# Patient Record
Sex: MALE | Race: WHITE | NOT HISPANIC OR LATINO | Employment: FULL TIME | URBAN - METROPOLITAN AREA
[De-identification: names, ages, dates, MRNs, and addresses within clinical notes are randomized per-mention and may not be internally consistent; named-entity substitution may affect disease eponyms.]

---

## 2017-05-27 DIAGNOSIS — N41.1 CHRONIC PROSTATITIS: ICD-10-CM

## 2017-05-28 RX ORDER — ATORVASTATIN CALCIUM 20 MG/1
TABLET, FILM COATED ORAL
Qty: 90 TABLET | Refills: 12 | Status: SHIPPED | OUTPATIENT
Start: 2017-05-28 | End: 2018-06-04 | Stop reason: SDUPTHER

## 2017-05-28 RX ORDER — DOXYCYCLINE HYCLATE 100 MG
TABLET ORAL
Qty: 30 TABLET | Refills: 12 | Status: SHIPPED | OUTPATIENT
Start: 2017-05-28 | End: 2018-06-18 | Stop reason: CLARIF

## 2017-08-23 DIAGNOSIS — Z00.00 ROUTINE GENERAL MEDICAL EXAMINATION AT A HEALTH CARE FACILITY: Primary | ICD-10-CM

## 2017-10-13 ENCOUNTER — OFFICE VISIT (OUTPATIENT)
Dept: PULMONOLOGY | Facility: CLINIC | Age: 67
End: 2017-10-13

## 2017-10-13 ENCOUNTER — HOSPITAL ENCOUNTER (OUTPATIENT)
Dept: CARDIOLOGY | Facility: CLINIC | Age: 67
Discharge: HOME | End: 2017-10-13

## 2017-10-13 ENCOUNTER — CLINICAL SUPPORT (OUTPATIENT)
Dept: INTERNAL MEDICINE | Facility: CLINIC | Age: 67
End: 2017-10-13

## 2017-10-13 VITALS
HEART RATE: 79 BPM | SYSTOLIC BLOOD PRESSURE: 156 MMHG | HEIGHT: 69 IN | DIASTOLIC BLOOD PRESSURE: 88 MMHG | BODY MASS INDEX: 30.96 KG/M2 | WEIGHT: 209 LBS

## 2017-10-13 DIAGNOSIS — Z00.00 ANNUAL PHYSICAL EXAM: Primary | ICD-10-CM

## 2017-10-13 DIAGNOSIS — Z00.00 ROUTINE GENERAL MEDICAL EXAMINATION AT A HEALTH CARE FACILITY: ICD-10-CM

## 2017-10-13 DIAGNOSIS — Z00.00 ROUTINE GENERAL MEDICAL EXAMINATION AT A HEALTH CARE FACILITY: Primary | ICD-10-CM

## 2017-10-13 LAB
ALBUMIN SERPL BCP-MCNC: 3.8 G/DL
ALP SERPL-CCNC: 94 U/L
ALT SERPL W/O P-5'-P-CCNC: 21 U/L
ANION GAP SERPL CALC-SCNC: 11 MMOL/L
AST SERPL-CCNC: 23 U/L
BILIRUB SERPL-MCNC: 0.6 MG/DL
BUN SERPL-MCNC: 17 MG/DL
CALCIUM SERPL-MCNC: 9.7 MG/DL
CHLORIDE SERPL-SCNC: 95 MMOL/L
CHOLEST SERPL-MCNC: 181 MG/DL
CHOLEST/HDLC SERPL: 2.7 {RATIO}
CO2 SERPL-SCNC: 28 MMOL/L
COMPLEXED PSA SERPL-MCNC: 1.3 NG/ML
CREAT SERPL-MCNC: 0.9 MG/DL
ERYTHROCYTE [DISTWIDTH] IN BLOOD BY AUTOMATED COUNT: 12.3 %
EST. GFR  (AFRICAN AMERICAN): >60 ML/MIN/1.73 M^2
EST. GFR  (NON AFRICAN AMERICAN): >60 ML/MIN/1.73 M^2
ESTIMATED AVG GLUCOSE: 105 MG/DL
GLUCOSE SERPL-MCNC: 102 MG/DL
HBA1C MFR BLD HPLC: 5.3 %
HCT VFR BLD AUTO: 44.2 %
HCV AB SERPL QL IA: NEGATIVE
HDLC SERPL-MCNC: 67 MG/DL
HDLC SERPL: 37 %
HGB BLD-MCNC: 15.1 G/DL
LDLC SERPL CALC-MCNC: 98 MG/DL
MCH RBC QN AUTO: 29.4 PG
MCHC RBC AUTO-ENTMCNC: 34.2 G/DL
MCV RBC AUTO: 86 FL
NONHDLC SERPL-MCNC: 114 MG/DL
PLATELET # BLD AUTO: 244 K/UL
PMV BLD AUTO: 8.8 FL
POTASSIUM SERPL-SCNC: 4.5 MMOL/L
PROT SERPL-MCNC: 6.9 G/DL
RBC # BLD AUTO: 5.14 M/UL
SODIUM SERPL-SCNC: 134 MMOL/L
TRIGL SERPL-MCNC: 80 MG/DL
TSH SERPL DL<=0.005 MIU/L-ACNC: 0.58 UIU/ML
WBC # BLD AUTO: 6.2 K/UL

## 2017-10-13 PROCEDURE — 93000 ELECTROCARDIOGRAM COMPLETE: CPT | Mod: ,,, | Performed by: INTERNAL MEDICINE

## 2017-10-13 PROCEDURE — 84443 ASSAY THYROID STIM HORMONE: CPT

## 2017-10-13 PROCEDURE — 80053 COMPREHEN METABOLIC PANEL: CPT

## 2017-10-13 PROCEDURE — 99387 INIT PM E/M NEW PAT 65+ YRS: CPT | Mod: ,,, | Performed by: INTERNAL MEDICINE

## 2017-10-13 PROCEDURE — 86803 HEPATITIS C AB TEST: CPT

## 2017-10-13 PROCEDURE — 80061 LIPID PANEL: CPT

## 2017-10-13 PROCEDURE — 85027 COMPLETE CBC AUTOMATED: CPT

## 2017-10-13 PROCEDURE — 99999 PR PBB SHADOW E&M-EST. PATIENT-LVL III: CPT | Mod: PBBFAC,,, | Performed by: INTERNAL MEDICINE

## 2017-10-13 PROCEDURE — 83036 HEMOGLOBIN GLYCOSYLATED A1C: CPT

## 2017-10-13 PROCEDURE — 84153 ASSAY OF PSA TOTAL: CPT

## 2017-10-13 RX ORDER — VALSARTAN AND HYDROCHLOROTHIAZIDE 320; 25 MG/1; MG/1
1 TABLET, FILM COATED ORAL DAILY
Refills: 3 | Status: ON HOLD | COMMUNITY
Start: 2017-08-28 | End: 2019-07-22 | Stop reason: HOSPADM

## 2017-10-13 NOTE — LETTER
"October 14, 2017    Manpreet Hamilton  [address]  [place] [address]             [facility]  [address]  [facility] [address]  Phone: [phone] Dear Pancho,      Thank you for allowing me to serve you and perform your Executive Health exam on 10/13/2017. This letter will serve as a brief summary of the physical findings and laboratory/studies performed and recommendations at this time. Today's assessment is essentially normal. With the weather cooling down, it is time to get back on the links. Thanks for the Bear. The other grandfather is named "Alberto".         If you have any questions or concerns, please don't hesitate to call.    Sincerely,        Matias Pradhan MD     "

## 2017-10-14 NOTE — PROGRESS NOTES
Subjective:       Patient ID: Manpreet Hamilton is a 67 y.o. male.    Chief Complaint: Annual Exam    HPI  68 yo  from [place] Comes for his periodic health exam. He is feeling well, had a ganglion taking on his toe last month as his only significant medical encounter since last year. Has no active medical compliant. He has no played much golf the weather has been too hot.   Review of Systems   Constitutional: Negative.    HENT: Negative.    Eyes: Negative.    Respiratory: Negative.    Cardiovascular: Negative.    Gastrointestinal: Negative.                 Repair of hiatal hernia with good results.   Genitourinary: Negative.         Hx of chronic epidymitis   Musculoskeletal: Negative.         Ganglion surgerically removed from toe recently   Skin: Negative.    Neurological: Negative.    Psychiatric/Behavioral: Negative.    All other systems reviewed and are negative.      Objective:      Physical Exam   Constitutional: He is oriented to person, place, and time. He appears well-developed and well-nourished.   HENT:   Head: Normocephalic and atraumatic.   Right Ear: External ear normal.   Left Ear: External ear normal.   Eyes: Conjunctivae and EOM are normal. Pupils are equal, round, and reactive to light.   Neck: Normal range of motion. Neck supple.   Cardiovascular: Normal rate, regular rhythm and normal heart sounds.    Pulmonary/Chest: Effort normal and breath sounds normal.   Peak flow 500 l/min   Abdominal: Soft. Bowel sounds are normal.   Musculoskeletal: Normal range of motion.   Neurological: He is alert and oriented to person, place, and time. He has normal reflexes.   Skin: Skin is warm and dry.   Psychiatric: He has a normal mood and affect. His behavior is normal. Judgment and thought content normal.       Assessment:       No diagnosis found.    Plan:           Labs; All parameters are normal EKG is normal. IMP: Healthy Male 15 pounds overweight

## 2018-02-08 ENCOUNTER — TELEPHONE (OUTPATIENT)
Dept: PULMONOLOGY | Facility: CLINIC | Age: 68
End: 2018-02-08

## 2018-02-08 NOTE — TELEPHONE ENCOUNTER
Has had a cough for a month. Chest x-ray in [place] suggests fibrosis. His Ct shows mild increase in interstitial  Line marking. No honeycombing and no appreciable ground glass. Will treat with steroids for alveolitis.

## 2018-03-01 ENCOUNTER — HOSPITAL ENCOUNTER (OUTPATIENT)
Dept: RADIOLOGY | Facility: HOSPITAL | Age: 68
Discharge: HOME | End: 2018-03-01
Attending: INTERNAL MEDICINE
Payer: COMMERCIAL

## 2018-03-01 ENCOUNTER — HOSPITAL ENCOUNTER (OUTPATIENT)
Dept: PULMONOLOGY | Facility: CLINIC | Age: 68
Discharge: HOME | End: 2018-03-01
Payer: COMMERCIAL

## 2018-03-01 ENCOUNTER — OFFICE VISIT (OUTPATIENT)
Dept: PULMONOLOGY | Facility: CLINIC | Age: 68
End: 2018-03-01
Payer: COMMERCIAL

## 2018-03-01 VITALS — BODY MASS INDEX: 31.31 KG/M2 | WEIGHT: 212 LBS

## 2018-03-01 DIAGNOSIS — R91.8 ABNORMAL FINDINGS ON DIAGNOSTIC IMAGING OF LUNG: ICD-10-CM

## 2018-03-01 DIAGNOSIS — J84.9 ILD (INTERSTITIAL LUNG DISEASE): Primary | ICD-10-CM

## 2018-03-01 PROCEDURE — 71250 CT THORAX DX C-: CPT | Mod: TC

## 2018-03-01 PROCEDURE — 94727 GAS DIL/WSHOT DETER LNG VOL: CPT | Mod: S$GLB,,, | Performed by: INTERNAL MEDICINE

## 2018-03-01 PROCEDURE — 99999 PR PBB SHADOW E&M-EST. PATIENT-LVL II: CPT | Mod: PBBFAC,,, | Performed by: INTERNAL MEDICINE

## 2018-03-01 PROCEDURE — 94729 DIFFUSING CAPACITY: CPT | Mod: S$GLB,,, | Performed by: INTERNAL MEDICINE

## 2018-03-01 PROCEDURE — 71250 CT THORAX DX C-: CPT | Mod: 26,,, | Performed by: RADIOLOGY

## 2018-03-01 PROCEDURE — 99214 OFFICE O/P EST MOD 30 MIN: CPT | Mod: S$GLB,,, | Performed by: INTERNAL MEDICINE

## 2018-03-01 NOTE — LETTER
March 5, 2018     No referring provider defined for this encounter.               [facility]  [address]  [facility] [address]  Phone: [phone] Dear {MR/MRS/MS/DR:48140} No ref. provider found     I saw your patient, Manpreet Hamilton, in the Pl[facility] Surgery [facility] on 3/1/2018.    Thank you for referring him and allowing me to participate in his care.    Sincerely,          Radames Pradhan MD

## 2018-03-02 NOTE — PROGRESS NOTES
Subjective:       Patient ID: Manpreet Hamilton is a 67 y.o. male.    Chief Complaint: Pneumonia and Shortness of Breath    HPI  66 yo male from [place] who I have cared for years comes for follow up on recent onset on dyspnea following a atypical pneumonia in early February and  A CT done in Mid February shows fibrotic changes in the lower lung fields and sub pleurral areas. I empirically put him on 40 mg of prednisone for two and he is currently completing a 2 week course of 30 mg. He admits to mild exertional dyspnea with exertion. He has not played golf because of weather not because of his breathing.  On exam today he has bi basilar velcro rales. His PFT's show mild reduction In FVC and TLC BUT a normal DLCO of 88% His resting Sa02: 97% and after walking the length of the carter and back it did drop to 93%  His CT today is compared to his CT done in [place] in Feb. And in my opinion it is slightly better but fibrotic changes remain. Could be early UIP vs NSIP.  I favor the latter because of a documented illness with subsequent respiratory symptoms..  His CT showed significant coronary artery calcifications, He denies chest pain. Will ask Dr. Scanlon, his PCP in [place] to order a stress EKG. Will taper his prednisone to 20 mg per two weeks then 10 mg for two weeks and then repeat the lung volumes and DLCO       No flowsheet data found.]  Review of Systems   Constitutional: Negative.    HENT: Negative.    Eyes: Negative.    Respiratory: Negative.  Positive for dyspnea on extertion.         Recent bout of atypical pneumonia,, now abnormal CT and PFT's   Cardiovascular: Negative.         Coronary artery calcifications  on CT   Genitourinary: Negative.    Musculoskeletal: Negative.    Skin: Negative.    Gastrointestinal: Negative.    Neurological: Negative.    Psychiatric/Behavioral: Negative.        Objective:      Physical Exam   Constitutional: He is oriented to person, place, and time. He appears  well-developed and well-nourished.   HENT:   Head: Normocephalic and atraumatic.   Right Ear: External ear normal.   Left Ear: External ear normal.   Eyes: Conjunctivae and EOM are normal. Pupils are equal, round, and reactive to light.   Neck: Normal range of motion. Neck supple.   Cardiovascular: Normal rate, regular rhythm and normal heart sounds.    Pulmonary/Chest: Effort normal and breath sounds normal.   Bi basilar velcro rales   Abdominal: Soft. Bowel sounds are normal.   Musculoskeletal: Normal range of motion.   Neurological: He is alert and oriented to person, place, and time. He has normal reflexes.   Skin: Skin is warm and dry.   Psychiatric: He has a normal mood and affect. His behavior is normal. Judgment and thought content normal.           Assessment:       No diagnosis found.    Outpatient Encounter Prescriptions as of 3/1/2018   Medication Sig Dispense Refill    ascorbic acid (VITAMIN C) 100 MG tablet Take 100 mg by mouth once daily.      aspirin (ECOTRIN) 81 MG EC tablet Take 81 mg by mouth once daily.      atorvastatin (LIPITOR) 20 MG tablet TAKE ONE TABLET BY MOUTH NIGHTLY 90 tablet 12    azelastine (ASTELIN) 137 mcg nasal spray 1 spray by Nasal route 2 (two) times daily. prn      doxycycline (VIBRA-TABS) 100 MG tablet TAKE ONE TABLET BY MOUTH ONCE DAILY FIRST 5 DAYS OF EACH MONTH 30 tablet 12    valsartan-hydrochlorothiazide (DIOVAN-HCT) 320-25 mg per tablet Take 1 tablet by mouth once daily.  3    VIAGRA 50 mg tablet Take 1 tablet (50 mg total) by mouth once daily. 30 tablet 10     No facility-administered encounter medications on file as of 3/1/2018.      No orders of the defined types were placed in this encounter.    Plan:        IMP NSIP following recent bout of atypical pneumonia  Continue on prednisone and get a stress EKG       PFT's done at [facility] in [place]: Feb 5: FVC;2.70 liters 63%, TLC:3.62 liters and DLCO:19.27  67%   The DLCO today is improved at 88% and  FVC 2.81  liters  and T.10 liters. Has improved on steroids. Certainly justifies the continued use but will taper and do CT and PFT's in six weeks.      DLCO cer

## 2018-03-06 DIAGNOSIS — J84.10 PULMONARY FIBROSIS: Primary | ICD-10-CM

## 2018-03-14 ENCOUNTER — PATIENT MESSAGE (OUTPATIENT)
Dept: PULMONOLOGY | Facility: CLINIC | Age: 68
End: 2018-03-14

## 2018-03-14 DIAGNOSIS — J84.9 INTERSTITIAL LUNG DISEASE: ICD-10-CM

## 2018-03-23 ENCOUNTER — TELEPHONE (OUTPATIENT)
Dept: PULMONOLOGY | Facility: CLINIC | Age: 68
End: 2018-03-23

## 2018-03-26 ENCOUNTER — PATIENT MESSAGE (OUTPATIENT)
Dept: PULMONOLOGY | Facility: CLINIC | Age: 68
End: 2018-03-26

## 2018-03-26 DIAGNOSIS — M54.50 LOW BACK PAIN, NON-SPECIFIC: ICD-10-CM

## 2018-03-26 DIAGNOSIS — R35.0 FREQUENCY OF URINATION: Primary | ICD-10-CM

## 2018-03-27 ENCOUNTER — HOSPITAL ENCOUNTER (OUTPATIENT)
Dept: PULMONOLOGY | Facility: CLINIC | Age: 68
Discharge: HOME | End: 2018-03-27
Payer: COMMERCIAL

## 2018-03-27 ENCOUNTER — HOSPITAL ENCOUNTER (OUTPATIENT)
Dept: RADIOLOGY | Facility: HOSPITAL | Age: 68
Discharge: HOME | End: 2018-03-27
Attending: INTERNAL MEDICINE
Payer: COMMERCIAL

## 2018-03-27 ENCOUNTER — OFFICE VISIT (OUTPATIENT)
Dept: PULMONOLOGY | Facility: CLINIC | Age: 68
End: 2018-03-27
Payer: COMMERCIAL

## 2018-03-27 ENCOUNTER — CLINICAL SUPPORT (OUTPATIENT)
Dept: INTERNAL MEDICINE | Facility: CLINIC | Age: 68
End: 2018-03-27
Attending: INTERNAL MEDICINE
Payer: COMMERCIAL

## 2018-03-27 ENCOUNTER — OFFICE VISIT (OUTPATIENT)
Dept: UROLOGY | Facility: CLINIC | Age: 68
End: 2018-03-27
Payer: COMMERCIAL

## 2018-03-27 VITALS
WEIGHT: 215.38 LBS | SYSTOLIC BLOOD PRESSURE: 131 MMHG | HEART RATE: 83 BPM | BODY MASS INDEX: 31.9 KG/M2 | HEIGHT: 69 IN | DIASTOLIC BLOOD PRESSURE: 69 MMHG

## 2018-03-27 VITALS
WEIGHT: 215.38 LBS | SYSTOLIC BLOOD PRESSURE: 128 MMHG | OXYGEN SATURATION: 96 % | DIASTOLIC BLOOD PRESSURE: 74 MMHG | HEIGHT: 69 IN | BODY MASS INDEX: 31.9 KG/M2 | HEART RATE: 78 BPM

## 2018-03-27 DIAGNOSIS — N13.8 BPH WITH OBSTRUCTION/LOWER URINARY TRACT SYMPTOMS: ICD-10-CM

## 2018-03-27 DIAGNOSIS — R39.15 URINARY URGENCY: ICD-10-CM

## 2018-03-27 DIAGNOSIS — G62.9 ACQUIRED POLYNEUROPATHY: Primary | ICD-10-CM

## 2018-03-27 DIAGNOSIS — J84.9 INTERSTITIAL LUNG DISEASE: ICD-10-CM

## 2018-03-27 DIAGNOSIS — R06.00 DYSPNEA AND RESPIRATORY ABNORMALITY: ICD-10-CM

## 2018-03-27 DIAGNOSIS — R39.14 FEELING OF INCOMPLETE BLADDER EMPTYING: Primary | ICD-10-CM

## 2018-03-27 DIAGNOSIS — N40.1 BPH WITH OBSTRUCTION/LOWER URINARY TRACT SYMPTOMS: ICD-10-CM

## 2018-03-27 DIAGNOSIS — J84.10 PULMONARY FIBROSIS: ICD-10-CM

## 2018-03-27 DIAGNOSIS — R06.89 DYSPNEA AND RESPIRATORY ABNORMALITY: ICD-10-CM

## 2018-03-27 DIAGNOSIS — J84.9 ILD (INTERSTITIAL LUNG DISEASE): Primary | ICD-10-CM

## 2018-03-27 DIAGNOSIS — M54.50 LOW BACK PAIN, NON-SPECIFIC: ICD-10-CM

## 2018-03-27 DIAGNOSIS — R35.0 URINE FREQUENCY: ICD-10-CM

## 2018-03-27 LAB
BILIRUB SERPL-MCNC: NORMAL MG/DL
BLOOD URINE, POC: NORMAL
COLOR, POC UA: NORMAL
FOLATE SERPL-MCNC: 12.1 NG/ML
GLUCOSE UR QL STRIP: NORMAL
KETONES UR QL STRIP: NORMAL
LEUKOCYTE ESTERASE URINE, POC: NORMAL
NITRITE, POC UA: NORMAL
PH, POC UA: 8
POC RESIDUAL URINE VOLUME: 53 ML (ref 0–100)
PROTEIN, POC: NORMAL
SPECIFIC GRAVITY, POC UA: 1
T4 FREE SERPL-MCNC: 0.92 NG/DL
TSH SERPL DL<=0.005 MIU/L-ACNC: 0.42 UIU/ML
UROBILINOGEN, POC UA: NORMAL
VIT B12 SERPL-MCNC: 422 PG/ML

## 2018-03-27 PROCEDURE — 51798 US URINE CAPACITY MEASURE: CPT | Mod: S$GLB,,, | Performed by: NURSE PRACTITIONER

## 2018-03-27 PROCEDURE — 94727 GAS DIL/WSHOT DETER LNG VOL: CPT | Mod: S$GLB,,, | Performed by: INTERNAL MEDICINE

## 2018-03-27 PROCEDURE — 84425 ASSAY OF VITAMIN B-1: CPT

## 2018-03-27 PROCEDURE — 71250 CT THORAX DX C-: CPT | Mod: TC

## 2018-03-27 PROCEDURE — 81001 URINALYSIS AUTO W/SCOPE: CPT | Mod: S$GLB,,, | Performed by: NURSE PRACTITIONER

## 2018-03-27 PROCEDURE — 94729 DIFFUSING CAPACITY: CPT | Mod: S$GLB,,, | Performed by: INTERNAL MEDICINE

## 2018-03-27 PROCEDURE — 72148 MRI LUMBAR SPINE W/O DYE: CPT | Mod: 26,,, | Performed by: RADIOLOGY

## 2018-03-27 PROCEDURE — 99214 OFFICE O/P EST MOD 30 MIN: CPT | Mod: S$GLB,,, | Performed by: INTERNAL MEDICINE

## 2018-03-27 PROCEDURE — 99999 PR PBB SHADOW E&M-EST. PATIENT-LVL III: CPT | Mod: PBBFAC,,, | Performed by: INTERNAL MEDICINE

## 2018-03-27 PROCEDURE — 84443 ASSAY THYROID STIM HORMONE: CPT

## 2018-03-27 PROCEDURE — 82746 ASSAY OF FOLIC ACID SERUM: CPT

## 2018-03-27 PROCEDURE — 72148 MRI LUMBAR SPINE W/O DYE: CPT | Mod: TC

## 2018-03-27 PROCEDURE — 99203 OFFICE O/P NEW LOW 30 MIN: CPT | Mod: 25,S$GLB,, | Performed by: NURSE PRACTITIONER

## 2018-03-27 PROCEDURE — 84439 ASSAY OF FREE THYROXINE: CPT

## 2018-03-27 PROCEDURE — 82607 VITAMIN B-12: CPT

## 2018-03-27 PROCEDURE — 71250 CT THORAX DX C-: CPT | Mod: 26,,, | Performed by: RADIOLOGY

## 2018-03-27 PROCEDURE — 99999 PR PBB SHADOW E&M-EST. PATIENT-LVL III: CPT | Mod: PBBFAC,,, | Performed by: NURSE PRACTITIONER

## 2018-03-27 RX ORDER — PREDNISONE 20 MG/1
10 TABLET ORAL DAILY
Refills: 0 | COMMUNITY
Start: 2018-03-08 | End: 2018-11-15

## 2018-03-27 RX ORDER — BACLOFEN 10 MG/1
2 TABLET ORAL 3 TIMES DAILY PRN
Refills: 1 | Status: ON HOLD | COMMUNITY
Start: 2018-03-22 | End: 2018-06-27 | Stop reason: CLARIF

## 2018-03-27 RX ORDER — TAMSULOSIN HYDROCHLORIDE 0.4 MG/1
0.4 CAPSULE ORAL NIGHTLY
Qty: 30 CAPSULE | Refills: 11 | Status: SHIPPED | OUTPATIENT
Start: 2018-03-27 | End: 2018-11-15

## 2018-03-27 RX ORDER — DOXYCYCLINE 100 MG/1
1 CAPSULE ORAL DAILY
Refills: 22 | COMMUNITY
Start: 2018-03-06 | End: 2018-06-04 | Stop reason: SDUPTHER

## 2018-03-27 NOTE — LETTER
March 29, 2018    Manpreet Hamilton  [address]  [place] [address]             [facility]  [address]  [facility] [address]  Phone: [phone] Dear Pancho,    Your studies of lung function declined very slightly since the original studies of March 1. I have added cellceft and reduced your prednisone to 10 mg a day. Your MRI shows moderate degenrative changes with some disc bulging and mild spinal stenosis. If agreeable, I will make an appointment with one of our neurosurgeons to review your options when you return to see me in May.      You have several issues that need close monitoring.    If you have any questions or concerns, please don't hesitate to call.    Sincerely,        Matias Pradhan MD

## 2018-03-27 NOTE — PROGRESS NOTES
Subjective:       Patient ID: Manpreet Hamilton is a 67 y.o. male.    Chief Complaint: incomplete bladder emptying; Urinary Frequency; and Urinary Urgency      HPI: Manpreet Hamilton is a 67 y.o. male who presents today for evaluation and management of feeling of incomplete bladder emptying, urinary frequency and urinary urgency. [facility] but a new patient to me. This is his initial visit to clinic.    Today he presents to clinic for feeling of incomplete bladder emptying, urinary frequency and urgency. His symptoms have been occurring for the past year and gradually worsening over the past few months. He reports daytime frequency about every 2 hours and nocturia x 1. He reports urgency but is able to hold urine for short time. He denies any incontinence. He reports he has to strain to start his urine stream and once stream is started, it is intermittent stream. He denies dysuria, hematuria, flank pain. He had a UTI over 15 years ago and denies any hx of kidney stones or personal or family hx of prostate cancer.   He has ED and takes viagra, which he receives from his PCP. He has hx of epididymitis, which has been treated with doxycycline in the past, but denies any recent episode of epididymitis.  He drinks coffee throughout the day and evening.    Lab Results   Component Value Date    PSA 1.3 10/13/2017    PSA 1.0 08/05/2016    PSA 1.1 09/03/2015       Review of patient's allergies indicates:   Allergen Reactions    Penicillins        Current Outpatient Prescriptions   Medication Sig Dispense Refill    ascorbic acid (VITAMIN C) 100 MG tablet Take 100 mg by mouth once daily.      aspirin (ECOTRIN) 81 MG EC tablet Take 81 mg by mouth once daily.      atorvastatin (LIPITOR) 20 MG tablet TAKE ONE TABLET BY MOUTH NIGHTLY 90 tablet 12    baclofen (LIORESAL) 10 MG tablet 2 tablets 3 (three) times daily as needed.  1    doxycycline (VIBRA-TABS) 100 MG tablet TAKE ONE TABLET  BY MOUTH ONCE DAILY FIRST 5 DAYS OF EACH MONTH 30 tablet 12    doxycycline (VIBRAMYCIN) 100 MG Cap Take 1 capsule by mouth once daily. 1 first 5 days of each month  22    predniSONE (DELTASONE) 20 MG tablet Take 20 mg by mouth once daily.  0    valsartan-hydrochlorothiazide (DIOVAN-HCT) 320-25 mg per tablet Take 1 tablet by mouth once daily.  3    VIAGRA 50 mg tablet Take 1 tablet (50 mg total) by mouth once daily. 30 tablet 10    azelastine (ASTELIN) 137 mcg nasal spray 1 spray by Nasal route 2 (two) times daily. prn      tamsulosin (FLOMAX) 0.4 mg Cp24 Take 1 capsule (0.4 mg total) by mouth every evening. 30 capsule 11     No current facility-administered medications for this visit.        Past Medical History:   Diagnosis Date    Hyperlipemia     Hypertension     Nasal congestion        Past Surgical History:   Procedure Laterality Date    COLONOSCOPY N/A 8/5/2016    Procedure: COLONOSCOPY;  Surgeon: Leon Padilla MD;  Location: [address]);  Service: Endoscopy;  Laterality: N/A;    HAND SURGERY      left    HIATAL HERNIA REPAIR      TONSILLECTOMY, ADENOIDECTOMY      VASECTOMY         Family History   Problem Relation Age of Onset    Coronary artery disease Mother     Parkinsonism Father     Dementia Father     Coronary artery disease Sister     Multiple sclerosis Sister        Review of Systems   Constitutional: Negative for chills, fatigue and fever.   HENT: Negative for congestion and trouble swallowing.    Eyes: Negative for visual disturbance.   Respiratory: Negative for chest tightness and shortness of breath.    Cardiovascular: Positive for leg swelling. Negative for chest pain and palpitations.   Gastrointestinal: Negative for abdominal pain, constipation, diarrhea, nausea and vomiting.   Genitourinary: Positive for frequency and urgency. Negative for dysuria, flank pain, hematuria, scrotal swelling and testicular pain.        See HPI   Musculoskeletal: Negative for back  pain and gait problem.   Skin: Negative for rash.   Allergic/Immunologic: Negative for immunocompromised state.   Neurological: Negative for dizziness, seizures, syncope, weakness, light-headedness and headaches.   Hematological: Negative for adenopathy.   Psychiatric/Behavioral: Negative for confusion. The patient is not nervous/anxious.          All other systems were reviewed and were negative.    Objective:     Vitals:    03/27/18 1021   BP: 131/69   Pulse: 83        Physical Exam   Nursing note and vitals reviewed.  Constitutional: He is oriented to person, place, and time. He appears well-developed and well-nourished.   HENT:   Head: Normocephalic and atraumatic.   Eyes: Conjunctivae and EOM are normal.   Neck: Normal range of motion.   Cardiovascular: Normal rate and regular rhythm.    Pulmonary/Chest: Effort normal. No respiratory distress.   Abdominal: Soft.   Genitourinary: Right testis shows no mass, no swelling and no tenderness. Left testis shows no mass, no swelling and no tenderness. Circumcised. No penile erythema or penile tenderness. No discharge found.       Musculoskeletal: Normal range of motion. He exhibits no edema.   Neurological: He is alert and oriented to person, place, and time.   Skin: Skin is warm and dry.     Psychiatric: He has a normal mood and affect. His behavior is normal. Judgment and thought content normal.         Lab Results   Component Value Date    CREATININE 0.9 10/13/2017     Lab Results   Component Value Date    EGFRNONAA >60.0 10/13/2017     Lab Results   Component Value Date    ESTGFRAFRICA >60.0 10/13/2017     Urine dipstick in clinic: sp grav 1.005, pH 8, negative blood or bacteria    PVR: done in clinic with bladder scanner by Nurse Tolbert was 53 ml.    Assessment:       1. Feeling of incomplete bladder emptying    2. Urine frequency    3. Urinary urgency    4. BPH with obstruction/lower urinary tract symptoms        Plan:     Manpreet Loza was seen today for  incomplete bladder emptying, urinary frequency and urinary urgency.    Diagnoses and all orders for this visit:    Feeling of incomplete bladder emptying  -     POCT urinalysis, dipstick or tablet reag  -     POCT Bladder Scan  -     tamsulosin (FLOMAX) 0.4 mg Cp24; Take 1 capsule (0.4 mg total) by mouth every evening.    Urine frequency  -     POCT urinalysis, dipstick or tablet reag  -     POCT Bladder Scan  -     tamsulosin (FLOMAX) 0.4 mg Cp24; Take 1 capsule (0.4 mg total) by mouth every evening.    Urinary urgency  -     POCT urinalysis, dipstick or tablet reag  -     POCT Bladder Scan  -     tamsulosin (FLOMAX) 0.4 mg Cp24; Take 1 capsule (0.4 mg total) by mouth every evening.    BPH with obstruction/lower urinary tract symptoms  -     tamsulosin (FLOMAX) 0.4 mg Cp24; Take 1 capsule (0.4 mg total) by mouth every evening.    -Discussed plan of care with patient  -Education and recommendations of today's plan of care including home remedies.  Discussed the in office findings; reassurance with no infection and emptying his bladder.  Discussed the contributing factors for his LUTS.  Diet modifications; reducing PM fluids (no eating or drinking 2-3 hours prior to bedtime) and avoid bladder irritants.  Avoid Bladder Irritants: Tea, coffee, caffeine, alcohol, artificial sweeteners, citrus, spicy foods, acidic foods,chocolate, tomato-based foods, smoking  Elevating his feet couple of hours prior to bedtime.  -Can start flomax; take at bedtime; Discussed side effects, indications, and MOA for flomax. Prescription sent to the pharmacy. Pt verbalized understanding. If feeling dizzy can stop.  -Will call patient in one month to see how medication is working since patient lives in [place].  -Follow up in one year with PSA or prn      I encouraged him to call or email me with questions and/or concerns.     I spent 35 minutes with the patient of which more than half was spent in coordinating the patient's care as well as  in direct consultation with the patient in regards to our treatment and plan.

## 2018-03-27 NOTE — LETTER
[facility]  [address] [facility] [address]  Phone: [phone] April 23, 2018     Dr. Yonis Sheets  [facility] [employer] [facility]  Dept of Neurosurgery [address]  [facility] 3[address]  [facility]. [id number]    Patient: Manpreet Hamilton   MR Number: [id number]   YOB: 1950   Date of Visit: 3/27/2018       Dear Dr. Sheets,    I am referring my patient, Manpreet Hamilton, to you for evaluation of lower back pain and accompanying peripheral neuropathy. At the time of my visit, I noted a slight right sided foot drop. He says that has improved. He is currently in [place] but he has sent you a disc of his MRI done here for your review and hopes to see you later this month or early May,     I appreciate your assistance in his care and look forward to your findings and recommendations.    Sincerely,                           Radames Pradhan MD            CC  No Recipients

## 2018-03-27 NOTE — LETTER
March 27, 2018      Matias Pradhan MD  [address]  [facility] [address]           [facility] 4th Floor  [address]  [facility] [address]  Phone: [phone]          Patient: Manpreet Hamilton   MR Number: [id number]   YOB: 1950   Date of Visit: 3/27/2018       Dear Dr. Matias Pradhan:    Thank you for referring Manpreet Hamilton to me for evaluation. Attached you will find relevant portions of my assessment and plan of care.    If you have questions, please do not hesitate to call me. I look forward to following Manpreet Hamilton along with you.    Sincerely,    Rose Delgadillo, NBA    Enclosure  CC:  No Recipients    If you would like to receive this communication electronically, please contact [email or web address] or [phone] to request more information on [employer] access.    For providers and/or their staff who would like to refer a patient to [facility], please contact us through our one-stop-shop provider referral line, [facility] , at [phone].    If you feel you have received this communication in error or would no longer like to receive these types of communications, please e-mail [email or web address]

## 2018-03-29 NOTE — PROGRESS NOTES
Subjective:       Patient ID: Manpreet Hamilton is a 67 y.o. male.    Chief Complaint: Interstitial Lung Disease    HPI 68 yo  comes for follow up on new onset pulmonary fibrosis following an acute illness in January, labeled an  Atypical pneumonia by his PCP in [place].  I have had him on prednisone, 40 mg for two weeks and he is down to 30 mg for the past two weeks. There has been a slight decline in his lung volumes and DLCO since his baseline studies of  March 1st.  There is no appreciable change on his CT study compared to one six weeks ago. I have suggested dropping prednisone to 10 mg and adding cellceft 10-00 mg daily.  While here he had a MRI of his lumbar spine for assessment of a peripheral neuropathy and weakness in his lower extremity. There are significant changes in the lower lumbar spine with some encroachment and findings of mild spinal stenosis. When he returns in six weeks for follow up on his lung function will get a consult in Neurosurgery. I will had the neurosurgeon review his MRI next week in case he feels that he needs to be seen sooner .  He was also seen in urology clinic and found to have urinary retention and Benign Prostatic Hypertrophy and started on Flomax 0.4 mg at bedtime.  A copy of  This letter and all report will be sent to Dr. Josiah Scanlon in [place].       No flowsheet data found.]  Review of Systems   Constitutional: Negative.    HENT: Negative.    Eyes: Negative.    Respiratory: Positive for shortness of breath.         New onset post viral pulmonary fibrosis    Exsmoker Quit 30 years ago   Cardiovascular: Negative.    Genitourinary: Positive for difficulty urinating.   Musculoskeletal: Positive for back pain.        Peripheral   neuropathy  All chemical tests are normal: B-12. Folate and TSH   Skin: Negative.    Gastrointestinal: Negative.    Neurological: Negative.    Psychiatric/Behavioral: Negative.        Objective:      Physical Exam    Constitutional: He is oriented to person, place, and time. He appears well-developed and well-nourished.   HENT:   Head: Normocephalic and atraumatic.   Right Ear: External ear normal.   Left Ear: External ear normal.   Eyes: Conjunctivae and EOM are normal. Pupils are equal, round, and reactive to light.   Neck: Normal range of motion. Neck supple.   Cardiovascular: Normal rate, regular rhythm and normal heart sounds.    Pulmonary/Chest: Effort normal and breath sounds normal.   Faint bi basilar velcro rales   Resting Sa02: 97% fell to 92$ after walking the length of the carter and back.   Abdominal: Soft. Bowel sounds are normal.   Musculoskeletal: Normal range of motion.   Neurological: He is alert and oriented to person, place, and time. He has normal reflexes.   Skin: Skin is warm and dry.   Psychiatric: He has a normal mood and affect. His behavior is normal. Judgment and thought content normal.           Assessment:       No diagnosis found.    Outpatient Encounter Prescriptions as of 3/27/2018   Medication Sig Dispense Refill    ascorbic acid (VITAMIN C) 100 MG tablet Take 100 mg by mouth once daily.      aspirin (ECOTRIN) 81 MG EC tablet Take 81 mg by mouth once daily.      atorvastatin (LIPITOR) 20 MG tablet TAKE ONE TABLET BY MOUTH NIGHTLY 90 tablet 12    azelastine (ASTELIN) 137 mcg nasal spray 1 spray by Nasal route 2 (two) times daily. prn      baclofen (LIORESAL) 10 MG tablet 2 tablets 3 (three) times daily as needed.  1    doxycycline (VIBRA-TABS) 100 MG tablet TAKE ONE TABLET BY MOUTH ONCE DAILY FIRST 5 DAYS OF EACH MONTH 30 tablet 12    doxycycline (VIBRAMYCIN) 100 MG Cap Take 1 capsule by mouth once daily. 1 first 5 days of each month  22    predniSONE (DELTASONE) 20 MG tablet Take 20 mg by mouth once daily.  0    tamsulosin (FLOMAX) 0.4 mg Cp24 Take 1 capsule (0.4 mg total) by mouth every evening. 30 capsule 11    valsartan-hydrochlorothiazide (DIOVAN-HCT) 320-25 mg per tablet Take 1  tablet by mouth once daily.  3    VIAGRA 50 mg tablet Take 1 tablet (50 mg total) by mouth once daily. 30 tablet 10     No facility-administered encounter medications on file as of 3/27/2018.      No orders of the defined types were placed in this encounter.    Plan:             IMP: Mild post infectious alveolitis with suggestion of fibrosis.  Lumbar back disease with some spinal stenosis and Benign Prostatic Hypertrophy

## 2018-03-30 LAB — VIT B1 SERPL-MCNC: 56 UG/L (ref 38–122)

## 2018-04-27 ENCOUNTER — TELEPHONE (OUTPATIENT)
Dept: UROLOGY | Facility: CLINIC | Age: 68
End: 2018-04-27

## 2018-04-27 NOTE — TELEPHONE ENCOUNTER
Spoke with patient regarding flomax. He was started on flomax about 1 month ago for BPH with LUTS. He reports slight improvement to symptoms. He is on vacation and will call clinic next week to discuss follow up appt.

## 2018-05-01 ENCOUNTER — TELEPHONE (OUTPATIENT)
Dept: UROLOGY | Facility: CLINIC | Age: 68
End: 2018-05-01

## 2018-05-01 DIAGNOSIS — J84.9 ILD (INTERSTITIAL LUNG DISEASE): Primary | ICD-10-CM

## 2018-05-01 NOTE — TELEPHONE ENCOUNTER
Returned patient's phone call. A follow up appt was made for 5/15/18. He reports flomax has slightly improved his symptoms.    ----- Message from Shaheed Cameron LPN sent at 5/1/2018 11:00 AM CDT -----  Contact: Self- [phone]      ----- Message -----  From: Kellie Gaines  Sent: 5/1/2018   8:08 AM  To: [facility] Staff    Patient Returning Call    Who Called: Pt  Who Left Message for Patient: Rose Delgadillo   Does the patient know what this is regarding?: No  Communication Preference Call back [phone]  Additional Information:

## 2018-05-02 ENCOUNTER — TELEPHONE (OUTPATIENT)
Dept: NEUROSURGERY | Facility: CLINIC | Age: 68
End: 2018-05-02

## 2018-05-02 NOTE — TELEPHONE ENCOUNTER
----- Message from Angel Pendleton MD sent at 5/2/2018 12:46 PM CDT -----  Logan,    Would you please double book Mr. Hamilton for the AM clinic on May 14?    Many thanks,  Angel

## 2018-05-02 NOTE — TELEPHONE ENCOUNTER
Advised patient that appt has been scheduled 5/14/18 at 1015. Patient agreed to appt. Date, time and location. Appt letter mailed to address on file.

## 2018-05-14 ENCOUNTER — OFFICE VISIT (OUTPATIENT)
Dept: SPINE | Facility: CLINIC | Age: 68
End: 2018-05-14
Payer: COMMERCIAL

## 2018-05-14 ENCOUNTER — HOSPITAL ENCOUNTER (OUTPATIENT)
Dept: PULMONOLOGY | Facility: CLINIC | Age: 68
Discharge: HOME | End: 2018-05-14
Payer: COMMERCIAL

## 2018-05-14 ENCOUNTER — OFFICE VISIT (OUTPATIENT)
Dept: PULMONOLOGY | Facility: CLINIC | Age: 68
End: 2018-05-14
Payer: COMMERCIAL

## 2018-05-14 ENCOUNTER — HOSPITAL ENCOUNTER (OUTPATIENT)
Dept: CARDIOLOGY | Facility: CLINIC | Age: 68
Discharge: HOME | End: 2018-05-14
Payer: COMMERCIAL

## 2018-05-14 VITALS
BODY MASS INDEX: 31.4 KG/M2 | HEART RATE: 76 BPM | WEIGHT: 212 LBS | DIASTOLIC BLOOD PRESSURE: 76 MMHG | SYSTOLIC BLOOD PRESSURE: 136 MMHG | HEIGHT: 69 IN

## 2018-05-14 VITALS
HEIGHT: 69 IN | OXYGEN SATURATION: 97 % | BODY MASS INDEX: 31.84 KG/M2 | WEIGHT: 215 LBS | HEART RATE: 107 BPM | SYSTOLIC BLOOD PRESSURE: 136 MMHG | DIASTOLIC BLOOD PRESSURE: 68 MMHG

## 2018-05-14 DIAGNOSIS — R00.0 TACHYCARDIA: ICD-10-CM

## 2018-05-14 DIAGNOSIS — M51.26 LUMBAR HERNIATED DISC: Primary | ICD-10-CM

## 2018-05-14 DIAGNOSIS — R06.89 DYSPNEA AND RESPIRATORY ABNORMALITY: ICD-10-CM

## 2018-05-14 DIAGNOSIS — J84.9 ILD (INTERSTITIAL LUNG DISEASE): Primary | ICD-10-CM

## 2018-05-14 DIAGNOSIS — J84.9 ILD (INTERSTITIAL LUNG DISEASE): ICD-10-CM

## 2018-05-14 DIAGNOSIS — R06.00 DYSPNEA AND RESPIRATORY ABNORMALITY: ICD-10-CM

## 2018-05-14 DIAGNOSIS — R00.0 TACHYCARDIA: Primary | ICD-10-CM

## 2018-05-14 PROCEDURE — 99999 PR PBB SHADOW E&M-EST. PATIENT-LVL III: CPT | Mod: PBBFAC,,, | Performed by: INTERNAL MEDICINE

## 2018-05-14 PROCEDURE — 99214 OFFICE O/P EST MOD 30 MIN: CPT | Mod: S$GLB,,, | Performed by: INTERNAL MEDICINE

## 2018-05-14 PROCEDURE — 93000 ELECTROCARDIOGRAM COMPLETE: CPT | Mod: S$GLB,,, | Performed by: INTERNAL MEDICINE

## 2018-05-14 PROCEDURE — 99999 PR PBB SHADOW E&M-EST. PATIENT-LVL III: CPT | Mod: PBBFAC,,, | Performed by: NEUROLOGICAL SURGERY

## 2018-05-14 PROCEDURE — 94729 DIFFUSING CAPACITY: CPT | Mod: S$GLB,,, | Performed by: INTERNAL MEDICINE

## 2018-05-14 PROCEDURE — 99203 OFFICE O/P NEW LOW 30 MIN: CPT | Mod: S$GLB,,, | Performed by: NEUROLOGICAL SURGERY

## 2018-05-14 PROCEDURE — 94727 GAS DIL/WSHOT DETER LNG VOL: CPT | Mod: S$GLB,,, | Performed by: INTERNAL MEDICINE

## 2018-05-14 RX ORDER — SILDENAFIL CITRATE 20 MG/1
1 TABLET ORAL
Refills: 0 | COMMUNITY
Start: 2018-03-23 | End: 2018-11-15

## 2018-05-14 RX ORDER — MYCOPHENOLATE MOFETIL 500 MG/1
1000 TABLET ORAL DAILY
Refills: 1 | COMMUNITY
Start: 2018-03-28 | End: 2018-06-13

## 2018-05-14 RX ORDER — TRAMADOL HYDROCHLORIDE 50 MG/1
50 TABLET ORAL EVERY 6 HOURS PRN
Status: ON HOLD | COMMUNITY
End: 2018-06-28 | Stop reason: HOSPADM

## 2018-05-14 RX ORDER — HYDROCODONE POLISTIREX AND CHLORPHENIRAMINE POLISTIREX 10; 8 MG/5ML; MG/5ML
1 SUSPENSION, EXTENDED RELEASE ORAL 2 TIMES DAILY PRN
Refills: 0 | COMMUNITY
Start: 2018-05-07 | End: 2018-06-13

## 2018-05-14 NOTE — PROGRESS NOTES
Dear Dr. Pradhan,      Thank you for referring this patient to my clinic. The full details of my evaluation will also be forthcoming to you by letter.    CHIEF COMPLAINT:  Consult for low back pain    I, Gary Ayoub, attest that this documentation has been prepared under the direction and in the presence of Angel Pendleton MD.    HPI:  Manpreet Hamilton is a 67 y.o.  male with history of pulmonary fibrosis on decadron taper, who is referred to me by Dr. Pradhan for evaluation of low back pain. Pt reports primarily low back pain and posterior RLE pain radiating from the buttocks to the foot. He notes some LLE pain in the same region as well. Pt's leg pain is worse than his low back. He reports numbness in the 2nd and 3rd toes of the right foot and middle 3 toes of the left foot. He does not feel as if he is dragging his feet but states that Dr. Pradhan observed mild foot dragging. Pt does not notice any pain relief from the steroids he has been taking. The pain began in January and has been worsening since then. He received any PRERNA in [place] by Dr. Familia Hutchison but is unsure of the specific level. Pt reports urinary retention but denies any incontinence.       Review of patient's allergies indicates:   Allergen Reactions    Penicillins        Past Medical History:   Diagnosis Date    Hyperlipemia     Hypertension     Nasal congestion      Past Surgical History:   Procedure Laterality Date    COLONOSCOPY N/A 8/5/2016    Procedure: COLONOSCOPY;  Surgeon: Leon Padilla MD;  Location: [address]);  Service: Endoscopy;  Laterality: N/A;    HAND SURGERY      left    HIATAL HERNIA REPAIR      TONSILLECTOMY, ADENOIDECTOMY      VASECTOMY       Family History   Problem Relation Age of Onset    Coronary artery disease Mother     Parkinsonism Father     Dementia Father     Coronary artery disease Sister     Multiple sclerosis Sister      Social History   Substance Use Topics    Smoking status:  Former Smoker     Packs/day: 1.50     Years: 20.00     Types: Cigarettes     Quit date: 3/17/1985    Smokeless tobacco: Former User     Quit date: 1/30/1986    Alcohol use 10.2 oz/week     17 Glasses of wine per week        Review of Systems   Constitutional: Negative.    HENT: Negative.    Eyes: Negative.    Respiratory: Negative.    Cardiovascular: Negative.    Gastrointestinal: Negative.    Endocrine: Negative.    Genitourinary: Negative.         Urinary retention   Musculoskeletal: Positive for back pain (low back ) and myalgias (BLE, R>L). Negative for gait problem and neck pain.   Skin: Negative.    Allergic/Immunologic: Negative.    Neurological: Positive for numbness (2nd two toes of right foot and middle three toes of left foot ). Negative for weakness, light-headedness and headaches.   Hematological: Negative.    Psychiatric/Behavioral: Negative.        OBJECTIVE:   Vital Signs:  Pulse: 76 (05/14/18 0953)  BP: 136/76 (05/14/18 0953)    Physical Exam:    Vital signs: All nursing notes and vital signs reviewed -- afebrile, vital signs stable.  Constitutional: Patient sitting comfortably in chair. Appears well developed and well nourished.  Skin: Exposed areas are intact without abnormal markings, rashes or other lesions.  HEENT: Normocephalic. Normal conjunctivae.  Cardiovascular: Normal rate and regular rhythm.  Respiratory: Chest wall rises and falls symmetrically, without signs of respiratory distress.  Abdomen: Soft and non-tender.  Extremities: Warm and without edema. Calves supple, non-tender.  Psych/Behavior: Normal affect.    Neurological:    Mental status: Alert and oriented. Conversational and appropriate.       Cranial Nerves: Grossly intact.     Motor:    Upper:  Deltoids Triceps Biceps WE WF     R 5/5 5/5 5/5 5/5 5/5 5/5    L 5/5 5/5 5/5 5/5 5/5 5/5      Lower:  HF KE KF DF PF EHL    R 5/5 5/5 5/5 5/5 5/5 5/5    L 5/5 5/5 5/5 5/5 5/5 5/5     Sensory: Intact sensation to light touch in all  extremities. Romberg positive.    Reflexes:          DTR: 1 patellar, bilaterally. 1 achilles, bilaterally.     Lemos's: Negative.     Babinski's: Negative.     Clonus: Negative.    Cerebellar: Finger-to-nose and rapid alternating movements normal. Gait stable, fluid.    Spine:    Posture: Head well aligned over pelvis in front and side views.  No focal or global spinal deformity visible on inspection. Shoulders and hips even. No obvious leg length discrepancy. No scapula winging.    Bending: Full ROM with forward, back and lateral bending. No rib prominence with forward bend.    Cervical:      ROM: Full with flexion, extension, lateral rotation and ear-to-shoulder bend.      Midline TTP: Negative.     Spurling's test: Negative.     Lhermitte's: Negative.    Thoracic:     Midline TTP: Negative    Lumbar:     Midline TTP: Negative     Straight Leg Test: Positive, right.      Crossed Straight Leg Test: Negative     Sciatic notch tenderness: Negative.    Other:     SI joint TTP: Negative.     Greater trochanter TTP: Negative.     Tenderness with external/internal hip rotation: Negative.    Diagnostic Results:  All imaging was independently reviewed by me.    MRI L-spine, dated 3/27/2018  1. Lumbar spondylosis from L3/4 to L5/S1   2. Small right paracentral disc herniation at L5/S1   3. Mild to moderate right neuroforaminal stenosis at L5/S1  4. Multilevel facet arthropathy    ASSESSMENT/PLAN:     Manpreet Hamilton has a subacute nonfunctional right L5 radiculopathy in the setting of a small right paracentral disc herniation at L5-S1 causing moderate lateral recess and neuroforaminal stenosis. He has tried PT but it hasn't helped. His decadron taper for pulmonary fibrosis has had no effect on his pain. He has tried one round of ESIs which didn't help, but level injected in unclear. We discussed surgical and nonsurgical options, but decided that another round of injections targeting the right L5 nerve root would be  appropriate. He will follow up with me in 3 months or sooner depending on how he is doing.    The patient understands and agrees with the plan of care. All questions were answered.     1. L5/S1 PRERNA and return with records   2. RTC 3 months       I, Dr. Angel Pendleton personally performed the services described in this documentation. All medical record entries made by the scribe, Gary Ayoub, were at my direction and in my presence.  I have reviewed the chart and agree that the record reflects my personal performance and is accurate and complete.      Angel Pendleotn M.D.  Department of Neurosurgery  [facility]      .

## 2018-05-14 NOTE — PROGRESS NOTES
Subjective:       Patient ID: Manpreet Hamilton is a 67 y.o. male.    Chief Complaint: Interstitial Lung Disease    HPI  68 yo male who had a viral pneumonia in January and has developed a mild but progressive interstitial lung disease. The clinical course is suggestive of  or NSIP. His chest x-ray done last week in [facility] has declined from prior films with bilateral mid zonal fibrosis. His CT done six weeks ago March 27t has sub pleural fibrotic changes.  Resting  Sa02: 97% but dropped to 90% after walking the length of the carter and back. He recently returned from a vacation in [place] and was very aware of walking and climbing stairs. He has been on Cell Cept 500 mg bid for the past six weeks. I think that it is time to move. OFEV. A EKG showed a resting tachycardia with a normal sinus rhythm.   Today FVC: 2.55 liters 65% vs Jan, 2013: 3.36 liters 82%  DLCO today: 58% vs 75% in 2013      No flowsheet data found.]  Review of Systems   Constitutional: Negative.    HENT: Negative.    Eyes: Negative.    Respiratory: Positive for cough, shortness of breath and dyspnea on extertion.         URI in Jan. 2018 followed by cough and dyspnea   Cardiovascular: Negative.    Genitourinary: Negative.    Musculoskeletal: Positive for back pain.        Saw Neurosurgery today.. Did not advise surgery   Skin: Negative.    Gastrointestinal: Negative.    Neurological: Negative.    Psychiatric/Behavioral: Negative.        Objective:      Physical Exam   Constitutional: He is oriented to person, place, and time. He appears well-developed and well-nourished.   HENT:   Head: Normocephalic and atraumatic.   Right Ear: External ear normal.   Left Ear: External ear normal.   Eyes: Conjunctivae and EOM are normal. Pupils are equal, round, and reactive to light.   Neck: Normal range of motion. Neck supple.   Cardiovascular: Normal rate, regular rhythm and normal heart sounds.    Pulmonary/Chest: Effort normal and breath  sounds normal.   Bi Basilar velcro rales   Abdominal: Soft. Bowel sounds are normal.   Musculoskeletal: Normal range of motion.   Neurological: He is alert and oriented to person, place, and time. He has normal reflexes.   Skin: Skin is warm and dry.   Psychiatric: He has a normal mood and affect. His behavior is normal. Judgment and thought content normal.           Assessment:       No diagnosis found.    Outpatient Encounter Prescriptions as of 5/14/2018   Medication Sig Dispense Refill    hydrocodone-chlorpheniramine (TUSSIONEX) 10-8 mg/5 mL suspension Take 1 mL by mouth 2 (two) times daily as needed.  0    aspirin (ECOTRIN) 81 MG EC tablet Take 81 mg by mouth once daily.      atorvastatin (LIPITOR) 20 MG tablet TAKE ONE TABLET BY MOUTH NIGHTLY 90 tablet 12    azelastine (ASTELIN) 137 mcg nasal spray 1 spray by Nasal route 2 (two) times daily. prn      baclofen (LIORESAL) 10 MG tablet 2 tablets 3 (three) times daily as needed.  1    doxycycline (VIBRA-TABS) 100 MG tablet TAKE ONE TABLET BY MOUTH ONCE DAILY FIRST 5 DAYS OF EACH MONTH 30 tablet 12    doxycycline (VIBRAMYCIN) 100 MG Cap Take 1 capsule by mouth once daily. 1 first 5 days of each month  22    mycophenolate (CELLCEPT) 500 mg Tab Take 1,000 mg by mouth once daily.  1    predniSONE (DELTASONE) 20 MG tablet Take 20 mg by mouth once daily.  0    sildenafil, antihypertensive, (REVATIO) 20 mg Tab Take 1 tablet by mouth as needed.  0    tamsulosin (FLOMAX) 0.4 mg Cp24 Take 1 capsule (0.4 mg total) by mouth every evening. 30 capsule 11    traMADol (ULTRAM) 50 mg tablet Take 50 mg by mouth every 6 (six) hours as needed for Pain.      valsartan-hydrochlorothiazide (DIOVAN-HCT) 320-25 mg per tablet Take 1 tablet by mouth once daily.  3    VIAGRA 50 mg tablet Take 1 tablet (50 mg total) by mouth once daily. 30 tablet 10    [DISCONTINUED] ascorbic acid (VITAMIN C) 100 MG tablet Take 100 mg by mouth once daily.       No facility-administered  encounter medications on file as of 5/14/2018.      No orders of the defined types were placed in this encounter.    Plan:             Patient 's clinical course is compatible with NSIP. Will start OFEV in the near future.

## 2018-05-14 NOTE — LETTER
"[facility] - Pulmonary Services  [address]  [place] [address]  Phone: [phone] May 23, 2018     Irwin Shoemaker MD  Pulmonary Division  [facility] [place]    Patient: Manpreet Hamilton   MR Number: [id number]   YOB: 1950   Date of Visit: 5/14/2018       Dear Ulises,     I hope that you enjoyed the ATS meeting. I am referring . Manpreet Hamilton for your opinion regarding pulmonary fibrosis and the wisdom of starting OFEV.   I would like you to reviewed the reports on my long term friend, Manpreet Hamilton of [facility]. He had an acute " atypical pneumonia" in mid January at home. He began to note exertional dyspnea and saw his local PCP, chest x-rays were  done. He finally saw me for the first time March 1st. I thought that the clinical history was strong for BOOP , I think the working term this month is .  He has continued to lose ground in spite of steroids and the addition of cell ceft. I am trying to enroll him into the OFEV program and they want a second opinion. 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   Give me a call at your pleasure.           I appreciate your assistance in his care and look forward to your findings and recommendations.    Sincerely,                           Radames Pradhan MD            CC  No Recipients       "

## 2018-05-14 NOTE — LETTER
May 18, 2018    Manpreet Hamilton  [address] [place] [address]             [facility] - Pulmonary Services  [address]  [facility] [address]  Phone: [phone] Dear Pancho,      I am sending you a copy of your recent visit notes. I do think that you need to start the OFEV. I am working on getting it her and Brain is coordinating it in [place]. Call me you get it, We would stop the Cell Cept at that point.     If you have any questions or concerns, please don't hesitate to call.    Sincerely,        Matias Pradhan MD

## 2018-05-14 NOTE — LETTER
May 14, 2018      Mayo Pradhan MD  [address]  [facility] [address]           [facility]  [address], [address]  [facility] [address]  Phone: [phone]  Fax: [phone]          Patient: Manpreet Hamilton   MR Number: [id number]   YOB: 1950   Date of Visit: 5/14/2018       Dear Dr. Mayo Pradhan:    Thank you for referring Manpreet Hamilton to me for evaluation. Attached you will find relevant portions of my assessment and plan of care.    If you have questions, please do not hesitate to call me. I look forward to following Manpreet Hamilton along with you.    Sincerely,    Angel Pendleton MD    Enclosure  CC:  No Recipients    If you would like to receive this communication electronically, please contact [email or web address] or [phone] to request more information on [employer] Link access.    For providers and/or their staff who would like to refer a patient to [facility], please contact us through our one-stop-shop provider referral line, [facility], at [phone].    If you feel you have received this communication in error or would no longer like to receive these types of communications, please e-mail [email or web address]

## 2018-05-14 NOTE — LETTER
May 14, 2018    Manpreet Hamilton  [address]  [place] [address]             [facility] - Pulmonary Services  [address]  [facility] [address]  Phone: [phone] Dear {MR/MRS/MS/DR:34590} Alfonso:    ***      If you have any questions or concerns, please don't hesitate to call.    Sincerely,        Matias Pradhan MD

## 2018-05-17 ENCOUNTER — TELEPHONE (OUTPATIENT)
Dept: PULMONOLOGY | Facility: CLINIC | Age: 68
End: 2018-05-17

## 2018-05-17 NOTE — TELEPHONE ENCOUNTER
I faxed 13 pages of medical records on pt Manpreet Hamilton this AM. Morelia called and requested records that specifically state Mr Hamilton has ILD. I told her that I will fax tomorrow after Dr Pradhan review it. Joana Guzman LPN.

## 2018-05-17 NOTE — TELEPHONE ENCOUNTER
----- Message from Tasha Arroyo sent at 5/17/2018  3:27 PM CDT -----  Contact: [facility]    Needs Medical Advice    Who Called:  Morelia   Symptoms (please be specific):    How long has patient had these symptoms:    Pharmacy name and phone # if needed:    Communication Preference (MyChart response to Pt. (or) Call Back # and timeframe):[phone]  Ext  [phone]  Additional Information: needs to speak with the nurse in regards to pt medication

## 2018-05-23 DIAGNOSIS — J84.9 ILD (INTERSTITIAL LUNG DISEASE): Primary | ICD-10-CM

## 2018-05-24 ENCOUNTER — TELEPHONE (OUTPATIENT)
Dept: PULMONOLOGY | Facility: CLINIC | Age: 68
End: 2018-05-24

## 2018-05-24 NOTE — TELEPHONE ENCOUNTER
----- Message from Tasha Arroyo sent at 5/24/2018 10:30 AM CDT -----  Pharmacy Calling    Name of Caller: Zoila   Pharmacy Name:  [facility]   Prescription Name: OFEV  What do they need to clarify?:  Communication Preference (Fabrizio response to Pt. (or) Call Back # and timeframe): [phone] ext [phone]  Additional Information:

## 2018-05-24 NOTE — TELEPHONE ENCOUNTER
Spoke to Elsie , she works for the insurance company of Mr Hamilton. I had previously faxed her some records of Mr Hamilton, ct scans, PFTS and Dr Pradhan's clinic notes. Elsie is looking for something that states Interstitial Lung Disease. I let her know no biopsy has been done, its Dr Pradhan opinion based on his symptoms, ct reports, Pulmonary function reports and his response to medicine, Prednisone and Cellcept. I faxed the clinic notes and CT reports to Elsie. Joana Guzman Lpn.

## 2018-05-29 ENCOUNTER — PATIENT MESSAGE (OUTPATIENT)
Dept: SPINE | Facility: CLINIC | Age: 68
End: 2018-05-29

## 2018-05-30 ENCOUNTER — PATIENT MESSAGE (OUTPATIENT)
Dept: PULMONOLOGY | Facility: CLINIC | Age: 68
End: 2018-05-30

## 2018-06-02 ENCOUNTER — PATIENT MESSAGE (OUTPATIENT)
Dept: SPINE | Facility: CLINIC | Age: 68
End: 2018-06-02

## 2018-06-04 ENCOUNTER — PATIENT MESSAGE (OUTPATIENT)
Dept: PULMONOLOGY | Facility: CLINIC | Age: 68
End: 2018-06-04

## 2018-06-04 RX ORDER — ATORVASTATIN CALCIUM 20 MG/1
TABLET, FILM COATED ORAL
Qty: 90 TABLET | Refills: 4 | Status: SHIPPED | OUTPATIENT
Start: 2018-06-04

## 2018-06-04 RX ORDER — DOXYCYCLINE 100 MG/1
CAPSULE ORAL
Qty: 15 CAPSULE | Refills: 6 | Status: ON HOLD | OUTPATIENT
Start: 2018-06-04 | End: 2019-08-28 | Stop reason: HOSPADM

## 2018-06-06 ENCOUNTER — PATIENT MESSAGE (OUTPATIENT)
Dept: SPINE | Facility: CLINIC | Age: 68
End: 2018-06-06

## 2018-06-07 ENCOUNTER — PATIENT MESSAGE (OUTPATIENT)
Dept: NEUROSURGERY | Facility: CLINIC | Age: 68
End: 2018-06-07

## 2018-06-08 ENCOUNTER — TELEPHONE (OUTPATIENT)
Dept: NEUROSURGERY | Facility: CLINIC | Age: 68
End: 2018-06-08

## 2018-06-08 DIAGNOSIS — M54.9 BACK PAIN, UNSPECIFIED BACK LOCATION, UNSPECIFIED BACK PAIN LATERALITY, UNSPECIFIED CHRONICITY: Primary | ICD-10-CM

## 2018-06-08 NOTE — TELEPHONE ENCOUNTER
BEHZAD stating and giving a reminder about the up coming appointment for Monday 6/11/18@3:15pm also  wanted a imaging that was scheduled for 7:30am

## 2018-06-11 ENCOUNTER — PATIENT MESSAGE (OUTPATIENT)
Dept: SPINE | Facility: CLINIC | Age: 68
End: 2018-06-11

## 2018-06-11 ENCOUNTER — OFFICE VISIT (OUTPATIENT)
Dept: SPINE | Facility: CLINIC | Age: 68
End: 2018-06-11
Payer: COMMERCIAL

## 2018-06-11 ENCOUNTER — HOSPITAL ENCOUNTER (OUTPATIENT)
Dept: RADIOLOGY | Facility: OTHER | Age: 68
Discharge: HOME | End: 2018-06-11
Attending: NEUROLOGICAL SURGERY
Payer: COMMERCIAL

## 2018-06-11 VITALS
WEIGHT: 215 LBS | SYSTOLIC BLOOD PRESSURE: 132 MMHG | DIASTOLIC BLOOD PRESSURE: 74 MMHG | HEART RATE: 110 BPM | BODY MASS INDEX: 31.84 KG/M2 | HEIGHT: 69 IN

## 2018-06-11 DIAGNOSIS — M54.17 RADICULOPATHY OF LUMBOSACRAL REGION: Primary | ICD-10-CM

## 2018-06-11 DIAGNOSIS — M54.9 BACK PAIN, UNSPECIFIED BACK LOCATION, UNSPECIFIED BACK PAIN LATERALITY, UNSPECIFIED CHRONICITY: ICD-10-CM

## 2018-06-11 PROCEDURE — 72148 MRI LUMBAR SPINE W/O DYE: CPT | Mod: 26,,, | Performed by: RADIOLOGY

## 2018-06-11 PROCEDURE — 99214 OFFICE O/P EST MOD 30 MIN: CPT | Mod: S$GLB,,, | Performed by: NEUROLOGICAL SURGERY

## 2018-06-11 PROCEDURE — 99999 PR PBB SHADOW E&M-EST. PATIENT-LVL III: CPT | Mod: PBBFAC,,, | Performed by: NEUROLOGICAL SURGERY

## 2018-06-11 PROCEDURE — 72148 MRI LUMBAR SPINE W/O DYE: CPT | Mod: TC

## 2018-06-11 NOTE — PROGRESS NOTES
CHIEF COMPLAINT:  Follow up evaluation of low back pain    I, Gary Ayoub, attest that this documentation has been prepared under the direction and in the presence of Angel Pendleton MD.    HPI:  Manpreet Hamilton is a 67 y.o.  male with history of pulmonary fibrosis on decadron taper, who presents today for follow up evaluation of low back pain after increased pain. Pt reports that after his last PRERNA 3 weeks ago his low back and RLE pain has worsened. His low back and RLE pain are equal in severity. Lying flat on his back exacerbates his pain. Pt notes some LLE pain as well, though not as severe as the RLE. Pt recently began Ofev for pulmonary fibrosis.       Review of patient's allergies indicates:   Allergen Reactions    Penicillins        Past Medical History:   Diagnosis Date    Hyperlipemia     Hypertension     Nasal congestion      Past Surgical History:   Procedure Laterality Date    COLONOSCOPY N/A 8/5/2016    Procedure: COLONOSCOPY;  Surgeon: Leon Padilla MD;  Location: [address]);  Service: Endoscopy;  Laterality: N/A;    HAND SURGERY      left    HIATAL HERNIA REPAIR      TONSILLECTOMY, ADENOIDECTOMY      VASECTOMY       Family History   Problem Relation Age of Onset    Coronary artery disease Mother     Parkinsonism Father     Dementia Father     Coronary artery disease Sister     Multiple sclerosis Sister      Social History   Substance Use Topics    Smoking status: Former Smoker     Packs/day: 1.50     Years: 20.00     Types: Cigarettes     Quit date: 3/17/1985    Smokeless tobacco: Former User     Quit date: 1/30/1986    Alcohol use 10.2 oz/week     17 Glasses of wine per week        Review of Systems   Constitutional: Negative.    HENT: Negative.    Eyes: Negative.    Respiratory: Negative.    Cardiovascular: Negative.    Gastrointestinal: Negative.    Endocrine: Negative.    Genitourinary: Negative.    Musculoskeletal: Positive for back pain (low back pain) and  myalgias (BLE, R>L). Negative for gait problem and neck pain.   Skin: Negative.    Allergic/Immunologic: Negative.    Neurological: Negative for weakness, light-headedness, numbness and headaches.   Hematological: Negative.    Psychiatric/Behavioral: Negative.        OBJECTIVE:   Vital Signs:  Pulse: 110 (06/11/18 1311)  BP: 132/74 (06/11/18 1311)    Physical Exam:    Vital signs: All nursing notes and vital signs reviewed -- afebrile, vital signs stable.  Constitutional: Patient sitting comfortably in chair. Appears well developed and well nourished.  Skin: Exposed areas are intact without abnormal markings, rashes or other lesions.  HEENT: Normocephalic. Normal conjunctivae.  Cardiovascular: Normal rate and regular rhythm.  Respiratory: Chest wall rises and falls symmetrically, without signs of respiratory distress.  Abdomen: Soft and non-tender.  Extremities: Warm and without edema. Calves supple, non-tender.  Psych/Behavior: Normal affect.    Neurological:    Mental status: Alert and oriented. Conversational and appropriate.       Cranial Nerves: Grossly intact.     Motor:    Upper:  Deltoids Triceps Biceps WE WF     R 5/5 5/5 5/5 5/5 5/5 5/5    L 5/5 5/5 5/5 5/5 5/5 5/5      Lower:  HF KE KF DF PF EHL    R 5/5 5/5 5/5 5/5 5/5 5/5    L 5/5 5/5 5/5 5/5 5/5 5/5     Sensory: Intact sensation to light touch in all extremities. Romberg positive.    Reflexes:          DTR: 1 patellar, bilaterally. 1 achilles, bilaterally.     Lemos's: Negative.     Babinski's: Negative.     Clonus: Negative.    Cerebellar: Finger-to-nose and rapid alternating movements normal. Gait stable, fluid.    Spine:    Posture: Head well aligned over pelvis in front and side views.  No focal or global spinal deformity visible on inspection. Shoulders and hips even. No obvious leg length discrepancy. No scapula winging.    Bending: Full ROM with forward, back and lateral bending. No rib prominence with forward bend.    Cervical:      ROM:  Full with flexion, extension, lateral rotation and ear-to-shoulder bend.      Midline TTP: Negative.     Spurling's test: Negative.     Lhermitte's: Negative.    Thoracic:     Midline TTP: Negative    Lumbar:     Midline TTP: Negative     Straight Leg Test: Positive, right.      Crossed Straight Leg Test: Negative     Sciatic notch tenderness: Negative.    Other:     SI joint TTP: Negative.     Greater trochanter TTP: Negative.     Tenderness with external/internal hip rotation: Negative.    Diagnostic Results:  All imaging was independently reviewed by me.    MRI L-spine, dated 6/11/2018:  1. Unchanged compared to MRI from 3/27/2018    ASSESSMENT/PLAN:     Manpreet Hamilton has severe right lateral recess stenosis from a disc herniation at L5-S1 causing a right L5/S1 radiculopathy. He also has axial back pain. His symptoms have failed conservative measures. I therefore recommend a right L5-S1 microdiscectomy. R/B/A/I/M were reviewed in detail. I estimate a 10% risk of complication, much of which relates to his pulmonary fibrosis and immunosuppressive therapy. He will need thorough preop medical clearance.    The patient understands and agrees with the plan of care. All questions were answered.     1. Right L5/S1 microdiscectomy      I, Dr. Angel Pendleton personally performed the services described in this documentation. All medical record entries made by the scribe, Gary Ayoub, were at my direction and in my presence.  I have reviewed the chart and agree that the record reflects my personal performance and is accurate and complete.      Angel Pendleton M.D.  Department of Neurosurgery  [facility]      .

## 2018-06-12 ENCOUNTER — PATIENT MESSAGE (OUTPATIENT)
Dept: PULMONOLOGY | Facility: CLINIC | Age: 68
End: 2018-06-12

## 2018-06-12 ENCOUNTER — PATIENT MESSAGE (OUTPATIENT)
Dept: SPINE | Facility: CLINIC | Age: 68
End: 2018-06-12

## 2018-06-13 ENCOUNTER — PATIENT MESSAGE (OUTPATIENT)
Dept: PULMONOLOGY | Facility: CLINIC | Age: 68
End: 2018-06-13

## 2018-06-13 ENCOUNTER — TELEPHONE (OUTPATIENT)
Dept: NEUROSURGERY | Facility: CLINIC | Age: 68
End: 2018-06-13

## 2018-06-13 DIAGNOSIS — M51.36 DDD (DEGENERATIVE DISC DISEASE), LUMBAR: ICD-10-CM

## 2018-06-13 DIAGNOSIS — M48.061 SPINAL STENOSIS OF LUMBAR REGION, UNSPECIFIED WHETHER NEUROGENIC CLAUDICATION PRESENT: Primary | ICD-10-CM

## 2018-06-17 ENCOUNTER — PATIENT MESSAGE (OUTPATIENT)
Dept: SURGERY | Facility: HOSPITAL | Age: 68
End: 2018-06-17

## 2018-06-18 ENCOUNTER — ANESTHESIA EVENT (OUTPATIENT)
Dept: SURGERY | Facility: HOSPITAL | Age: 68
End: 2018-06-18
Payer: COMMERCIAL

## 2018-06-18 DIAGNOSIS — Z01.818 PREOPERATIVE TESTING: Primary | ICD-10-CM

## 2018-06-18 NOTE — PRE-PROCEDURE INSTRUCTIONS
Preop instructions given to wife.  Hold asa, asa containing products, vitamins, supplements, nsaids one week prior to surgery. Verbalized understanding. Call for change inpatient status or questions. Will need medical clearance by Dr. Josiah Scanlon in [place], [place] and will get labs and ua there.Patient will be out of town in [place] from 6/22 until 6/26.  He will come to [place] on 6/26, the day before surgery.

## 2018-06-18 NOTE — ANESTHESIA PREPROCEDURE EVALUATION
Pre Admission Screening  Radha Sanchez RN      []Hide copied text  Anesthesia Assessment: Preoperative EQUATION     Planned Procedure: Procedure(s) (LRB):  MICRODISCECTOMY, SPINE MIS Right L5- S1 (Right)  Requested Anesthesia Type:General  Surgeon: Angel Pendleton MD  Service: Neurosurgery  Known or anticipated Date of Surgery:6/27/2018     Surgeon notes: reviewed     Electronic QUestionnaire Assessment completed via nurse interview with patient.      No aq     Triage considerations:      The patient has no apparent active cardiac condition (No unstable coronary Syndrome such as severe unstable angina or recent [<1 month] myocardial infarction, decompensated CHF, severe valvular   disease or significant arrhythmia)     Previous anesthesia records:MAC and No problems  8/5/2016 colonoscopy  Airway/Jaw/Neck:  Airway Findings: Mouth Opening: Normal Tongue: Normal  General Airway Assessment: Adult  TM Distance: Normal, at least 6 cm  Jaw/Neck Findings:  Neck ROM: Normal ROM        Last PCP note: within 3 months , within [facility]   Subspecialty notes: Pulmonary, urology,spine     Other important co-morbidities: interstitial pulmonary fibrosis, htn, hld, lumbar spinal stenosis     Tests already available:  Available tests,  3-6 months ago , within [facility] . 5/124/2018 ekg,3/27/2018 tsh, 10/13/2017 hgaic                             Instructions given. (See in Nurse's note)     Optimization:    Medical Opinion Indicated                              Plan:    Testing:  Hematology Profile, BMP, PT/INR, PTT, T&S and UA                              Consultation:Patient's PCP for a statement of optimization                            Patient  has previously scheduled Medical Appointment:none     Navigation: Tests Scheduled.tbd                         Consults scheduled.tbd                        Results will be tracked by Preop Clinic.                                 Electronically signed by Radha Sanchez RN at 6/18/2018  8:55 AM         Pre-admit on 6/27/2018            Detailed Report     6/21 6/19 Cleared for surgery by Dr. Josiah Mederos, per his note.  Labs resulted and noted. BMP: Cl 93  Scanned to media  ua still pending    6/21  UA resulted and scanned into media    Radha Sanchez RN BSN  Preop Center      [facility]  Anesthesia Pre-Operative Evaluation         Patient Name: Manpreet Hamilton  YOB: 1950  MRN: [id number]    SUBJECTIVE:     Pre-operative evaluation for Procedure(s) (LRB):  MICRODISCECTOMY, SPINE MIS Right L5- S1 (Right)     06/26/2018    Manpreet Hamilton is a 67 y.o. male w/ a significant PMHx of HTN, HLD, pulmonary fibrosis/ILD and Lumbar spondylosis from L3/4 to L5/S1. Patient now presents for the above procedure(s).      LDA: None documented.    Prev airway: None documented    Drips: None documented.    Patient Active Problem List   Diagnosis    Screening    Abnormal findings on diagnostic imaging of lung    Pulmonary fibrosis    ILD (interstitial lung disease)       Review of patient's allergies indicates:   Allergen Reactions    Penicillins        No current facility-administered medications for this encounter.     Current Outpatient Prescriptions:     aspirin (ECOTRIN) 81 MG EC tablet, Take 81 mg by mouth once daily., Disp: , Rfl:     atorvastatin (LIPITOR) 20 MG tablet, TAKE ONE TABLET BY MOUTH EVERY EVENING, Disp: 90 tablet, Rfl: 4    azelastine (ASTELIN) 137 mcg nasal spray, 1 spray by Nasal route 2 (two) times daily. prn, Disp: , Rfl:     baclofen (LIORESAL) 10 MG tablet, 2 tablets 3 (three) times daily as needed., Disp: , Rfl: 1    doxycycline (VIBRAMYCIN) 100 MG Cap, TAKE ONE CAPSULE BY MOUTH EVERY DAY FOR THE FIRST 5 DAYS OF EACH MONTH, Disp: 15 capsule, Rfl: 6    nintedanib (OFEV) 150 mg Cap, Take by mouth., Disp: , Rfl:     predniSONE (DELTASONE) 20 MG tablet, Take 10 mg by mouth once daily. , Disp: , Rfl: 0    sildenafil, antihypertensive, (REVATIO) 20 mg  Tab, Take 1 tablet by mouth as needed., Disp: , Rfl: 0    valsartan-hydrochlorothiazide (DIOVAN-HCT) 320-25 mg per tablet, Take 1 tablet by mouth once daily., Disp: , Rfl: 3    tamsulosin (FLOMAX) 0.4 mg Cp24, Take 1 capsule (0.4 mg total) by mouth every evening., Disp: 30 capsule, Rfl: 11    traMADol (ULTRAM) 50 mg tablet, Take 50 mg by mouth every 6 (six) hours as needed for Pain., Disp: , Rfl:     No current facility-administered medications on file prior to encounter.      Current Outpatient Prescriptions on File Prior to Encounter   Medication Sig Dispense Refill    aspirin (ECOTRIN) 81 MG EC tablet Take 81 mg by mouth once daily.      atorvastatin (LIPITOR) 20 MG tablet TAKE ONE TABLET BY MOUTH EVERY EVENING 90 tablet 4    azelastine (ASTELIN) 137 mcg nasal spray 1 spray by Nasal route 2 (two) times daily. prn      baclofen (LIORESAL) 10 MG tablet 2 tablets 3 (three) times daily as needed.  1    doxycycline (VIBRAMYCIN) 100 MG Cap TAKE ONE CAPSULE BY MOUTH EVERY DAY FOR THE FIRST 5 DAYS OF EACH MONTH 15 capsule 6    nintedanib (OFEV) 150 mg Cap Take by mouth.      predniSONE (DELTASONE) 20 MG tablet Take 10 mg by mouth once daily.   0    sildenafil, antihypertensive, (REVATIO) 20 mg Tab Take 1 tablet by mouth as needed.  0    valsartan-hydrochlorothiazide (DIOVAN-HCT) 320-25 mg per tablet Take 1 tablet by mouth once daily.  3    tamsulosin (FLOMAX) 0.4 mg Cp24 Take 1 capsule (0.4 mg total) by mouth every evening. 30 capsule 11    traMADol (ULTRAM) 50 mg tablet Take 50 mg by mouth every 6 (six) hours as needed for Pain.         Past Surgical History:   Procedure Laterality Date    COLONOSCOPY N/A 8/5/2016    Procedure: COLONOSCOPY;  Surgeon: Leon Padilla MD;  Location: [address];  Service: Endoscopy;  Laterality: N/A;    HAND SURGERY      left    HIATAL HERNIA REPAIR      TONSILLECTOMY, ADENOIDECTOMY      VASECTOMY         Social History     Social History    Marital status:       Spouse name: N/A    Number of children: N/A    Years of education: N/A     Occupational History    Not on file.     Social History Main Topics    Smoking status: Former Smoker     Packs/day: 1.50     Years: 20.00     Types: Cigarettes     Quit date: 3/17/1985    Smokeless tobacco: Former User     Quit date: 1/30/1986    Alcohol use 10.2 oz/week     17 Glasses of wine per week    Drug use: No    Sexual activity: Yes     Partners: Female     Other Topics Concern    Not on file     Social History Narrative    No narrative on file       OBJECTIVE:     Vital Signs Range (Last 24H):         Significant Labs:  Lab Results   Component Value Date    WBC 6.20 10/13/2017    HGB 15.1 10/13/2017    HCT 44.2 10/13/2017     10/13/2017    CHOL 181 10/13/2017    TRIG 80 10/13/2017    HDL 67 10/13/2017    ALT 21 10/13/2017    AST 23 10/13/2017     (L) 10/13/2017    K 4.5 10/13/2017    CL 95 10/13/2017    CREATININE 0.9 10/13/2017    BUN 17 10/13/2017    CO2 28 10/13/2017    TSH 0.422 03/27/2018    PSA 1.3 10/13/2017    HGBA1C 5.3 10/13/2017       Diagnostic Studies: No relevant studies.    EKG:  Vent. Rate : 100 BPM     Atrial Rate : 100 BPM     P-R Int : 140 ms          QRS Dur : 082 ms      QT Int : 326 ms       P-R-T Axes : 023 009 013 degrees     QTc Int : 420 ms    Normal sinus rhythm  Normal ECG  When compared with ECG of 13-OCT-2017 08:59,  No significant change was found  Confirmed by Lilian Eubanks MD ([id number]) on 5/14/2018 2:58:10 PM    2D ECHO:  No results found for this or any previous visit.       ASSESSMENT/PLAN:         Anesthesia Evaluation    I have reviewed the Patient Summary Reports.    I have reviewed the Nursing Notes.   I have reviewed the Medications.   Steroids Taken In Past Year: Prednisone    Review of Systems  Anesthesia Hx:  Denies Hx of Anesthetic complications  Denies Family Hx of Anesthesia complications.   Denies Personal Hx of Anesthesia complications.   Social:  Former  Smoker, Alcohol Use    Hematology/Oncology:  Hematology Normal   Oncology Normal     EENT/Dental:EENT/Dental Normal   Cardiovascular:   Hypertension hyperlipidemia  Functional Capacity 4 METS, ABLD TO CLIMB 2 FLIGHTS OF STAIRS  Hypertension , Well Controlled on Rx    Pulmonary:   Denies Shortness of breath. Interstitial lung disease Pulmonary Fibrosis/Interstitial Disease:    Renal/:  Renal Symptoms/Infections/Stones: (H/O FREQUENCY, URGENCY AND INCOMPLETE BLADDER EMPTYING)    Hepatic/GI:  Hepatic/GI Normal    Musculoskeletal:  Musculoskeletal General/Symptoms: (RLE PAIN) low back pain.  Spine Disorders: lumbar Degenerative disease  Lumbar Spine Disorders (LUMBAR SPINAL STENOSIS, LUMBAR DDD), Lumbar Disc Disease, Radiculopathy   Neurological:  Neurology Normal    Endocrine:  Endocrine Normal  Metabolic Disorders, Obesity / BMI > 30  Psych:  Psychiatric Normal           Physical Exam  General:  Well nourished    Airway/Jaw/Neck:  Airway Findings: Mouth Opening: Normal Mallampati: II  TM Distance: Normal, at least 6 cm  Jaw/Neck Findings:  Neck ROM: Normal ROM     Eyes/Ears/Nose:  EYES/EARS/NOSE FINDINGS: Normal   Dental:  Dental Findings: In tact   Chest/Lungs:  Chest/Lungs Clear    Heart/Vascular:  Heart Findings: Normal    Abdomen:  Abdomen Findings: Normal    Musculoskeletal:  Musculoskeletal Findings: Normal    Mental Status:  Mental Status Findings: Normal        Anesthesia Plan  Type of Anesthesia, risks & benefits discussed:  Anesthesia Type:  general  Patient's Preference:   Intra-op Monitoring Plan: standard ASA monitors  Intra-op Monitoring Plan Comments:   Post Op Pain Control Plan: multimodal analgesia, IV/PO Opioids PRN and per primary service following discharge from PACU  Post Op Pain Control Plan Comments:   Induction:   IV  Beta Blocker:  Patient is not currently on a Beta-Blocker (No further documentation required).       Informed Consent: Patient understands risks and agrees with Anesthesia plan.   Questions answered. Anesthesia consent signed with patient.  ASA Score: 3     Day of Surgery Review of History & Physical:    H&P update referred to the surgeon.         Ready For Surgery From Anesthesia Perspective.

## 2018-06-18 NOTE — PRE ADMISSION SCREENING
Anesthesia Assessment: Preoperative EQUATION    Planned Procedure: Procedure(s) (LRB):  MICRODISCECTOMY, SPINE MIS Right L5- S1 (Right)  Requested Anesthesia Type:General  Surgeon: Angel Pendleton MD  Service: Neurosurgery  Known or anticipated Date of Surgery:6/27/2018    Surgeon notes: reviewed    Electronic QUestionnaire Assessment completed via nurse interview with patient.      No aq    Triage considerations:     The patient has no apparent active cardiac condition (No unstable coronary Syndrome such as severe unstable angina or recent [<1 month] myocardial infarction, decompensated CHF, severe valvular   disease or significant arrhythmia)    Previous anesthesia records:MAC and No problems  8/5/2016 colonoscopy  Airway/Jaw/Neck:  Airway Findings: Mouth Opening: Normal Tongue: Normal  General Airway Assessment: Adult  TM Distance: Normal, at least 6 cm  Jaw/Neck Findings:  Neck ROM: Normal ROM       Last PCP note: within 3 months , within [facility]   Subspecialty notes: Pulmonary, urology,spine    Other important co-morbidities: interstitial pulmonary fibrosis, htn, hld, lumbar spinal stenosis     Tests already available:  Available tests,  3-6 months ago , within [facility] . 5/124/2018 ekg,3/27/2018 tsh, 10/13/2017 hgaic             Instructions given. (See in Nurse's note)    Optimization:    Medical Opinion Indicated         Plan:    Testing:  Hematology Profile, BMP, PT/INR, PTT, T&S and UA        Consultation:Patient's PCP for a statement of optimization      Patient  has previously scheduled Medical Appointment:none    Navigation: Tests Scheduled.tbd              Consults scheduled.tbd             Results will be tracked by Preop Clinic.

## 2018-06-22 ENCOUNTER — PATIENT MESSAGE (OUTPATIENT)
Dept: PULMONOLOGY | Facility: CLINIC | Age: 68
End: 2018-06-22

## 2018-06-22 DIAGNOSIS — R05.9 COUGH: Primary | ICD-10-CM

## 2018-06-23 ENCOUNTER — PATIENT MESSAGE (OUTPATIENT)
Dept: SURGERY | Facility: HOSPITAL | Age: 68
End: 2018-06-23

## 2018-06-25 ENCOUNTER — PATIENT MESSAGE (OUTPATIENT)
Dept: SURGERY | Facility: HOSPITAL | Age: 68
End: 2018-06-25

## 2018-06-26 ENCOUNTER — PATIENT MESSAGE (OUTPATIENT)
Dept: PULMONOLOGY | Facility: CLINIC | Age: 68
End: 2018-06-26

## 2018-06-26 ENCOUNTER — OFFICE VISIT (OUTPATIENT)
Dept: PULMONOLOGY | Facility: CLINIC | Age: 68
End: 2018-06-26
Payer: COMMERCIAL

## 2018-06-26 ENCOUNTER — HOSPITAL ENCOUNTER (OUTPATIENT)
Dept: PULMONOLOGY | Facility: CLINIC | Age: 68
Discharge: HOME | End: 2018-06-26
Payer: COMMERCIAL

## 2018-06-26 ENCOUNTER — TELEPHONE (OUTPATIENT)
Dept: NEUROSURGERY | Facility: CLINIC | Age: 68
End: 2018-06-26

## 2018-06-26 DIAGNOSIS — Z01.811 ENCOUNTER FOR PRE-OPERATIVE RESPIRATORY CLEARANCE: Primary | ICD-10-CM

## 2018-06-26 DIAGNOSIS — J84.9 ILD (INTERSTITIAL LUNG DISEASE): ICD-10-CM

## 2018-06-26 PROCEDURE — 94727 GAS DIL/WSHOT DETER LNG VOL: CPT | Mod: S$GLB,,, | Performed by: INTERNAL MEDICINE

## 2018-06-26 PROCEDURE — 94729 DIFFUSING CAPACITY: CPT | Mod: S$GLB,,, | Performed by: INTERNAL MEDICINE

## 2018-06-26 PROCEDURE — 99214 OFFICE O/P EST MOD 30 MIN: CPT | Mod: S$GLB,,, | Performed by: INTERNAL MEDICINE

## 2018-06-26 PROCEDURE — 99999 PR PBB SHADOW E&M-EST. PATIENT-LVL II: CPT | Mod: PBBFAC,,, | Performed by: INTERNAL MEDICINE

## 2018-06-26 NOTE — TELEPHONE ENCOUNTER
Called pt to give information R/T surgery tmr. No answer, left message to arrive at [facility] at 9AM, NPO after midnight.

## 2018-06-26 NOTE — TELEPHONE ENCOUNTER
Multiple attempts to reach patient both on cell phone and home number. Messages left on both lines with surgery information for tomorrow, stated to arrive to [facility] at 9AM, NPO after midnight tonight.

## 2018-06-26 NOTE — PROGRESS NOTES
Subjective:       Patient ID: Manpreet Hamilton is a 67 y.o. male.    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 Pendleton.  He is currently of 10 mg of prednisone and 150 mg of OFEV bid for recent episode of pulmonary fibrosis. His PFT's today are better than the studies done on June 7th when he began his OFEV. His resting Sa02: 98% and it fell to 93% after walking the length of the carter and back.  Review of Systems   Constitutional: Negative.    HENT: Negative.    Eyes: Negative.    Respiratory: Positive for shortness of breath.         Mild pulmonary fibrosis.    Cardiovascular: Negative.    Genitourinary: Negative.    Musculoskeletal: Negative.         Sciatic pain   Skin: Negative.    Gastrointestinal: Negative.    Neurological: Negative.    Psychiatric/Behavioral: Negative.        Objective:      Physical Exam   Constitutional: He is oriented to person, place, and time. He appears well-developed and well-nourished.   HENT:   Head: Normocephalic and atraumatic.   Right Ear: External ear normal.   Left Ear: External ear normal.   Eyes: Conjunctivae and EOM are normal. Pupils are equal, round, and reactive to light.   Neck: Normal range of motion. Neck supple.   Cardiovascular: Normal rate, regular rhythm and normal heart sounds.    Pulmonary/Chest: Effort normal and breath sounds normal.   Fine bibasilar velrco rales.    Abdominal: Soft. Bowel sounds are normal.   Musculoskeletal: Normal range of motion.   Neurological: He is alert and oriented to person, place, and time. He has normal reflexes.   Skin: Skin is warm and dry.   Psychiatric: He has a normal mood and affect. His behavior is normal. Judgment and thought content normal.         Assessment:       No diagnosis found.    Outpatient Encounter Prescriptions as of 6/26/2018   Medication Sig Dispense Refill     aspirin (ECOTRIN) 81 MG EC tablet Take 81 mg by mouth once daily.      atorvastatin (LIPITOR) 20 MG tablet TAKE ONE TABLET BY MOUTH EVERY EVENING 90 tablet 4    azelastine (ASTELIN) 137 mcg nasal spray 1 spray by Nasal route 2 (two) times daily. prn      baclofen (LIORESAL) 10 MG tablet 2 tablets 3 (three) times daily as needed.  1    doxycycline (VIBRAMYCIN) 100 MG Cap TAKE ONE CAPSULE BY MOUTH EVERY DAY FOR THE FIRST 5 DAYS OF EACH MONTH 15 capsule 6    nintedanib (OFEV) 150 mg Cap Take by mouth.      predniSONE (DELTASONE) 20 MG tablet Take 10 mg by mouth once daily.   0    sildenafil, antihypertensive, (REVATIO) 20 mg Tab Take 1 tablet by mouth as needed.  0    tamsulosin (FLOMAX) 0.4 mg Cp24 Take 1 capsule (0.4 mg total) by mouth every evening. 30 capsule 11    traMADol (ULTRAM) 50 mg tablet Take 50 mg by mouth every 6 (six) hours as needed for Pain.      valsartan-hydrochlorothiazide (DIOVAN-HCT) 320-25 mg per tablet Take 1 tablet by mouth once daily.  3     No facility-administered encounter medications on file as of 6/26/2018.      No orders of the defined types were placed in this encounter.    Plan:             Patient cleared for anesthesia and surgery.

## 2018-06-27 ENCOUNTER — HOSPITAL ENCOUNTER (OUTPATIENT)
Facility: HOSPITAL | Age: 68
Discharge: HOME | End: 2018-06-28
Attending: NEUROLOGICAL SURGERY | Admitting: NEUROLOGICAL SURGERY
Payer: COMMERCIAL

## 2018-06-27 ENCOUNTER — ANESTHESIA (OUTPATIENT)
Dept: SURGERY | Facility: HOSPITAL | Age: 68
End: 2018-06-27
Payer: COMMERCIAL

## 2018-06-27 DIAGNOSIS — Z01.818 PREOPERATIVE TESTING: ICD-10-CM

## 2018-06-27 DIAGNOSIS — J84.9 ILD (INTERSTITIAL LUNG DISEASE): Primary | ICD-10-CM

## 2018-06-27 DIAGNOSIS — M54.16 LUMBAR RADICULOPATHY: Primary | ICD-10-CM

## 2018-06-27 LAB
ABO + RH BLD: NORMAL
ANION GAP SERPL CALC-SCNC: 9 MMOL/L
APTT BLDCRRT: 23.3 SEC
BASOPHILS # BLD AUTO: 0.07 K/UL
BASOPHILS NFR BLD: 1 %
BLD GP AB SCN CELLS X3 SERPL QL: NORMAL
BUN SERPL-MCNC: 14 MG/DL
CALCIUM SERPL-MCNC: 9.6 MG/DL
CHLORIDE SERPL-SCNC: 100 MMOL/L
CO2 SERPL-SCNC: 27 MMOL/L
CREAT SERPL-MCNC: 0.8 MG/DL
DIFFERENTIAL METHOD: ABNORMAL
EOSINOPHIL # BLD AUTO: 0.2 K/UL
EOSINOPHIL NFR BLD: 2.3 %
ERYTHROCYTE [DISTWIDTH] IN BLOOD BY AUTOMATED COUNT: 12.6 %
EST. GFR  (AFRICAN AMERICAN): >60 ML/MIN/1.73 M^2
EST. GFR  (NON AFRICAN AMERICAN): >60 ML/MIN/1.73 M^2
GLUCOSE SERPL-MCNC: 94 MG/DL
HCT VFR BLD AUTO: 46.1 %
HGB BLD-MCNC: 15.7 G/DL
IMM GRANULOCYTES # BLD AUTO: 0.03 K/UL
IMM GRANULOCYTES NFR BLD AUTO: 0.4 %
INR PPP: 1
LYMPHOCYTES # BLD AUTO: 1.3 K/UL
LYMPHOCYTES NFR BLD: 18.1 %
MCH RBC QN AUTO: 31.3 PG
MCHC RBC AUTO-ENTMCNC: 34.1 G/DL
MCV RBC AUTO: 92 FL
MONOCYTES # BLD AUTO: 0.6 K/UL
MONOCYTES NFR BLD: 8.5 %
NEUTROPHILS # BLD AUTO: 5.1 K/UL
NEUTROPHILS NFR BLD: 69.7 %
NRBC BLD-RTO: 0 /100 WBC
PLATELET # BLD AUTO: 228 K/UL
PMV BLD AUTO: 8.7 FL
POTASSIUM SERPL-SCNC: 4.5 MMOL/L
PROTHROMBIN TIME: 10.3 SEC
RBC # BLD AUTO: 5.02 M/UL
SODIUM SERPL-SCNC: 136 MMOL/L
WBC # BLD AUTO: 7.31 K/UL

## 2018-06-27 PROCEDURE — 25000003 PHARM REV CODE 250: Performed by: STUDENT IN AN ORGANIZED HEALTH CARE EDUCATION/TRAINING PROGRAM

## 2018-06-27 PROCEDURE — P9045 ALBUMIN (HUMAN), 5%, 250 ML: HCPCS | Mod: JG | Performed by: NURSE ANESTHETIST, CERTIFIED REGISTERED

## 2018-06-27 PROCEDURE — 71000033 HC RECOVERY, INTIAL HOUR: Performed by: NEUROLOGICAL SURGERY

## 2018-06-27 PROCEDURE — 86850 RBC ANTIBODY SCREEN: CPT

## 2018-06-27 PROCEDURE — 63600175 PHARM REV CODE 636 W HCPCS: Performed by: NURSE ANESTHETIST, CERTIFIED REGISTERED

## 2018-06-27 PROCEDURE — 63600175 PHARM REV CODE 636 W HCPCS: Performed by: NEUROLOGICAL SURGERY

## 2018-06-27 PROCEDURE — 27000221 HC OXYGEN, UP TO 24 HOURS

## 2018-06-27 PROCEDURE — 63600175 PHARM REV CODE 636 W HCPCS: Performed by: STUDENT IN AN ORGANIZED HEALTH CARE EDUCATION/TRAINING PROGRAM

## 2018-06-27 PROCEDURE — 37000008 HC ANESTHESIA 1ST 15 MINUTES: Performed by: NEUROLOGICAL SURGERY

## 2018-06-27 PROCEDURE — 71000039 HC RECOVERY, EACH ADD'L HOUR: Performed by: NEUROLOGICAL SURGERY

## 2018-06-27 PROCEDURE — 80048 BASIC METABOLIC PNL TOTAL CA: CPT

## 2018-06-27 PROCEDURE — 36000710: Performed by: NEUROLOGICAL SURGERY

## 2018-06-27 PROCEDURE — 85610 PROTHROMBIN TIME: CPT

## 2018-06-27 PROCEDURE — 25000003 PHARM REV CODE 250: Performed by: NEUROLOGICAL SURGERY

## 2018-06-27 PROCEDURE — 27100025 HC TUBING, SET FLUID WARMER: Performed by: STUDENT IN AN ORGANIZED HEALTH CARE EDUCATION/TRAINING PROGRAM

## 2018-06-27 PROCEDURE — 27201423 OPTIME MED/SURG SUP & DEVICES STERILE SUPPLY: Performed by: NEUROLOGICAL SURGERY

## 2018-06-27 PROCEDURE — 63600175 PHARM REV CODE 636 W HCPCS: Performed by: ANESTHESIOLOGY

## 2018-06-27 PROCEDURE — 25000003 PHARM REV CODE 250: Performed by: NURSE ANESTHETIST, CERTIFIED REGISTERED

## 2018-06-27 PROCEDURE — 25000003 PHARM REV CODE 250: Performed by: ANESTHESIOLOGY

## 2018-06-27 PROCEDURE — D9220A PRA ANESTHESIA: Mod: ANES,,, | Performed by: ANESTHESIOLOGY

## 2018-06-27 PROCEDURE — 63030 LAMOT DCMPRN NRV RT 1 LMBR: CPT | Mod: RT,,, | Performed by: NEUROLOGICAL SURGERY

## 2018-06-27 PROCEDURE — 94761 N-INVAS EAR/PLS OXIMETRY MLT: CPT

## 2018-06-27 PROCEDURE — 85730 THROMBOPLASTIN TIME PARTIAL: CPT

## 2018-06-27 PROCEDURE — 37000009 HC ANESTHESIA EA ADD 15 MINS: Performed by: NEUROLOGICAL SURGERY

## 2018-06-27 PROCEDURE — 86920 COMPATIBILITY TEST SPIN: CPT

## 2018-06-27 PROCEDURE — 63600175 PHARM REV CODE 636 W HCPCS

## 2018-06-27 PROCEDURE — 36000711: Performed by: NEUROLOGICAL SURGERY

## 2018-06-27 PROCEDURE — 85025 COMPLETE CBC W/AUTO DIFF WBC: CPT

## 2018-06-27 PROCEDURE — 63600175 PHARM REV CODE 636 W HCPCS: Mod: JG | Performed by: NURSE ANESTHETIST, CERTIFIED REGISTERED

## 2018-06-27 PROCEDURE — D9220A PRA ANESTHESIA: Mod: CRNA,,, | Performed by: NURSE ANESTHETIST, CERTIFIED REGISTERED

## 2018-06-27 RX ORDER — OXYCODONE AND ACETAMINOPHEN 5; 325 MG/1; MG/1
1 TABLET ORAL EVERY 4 HOURS PRN
Status: DISCONTINUED | OUTPATIENT
Start: 2018-06-27 | End: 2018-06-28 | Stop reason: HOSPADM

## 2018-06-27 RX ORDER — HYDROMORPHONE HYDROCHLORIDE 1 MG/ML
0.2 INJECTION, SOLUTION INTRAMUSCULAR; INTRAVENOUS; SUBCUTANEOUS EVERY 5 MIN PRN
Status: DISCONTINUED | OUTPATIENT
Start: 2018-06-27 | End: 2018-06-27 | Stop reason: HOSPADM

## 2018-06-27 RX ORDER — ONDANSETRON 2 MG/ML
4 INJECTION INTRAMUSCULAR; INTRAVENOUS ONCE AS NEEDED
Status: DISCONTINUED | OUTPATIENT
Start: 2018-06-27 | End: 2018-06-27 | Stop reason: HOSPADM

## 2018-06-27 RX ORDER — PREDNISONE 10 MG/1
10 TABLET ORAL DAILY
Status: DISCONTINUED | OUTPATIENT
Start: 2018-06-28 | End: 2018-06-28 | Stop reason: HOSPADM

## 2018-06-27 RX ORDER — BISACODYL 10 MG
10 SUPPOSITORY, RECTAL RECTAL DAILY
Status: DISCONTINUED | OUTPATIENT
Start: 2018-06-28 | End: 2018-06-28 | Stop reason: HOSPADM

## 2018-06-27 RX ORDER — MIDAZOLAM HYDROCHLORIDE 1 MG/ML
INJECTION, SOLUTION INTRAMUSCULAR; INTRAVENOUS
Status: DISCONTINUED | OUTPATIENT
Start: 2018-06-27 | End: 2018-06-27

## 2018-06-27 RX ORDER — ACETAMINOPHEN 10 MG/ML
INJECTION, SOLUTION INTRAVENOUS
Status: DISCONTINUED | OUTPATIENT
Start: 2018-06-27 | End: 2018-06-27

## 2018-06-27 RX ORDER — AMOXICILLIN 250 MG
2 CAPSULE ORAL NIGHTLY PRN
Status: DISCONTINUED | OUTPATIENT
Start: 2018-06-27 | End: 2018-06-28 | Stop reason: HOSPADM

## 2018-06-27 RX ORDER — KETAMINE HYDROCHLORIDE 100 MG/ML
INJECTION, SOLUTION INTRAMUSCULAR; INTRAVENOUS
Status: DISCONTINUED | OUTPATIENT
Start: 2018-06-27 | End: 2018-06-27

## 2018-06-27 RX ORDER — MAG HYDROX/ALUMINUM HYD/SIMETH 200-200-20
30 SUSPENSION, ORAL (FINAL DOSE FORM) ORAL EVERY 4 HOURS PRN
Status: DISCONTINUED | OUTPATIENT
Start: 2018-06-27 | End: 2018-06-28 | Stop reason: HOSPADM

## 2018-06-27 RX ORDER — PHENYLEPHRINE HYDROCHLORIDE 10 MG/ML
INJECTION INTRAVENOUS
Status: DISCONTINUED | OUTPATIENT
Start: 2018-06-27 | End: 2018-06-27

## 2018-06-27 RX ORDER — ATORVASTATIN CALCIUM 20 MG/1
20 TABLET, FILM COATED ORAL NIGHTLY
Status: DISCONTINUED | OUTPATIENT
Start: 2018-06-27 | End: 2018-06-28 | Stop reason: HOSPADM

## 2018-06-27 RX ORDER — LORAZEPAM 2 MG/ML
1 INJECTION INTRAMUSCULAR ONCE
Status: COMPLETED | OUTPATIENT
Start: 2018-06-27 | End: 2018-06-27

## 2018-06-27 RX ORDER — AZELASTINE 1 MG/ML
1 SPRAY, METERED NASAL 2 TIMES DAILY
Status: DISCONTINUED | OUTPATIENT
Start: 2018-06-27 | End: 2018-06-28 | Stop reason: HOSPADM

## 2018-06-27 RX ORDER — SODIUM CHLORIDE 9 MG/ML
INJECTION, SOLUTION INTRAVENOUS CONTINUOUS PRN
Status: DISCONTINUED | OUTPATIENT
Start: 2018-06-27 | End: 2018-06-27

## 2018-06-27 RX ORDER — METHYLPREDNISOLONE ACETATE 40 MG/ML
INJECTION, SUSPENSION INTRA-ARTICULAR; INTRALESIONAL; INTRAMUSCULAR; SOFT TISSUE
Status: DISCONTINUED | OUTPATIENT
Start: 2018-06-27 | End: 2018-06-27 | Stop reason: HOSPADM

## 2018-06-27 RX ORDER — MUPIROCIN 20 MG/G
1 OINTMENT TOPICAL 2 TIMES DAILY
Status: DISCONTINUED | OUTPATIENT
Start: 2018-06-27 | End: 2018-06-28 | Stop reason: HOSPADM

## 2018-06-27 RX ORDER — ONDANSETRON 4 MG/1
4 TABLET, ORALLY DISINTEGRATING ORAL ONCE
Status: DISCONTINUED | OUTPATIENT
Start: 2018-06-27 | End: 2018-06-28 | Stop reason: HOSPADM

## 2018-06-27 RX ORDER — SODIUM CHLORIDE 0.9 % (FLUSH) 0.9 %
3 SYRINGE (ML) INJECTION
Status: DISCONTINUED | OUTPATIENT
Start: 2018-06-27 | End: 2018-06-27 | Stop reason: HOSPADM

## 2018-06-27 RX ORDER — ONDANSETRON 2 MG/ML
4 INJECTION INTRAMUSCULAR; INTRAVENOUS DAILY PRN
Status: DISCONTINUED | OUTPATIENT
Start: 2018-06-27 | End: 2018-06-27 | Stop reason: HOSPADM

## 2018-06-27 RX ORDER — FENTANYL CITRATE 50 UG/ML
INJECTION, SOLUTION INTRAMUSCULAR; INTRAVENOUS
Status: DISCONTINUED | OUTPATIENT
Start: 2018-06-27 | End: 2018-06-27

## 2018-06-27 RX ORDER — LIDOCAINE HYDROCHLORIDE 10 MG/ML
1 INJECTION, SOLUTION EPIDURAL; INFILTRATION; INTRACAUDAL; PERINEURAL ONCE
Status: COMPLETED | OUTPATIENT
Start: 2018-06-27 | End: 2018-06-27

## 2018-06-27 RX ORDER — LIDOCAINE HCL/PF 100 MG/5ML
SYRINGE (ML) INTRAVENOUS
Status: DISCONTINUED | OUTPATIENT
Start: 2018-06-27 | End: 2018-06-27

## 2018-06-27 RX ORDER — LORAZEPAM 2 MG/ML
INJECTION INTRAMUSCULAR
Status: COMPLETED
Start: 2018-06-27 | End: 2018-06-27

## 2018-06-27 RX ORDER — VANCOMYCIN HYDROCHLORIDE 1 G/20ML
INJECTION, POWDER, LYOPHILIZED, FOR SOLUTION INTRAVENOUS
Status: DISCONTINUED | OUTPATIENT
Start: 2018-06-27 | End: 2018-06-27 | Stop reason: HOSPADM

## 2018-06-27 RX ORDER — LIDOCAINE HYDROCHLORIDE AND EPINEPHRINE 10; 10 MG/ML; UG/ML
INJECTION, SOLUTION INFILTRATION; PERINEURAL
Status: DISCONTINUED | OUTPATIENT
Start: 2018-06-27 | End: 2018-06-27 | Stop reason: HOSPADM

## 2018-06-27 RX ORDER — OXYCODONE HYDROCHLORIDE 5 MG/1
5 TABLET ORAL
Status: DISCONTINUED | OUTPATIENT
Start: 2018-06-27 | End: 2018-06-27 | Stop reason: HOSPADM

## 2018-06-27 RX ORDER — ONDANSETRON 2 MG/ML
INJECTION INTRAMUSCULAR; INTRAVENOUS
Status: DISCONTINUED | OUTPATIENT
Start: 2018-06-27 | End: 2018-06-27

## 2018-06-27 RX ORDER — ONDANSETRON 8 MG/1
8 TABLET, ORALLY DISINTEGRATING ORAL EVERY 6 HOURS PRN
Status: DISCONTINUED | OUTPATIENT
Start: 2018-06-27 | End: 2018-06-28 | Stop reason: HOSPADM

## 2018-06-27 RX ORDER — MUPIROCIN 20 MG/G
1 OINTMENT TOPICAL
Status: COMPLETED | OUTPATIENT
Start: 2018-06-27 | End: 2018-06-27

## 2018-06-27 RX ORDER — ROCURONIUM BROMIDE 10 MG/ML
INJECTION, SOLUTION INTRAVENOUS
Status: DISCONTINUED | OUTPATIENT
Start: 2018-06-27 | End: 2018-06-27

## 2018-06-27 RX ORDER — LOSARTAN POTASSIUM AND HYDROCHLOROTHIAZIDE 12.5; 1 MG/1; MG/1
2 TABLET ORAL DAILY
Status: DISCONTINUED | OUTPATIENT
Start: 2018-06-28 | End: 2018-06-28 | Stop reason: HOSPADM

## 2018-06-27 RX ORDER — MORPHINE SULFATE 4 MG/ML
1 INJECTION, SOLUTION INTRAMUSCULAR; INTRAVENOUS EVERY 4 HOURS PRN
Status: DISCONTINUED | OUTPATIENT
Start: 2018-06-27 | End: 2018-06-28 | Stop reason: HOSPADM

## 2018-06-27 RX ORDER — BACITRACIN 50000 [IU]/1
INJECTION, POWDER, FOR SOLUTION INTRAMUSCULAR
Status: DISCONTINUED | OUTPATIENT
Start: 2018-06-27 | End: 2018-06-27 | Stop reason: HOSPADM

## 2018-06-27 RX ORDER — ACETAMINOPHEN 325 MG/1
650 TABLET ORAL EVERY 6 HOURS PRN
Status: DISCONTINUED | OUTPATIENT
Start: 2018-06-27 | End: 2018-06-28 | Stop reason: HOSPADM

## 2018-06-27 RX ORDER — MUPIROCIN 20 MG/G
OINTMENT TOPICAL
Status: DISCONTINUED | OUTPATIENT
Start: 2018-06-27 | End: 2018-06-27 | Stop reason: HOSPADM

## 2018-06-27 RX ORDER — SODIUM CHLORIDE 9 MG/ML
INJECTION, SOLUTION INTRAVENOUS CONTINUOUS
Status: DISCONTINUED | OUTPATIENT
Start: 2018-06-27 | End: 2018-06-28 | Stop reason: HOSPADM

## 2018-06-27 RX ORDER — TRAMADOL HYDROCHLORIDE 50 MG/1
50 TABLET ORAL EVERY 6 HOURS PRN
Status: DISCONTINUED | OUTPATIENT
Start: 2018-06-27 | End: 2018-06-28 | Stop reason: HOSPADM

## 2018-06-27 RX ORDER — SUCCINYLCHOLINE CHLORIDE 20 MG/ML
INJECTION INTRAMUSCULAR; INTRAVENOUS
Status: DISCONTINUED | OUTPATIENT
Start: 2018-06-27 | End: 2018-06-27

## 2018-06-27 RX ORDER — PROPOFOL 10 MG/ML
VIAL (ML) INTRAVENOUS
Status: DISCONTINUED | OUTPATIENT
Start: 2018-06-27 | End: 2018-06-27

## 2018-06-27 RX ORDER — FENTANYL CITRATE 50 UG/ML
25 INJECTION, SOLUTION INTRAMUSCULAR; INTRAVENOUS EVERY 5 MIN PRN
Status: DISCONTINUED | OUTPATIENT
Start: 2018-06-27 | End: 2018-06-27 | Stop reason: HOSPADM

## 2018-06-27 RX ORDER — CEFAZOLIN SODIUM 1 G/3ML
INJECTION, POWDER, FOR SOLUTION INTRAMUSCULAR; INTRAVENOUS
Status: DISCONTINUED | OUTPATIENT
Start: 2018-06-27 | End: 2018-06-27

## 2018-06-27 RX ORDER — TAMSULOSIN HYDROCHLORIDE 0.4 MG/1
0.4 CAPSULE ORAL NIGHTLY
Status: DISCONTINUED | OUTPATIENT
Start: 2018-06-27 | End: 2018-06-28 | Stop reason: HOSPADM

## 2018-06-27 RX ORDER — ALBUMIN HUMAN 50 G/1000ML
SOLUTION INTRAVENOUS CONTINUOUS PRN
Status: DISCONTINUED | OUTPATIENT
Start: 2018-06-27 | End: 2018-06-27

## 2018-06-27 RX ORDER — OXYCODONE HYDROCHLORIDE 5 MG/1
TABLET ORAL
Status: DISPENSED
Start: 2018-06-27 | End: 2018-06-28

## 2018-06-27 RX ORDER — HYDROMORPHONE HYDROCHLORIDE 1 MG/ML
INJECTION, SOLUTION INTRAMUSCULAR; INTRAVENOUS; SUBCUTANEOUS
Status: COMPLETED
Start: 2018-06-27 | End: 2018-06-27

## 2018-06-27 RX ADMIN — HYDROMORPHONE HYDROCHLORIDE 0.2 MG: 1 INJECTION, SOLUTION INTRAMUSCULAR; INTRAVENOUS; SUBCUTANEOUS at 06:06

## 2018-06-27 RX ADMIN — ONDANSETRON 4 MG: 2 INJECTION INTRAMUSCULAR; INTRAVENOUS at 05:06

## 2018-06-27 RX ADMIN — LORAZEPAM 1 MG: 2 INJECTION INTRAMUSCULAR; INTRAVENOUS at 12:06

## 2018-06-27 RX ADMIN — TRAMADOL HYDROCHLORIDE 50 MG: 50 TABLET, FILM COATED ORAL at 09:06

## 2018-06-27 RX ADMIN — ROCURONIUM BROMIDE 5 MG: 10 INJECTION, SOLUTION INTRAVENOUS at 03:06

## 2018-06-27 RX ADMIN — PROPOFOL 50 MG: 10 INJECTION, EMULSION INTRAVENOUS at 03:06

## 2018-06-27 RX ADMIN — PROPOFOL 150 MG: 10 INJECTION, EMULSION INTRAVENOUS at 03:06

## 2018-06-27 RX ADMIN — MIDAZOLAM HYDROCHLORIDE 2 MG: 1 INJECTION, SOLUTION INTRAMUSCULAR; INTRAVENOUS at 03:06

## 2018-06-27 RX ADMIN — ACETAMINOPHEN 1000 MG: 10 INJECTION, SOLUTION INTRAVENOUS at 04:06

## 2018-06-27 RX ADMIN — PHENYLEPHRINE HYDROCHLORIDE 150 MCG: 10 INJECTION INTRAVENOUS at 05:06

## 2018-06-27 RX ADMIN — PHENYLEPHRINE HYDROCHLORIDE 200 MCG: 10 INJECTION INTRAVENOUS at 03:06

## 2018-06-27 RX ADMIN — PHENYLEPHRINE HYDROCHLORIDE 200 MCG: 10 INJECTION INTRAVENOUS at 04:06

## 2018-06-27 RX ADMIN — EPHEDRINE SULFATE 5 MG: 50 INJECTION INTRAMUSCULAR; INTRAVENOUS; SUBCUTANEOUS at 04:06

## 2018-06-27 RX ADMIN — ALBUMIN (HUMAN): 12.5 SOLUTION INTRAVENOUS at 05:06

## 2018-06-27 RX ADMIN — SODIUM CHLORIDE: 0.9 INJECTION, SOLUTION INTRAVENOUS at 04:06

## 2018-06-27 RX ADMIN — PROPOFOL 50 MG: 10 INJECTION, EMULSION INTRAVENOUS at 05:06

## 2018-06-27 RX ADMIN — SODIUM CHLORIDE, SODIUM GLUCONATE, SODIUM ACETATE, POTASSIUM CHLORIDE, MAGNESIUM CHLORIDE, SODIUM PHOSPHATE, DIBASIC, AND POTASSIUM PHOSPHATE: .53; .5; .37; .037; .03; .012; .00082 INJECTION, SOLUTION INTRAVENOUS at 04:06

## 2018-06-27 RX ADMIN — LIDOCAINE HYDROCHLORIDE 100 MG: 20 INJECTION, SOLUTION INTRAVENOUS at 03:06

## 2018-06-27 RX ADMIN — SODIUM CHLORIDE: 0.9 INJECTION, SOLUTION INTRAVENOUS at 10:06

## 2018-06-27 RX ADMIN — FENTANYL CITRATE 75 MCG: 50 INJECTION, SOLUTION INTRAMUSCULAR; INTRAVENOUS at 06:06

## 2018-06-27 RX ADMIN — PHENYLEPHRINE HYDROCHLORIDE 100 MCG: 10 INJECTION INTRAVENOUS at 04:06

## 2018-06-27 RX ADMIN — FENTANYL CITRATE 50 MCG: 50 INJECTION, SOLUTION INTRAMUSCULAR; INTRAVENOUS at 03:06

## 2018-06-27 RX ADMIN — FENTANYL CITRATE 50 MCG: 50 INJECTION, SOLUTION INTRAMUSCULAR; INTRAVENOUS at 05:06

## 2018-06-27 RX ADMIN — LIDOCAINE HYDROCHLORIDE 0.2 MG: 10 INJECTION, SOLUTION EPIDURAL; INFILTRATION; INTRACAUDAL; PERINEURAL at 10:06

## 2018-06-27 RX ADMIN — LORAZEPAM 1 MG: 2 INJECTION INTRAMUSCULAR; INTRAVENOUS at 01:06

## 2018-06-27 RX ADMIN — EPHEDRINE SULFATE 5 MG: 50 INJECTION INTRAMUSCULAR; INTRAVENOUS; SUBCUTANEOUS at 05:06

## 2018-06-27 RX ADMIN — ATORVASTATIN CALCIUM 20 MG: 20 TABLET, FILM COATED ORAL at 09:06

## 2018-06-27 RX ADMIN — SUCCINYLCHOLINE CHLORIDE 140 MG: 20 INJECTION, SOLUTION INTRAMUSCULAR; INTRAVENOUS at 03:06

## 2018-06-27 RX ADMIN — LORAZEPAM 1 MG: 2 INJECTION INTRAMUSCULAR at 12:06

## 2018-06-27 RX ADMIN — PHENYLEPHRINE HYDROCHLORIDE 100 MCG: 10 INJECTION INTRAVENOUS at 03:06

## 2018-06-27 RX ADMIN — MUPIROCIN 1 G: 20 OINTMENT TOPICAL at 09:06

## 2018-06-27 RX ADMIN — FENTANYL CITRATE 100 MCG: 50 INJECTION, SOLUTION INTRAMUSCULAR; INTRAVENOUS at 03:06

## 2018-06-27 RX ADMIN — PHENYLEPHRINE HYDROCHLORIDE 150 MCG: 10 INJECTION INTRAVENOUS at 04:06

## 2018-06-27 RX ADMIN — FENTANYL CITRATE 25 MCG: 50 INJECTION, SOLUTION INTRAMUSCULAR; INTRAVENOUS at 05:06

## 2018-06-27 RX ADMIN — CEFAZOLIN 2 G: 330 INJECTION, POWDER, FOR SOLUTION INTRAMUSCULAR; INTRAVENOUS at 03:06

## 2018-06-27 RX ADMIN — OXYCODONE HYDROCHLORIDE 5 MG: 5 TABLET ORAL at 06:06

## 2018-06-27 RX ADMIN — VANCOMYCIN HYDROCHLORIDE 1000 MG: 1 INJECTION, POWDER, LYOPHILIZED, FOR SOLUTION INTRAVENOUS at 12:06

## 2018-06-27 RX ADMIN — KETAMINE HYDROCHLORIDE 30 MG: 100 INJECTION, SOLUTION, CONCENTRATE INTRAMUSCULAR; INTRAVENOUS at 03:06

## 2018-06-27 RX ADMIN — PHENYLEPHRINE HYDROCHLORIDE 150 MCG: 10 INJECTION INTRAVENOUS at 06:06

## 2018-06-27 RX ADMIN — MUPIROCIN 1 G: 20 OINTMENT TOPICAL at 10:06

## 2018-06-27 NOTE — TRANSFER OF CARE
"Anesthesia Transfer of Care Note    Patient: Manpreet Hamilton    Procedure(s) Performed: Procedure(s) (LRB):  MICRODISCECTOMY, SPINE MIS Right L5- S1 (Right)    Patient location: PACU    Anesthesia Type: general    Transport from OR: Transported from OR on 6-10 L/min O2 by face mask with adequate spontaneous ventilation    Post pain: adequate analgesia    Post assessment: no apparent anesthetic complications and tolerated procedure well    Post vital signs: stable    Level of consciousness: awake, alert and oriented    Nausea/Vomiting: no nausea/vomiting    Complications: none    Transfer of care protocol was followed      Last vitals:   Visit Vitals  /76 (BP Location: Left arm, Patient Position: Lying)   Pulse (!) 111   Temp 36.5 °C (97.7 °F) (Oral)   Resp 18   Ht 5' 9" (1.753 m)   Wt 95.7 kg (211 lb)   SpO2 99%   BMI 31.16 kg/m²     "

## 2018-06-27 NOTE — H&P (VIEW-ONLY)
CHIEF COMPLAINT:  Follow up evaluation of low back pain    I, Gary Ayoub, attest that this documentation has been prepared under the direction and in the presence of Angel Pendleton MD.    HPI:  Manpreet Hamilton is a 67 y.o.  male with history of pulmonary fibrosis on decadron taper, who presents today for follow up evaluation of low back pain after increased pain. Pt reports that after his last PRERNA 3 weeks ago his low back and RLE pain has worsened. His low back and RLE pain are equal in severity. Lying flat on his back exacerbates his pain. Pt notes some LLE pain as well, though not as severe as the RLE. Pt recently began Ofev for pulmonary fibrosis.       Review of patient's allergies indicates:   Allergen Reactions    Penicillins        Past Medical History:   Diagnosis Date    Hyperlipemia     Hypertension     Nasal congestion      Past Surgical History:   Procedure Laterality Date    COLONOSCOPY N/A 8/5/2016    Procedure: COLONOSCOPY;  Surgeon: Leon Padilla MD;  Location: [address]);  Service: Endoscopy;  Laterality: N/A;    HAND SURGERY      left    HIATAL HERNIA REPAIR      TONSILLECTOMY, ADENOIDECTOMY      VASECTOMY       Family History   Problem Relation Age of Onset    Coronary artery disease Mother     Parkinsonism Father     Dementia Father     Coronary artery disease Sister     Multiple sclerosis Sister      Social History   Substance Use Topics    Smoking status: Former Smoker     Packs/day: 1.50     Years: 20.00     Types: Cigarettes     Quit date: 3/17/1985    Smokeless tobacco: Former User     Quit date: 1/30/1986    Alcohol use 10.2 oz/week     17 Glasses of wine per week        Review of Systems   Constitutional: Negative.    HENT: Negative.    Eyes: Negative.    Respiratory: Negative.    Cardiovascular: Negative.    Gastrointestinal: Negative.    Endocrine: Negative.    Genitourinary: Negative.    Musculoskeletal: Positive for back pain (low back pain) and  myalgias (BLE, R>L). Negative for gait problem and neck pain.   Skin: Negative.    Allergic/Immunologic: Negative.    Neurological: Negative for weakness, light-headedness, numbness and headaches.   Hematological: Negative.    Psychiatric/Behavioral: Negative.        OBJECTIVE:   Vital Signs:  Pulse: 110 (06/11/18 1311)  BP: 132/74 (06/11/18 1311)    Physical Exam:    Vital signs: All nursing notes and vital signs reviewed -- afebrile, vital signs stable.  Constitutional: Patient sitting comfortably in chair. Appears well developed and well nourished.  Skin: Exposed areas are intact without abnormal markings, rashes or other lesions.  HEENT: Normocephalic. Normal conjunctivae.  Cardiovascular: Normal rate and regular rhythm.  Respiratory: Chest wall rises and falls symmetrically, without signs of respiratory distress.  Abdomen: Soft and non-tender.  Extremities: Warm and without edema. Calves supple, non-tender.  Psych/Behavior: Normal affect.    Neurological:    Mental status: Alert and oriented. Conversational and appropriate.       Cranial Nerves: Grossly intact.     Motor:    Upper:  Deltoids Triceps Biceps WE WF     R 5/5 5/5 5/5 5/5 5/5 5/5    L 5/5 5/5 5/5 5/5 5/5 5/5      Lower:  HF KE KF DF PF EHL    R 5/5 5/5 5/5 5/5 5/5 5/5    L 5/5 5/5 5/5 5/5 5/5 5/5     Sensory: Intact sensation to light touch in all extremities. Romberg positive.    Reflexes:          DTR: 1 patellar, bilaterally. 1 achilles, bilaterally.     Lemos's: Negative.     Babinski's: Negative.     Clonus: Negative.    Cerebellar: Finger-to-nose and rapid alternating movements normal. Gait stable, fluid.    Spine:    Posture: Head well aligned over pelvis in front and side views.  No focal or global spinal deformity visible on inspection. Shoulders and hips even. No obvious leg length discrepancy. No scapula winging.    Bending: Full ROM with forward, back and lateral bending. No rib prominence with forward bend.    Cervical:      ROM:  Full with flexion, extension, lateral rotation and ear-to-shoulder bend.      Midline TTP: Negative.     Spurling's test: Negative.     Lhermitte's: Negative.    Thoracic:     Midline TTP: Negative    Lumbar:     Midline TTP: Negative     Straight Leg Test: Positive, right.      Crossed Straight Leg Test: Negative     Sciatic notch tenderness: Negative.    Other:     SI joint TTP: Negative.     Greater trochanter TTP: Negative.     Tenderness with external/internal hip rotation: Negative.    Diagnostic Results:  All imaging was independently reviewed by me.    MRI L-spine, dated 6/11/2018:  1. Unchanged compared to MRI from 3/27/2018    ASSESSMENT/PLAN:     Manpreet Hamilton has severe right lateral recess stenosis from a disc herniation at L5-S1 causing a right L5/S1 radiculopathy. He also has axial back pain. His symptoms have failed conservative measures. I therefore recommend a right L5-S1 microdiscectomy. R/B/A/I/M were reviewed in detail. I estimate a 10% risk of complication, much of which relates to his pulmonary fibrosis and immunosuppressive therapy. He will need thorough preop medical clearance.    The patient understands and agrees with the plan of care. All questions were answered.     1. Right L5/S1 microdiscectomy      I, Dr. Angel Pendleton personally performed the services described in this documentation. All medical record entries made by the scribe, Gary Ayoub, were at my direction and in my presence.  I have reviewed the chart and agree that the record reflects my personal performance and is accurate and complete.      Angel Pendleton M.D.  Department of Neurosurgery  [facility]      .

## 2018-06-27 NOTE — BRIEF OP NOTE
[facility]  Brief Operative Note    SUMMARY     Surgery Date: 6/27/2018     Surgeon(s) and Role:     * Angel Pendleton MD - Primary     * Irwin Miranda MD - Resident - Assisting        Pre-op Diagnosis:  DDD (degenerative disc disease), lumbar [M51.36]  Spinal stenosis of lumbar region, unspecified whether neurogenic claudication present [M48.061]    Post-op Diagnosis:  Post-Op Diagnosis Codes:     * DDD (degenerative disc disease), lumbar [M51.36]     * Spinal stenosis of lumbar region, unspecified whether neurogenic claudication present [M48.061]    Procedure(s) (LRB):  MICRODISCECTOMY, SPINE MIS Right L5- S1 (Right)    Anesthesia: General    Description of Procedure: L5-S1 MIS microdiscectomy from right paramedian approach    Description of the findings of the procedure: herniated disc at L5-S1, adequate decompression    Estimated Blood Loss: 100 mL         Specimens:   Specimen (12h ago through future)    None

## 2018-06-28 VITALS
HEART RATE: 91 BPM | SYSTOLIC BLOOD PRESSURE: 141 MMHG | OXYGEN SATURATION: 97 % | HEIGHT: 69 IN | RESPIRATION RATE: 18 BRPM | BODY MASS INDEX: 31.25 KG/M2 | WEIGHT: 211 LBS | TEMPERATURE: 98 F | DIASTOLIC BLOOD PRESSURE: 80 MMHG

## 2018-06-28 PROCEDURE — 97161 PT EVAL LOW COMPLEX 20 MIN: CPT

## 2018-06-28 PROCEDURE — 97165 OT EVAL LOW COMPLEX 30 MIN: CPT

## 2018-06-28 PROCEDURE — 99024 POSTOP FOLLOW-UP VISIT: CPT | Mod: ,,, | Performed by: PHYSICIAN ASSISTANT

## 2018-06-28 PROCEDURE — 25000003 PHARM REV CODE 250: Performed by: STUDENT IN AN ORGANIZED HEALTH CARE EDUCATION/TRAINING PROGRAM

## 2018-06-28 RX ORDER — OXYCODONE AND ACETAMINOPHEN 5; 325 MG/1; MG/1
1 TABLET ORAL
Qty: 75 TABLET | Refills: 0 | Status: SHIPPED | OUTPATIENT
Start: 2018-06-28 | End: 2018-08-03

## 2018-06-28 RX ADMIN — TRAMADOL HYDROCHLORIDE 50 MG: 50 TABLET, FILM COATED ORAL at 05:06

## 2018-06-28 NOTE — PROGRESS NOTES
[facility]  Neurosurgery  Progress Note    Subjective:     History of Present Illness: Manpreet Hamilton is a 67 y.o.  male with history of pulmonary fibrosis on decadron taper, who presents today for follow up evaluation of low back pain after increased pain. Pt reports that after his last PRERNA 3 weeks ago his low back and RLE pain has worsened. His low back and RLE pain are equal in severity. Lying flat on his back exacerbates his pain. Pt notes some LLE pain as well, though not as severe as the RLE. Pt recently began Ofev for pulmonary fibrosis.     Post-Op Info:  Procedure(s) (LRB):  MICRODISCECTOMY, SPINE MIS Right L5- S1 (Right)   1 Day Post-Op     Interval History: Pain well controlled. Tolerating diet and voiding. Denies paresthesias, weakness, or leg pain.     Medications:  Continuous Infusions:   sodium chloride 0.9% 0 mL/hr at 06/27/18 1745     Scheduled Meds:   atorvastatin  20 mg Oral QHS    azelastine  1 spray Nasal BID    bisacodyl  10 mg Rectal Daily    losartan-hydrochlorothiazide 100-12.5 mg  2 tablet Oral Daily    mupirocin  1 g Nasal BID    ondansetron  4 mg Oral Once    predniSONE  10 mg Oral Daily    tamsulosin  0.4 mg Oral QHS     PRN Meds:acetaminophen, aluminum-magnesium hydroxide-simethicone, morphine, ondansetron, oxyCODONE-acetaminophen, promethazine (PHENERGAN) IVPB, senna-docusate 8.6-50 mg, traMADol     Review of Systems  Objective:     Weight: 95.7 kg (211 lb)  Body mass index is 31.16 kg/m².  Vital Signs (Most Recent):  Temp: 98.3 °F (36.8 °C) (06/28/18 0737)  Pulse: 91 (06/28/18 0737)  Resp: 18 (06/28/18 0737)  BP: (!) 141/80 (06/28/18 0737)  SpO2: 97 % (06/28/18 0737) Vital Signs (24h Range):  Temp:  [97.7 °F (36.5 °C)-98.5 °F (36.9 °C)] 98.3 °F (36.8 °C)  Pulse:  [] 91  Resp:  [12-20] 18  SpO2:  [94 %-99 %] 97 %  BP: (110-141)/(52-80) 141/80                           Neurosurgery Physical Exam  General: well developed, well nourished, no  distress.   Head: normocephalic, atraumatic  Neurologic: Alert and oriented. Thought content appropriate.  GCS: Motor: 6/Verbal: 5/Eyes: 4 GCS Total: 15  Mental Status: Awake, Alert, Oriented x 4  Language: No aphasia  Speech: No dysarthria  Cranial nerves: face symmetric, tongue midline, CN II-XII grossly intact.   Eyes: pupils equal, round, reactive to light with accomodation, EOMI.   Pulmonary: normal respirations, no signs of respiratory distress  Abdomen: soft, non-distended, not tender to palpation  Sensory: intact to light touch throughout    Motor Strength: Moves all extremities spontaneously with good tone.  Full strength upper and lower extremities. No abnormal movements seen.     Strength  Deltoids Triceps Biceps Wrist Extension Wrist Flexion Hand    Upper: R 5/5 5/5 5/5 5/5 5/5 5/5    L 5/5 5/5 5/5 5/5 5/5 5/5     Iliopsoas Quadriceps Knee  Flexion Tibialis  anterior Gastro- cnemius EHL   Lower: R 5/5 5/5 5/5 5/5 5/5 5/5    L 5/5 5/5 5/5 5/5 5/5 5/5     Lemos: absent  Clonus: absent  Pulses: 2+ and symmetric radial and dorsalis pedis.  Skin: Skin is warm, dry and intact.  Gait: normal   Incision c/d/i with no surrounding erythema, edema, or drainage.        Significant Labs:    Recent Labs  Lab 06/27/18  1008   GLU 94      K 4.5      CO2 27   BUN 14   CREATININE 0.8   CALCIUM 9.6       Recent Labs  Lab 06/27/18  1008   WBC 7.31   HGB 15.7   HCT 46.1          Recent Labs  Lab 06/27/18  1008   INR 1.0   APTT 23.3     Microbiology Results (last 7 days)     ** No results found for the last 168 hours. **        Recent Lab Results       06/27/18  1008      Immature Granulocytes 0.4     Immature Grans (Abs) 0.03  Comment:  Mild elevation in immature granulocytes is non specific and   can be seen in a variety of conditions including stress response,   acute inflammation, trauma and pregnancy. Correlation with other   laboratory and clinical findings is essential.       Unit Blood Type  Code 5100[P]     Unit Expiration [id number][P]     Unit Blood Type O POS[P]     Anion Gap 9     aPTT 23.3  Comment:  aPTT therapeutic range = 39-69 seconds     Baso # 0.07     Basophil% 1.0     BUN, Bld 14     Calcium 9.6     Chloride 100     CO2 27     CODING SYSTEM AXOV161[P]     Creatinine 0.8     Differential Method Automated     DISPENSE STATUS CROSSMATCHED[P]     eGFR if African American >60.0     eGFR if non  >60.0  Comment:  Calculation used to obtain the estimated glomerular filtration  rate (eGFR) is the CKD-EPI equation.        Eos # 0.2     Eosinophil% 2.3     Glucose 94     Gran # (ANC) 5.1     Gran% 69.7     Group & Rh O POS     Hematocrit 46.1     Hemoglobin 15.7     INDIRECT DARYN NEG     Coumadin Monitoring INR 1.0  Comment:  Coumadin Therapy:  2.0 - 3.0 for INR for all indicators except mechanical heart valves  and antiphospholipid syndromes which should use 2.5 - 3.5.       Lymph # 1.3     Lymph% 18.1     MCH 31.3(H)     MCHC 34.1     MCV 92     Mono # 0.6     Mono% 8.5     MPV 8.7(L)     nRBC 0     Platelets 228     Potassium 4.5     PRODUCT CODE [id number][P]     Protime 10.3     RBC 5.02     RDW 12.6     Sodium 136     UNIT NUMBER [id number][P]     WBC 7.31         All pertinent labs from the last 24 hours have been reviewed.    Significant Diagnostics:  No new imaging    Assessment/Plan:     * Lumbar radiculopathy    Pt is 66 y/o male who presents with lumbar radiculopathy now s/p right MIS L5-S1 microdiscectomy POD#1    -Pt neurologically stable  -Pain well controlled on current regimen  -Tolerating diet and voiding  -No brace required  -Incision c/d/i  -Pt stable for dc home with no needs  -Follow up appointments scheduled            Bee Du PA-C  Neurosurgery  [facility]

## 2018-06-28 NOTE — PLAN OF CARE
Problem: Patient Care Overview  Goal: Plan of Care Review  Outcome: Ongoing (interventions implemented as appropriate)  Plan of care reviewed with patient. No distress noted at this time. Peripheral IV saline locked x2. Fall precautions in place. TEDS/SCD's on. Incision intact. PRN pain given once with moderate relief noted. Will continue to monitor closely.

## 2018-06-28 NOTE — SUBJECTIVE & OBJECTIVE
Interval History: Pain well controlled. Tolerating diet and voiding. Denies paresthesias, weakness, or leg pain.     Medications:  Continuous Infusions:   sodium chloride 0.9% 0 mL/hr at 06/27/18 1745     Scheduled Meds:   atorvastatin  20 mg Oral QHS    azelastine  1 spray Nasal BID    bisacodyl  10 mg Rectal Daily    losartan-hydrochlorothiazide 100-12.5 mg  2 tablet Oral Daily    mupirocin  1 g Nasal BID    ondansetron  4 mg Oral Once    predniSONE  10 mg Oral Daily    tamsulosin  0.4 mg Oral QHS     PRN Meds:acetaminophen, aluminum-magnesium hydroxide-simethicone, morphine, ondansetron, oxyCODONE-acetaminophen, promethazine (PHENERGAN) IVPB, senna-docusate 8.6-50 mg, traMADol     Review of Systems  Objective:     Weight: 95.7 kg (211 lb)  Body mass index is 31.16 kg/m².  Vital Signs (Most Recent):  Temp: 98.3 °F (36.8 °C) (06/28/18 0737)  Pulse: 91 (06/28/18 0737)  Resp: 18 (06/28/18 0737)  BP: (!) 141/80 (06/28/18 0737)  SpO2: 97 % (06/28/18 0737) Vital Signs (24h Range):  Temp:  [97.7 °F (36.5 °C)-98.5 °F (36.9 °C)] 98.3 °F (36.8 °C)  Pulse:  [] 91  Resp:  [12-20] 18  SpO2:  [94 %-99 %] 97 %  BP: (110-141)/(52-80) 141/80                           Neurosurgery Physical Exam  General: well developed, well nourished, no distress.   Head: normocephalic, atraumatic  Neurologic: Alert and oriented. Thought content appropriate.  GCS: Motor: 6/Verbal: 5/Eyes: 4 GCS Total: 15  Mental Status: Awake, Alert, Oriented x 4  Language: No aphasia  Speech: No dysarthria  Cranial nerves: face symmetric, tongue midline, CN II-XII grossly intact.   Eyes: pupils equal, round, reactive to light with accomodation, EOMI.   Pulmonary: normal respirations, no signs of respiratory distress  Abdomen: soft, non-distended, not tender to palpation  Sensory: intact to light touch throughout    Motor Strength: Moves all extremities spontaneously with good tone.  Full strength upper and lower extremities. No abnormal movements  seen.     Strength  Deltoids Triceps Biceps Wrist Extension Wrist Flexion Hand    Upper: R 5/5 5/5 5/5 5/5 5/5 5/5    L 5/5 5/5 5/5 5/5 5/5 5/5     Iliopsoas Quadriceps Knee  Flexion Tibialis  anterior Gastro- cnemius EHL   Lower: R 5/5 5/5 5/5 5/5 5/5 5/5    L 5/5 5/5 5/5 5/5 5/5 5/5     Lemos: absent  Clonus: absent  Pulses: 2+ and symmetric radial and dorsalis pedis.  Skin: Skin is warm, dry and intact.  Gait: normal   Incision c/d/i with no surrounding erythema, edema, or drainage.        Significant Labs:    Recent Labs  Lab 06/27/18  1008   GLU 94      K 4.5      CO2 27   BUN 14   CREATININE 0.8   CALCIUM 9.6       Recent Labs  Lab 06/27/18  1008   WBC 7.31   HGB 15.7   HCT 46.1          Recent Labs  Lab 06/27/18  1008   INR 1.0   APTT 23.3     Microbiology Results (last 7 days)     ** No results found for the last 168 hours. **        Recent Lab Results       06/27/18  1008      Immature Granulocytes 0.4     Immature Grans (Abs) 0.03  Comment:  Mild elevation in immature granulocytes is non specific and   can be seen in a variety of conditions including stress response,   acute inflammation, trauma and pregnancy. Correlation with other   laboratory and clinical findings is essential.       Unit Blood Type Code 5100[P]     Unit Expiration [id number][P]     Unit Blood Type O POS[P]     Anion Gap 9     aPTT 23.3  Comment:  aPTT therapeutic range = 39-69 seconds     Baso # 0.07     Basophil% 1.0     BUN, Bld 14     Calcium 9.6     Chloride 100     CO2 27     CODING SYSTEM WJWG353[P]     Creatinine 0.8     Differential Method Automated     DISPENSE STATUS CROSSMATCHED[P]     eGFR if African American >60.0     eGFR if non  >60.0  Comment:  Calculation used to obtain the estimated glomerular filtration  rate (eGFR) is the CKD-EPI equation.        Eos # 0.2     Eosinophil% 2.3     Glucose 94     Gran # (ANC) 5.1     Gran% 69.7     Group & Rh O POS     Hematocrit 46.1      Hemoglobin 15.7     INDIRECT DARYN NEG     Coumadin Monitoring INR 1.0  Comment:  Coumadin Therapy:  2.0 - 3.0 for INR for all indicators except mechanical heart valves  and antiphospholipid syndromes which should use 2.5 - 3.5.       Lymph # 1.3     Lymph% 18.1     MCH 31.3(H)     MCHC 34.1     MCV 92     Mono # 0.6     Mono% 8.5     MPV 8.7(L)     nRBC 0     Platelets 228     Potassium 4.5     PRODUCT CODE W4184O46[P]     Protime 10.3     RBC 5.02     RDW 12.6     Sodium 136     UNIT NUMBER [id number][P]     WBC 7.31         All pertinent labs from the last 24 hours have been reviewed.    Significant Diagnostics:  No new imaging

## 2018-06-28 NOTE — PROGRESS NOTES
Pt and wife unhappy with semi private room assignment, bed board called and pt reassigned to OBS1, pt and wife to OBS1 via stretcher, report to Sandi GRAVES, pt remains stable.

## 2018-06-28 NOTE — PLAN OF CARE
Problem: Occupational Therapy Goal  Goal: Occupational Therapy Goal  Outcome: Outcome(s) achieved Date Met: 06/28/18    OT evaluation complete with no major deficits identified with ADLs or mobility.  Pt does not require OT services in the acute care setting.  Pt to d/c from OT with no further therapy needs recommended upon d/c from hospital.    NADEEM Meneses  6/28/2018

## 2018-06-28 NOTE — PROGRESS NOTES
Attempted to call report, awaiting return call from Ricardo GRAVES, pt made ready fro transport to 730 via stretcher per PCT, Pt resting quietly,VSS per monitor,resp unlabored,  bilat /dorsi/plantar flexion equal and strong, sensation intact, w/o evident defecits, wife updated on pt status.

## 2018-06-28 NOTE — DISCHARGE SUMMARY
[facility]  Neurosurgery  Discharge Summary      Patient Name: Manpreet Hamilton  MRN: [id number]  Admission Date: 6/27/2018  Hospital Length of Stay: 0 days  Discharge Date and Time:  06/28/2018 8:53 AM  Attending Physician: Angel Pendleton MD   Discharging Provider: Bee Du PA-C  Primary Care Provider: Radames Pradhan MD    HPI:   Manpreet Hamilton is a 67 y.o.  male with history of pulmonary fibrosis on decadron taper, who presents today for follow up evaluation of low back pain after increased pain. Pt reports that after his last PRERNA 3 weeks ago his low back and RLE pain has worsened. His low back and RLE pain are equal in severity. Lying flat on his back exacerbates his pain. Pt notes some LLE pain as well, though not as severe as the RLE. Pt recently began Ofev for pulmonary fibrosis.     Procedure(s) (LRB):  MICRODISCECTOMY, SPINE MIS Right L5- S1 (Right)     Hospital Course: 6/27: L5-S1 Microdiscectomy  6/28: Pain well controlled. Incision c/d/i. Stable for dc.     Consults:     Significant Diagnostic Studies: Labs:   BMP:   Recent Labs  Lab 06/27/18  1008   GLU 94      K 4.5      CO2 27   BUN 14   CREATININE 0.8   CALCIUM 9.6    and CBC   Recent Labs  Lab 06/27/18  1008   WBC 7.31   HGB 15.7   HCT 46.1          Pending Diagnostic Studies:     None        Final Active Diagnoses:    Diagnosis Date Noted POA    PRINCIPAL PROBLEM:  Lumbar radiculopathy [M54.16] 06/27/2018 Yes      Problems Resolved During this Admission:    Diagnosis Date Noted Date Resolved POA      Discharged Condition: good    Disposition: Home or Self Care    Follow Up:   -7/11: 2 wk wound check   -8/06: Post op follow up with Dr. Pendleton    Patient Instructions:     Diet Adult Regular     Notify your health care provider if you experience any of the following:  increased confusion or weakness     Notify your health care provider if you experience any of the following:  persistent dizziness,  light-headedness, or visual disturbances     Notify your health care provider if you experience any of the following:  worsening rash     Notify your health care provider if you experience any of the following:  severe persistent headache     Notify your health care provider if you experience any of the following:  difficulty breathing or increased cough     Notify your health care provider if you experience any of the following:  redness, tenderness, or signs of infection (pain, swelling, redness, odor or green/yellow discharge around incision site)     Notify your health care provider if you experience any of the following:  severe uncontrolled pain     Notify your health care provider if you experience any of the following:  persistent nausea and vomiting or diarrhea     Notify your health care provider if you experience any of the following:  temperature >100.4     Activity as tolerated       Medications:  Reconciled Home Medications:      Medication List      START taking these medications    oxyCODONE-acetaminophen 5-325 mg per tablet  Commonly known as:  PERCOCET  Take 1 tablet by mouth every 4 to 6 hours as needed for Pain.        CONTINUE taking these medications    atorvastatin 20 MG tablet  Commonly known as:  LIPITOR  TAKE ONE TABLET BY MOUTH EVERY EVENING     azelastine 137 mcg (0.1 %) nasal spray  Commonly known as:  ASTELIN  1 spray by Nasal route 2 (two) times daily. prn     doxycycline 100 MG Cap  Commonly known as:  VIBRAMYCIN  TAKE ONE CAPSULE BY MOUTH EVERY DAY FOR THE FIRST 5 DAYS OF EACH MONTH     OFEV 150 mg Cap  Generic drug:  nintedanib  Take by mouth.     predniSONE 20 MG tablet  Commonly known as:  DELTASONE  Take 10 mg by mouth once daily.     sildenafil 20 mg Tab  Commonly known as:  REVATIO  Take 1 tablet by mouth as needed.     tamsulosin 0.4 mg Cp24  Commonly known as:  FLOMAX  Take 1 capsule (0.4 mg total) by mouth every evening.     valsartan-hydrochlorothiazide 320-25 mg per  tablet  Commonly known as:  DIOVAN-HCT  Take 1 tablet by mouth once daily.     traMADol 50 mg tablet  Commonly known as:  ULTRAM     STOP taking these medications    aspirin 81 MG EC tablet  Commonly known as:  ECOTRIN  May resume on 7/4/18               Bee Du PA-C  Neurosurgery  [facility]

## 2018-06-28 NOTE — PLAN OF CARE
Problem: Fall Risk (Adult)  Goal: Identify Related Risk Factors and Signs and Symptoms  Related risk factors and signs and symptoms are identified upon initiation of Human Response Clinical Practice Guideline (CPG)   Outcome: Outcome(s) achieved Date Met: 06/28/18  POC reviewed with pt and spouse. Pt aao x 4, up ad vannessa. Safety precautions maintained. Bed in low position wheels locked. Pt free of falls.

## 2018-06-28 NOTE — HPI
Manpreet Hamilton is a 67 y.o.  male with history of pulmonary fibrosis on decadron taper, who presents today for follow up evaluation of low back pain after increased pain. Pt reports that after his last PRERNA 3 weeks ago his low back and RLE pain has worsened. His low back and RLE pain are equal in severity. Lying flat on his back exacerbates his pain. Pt notes some LLE pain as well, though not as severe as the RLE. Pt recently began Ofev for pulmonary fibrosis.

## 2018-06-28 NOTE — ANESTHESIA POSTPROCEDURE EVALUATION
"Anesthesia Post Evaluation    Patient: Manpreet Hamilton    Procedure(s) Performed: Procedure(s) (LRB):  MICRODISCECTOMY, SPINE MIS Right L5- S1 (Right)    Final Anesthesia Type: general  Patient location during evaluation: PACU  Patient participation: Yes- Able to Participate  Level of consciousness: awake and alert and oriented  Pain management: adequate  Airway patency: patent  PONV status at discharge: No PONV  Anesthetic complications: no      Cardiovascular status: blood pressure returned to baseline and hemodynamically stable  Respiratory status: unassisted  Hydration status: euvolemic  Follow-up not needed.        Visit Vitals  BP (!) 141/80 (BP Location: Left arm, Patient Position: Sitting)   Pulse 91   Temp 36.8 °C (98.3 °F) (Oral)   Resp 18   Ht 5' 9" (1.753 m)   Wt 95.7 kg (211 lb)   SpO2 97%   BMI 31.16 kg/m²       Pain/Dannielle Score: Pain Assessment Performed: Yes (6/28/2018  6:00 AM)  Presence of Pain: denies (6/28/2018  6:00 AM)  Pain Rating Prior to Med Admin: 4 (6/28/2018  5:46 AM)  Dannielle Score: 10 (6/27/2018  8:00 PM)      "

## 2018-06-28 NOTE — NURSING
Pt DC per MD orders. Dc and prescription instructions given. Pt verbalizes understanding. LANETTE Smith @ bedside. Pt and wife ambulated off unit.

## 2018-06-28 NOTE — PLAN OF CARE
Problem: Physical Therapy Goal  Goal: Physical Therapy Goal  Outcome: Outcome(s) achieved Date Met: 06/28/18  Pt evaluation complete. Pt safe to d/c home from a mobility standpoint without needs for skilled PT at this time.    CHE LI, PT  6/28/2018

## 2018-06-28 NOTE — OP NOTE
DATE OF SURGERY: 6/27/18    PREOPERATIVE DIAGNOSIS:  1. Herniated intervertebral disc, right L5-S1  2. Right S1 radiculopathy and axial low back pain    POSTOPERATIVE DIAGNOSIS:  Same.    PROCEDURE PERFORMED:  1. Right L5 hemilaminectomy and L4-5 microdiscectomy from MIS approach  2. Use of intraoperative microscope  3. Use of intraoperative flouroscopy  4. Use of neuromonitoring with free run EMG    SURGEON: Angel Pendleton M.D.    ASSISTANT: Ulises Miranda M.D.    ANESTHESIA: GETA    ESTIMATED BLOOD LOSS: Minimal    COMPLICATIONS: None    DRAINS: None    SPECIMENS SENT: None    FINDINGS: None    INDICATIONS:    67M with axial low back pain and radicular right leg pain which had failed conservative management. Imaging showed a right sided disc herniation at L5-S1 causing severe right lateral recess stenosis. I recommended a right L5-S1 microdiscectomy.    Risks, benefits, alternatives, indications and methods were reviewed in detail and the patient wished to proceed. All questions were answered. No guarantees about the results of the procedure were made.    PROCEDURE:    The patient was brought to the operating room where he was intubated and placed under general anesthesia without difficulty.  All lines were placed. He was placed prone onto a Jeremy frame with appropriate padding of all pressure points.  AP and lateral x-ray were used to localize to the L5-S1 disc space and to plan a right paramedian incision. The skin was marked and the area was prepped and draped in the usual sterile fashion. A timeout was performed prior to the procedure. Ten mL of Lidocaine with Epinephrine was injected into the skin.     A linear paramedian incision was made with a 10 blade.  Bovie electric cautery was used to open the fascia. Sequential dilators were docked onto the right L5 hemilamina.  A tubular retractor was placed and docked onto the operative table.  The microscope was brought into the field for microdissection.  Soft  tissues were cleared with Bovie electrocautery to expose the right L5 hemilamina and the medial right L5-S1 facet.  A hemilaminectomy was then performed with a combination of the high-speed drill and Kerrison punches.  The ligamentum flavum was removed with curettes and Kerrison punches to expose the thecal sac and traversing right S1 nerve root.  The thecal sac was retracted medially with a nerve root retractor and epidural veins were cauterized and divided.  The L5-S1 disc space then came into view and an annulotomy was performed with a 15 blade.  Disc material was removed with pituitary rongeurs.  The plane below the posterior longitudinal ligament was then explored with a down-going curette and a Arlington probe to find the sequestered disc fragment.  The disc fragment was identified and mobilized into the L5-S1 disc space and removed with a pituitary rongeur.  The shoulder of the right S1 nerve root was then explored with a Arlington probe and found to be adequately decompressed.      The wound was copiously irrigated with sterile normal saline and a dilute Betadyne solution.  Depo-Medrol was placed over the thecal sac.  The microscope was taken out of the field.  Hemostasis was achieved with bipolar electrocautery.  1 g of vancomycin powder was placed into the wound. The fascia was closed in a watertight fashion with a running UR6 needle.  The soft tissues were closed in layers.  A 4-0 Monocryl subcuticular stitch was placed.  Dermabond was placed at the skin.    The patient appeared to tolerate the procedure well from a hemodynamic and neuro monitoring standpoint.  I was present for all critical portions of the case.  At the end of the case all counts are correct.  The patient was repositioned supine onto the hospital bed where he was extubated and allowed to emerge from anesthesia without difficulty.

## 2018-06-28 NOTE — PT/OT/SLP EVAL
Physical Therapy Evaluation and Discharge Note    Patient Name:  Manpreet Hamilton   MRN:  [id number]    Recommendations:     Discharge Recommendations:  home   Discharge Equipment Recommendations: none   Barriers to discharge: None    Assessment:     Manpreet Hamilton is a 67 y.o. male admitted with a medical diagnosis of <principal problem not specified>.  At this time, patient is functioning at their prior level of function and does not require further acute PT services.     Recent Surgery: Procedure(s) (LRB):  MICRODISCECTOMY, SPINE MIS Right L5- S1 (Right) 1 Day Post-Op    Plan:     During this hospitalization, patient does not require further acute PT services.  Please re-consult if situation changes.     Plan of Care Reviewed with: patient, spouse    Subjective     Communicated with RN prior to session.  Patient found standing in room upon PT entry to room, agreeable to evaluation.      Chief Complaint: pain at incision site  Patient comments/goals: return home  Pain/Comfort:  · Pain Rating 1: 4/10  · Location - Side 1: Bilateral  · Location - Orientation 1: generalized  · Location 1:  (incision site)  · Pain Addressed 1: Reposition, Distraction  · Pain Rating Post-Intervention 1: 4/10    Living Environment:  Pt lives with wife in 2-story house with bed and bath on 1st floor and walk-in shower. Pt reports (I) with ADLs and amb. 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  Prior to admission, patients level of function was (I).  Patient has the following equipment: none.  DME owned (not currently used): none.  Upon discharge, patient will have assistance from wife.    Objective:     Patient found with:       General Precautions: Standard, fall   Orthopedic Precautions:spinal precautions   Braces: N/A     Exams:  · Cognitive Exam:  Patient is oriented to Person, Place, Time and Situation and follows 100% of multi-step commands   · Gross Motor Coordination:  WFL  · Postural Exam:  Patient presented with the  following abnormalities:    · -       No postural abnormalities identified  · Sensation:    · -       Intact  light/touch B LE  · Skin Integrity/Edema:      · -       Skin integrity: Visible skin intact  · RLE ROM: WFL  · RLE Strength: WFL  · LLE ROM: WFL  · LLE Strength: WFL    Functional Mobility:  Transfers:     · Sit to Stand:  independence with no AD    Gait: ~300ft (I); no LOB or SOB    AM-PAC 6 CLICK MOBILITY  Total Score:24       Therapeutic Activities and Exercises:  Pt and wife educated on role of PT/POC.  Pt and wife report no further questions or concerns from a mobility standpoint.     Patient left up in chair with call button in reach, RN notified and wife present.    GOALS:    Physical Therapy Goals     Not on file          Multidisciplinary Problems (Resolved)        Problem: Physical Therapy Goal    Goal Priority Disciplines Outcome Goal Variances Interventions   Physical Therapy Goal   (Resolved)     PT/OT, PT Outcome(s) achieved                     History:     Past Medical History:   Diagnosis Date    Hyperlipemia     Hypertension     Nasal congestion     Pulmonary fibrosis        Past Surgical History:   Procedure Laterality Date    COLONOSCOPY N/A 8/5/2016    Procedure: COLONOSCOPY;  Surgeon: Leon Padilla MD;  Location: [address];  Service: Endoscopy;  Laterality: N/A;    HAND SURGERY      left    HIATAL HERNIA REPAIR      TONSILLECTOMY, ADENOIDECTOMY      VASECTOMY         Clinical Decision Making:     Decision Making/ Complexity Score   On examination of body system using standardized tests and measures patient presents with 1-2 elements from any of the following: body structures and functions, activity limitations, and/or participation restrictions.  Leading to a clinical presentation that is considered stable and/or uncomplicated                              Clinical Decision Making  (Eval Complexity):  Low- 38547     Time Tracking:     PT Received On: 06/28/18  PT  Start Time: 0742     PT Stop Time: 0750  PT Total Time (min): 8 min     Billable Minutes: Evaluation 8      CHE LI, PT  06/28/2018

## 2018-06-28 NOTE — PROGRESS NOTES
Pt arrived to OBS 1 via stretcher with RN and wife at bedside. No distress noted at this time. Peripheral IV saline locked x2. Pt oriented to room with bed in lowest position, wheels locked, and call bell within reach. Will continue to monitor closely.

## 2018-06-28 NOTE — PT/OT/SLP EVAL
Occupational Therapy   Evaluation/Discharge Summary    Name: Manpreet Hamilton  MRN: [id number]  Admitting Diagnosis:  Lumbar radiculopathy s/p microdiscetomy; spine MIS Rt L5-S1 1 Day Post-Op    Recommendations:     Discharge Recommendations: home  Discharge Equipment Recommendations:  none  Barriers to discharge:   None    History:     Occupational Profile:  Living Environment: Pt lives with wife in 2SH, 0STE; everything is on 1st floor.  Bathroom contains walk-in shower.  Previous level of function: Pt reports being (I) with all ADLs and mobility.   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  , drives  Equipment Owned:  none  Assistance upon Discharge: Family able to provide assist    Past Medical History:   Diagnosis Date    Hyperlipemia     Hypertension     Nasal congestion     Pulmonary fibrosis        Past Surgical History:   Procedure Laterality Date    COLONOSCOPY N/A 8/5/2016    Procedure: COLONOSCOPY;  Surgeon: Leon Padilla MD;  Location: [address];  Service: Endoscopy;  Laterality: N/A;    HAND SURGERY      left    HIATAL HERNIA REPAIR      TONSILLECTOMY, ADENOIDECTOMY      VASECTOMY         Subjective     Chief Complaint: Wants to go home  Patient/Family stated goals: Return home; resume PLOF  Communicated with: RN prior to session.  Pain/Comfort:  · Pain Rating 1: 4/10  · Location - Side 1: Bilateral  · Location - Orientation 1: lower  · Location 1: back  · Pain Rating Post-Intervention 1: 4/10    Patients cultural, spiritual, Anabaptist conflicts given the current situation: None reported    Objective:     Patient found with:  (standing up in room fully dressed; wife present)    General Precautions: Standard,  (none)   Orthopedic Precautions:N/A   Braces: N/A     Occupational Performance:    Bed Mobility:    · Not assessed 2* pt standing in room upon arrival    Functional Mobility/Transfers:  · Patient completed Sit <> Stand Transfer with  independence  with  no assistive device x 1 trial from chair  · Functional Mobility: Pt walked 60 ft independently; no instances of postural sway or LOB.    Activities of Daily Living:  · UB Dressing: independence for donning shirt per pt report  · LB Dressing: independence for donning underwear and shorts per pt report    Cognitive/Visual Perceptual:  Cognitive/Psychosocial Skills:    -       Oriented to: Person, Place, Time and Situation   -       Follows Commands/attention:Follows multistep  commands  -       Communication: clear/fluent  -       Memory: No Deficits noted  -       Safety awareness/insight to disability: intact   -       Mood/Affect/Coping skills/emotional control: Appropriate to situation    Physical Exam:  Postural examination/scapula alignment:    -       No postural abnormalities identified  Skin integrity: Visible skin intact  Edema:  None noted  Sensation: -       Intact, but reports tingling/neuropathy in both feet  Motor Planning: -       WFL  Dominant hand: -       Right  Upper Extremity Range of Motion:    -       Right Upper Extremity: WNL  -       Left Upper Extremity: WNL  Upper Extremity Strength:   -       Right Upper Extremity: WNL  -       Left Upper Extremity: WNL   Strength:  5/5 both hands  Fine Motor Coordination: -       Intact  Gross motor coordination: WNL  Balance:  Independent for sitting and standing    Patient left sitting up in chair with call button in reach and wife present    AMPAC 6 Click:  AMPAC Total Score: 24    Treatment & Education:  *Pt educated on role of OT and POC discussed  Education:    Assessment:     Manpreet Hamilton is a 67 y.o. male with a medical diagnosis of Lumbar radiculopathy s/p microdiscetomy; spine MIS Rt L5-S1.  No major deficits identified with ADLs or mobility at this time.  Pt is performing at baseline and is (I) with all ADLs and mobility.  Pt to d/c from OT with no further therapy needs recommended upon d/c from hospital.    Rehab  "Prognosis:  Good; patient would benefit from acute skilled OT services to address these deficits and reach maximum level of function.         Clinical Decision Makin.  OT Low:  "Pt evaluation falls under low complexity for evaluation coding due to performance deficits noted in 1-3 areas as stated above and 0 co-morbities affecting current functional status. Data obtained from problem focused assessments. No modifications or assistance was required for completion of evaluation. Only brief occupational profile and history review completed."     Plan:     Patient to d/c from OT; no further therapy needs recommended upon d/c from hospital.    This Plan of care has been discussed with the patient who was involved in its development and understands and is in agreement with the identified goals and treatment plan    GOALS:    Occupational Therapy Goals     Not on file          Multidisciplinary Problems (Resolved)        Problem: Occupational Therapy Goal    Goal Priority Disciplines Outcome Interventions   Occupational Therapy Goal   (Resolved)     OT, PT/OT Outcome(s) achieved                    Time Tracking:     OT Date of Treatment: 18  OT Start Time: 0744  OT Stop Time: 0752  OT Total Time (min): 8 min    Billable Minutes:Evaluation 8   *Completed with PT    NADEEM Meneses  2018    "

## 2018-06-28 NOTE — DISCHARGE INSTRUCTIONS
Please follow ONLY the instructions that are checked below.    Activity Restrictions:  [x]  Return to work will be determined on an individual basis.  [x]  No lifting greater than 10 pounds.  [x]  Avoid bending and twisting the area of your surgery more than 45 degrees from neutral position in any direction.  [x]  No driving or operating machinery:  [x]  until cleared by your surgeon.  [x]  while taking narcotic pain medications or muscle relaxants.  [x]  No cervical collar, soft collar, or lumbar brace required.  []  Wear your brace at all times. You may be given an extra brace or soft collar to wear when showering.  []  Wear your brace at all times except when flat in bed.  []  Wear brace for comfort only.  [x]  Increase ambulation over the next 2 weeks so that you are walking 2 miles per day at 2 weeks post-operatively.  [x]  Walk on paved surfaces only. It is okay to walk up and down stairs while holding onto a side rail.  [x]  No sexual activity for 2-3 weeks.    Discharge Medication/Follow-up:  [x]  Please refer to discharge medication reconciliation form.  [x]  Do not take ANY non-steroidal anti-inflammatory drugs (NSAIDS), including the following: ibuprofen, naprosyn, Aleve, Advil, Indocin, Mobic, or Celebrex for:  [x]  4 weeks  []  8 weeks  []  6 months  [x]  Prescriptions for appropriate medication will be given upon discharge.  [x]  Take docusate (Colace 100 mg): take one capsule a day as needed for constipation. You can get this over the counter.  [x]  Follow-up appointment:  [x]  10-14 days post-op for wound check by physician assistant/nurse- 7/11with Eryn Macario PA-C  [x]  4-6 weeks with MD: 8/06 with Dr. Pendleton  []  An appointment will be mailed to you.    Wound Care:  []  Remove dressing or bandaid in    days.  [x]  No bandage required. Keep your incision open to the air.  [x]  You may shower on the 2nd day after your surgery. Have the force of water hit you opposite from the incision. Pat the  incision dry after your shower; do not scrub the incision.  [x]  You cannot take a bath until 8 weeks after surgery.  [x]  Apply bacitracin to incision twice a day     Call your doctor or go to the Emergency Room for any signs of infection, including: increased redness, drainage, pain, or fever (temperature ?101.5 for 24 hours). Call your doctor or go to the Emergency Room if there are any localized neurological changes; problems with speech, vision, numbness, tingling, weakness, or severe headache; or for other concerns.    Special Instructions:  [x]  No use of tobacco products.  [x]  Diet: Please eat a regular diet as tolerated.  []  Other diet:              Specific physician instructions:           Physicians need 3 days' notice for pain medicine to be refilled. Pain medicine will only be refilled between 8 AM and 5 PM, Monday through Friday, due to Food and Drug Administration regulation of documentation.    If you have any questions about this form, please call [phone].    Form No. 83327 (Revised 10/31/2013)

## 2018-06-29 ENCOUNTER — PATIENT MESSAGE (OUTPATIENT)
Dept: NEUROSURGERY | Facility: CLINIC | Age: 68
End: 2018-06-29

## 2018-07-01 LAB
BLD PROD TYP BPU: NORMAL
BLOOD UNIT EXPIRATION DATE: NORMAL
BLOOD UNIT TYPE CODE: 5100
BLOOD UNIT TYPE: NORMAL
CODING SYSTEM: NORMAL
DISPENSE STATUS: NORMAL
TRANS ERYTHROCYTES VOL PATIENT: NORMAL ML

## 2018-07-02 ENCOUNTER — PATIENT MESSAGE (OUTPATIENT)
Dept: SPINE | Facility: CLINIC | Age: 68
End: 2018-07-02

## 2018-07-05 ENCOUNTER — TELEPHONE (OUTPATIENT)
Dept: NEUROSURGERY | Facility: CLINIC | Age: 68
End: 2018-07-05

## 2018-07-05 ENCOUNTER — PATIENT MESSAGE (OUTPATIENT)
Dept: SPINE | Facility: CLINIC | Age: 68
End: 2018-07-05

## 2018-07-06 ENCOUNTER — TELEPHONE (OUTPATIENT)
Dept: NEUROSURGERY | Facility: CLINIC | Age: 68
End: 2018-07-06

## 2018-07-06 NOTE — TELEPHONE ENCOUNTER
Spoke with Pt about him canceling his up coming appt. w/Amirah he stated he has talked to Dr. Pendleton and he will be seeing his primary in [facility] on 7/12/18 and than F/U back with  on /8/6/18

## 2018-07-12 ENCOUNTER — PATIENT MESSAGE (OUTPATIENT)
Dept: SPINE | Facility: CLINIC | Age: 68
End: 2018-07-12

## 2018-07-13 ENCOUNTER — PATIENT MESSAGE (OUTPATIENT)
Dept: PULMONOLOGY | Facility: CLINIC | Age: 68
End: 2018-07-13

## 2018-07-30 ENCOUNTER — PATIENT MESSAGE (OUTPATIENT)
Dept: PULMONOLOGY | Facility: CLINIC | Age: 68
End: 2018-07-30

## 2018-08-06 ENCOUNTER — OFFICE VISIT (OUTPATIENT)
Dept: SPINE | Facility: CLINIC | Age: 68
End: 2018-08-06
Payer: COMMERCIAL

## 2018-08-06 ENCOUNTER — HOSPITAL ENCOUNTER (OUTPATIENT)
Dept: PULMONOLOGY | Facility: CLINIC | Age: 68
Discharge: HOME | End: 2018-08-06
Payer: COMMERCIAL

## 2018-08-06 ENCOUNTER — OFFICE VISIT (OUTPATIENT)
Dept: PULMONOLOGY | Facility: CLINIC | Age: 68
End: 2018-08-06
Payer: COMMERCIAL

## 2018-08-06 VITALS
BODY MASS INDEX: 30.96 KG/M2 | DIASTOLIC BLOOD PRESSURE: 83 MMHG | SYSTOLIC BLOOD PRESSURE: 124 MMHG | HEART RATE: 101 BPM | HEIGHT: 69 IN | WEIGHT: 209 LBS

## 2018-08-06 VITALS — WEIGHT: 212 LBS | HEIGHT: 68 IN | BODY MASS INDEX: 32.13 KG/M2

## 2018-08-06 DIAGNOSIS — R05.9 COUGH: ICD-10-CM

## 2018-08-06 DIAGNOSIS — J84.9 ILD (INTERSTITIAL LUNG DISEASE): ICD-10-CM

## 2018-08-06 DIAGNOSIS — J84.112 UIP (USUAL INTERSTITIAL PNEUMONITIS): Primary | ICD-10-CM

## 2018-08-06 DIAGNOSIS — Z98.890 H/O MICRODISCECTOMY: Primary | ICD-10-CM

## 2018-08-06 PROCEDURE — 94729 DIFFUSING CAPACITY: CPT | Mod: S$GLB,,, | Performed by: INTERNAL MEDICINE

## 2018-08-06 PROCEDURE — 99024 POSTOP FOLLOW-UP VISIT: CPT | Mod: S$GLB,,, | Performed by: NEUROLOGICAL SURGERY

## 2018-08-06 PROCEDURE — 94727 GAS DIL/WSHOT DETER LNG VOL: CPT | Mod: S$GLB,,, | Performed by: INTERNAL MEDICINE

## 2018-08-06 PROCEDURE — 99999 PR PBB SHADOW E&M-EST. PATIENT-LVL II: CPT | Mod: PBBFAC,,, | Performed by: INTERNAL MEDICINE

## 2018-08-06 PROCEDURE — 99999 PR PBB SHADOW E&M-EST. PATIENT-LVL III: CPT | Mod: PBBFAC,,, | Performed by: NEUROLOGICAL SURGERY

## 2018-08-06 PROCEDURE — 99214 OFFICE O/P EST MOD 30 MIN: CPT | Mod: S$GLB,,, | Performed by: INTERNAL MEDICINE

## 2018-08-06 NOTE — LETTER
August 10, 2018    Manpreet Hamilton  [address]  [place] [address]             [facility]  [address]  [facility] [address]  Phone: [phone] Dear Kiko, I am enclosing a copy monique the serial breathing studies for you to take with you to [facility]. Also enclosing a report of the CT studies. Hope that the float trip was a success.:          If you have any questions or concerns, please don't hesitate to call.    Sincerely,        Matias Pradhan MD

## 2018-08-06 NOTE — PROGRESS NOTES
Subjective:       Patient ID: Manpreet Hamilton is a 67 y.o. male.    Chief Complaint: Interstitial Lung Disease    HPI  66 yo male on OFEV 150 mg BID and 10 mg of Prednisone  for UIP comes for his periodic check. He saw Dr. Jara earlier for a post op visit after a successful microdiscectomy for relief of sciatica.  Breathing wise he is doing well, has occassional episode of diarrhea every 4 or 5 days.  Plans to go to the [facility] for a third opinion. Will be returning in December to see Dr. John at [facility] since he is following him as well.  Breathing limitation is not a complaint today. SVC:today  2.35 liters, 62% and DLCO: 66%   On June 26th  the values were  2.57 liters 65% and DLCO: 70%. Today,. Resting Sa02: 97% and dropped to 92% after walking the length of the carter and back. Liver enzymes remain normal on recent blood study done in [place].       No flowsheet data found.]  Review of Systems   Constitutional: Negative.    HENT: Negative.    Eyes: Negative.    Respiratory: Negative.         UIP   Cardiovascular: Negative.    Genitourinary: Negative.    Musculoskeletal: Negative.         Recent microdiscetomy for sciatica  Doing well   Skin: Negative.    Gastrointestinal: Negative.    Neurological: Negative.    Psychiatric/Behavioral: Negative.        Objective:      Physical Exam   Constitutional: He is oriented to person, place, and time. He appears well-developed and well-nourished.   HENT:   Head: Normocephalic and atraumatic.   Right Ear: External ear normal.   Left Ear: External ear normal.   Eyes: Conjunctivae and EOM are normal. Pupils are equal, round, and reactive to light.   Neck: Normal range of motion. Neck supple.   Cardiovascular: Normal rate, regular rhythm and normal heart sounds.    Pulmonary/Chest: Effort normal and breath sounds normal.   Velcro rales right lung base. Left lung is relatively normal.   Abdominal: Soft. Bowel sounds are normal.   Musculoskeletal: Normal range of motion.    Neurological: He is alert and oriented to person, place, and time. He has normal reflexes.   Skin: Skin is warm and dry.   Psychiatric: He has a normal mood and affect. His behavior is normal. Judgment and thought content normal.           Assessment:       No diagnosis found.    Outpatient Encounter Prescriptions as of 8/6/2018   Medication Sig Dispense Refill    atorvastatin (LIPITOR) 20 MG tablet TAKE ONE TABLET BY MOUTH EVERY EVENING 90 tablet 4    doxycycline (VIBRAMYCIN) 100 MG Cap TAKE ONE CAPSULE BY MOUTH EVERY DAY FOR THE FIRST 5 DAYS OF EACH MONTH 15 capsule 6    nintedanib (OFEV) 150 mg Cap Take by mouth.      predniSONE (DELTASONE) 20 MG tablet Take 10 mg by mouth once daily.   0    sildenafil, antihypertensive, (REVATIO) 20 mg Tab Take 1 tablet by mouth as needed.  0    tamsulosin (FLOMAX) 0.4 mg Cp24 Take 1 capsule (0.4 mg total) by mouth every evening. 30 capsule 11    valsartan-hydrochlorothiazide (DIOVAN-HCT) 320-25 mg per tablet Take 1 tablet by mouth once daily.  3     No facility-administered encounter medications on file as of 8/6/2018.      No orders of the defined types were placed in this encounter.    Plan:             On OFEV 150 mg BID and 10 mg of prednisone for Usual Interstital Pneumonia. His FVC has slipped even on the OFEV regimen.  March 1st: FVD:2.81 liters and today 2.35 liters.   To visit [facility] in mid August.

## 2018-08-06 NOTE — PROGRESS NOTES
CHIEF COMPLAINT:  Post op evaluation 6 weeks after right L5/S1 MIS microdiscectomy    I, Gary Ayoub, attest that this documentation has been prepared under the direction and in the presence of Angel Pendleton MD.    HPI:  Manpreet Hamilton is a 67 y.o.  male, who presents today for a 6 week post op evaluation. Pt is s/p right L5/S1 MIS microdiscectomy on 6/27/2018. Pt reports that his RLE sciatic pain has resolved. He notes some soreness in his low back but it is tolerable. He rates this pain as a 3/10 in severity.  Pt has been walking 2 miles per day.       Review of patient's allergies indicates:   Allergen Reactions    Penicillins        Past Medical History:   Diagnosis Date    Hyperlipemia     Hypertension     Nasal congestion     Pulmonary fibrosis      Past Surgical History:   Procedure Laterality Date    COLONOSCOPY N/A 8/5/2016    Procedure: COLONOSCOPY;  Surgeon: Leon Padilla MD;  Location: [facility] (4TH FLR);  Service: Endoscopy;  Laterality: N/A;    HAND SURGERY      left    HIATAL HERNIA REPAIR      MICRODISCECTOMY OF SPINE Right 6/27/2018    Procedure: MICRODISCECTOMY, SPINE MIS Right L5- S1;  Surgeon: Angel Pendleton MD;  [facility];  Service: Neurosurgery;  Laterality: Right;    TONSILLECTOMY, ADENOIDECTOMY      VASECTOMY       Family History   Problem Relation Age of Onset    Coronary artery disease Mother     Parkinsonism Father     Dementia Father     Coronary artery disease Sister     Multiple sclerosis Sister      Social History   Substance Use Topics    Smoking status: Former Smoker     Packs/day: 1.50     Years: 20.00     Types: Cigarettes     Quit date: 3/17/1985    Smokeless tobacco: Former User     Quit date: 1/30/1986    Alcohol use 10.2 oz/week     17 Glasses of wine per week        Review of Systems   Constitutional: Negative.    HENT: Negative.    Eyes: Negative.    Respiratory: Negative.    Cardiovascular: Negative.    Gastrointestinal:  Negative.    Endocrine: Negative.    Genitourinary: Negative.    Musculoskeletal: Positive for back pain. Negative for gait problem and neck pain.   Skin: Negative.    Allergic/Immunologic: Negative.    Neurological: Negative for weakness, light-headedness, numbness and headaches.   Hematological: Negative.    Psychiatric/Behavioral: Negative.        OBJECTIVE:   Vital Signs:  Pulse: 101 (08/06/18 1309)  BP: 124/83 (08/06/18 1309)    Physical Exam:    Vital signs: All nursing notes and vital signs reviewed -- afebrile, vital signs stable.  Constitutional: Patient sitting comfortably in chair. Appears well developed and well nourished.  Skin: Exposed areas are intact without abnormal markings, rashes or other lesions. Incision is healing well.   HEENT: Normocephalic. Normal conjunctivae.  Cardiovascular: Normal rate and regular rhythm.  Respiratory: Chest wall rises and falls symmetrically, without signs of respiratory distress.  Abdomen: Soft and non-tender.  Extremities: Warm and without edema. Calves supple, non-tender.  Psych/Behavior: Normal affect.    Neurological:    Mental status: Alert and oriented. Conversational and appropriate.       Cranial Nerves: Grossly intact.     Motor:    Upper:  Deltoids Triceps Biceps WE WF     R 5/5 5/5 5/5 5/5 5/5 5/5    L 5/5 5/5 5/5 5/5 5/5 5/5      Lower:  HF KE KF DF PF EHL    R 5/5 5/5 5/5 5/5 5/5 5/5    L 5/5 5/5 5/5 5/5 5/5 5/5     Sensory: Intact sensation to light touch in all extremities. Romberg negative.    Reflexes:          DTR: 2+ symmetrically throughout.     Lemos's: Negative.     Babinski's: Negative.     Clonus: Negative.    Cerebellar: Finger-to-nose and rapid alternating movements normal. Gait stable, fluid.    Spine:    Posture: Head well aligned over pelvis in front and side views.  No focal or global spinal deformity visible on inspection. Shoulders and hips even. No obvious leg length discrepancy. No scapula winging.    Bending: Full ROM with  forward, back and lateral bending. No rib prominence with forward bend.    Cervical:      ROM: Full with flexion, extension, lateral rotation and ear-to-shoulder bend.      Midline TTP: Negative.     Spurling's test: Negative.     Lhermitte's: Negative.    Thoracic:     Midline TTP: Negative    Lumbar:     Midline TTP: Negative     Straight Leg Test: Negative     Crossed Straight Leg Test: Negative     Sciatic notch tenderness: Negative.    Other:     SI joint TTP: Negative.     Greater trochanter TTP: Negative.     Tenderness with external/internal hip rotation: Negative.    Diagnostic Results:  All imaging was independently reviewed by me.    No new imaging for my review.     ASSESSMENT/PLAN:     Manpreet Hamilton is a 66 y/o male s/p right L5/S1 microdiscectomy. Pt's RLE radiculopathy is improved . He has some mild incisional back pain. Overall the pt is doing very well and walking 2 miles a day. I recommend he continue his physical therapy. Pt can follow up in 6 months or sooner if he notices any new or worsening symptoms.     The patient understands and agrees with the plan of care. All questions were answered.     1. RTC 6 months       I, Dr. Angel Pendleton personally performed the services described in this documentation. All medical record entries made by the scribe, Gary Ayoub, were at my direction and in my presence.  I have reviewed the chart and agree that the record reflects my personal performance and is accurate and complete.      Angel Pendleton M.D.  Department of Neurosurgery  [facility]      .

## 2018-08-07 ENCOUNTER — PATIENT MESSAGE (OUTPATIENT)
Dept: PULMONOLOGY | Facility: CLINIC | Age: 68
End: 2018-08-07

## 2018-08-10 DIAGNOSIS — J84.9 ILD (INTERSTITIAL LUNG DISEASE): Primary | ICD-10-CM

## 2018-08-16 ENCOUNTER — PATIENT MESSAGE (OUTPATIENT)
Dept: PULMONOLOGY | Facility: CLINIC | Age: 68
End: 2018-08-16

## 2018-08-23 ENCOUNTER — PATIENT MESSAGE (OUTPATIENT)
Dept: SPINE | Facility: CLINIC | Age: 68
End: 2018-08-23

## 2018-08-23 ENCOUNTER — PATIENT MESSAGE (OUTPATIENT)
Dept: PULMONOLOGY | Facility: CLINIC | Age: 68
End: 2018-08-23

## 2018-08-27 ENCOUNTER — PATIENT MESSAGE (OUTPATIENT)
Dept: PULMONOLOGY | Facility: CLINIC | Age: 68
End: 2018-08-27

## 2018-08-27 ENCOUNTER — PATIENT MESSAGE (OUTPATIENT)
Dept: SPINE | Facility: CLINIC | Age: 68
End: 2018-08-27

## 2018-08-27 DIAGNOSIS — Z00.00 ROUTINE GENERAL MEDICAL EXAMINATION AT A HEALTH CARE FACILITY: Primary | ICD-10-CM

## 2018-09-07 ENCOUNTER — PATIENT MESSAGE (OUTPATIENT)
Dept: PULMONOLOGY | Facility: CLINIC | Age: 68
End: 2018-09-07

## 2018-09-19 NOTE — ASSESSMENT & PLAN NOTE
Pt is 66 y/o male who presents with lumbar radiculopathy now s/p right MIS L5-S1 microdiscectomy POD#1    -Pt neurologically stable  -Pain well controlled on current regimen  -Tolerating diet and voiding  -No brace required  -Incision c/d/i  -Pt stable for dc home with no needs  -Follow up appointments scheduled  
good, to achieve stated therapy goals

## 2018-09-20 ENCOUNTER — PATIENT MESSAGE (OUTPATIENT)
Dept: PULMONOLOGY | Facility: CLINIC | Age: 68
End: 2018-09-20

## 2018-10-10 ENCOUNTER — TELEPHONE (OUTPATIENT)
Dept: TRANSPLANT | Facility: CLINIC | Age: 68
End: 2018-10-10

## 2018-10-10 DIAGNOSIS — J84.9 ILD (INTERSTITIAL LUNG DISEASE): Primary | ICD-10-CM

## 2018-10-10 NOTE — LETTER
10/16/2018    Matias Crum Lorne  [address] [place] [id number]  Phone: [phone]  Fax: [phone]         Dear Dr. Matias Crum Bridgeton    Patient: Manpreet Hamilton     MR Number: [id number]     YOB: 1950       Thank you for the referral of Manpreet Hamilton to our lung transplant program. An initial appointment with the transplant team has been scheduled for 11/15/18. You will receive an after-visit summary following the completion of your patients appointment in our clinic.    Thank you again for your trust in our program. If there is anything we can do for you or your staff, please feel free to contact us at [phone].    Sincerely,         Jarret Park MD   Director, Lung Transplantation   Pulmonary & Critical Care Medicine    [facility]  [address] [place] [id number]  [phone]

## 2018-10-12 NOTE — TELEPHONE ENCOUNTER
Pt returned my call regarding lung transplant referral.  I explained to him that our age cutoff for lung transplant is 65 years old, but that Dr. Park is willing to meet with him on the day he comes to see Dr. Pradhan to discuss options.  Told patient we can schedule him for 1:00 on 11/15 and that he would see the appointment information in the portal.  Pt verbalized understanding.

## 2018-10-19 ENCOUNTER — PATIENT MESSAGE (OUTPATIENT)
Dept: SPINE | Facility: CLINIC | Age: 68
End: 2018-10-19

## 2018-11-08 ENCOUNTER — PATIENT MESSAGE (OUTPATIENT)
Dept: PULMONOLOGY | Facility: CLINIC | Age: 68
End: 2018-11-08

## 2018-11-14 ENCOUNTER — PATIENT MESSAGE (OUTPATIENT)
Dept: PULMONOLOGY | Facility: CLINIC | Age: 68
End: 2018-11-14

## 2018-11-15 ENCOUNTER — CLINICAL SUPPORT (OUTPATIENT)
Dept: INTERNAL MEDICINE | Facility: CLINIC | Age: 68
End: 2018-11-15
Attending: INTERNAL MEDICINE
Payer: COMMERCIAL

## 2018-11-15 ENCOUNTER — HOSPITAL ENCOUNTER (OUTPATIENT)
Dept: PULMONOLOGY | Facility: CLINIC | Age: 68
Discharge: HOME | End: 2018-11-15
Payer: COMMERCIAL

## 2018-11-15 ENCOUNTER — OFFICE VISIT (OUTPATIENT)
Dept: PULMONOLOGY | Facility: CLINIC | Age: 68
End: 2018-11-15
Payer: COMMERCIAL

## 2018-11-15 ENCOUNTER — INITIAL CONSULT (OUTPATIENT)
Dept: TRANSPLANT | Facility: CLINIC | Age: 68
End: 2018-11-15
Payer: COMMERCIAL

## 2018-11-15 ENCOUNTER — HOSPITAL ENCOUNTER (OUTPATIENT)
Dept: CARDIOLOGY | Facility: CLINIC | Age: 68
Discharge: HOME | End: 2018-11-15
Payer: COMMERCIAL

## 2018-11-15 ENCOUNTER — HOSPITAL ENCOUNTER (OUTPATIENT)
Dept: RADIOLOGY | Facility: HOSPITAL | Age: 68
Discharge: HOME | End: 2018-11-15
Attending: INTERNAL MEDICINE
Payer: COMMERCIAL

## 2018-11-15 VITALS
HEIGHT: 69 IN | HEART RATE: 93 BPM | BODY MASS INDEX: 30.07 KG/M2 | WEIGHT: 203 LBS | SYSTOLIC BLOOD PRESSURE: 143 MMHG | DIASTOLIC BLOOD PRESSURE: 90 MMHG

## 2018-11-15 VITALS
RESPIRATION RATE: 20 BRPM | OXYGEN SATURATION: 96 % | TEMPERATURE: 97 F | DIASTOLIC BLOOD PRESSURE: 82 MMHG | HEART RATE: 95 BPM | SYSTOLIC BLOOD PRESSURE: 134 MMHG | HEIGHT: 69 IN | WEIGHT: 204 LBS | BODY MASS INDEX: 30.21 KG/M2

## 2018-11-15 DIAGNOSIS — J84.9 ILD (INTERSTITIAL LUNG DISEASE): ICD-10-CM

## 2018-11-15 DIAGNOSIS — J84.112 IDIOPATHIC PULMONARY FIBROSIS: Primary | ICD-10-CM

## 2018-11-15 DIAGNOSIS — Z00.00 ANNUAL PHYSICAL EXAM: Primary | ICD-10-CM

## 2018-11-15 DIAGNOSIS — Z00.00 ROUTINE GENERAL MEDICAL EXAMINATION AT A HEALTH CARE FACILITY: ICD-10-CM

## 2018-11-15 DIAGNOSIS — Z00.00 ROUTINE GENERAL MEDICAL EXAMINATION AT A HEALTH CARE FACILITY: Primary | ICD-10-CM

## 2018-11-15 LAB
ALBUMIN SERPL BCP-MCNC: 3.9 G/DL
ALP SERPL-CCNC: 97 U/L
ALT SERPL W/O P-5'-P-CCNC: 19 U/L
ANION GAP SERPL CALC-SCNC: 9 MMOL/L
AST SERPL-CCNC: 23 U/L
BILIRUB SERPL-MCNC: 0.6 MG/DL
BUN SERPL-MCNC: 13 MG/DL
CALCIUM SERPL-MCNC: 10 MG/DL
CHLORIDE SERPL-SCNC: 95 MMOL/L
CHOLEST SERPL-MCNC: 200 MG/DL
CHOLEST/HDLC SERPL: 2.9 {RATIO}
CO2 SERPL-SCNC: 30 MMOL/L
COMPLEXED PSA SERPL-MCNC: 1.4 NG/ML
CREAT SERPL-MCNC: 0.8 MG/DL
ERYTHROCYTE [DISTWIDTH] IN BLOOD BY AUTOMATED COUNT: 11.6 %
EST. GFR  (AFRICAN AMERICAN): >60 ML/MIN/1.73 M^2
EST. GFR  (NON AFRICAN AMERICAN): >60 ML/MIN/1.73 M^2
ESTIMATED AVG GLUCOSE: 105 MG/DL
GLUCOSE SERPL-MCNC: 100 MG/DL
HBA1C MFR BLD HPLC: 5.3 %
HCT VFR BLD AUTO: 46.4 %
HDLC SERPL-MCNC: 70 MG/DL
HDLC SERPL: 35 %
HGB BLD-MCNC: 15.5 G/DL
LDLC SERPL CALC-MCNC: 113.8 MG/DL
MCH RBC QN AUTO: 30.6 PG
MCHC RBC AUTO-ENTMCNC: 33.4 G/DL
MCV RBC AUTO: 92 FL
NONHDLC SERPL-MCNC: 130 MG/DL
PLATELET # BLD AUTO: 313 K/UL
PMV BLD AUTO: 8.7 FL
POTASSIUM SERPL-SCNC: 4.5 MMOL/L
PROT SERPL-MCNC: 6.9 G/DL
RBC # BLD AUTO: 5.07 M/UL
SODIUM SERPL-SCNC: 134 MMOL/L
TRIGL SERPL-MCNC: 81 MG/DL
TSH SERPL DL<=0.005 MIU/L-ACNC: 0.75 UIU/ML
WBC # BLD AUTO: 8.05 K/UL

## 2018-11-15 PROCEDURE — 71046 X-RAY EXAM CHEST 2 VIEWS: CPT | Mod: TC,FY,TXP

## 2018-11-15 PROCEDURE — 80061 LIPID PANEL: CPT

## 2018-11-15 PROCEDURE — 93005 ELECTROCARDIOGRAM TRACING: CPT | Mod: S$GLB,,, | Performed by: INTERNAL MEDICINE

## 2018-11-15 PROCEDURE — 84153 ASSAY OF PSA TOTAL: CPT

## 2018-11-15 PROCEDURE — 90471 IMMUNIZATION ADMIN: CPT | Mod: S$GLB,,, | Performed by: INTERNAL MEDICINE

## 2018-11-15 PROCEDURE — 99397 PER PM REEVAL EST PAT 65+ YR: CPT | Mod: 25,S$GLB,, | Performed by: INTERNAL MEDICINE

## 2018-11-15 PROCEDURE — 93010 ELECTROCARDIOGRAM REPORT: CPT | Mod: S$GLB,,, | Performed by: INTERNAL MEDICINE

## 2018-11-15 PROCEDURE — 71046 X-RAY EXAM CHEST 2 VIEWS: CPT | Mod: 26,NTX,, | Performed by: RADIOLOGY

## 2018-11-15 PROCEDURE — 94729 DIFFUSING CAPACITY: CPT | Mod: S$GLB,,, | Performed by: INTERNAL MEDICINE

## 2018-11-15 PROCEDURE — 84443 ASSAY THYROID STIM HORMONE: CPT

## 2018-11-15 PROCEDURE — 80053 COMPREHEN METABOLIC PANEL: CPT

## 2018-11-15 PROCEDURE — 83036 HEMOGLOBIN GLYCOSYLATED A1C: CPT | Mod: NTX

## 2018-11-15 PROCEDURE — 99999 PR PBB SHADOW E&M-EST. PATIENT-LVL III: CPT | Mod: PBBFAC,,, | Performed by: INTERNAL MEDICINE

## 2018-11-15 PROCEDURE — 85027 COMPLETE CBC AUTOMATED: CPT | Mod: NTX

## 2018-11-15 PROCEDURE — 90670 PCV13 VACCINE IM: CPT | Mod: S$GLB,,, | Performed by: INTERNAL MEDICINE

## 2018-11-15 PROCEDURE — 1101F PT FALLS ASSESS-DOCD LE1/YR: CPT | Mod: CPTII,S$GLB,, | Performed by: INTERNAL MEDICINE

## 2018-11-15 PROCEDURE — 99214 OFFICE O/P EST MOD 30 MIN: CPT | Mod: 25,S$GLB,, | Performed by: INTERNAL MEDICINE

## 2018-11-15 PROCEDURE — 94727 GAS DIL/WSHOT DETER LNG VOL: CPT | Mod: S$GLB,,, | Performed by: INTERNAL MEDICINE

## 2018-11-15 PROCEDURE — 99999 PR PBB SHADOW E&M-EST. PATIENT-LVL III: CPT | Mod: PBBFAC,TXP,, | Performed by: INTERNAL MEDICINE

## 2018-11-15 RX ORDER — BESIFLOXACIN 6 MG/ML
1 SUSPENSION OPHTHALMIC 3 TIMES DAILY
Refills: 1 | COMMUNITY
Start: 2018-10-16 | End: 2019-01-23

## 2018-11-15 RX ORDER — SILDENAFIL 100 MG/1
100 TABLET, FILM COATED ORAL
COMMUNITY
End: 2019-01-23 | Stop reason: SDUPTHER

## 2018-11-15 RX ORDER — BROMFENAC SODIUM 0.7 MG/ML
1 SOLUTION/ DROPS OPHTHALMIC DAILY
Refills: 0 | COMMUNITY
Start: 2018-10-16 | End: 2019-01-23

## 2018-11-15 RX ORDER — DUREZOL 0.5 MG/ML
1 EMULSION OPHTHALMIC 4 TIMES DAILY
Refills: 1 | COMMUNITY
Start: 2018-10-16 | End: 2019-01-23

## 2018-11-15 NOTE — LETTER
November 15, 2018        Matias Crum [place]  [address]  [facility] [address]  Phone: [phone]  Fax: [phone]             [facility] - Lung Transplant  [address]  [facility] [address]  Phone: [phone]   Patient: Manpreet Hamilton   MR Number: [id number]   YOB: 1950   Date of Visit: 11/15/2018       Dear Dr. Matias Crum [place]    Thank you for referring Manpreet Hamilton to me for evaluation. Attached you will find relevant portions of my assessment and plan of care.    If you have questions, please do not hesitate to call me. I look forward to following Manpreet Hamilton along with you.    Sincerely,    Shauna Booker, DO    Enclosure    If you would like to receive this communication electronically, please contact [email or web address] or [phone] to request [employer] access.    [employer] is a tool which provides read-only access to select patient information with whom you have a relationship. Its easy to use and provides real time access to review your patients record including encounter summaries, notes, results, and demographic information.    If you feel you have received this communication in error or would no longer like to receive these types of communications, please e-mail [email or web address]

## 2018-11-15 NOTE — PROGRESS NOTES
Per Dr Novak verbal and Epic order, Mr Hamilton was given Prevnar 13 IM 0.5ml left deltoid, lot #[id number], expires . Pt waited 15 minutes and left without complaint. Joana Guzman LPN

## 2018-11-15 NOTE — LETTER
November 15, 2018    Manpreet Hamilton  [address]  [place] [address]             [facility] - Pulmonary Services  [address]  [facility] [address]  Phone: [phone] Dear Pancho,      Thank you for allowing me to serve you and perform your Executive Health exam on 11/15/2018. This letter will serve as a brief summary of the physical findings and laboratory/studies performed and recommendations at this time.Except for you lung condition, this is a normal exam. I am excited that you have responded to the OFEV and you DLCO has improved and your oxygen levels are better.   Looking forward to the Birthday Bash.  Have a Happy Holidays;          If you have any questions or concerns, please don't hesitate to call.    Sincerely,        Matias Pradhan MD

## 2018-11-15 NOTE — PROGRESS NOTES
LUNG TRANSPLANT INITIAL EVALUATION                                                                                                                                           Reason for Visit:  Evaluation for lung transplant    Referring Physician: SimonMayo austin MD    History of Present Illness: Manpreet Hamilton is a 68 y.o. male who is on 0L of oxygen.  He is on no assisted ventilation.  His New York Heart Association Class is II and a Karnofsky score of 80% - Normal activity with effort: some symptoms of disease. He is not diabetic.  He presents for initial lung transplant evaluation for a diagnosis of IPF.    He states that in Jan 2018 he was diagnosed with PNA.  Prior to that, he denies having any dyspnea.  Since then, he has had continued worsening of his dyspnea and developed a dry cough.  In May, he was formerly diagnosed with IPF and started on OFEV.  He also started seeing Dr. John at [facility] and went to [facility] for a second opinion.  His cough has resolved on OFEV and he tolerates it with minimal GI side effects.  He does not require supplemental oxygen therapy or NIPPV.  He has not had any other respiratory complications or frequent admissions.  Has noticed continued dyspnea and can no longer do activities that he was able to do previously.  Spoke with his pulmonologist at [facility] who recommended lung transplant evaluation.    Has no other pulmonary history and prior to Jan 2018 did not have any respiratory symptoms.  Smoked until 1985.  No history of COPD/Asthma.  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  He denies any GERD symptoms.  Had a full rheumatologic/autoimmune work-up at [facility] which was negative.  Was told that testing for HP was negative.  Has not had a lung biopsy and his IPF was diagnosed radiographically.  Was previously on Prednisone and Revatio which have been discontinued.  Follows-up with Dr. John in December  2018.    Is  and his wife presents to clinic with him today.  He continues to work.  Quit smoking in 1985.  No alcohol or illicits.    Past Medical History:   Diagnosis Date    Hyperlipemia     Hypertension     Nasal congestion     Pulmonary fibrosis      Colonoscopy Results: No procedure found.    Past Surgical History:   Procedure Laterality Date    CATARACT EXTRACTION Left     COLONOSCOPY N/A 8/5/2016    Procedure: COLONOSCOPY;  Surgeon: Leon Padilla MD;  Location: [facility] (4TH FLR);  Service: Endoscopy;  Laterality: N/A;    COLONOSCOPY N/A 8/5/2016    Performed by Leon Padilla MD at [facility] ENDO (4TH FLR)    HAND SURGERY      left    HIATAL HERNIA REPAIR      MICRODISCECTOMY OF SPINE Right 6/27/2018    Procedure: MICRODISCECTOMY, SPINE MIS Right L5- S1;  Surgeon: Angel Pendleton MD;  Location: [facility] OR [address] [place] [address];  Service: Neurosurgery;  Laterality: Right;    MICRODISCECTOMY, SPINE MIS Right L5- S1 Right 6/27/2018    [facility]leton [facility]    TONSILLECTOMY, ADENOIDECTOMY      VASECTOMY       [place]: None    Blood Product Transfusions: no    Allergies: Penicillins    Current Outpatient Medications   Medication Sig    atorvastatin (LIPITOR) 20 MG tablet TAKE ONE TABLET BY MOUTH EVERY EVENING    BESIVANCE 0.6 % DrpS 1 drop 3 (three) times daily.    doxycycline (VIBRAMYCIN) 100 MG Cap TAKE ONE CAPSULE BY MOUTH EVERY DAY FOR THE FIRST 5 DAYS OF EACH MONTH    DUREZOL 0.05 % Drop ophthalmic solution Place 1 drop into the left eye 4 (four) times daily.    nintedanib (OFEV) 150 mg Cap Take 1 tablet by mouth 2 (two) times daily.     PROLENSA 0.07 % Drop 1 drop once daily.    sildenafil (VIAGRA) 100 MG tablet Take 100 mg by mouth as needed.    valsartan-hydrochlorothiazide (DIOVAN-HCT) 320-25 mg per tablet Take 1 tablet by mouth once daily.     No current facility-administered medications for this visit.        Immunization History   Administered Date(s)  Administered    Pneumococcal Conjugate - 13 Valent 11/15/2018     Family History:    Family History   Problem Relation Age of Onset    Coronary artery disease Mother     Parkinsonism Father     Dementia Father     Coronary artery disease Sister     Multiple sclerosis Sister      Social History     Substance and Sexual Activity   Alcohol Use Yes    Alcohol/week: 16.8 oz    Types: 28 Glasses of wine per week    Comment: 6-7 bottles per week      Social History     Substance and Sexual Activity   Drug Use No      Social History     Socioeconomic History    Marital status:      Spouse name: Not on file    Number of children: Not on file    Years of education: Not on file    Highest education level: Not on file   Social Needs    Financial resource strain: Not on file    Food insecurity - worry: Not on file    Food insecurity - inability: Not on file    Transportation needs - medical: Not on file    Transportation needs - non-medical: Not on file   Occupational History    Not on file   Tobacco Use    Smoking status: Former Smoker     Packs/day: 1.50     Years: 20.00     Pack years: 30.00     Types: Cigarettes     Last attempt to quit: 3/17/1985     Years since quittin.6    Smokeless tobacco: Former User     Quit date: 1986   Substance and Sexual Activity    Alcohol use: Yes     Alcohol/week: 16.8 oz     Types: 28 Glasses of wine per week     Comment: 6-7 bottles per week    Drug use: No    Sexual activity: Yes     Partners: Female   Other Topics Concern    Not on file   Social History Narrative    Not on file     Review of Systems   Constitutional: Negative for chills, diaphoresis, fever, malaise/fatigue and weight loss.   HENT: Negative for congestion, ear discharge, ear pain, hearing loss, nosebleeds, sinus pain, sore throat and tinnitus.    Eyes: Negative for blurred vision, double vision, photophobia, pain, discharge and redness.   Respiratory: Positive for shortness of  "breath. Negative for cough, hemoptysis, sputum production, wheezing and stridor.    Cardiovascular: Negative for chest pain, palpitations, orthopnea, claudication, leg swelling and PND.   Gastrointestinal: Negative for abdominal pain, blood in stool, constipation, diarrhea, heartburn, melena, nausea and vomiting.   Genitourinary: Negative for dysuria, flank pain, frequency, hematuria and urgency.   Musculoskeletal: Negative for back pain, falls, joint pain, myalgias and neck pain.   Skin: Negative for itching and rash.   Neurological: Positive for tingling. Negative for dizziness, tremors, sensory change, speech change, focal weakness, seizures, loss of consciousness, weakness and headaches.   Endo/Heme/Allergies: Negative for environmental allergies and polydipsia. Does not bruise/bleed easily.   Psychiatric/Behavioral: Negative for depression, hallucinations, memory loss, substance abuse and suicidal ideas. The patient is not nervous/anxious and does not have insomnia.      Vitals  /82   Pulse 95   Temp 97.1 °F (36.2 °C) (Oral)   Resp 20   Ht 5' 9" (1.753 m)   Wt 92.5 kg (204 lb)   SpO2 96% Comment: room air  BMI 30.13 kg/m²   Physical Exam   Constitutional: He is oriented to person, place, and time and well-developed, well-nourished, and in no distress. No distress.   HENT:   Head: Normocephalic and atraumatic.   Nose: Nose normal.   Mouth/Throat: Oropharynx is clear and moist. No oropharyngeal exudate.   Eyes: Conjunctivae are normal. Pupils are equal, round, and reactive to light. No scleral icterus.   Neck: Normal range of motion. Neck supple. No JVD present. No tracheal deviation present. No thyromegaly present.   Cardiovascular: Normal rate, regular rhythm, normal heart sounds and intact distal pulses. Exam reveals no gallop and no friction rub.   No murmur heard.  Pulmonary/Chest: Effort normal. No stridor. No respiratory distress. He has no decreased breath sounds. He has no wheezes. He has " rales in the right lower field and the left lower field. He exhibits no tenderness.   Abdominal: Soft. Bowel sounds are normal. He exhibits no distension. There is no rebound and no guarding.   Musculoskeletal: Normal range of motion. He exhibits no edema, tenderness or deformity.   Lymphadenopathy:     He has no cervical adenopathy.   Neurological: He is alert and oriented to person, place, and time. No cranial nerve deficit. Gait normal. Coordination normal. GCS score is 15.   Skin: Skin is warm and dry. No rash noted. He is not diaphoretic. No erythema. No pallor.   Psychiatric: Mood and affect normal.       Labs:  Clinical Support on 11/15/2018   Component Date Value    Sodium 11/15/2018 134*    Potassium 11/15/2018 4.5     Chloride 11/15/2018 95     CO2 11/15/2018 30*    Glucose 11/15/2018 100     BUN, Bld 11/15/2018 13     Creatinine 11/15/2018 0.8     Calcium 11/15/2018 10.0     Total Protein 11/15/2018 6.9     Albumin 11/15/2018 3.9     Total Bilirubin 11/15/2018 0.6     Alkaline Phosphatase 11/15/2018 97     AST 11/15/2018 23     ALT 11/15/2018 19     Anion Gap 11/15/2018 9     eGFR if African American 11/15/2018 >60.0     eGFR if non African Amer* 11/15/2018 >60.0     WBC 11/15/2018 8.05     RBC 11/15/2018 5.07     Hemoglobin 11/15/2018 15.5     Hematocrit 11/15/2018 46.4     MCV 11/15/2018 92     MCH 11/15/2018 30.6     MCHC 11/15/2018 33.4     RDW 11/15/2018 11.6     Platelets 11/15/2018 313     MPV 11/15/2018 8.7*    Cholesterol 11/15/2018 200*    Triglycerides 11/15/2018 81     HDL 11/15/2018 70     LDL Cholesterol 11/15/2018 113.8     HDL/Chol Ratio 11/15/2018 35.0     Total Cholesterol/HDL Ra* 11/15/2018 2.9     Non-HDL Cholesterol 11/15/2018 130     TSH 11/15/2018 0.751     PSA, SCREEN 11/15/2018 1.4     Hemoglobin A1C 11/15/2018 5.3     Estimated Avg Glucose 11/15/2018 105        Pulmonary Function Tests 11/15/2018   TLC (liters) 3.16   DLCO (ml/mmHg sec) 18.1    TLC% 50   DLCO% 73     6MW 1/30/2013   6MWT Status completed without stopping   Patient Reported No complaints   Was O2 used? No   6MW Distance walked (feet) 1600   Distance walked (meters) 487.68   Did patient stop? No   Oxygen Saturation 96   Supplemental Oxygen Room Air   Heart Rate 87   Blood Pressure 123/71   Kelsy Dyspnea Rating  light   Oxygen Saturation 96   Supplemental Oxygen Room Air   Heart Rate 96   Blood Pressure 126/81   Kelsy Dyspnea Rating  moderate   Recovery Time (seconds) 76   Oxygen Saturation 96   Supplemental Oxygen Room Air   Heart Rate 87       Imaging:  Results for orders placed during the hospital encounter of 11/15/18   X-Ray Chest PA And Lateral    Narrative EXAMINATION:  XR CHEST PA AND LATERAL    CLINICAL HISTORY:  Interstitial pulmonary disease, unspecified    FINDINGS:  Heart size is normal.  Lung volumes are diminished.  There is diffuse idiopathic pulmonary fibrosis with honeycomb formation.      Impression See above    Chronic interstitial lung disease.      Electronically signed by: Mikel Roberts MD  Date:    11/15/2018  Time:    08:03       Assessment:  1. Idiopathic pulmonary fibrosis      Plan:   1. Patient followed by Dr. Pradhan for a diagnosis of IPF.  Has been treated with OFEV since May and is tolerating it well.  TLC and DLCO have been relatively stable with a slow decline.  Spirometry not available to assess trends in FVC.  He does not have hypoxemia and follows with Dr. John regularly.  Is on optimal treatment for IPF.    2. Patient was sent for discussions about lung transplantation.  We discussed that he currently does not have a declining trend in his DLCO, does not have hypoxemia, and does not report any evidence of pulmonary HTN.  He does not meet disease specific indications for lung transplant at this time for a diagnosis of IPF.  Would recommend to continue to follow trends in his lung function including spirometry.  Discussed that his age will be a limiting  factor for any future plans for lung transplant.  Unfortunately, our center has a age cutoff of 65.  Should he decline further, will need to seek opinions from other centers regarding his candidacy given his age. Him and his wife verbalized understanding and all questions were answered.     I appreciate the consult and you allowing me to participate in this patients care.  Please feel free to call with any questions.       Shauna Booker D.O.  Pulmonary/Critical Care Medicine

## 2018-11-16 NOTE — PROGRESS NOTES
Subjective:       Patient ID: Manpreet Hamilton is a 68 y.o. male.    Chief Complaint: Annual Exam    HPI 67 yo  of [place] [employer] comes for his periodic health exam and follow up on his recently developed interstitial lung disease. He is on OFEV and is stable, He has lost 14 pounds due to gastric side effects but they are better now. His breathing seems to be better.   Review of Systems   Constitutional: Negative.    HENT: Negative.    Eyes: Negative.    Respiratory: Positive for shortness of breath.         Developed UIP of NSIP after a viral pneumonia earlier in the year  He had normal lung function on March 1 but by the end of the month he has dropped his lung function by 20% and had exertional dyspnea.     Now on OFEV and FVC is stable and DLCO had improved greatly   Cardiovascular: Negative.    Gastrointestinal: Negative.    Genitourinary: Negative.         Long hx of low grade epidiymitis   Musculoskeletal: Negative.         Hx of successful lumbar back surgery earlier this year.   Skin: Negative.    Neurological: Negative.    Psychiatric/Behavioral: Negative.    All other systems reviewed and are negative.      Objective:      Physical Exam   Constitutional: He is oriented to person, place, and time. He appears well-developed and well-nourished.   HENT:   Head: Normocephalic and atraumatic.   Right Ear: External ear normal.   Left Ear: External ear normal.   Eyes: Conjunctivae and EOM are normal. Pupils are equal, round, and reactive to light.   Neck: Normal range of motion. Neck supple.   Cardiovascular: Normal rate, regular rhythm and normal heart sounds.   Pulmonary/Chest: Effort normal and breath sounds normal.   Bi basilar velcro rales     Resting Sa02: 96% and after walking the length of the carter and back: 92%   Abdominal: Soft. Bowel sounds are normal.   Musculoskeletal: Normal range of motion.   Neurological: He is alert and oriented to person, place, and time. He has normal  reflexes.   Skin: Skin is warm and dry.   Psychiatric: He has a normal mood and affect. His behavior is normal. Judgment and thought content normal.       Assessment:       1. Annual physical exam    2. ILD (interstitial lung disease)        Plan:       Labs:All parameters are normal. EKG is essentially normal. Chest x-ray has chronic mild increased interstitial markings. IMP: Moderate interstitial lung disease stable on OFEV,

## 2018-11-17 ENCOUNTER — PATIENT MESSAGE (OUTPATIENT)
Dept: PULMONOLOGY | Facility: CLINIC | Age: 68
End: 2018-11-17

## 2018-11-19 ENCOUNTER — PATIENT MESSAGE (OUTPATIENT)
Dept: PULMONOLOGY | Facility: CLINIC | Age: 68
End: 2018-11-19

## 2018-11-26 ENCOUNTER — PATIENT MESSAGE (OUTPATIENT)
Dept: PULMONOLOGY | Facility: CLINIC | Age: 68
End: 2018-11-26

## 2018-11-27 ENCOUNTER — PATIENT MESSAGE (OUTPATIENT)
Dept: PULMONOLOGY | Facility: CLINIC | Age: 68
End: 2018-11-27

## 2018-11-28 ENCOUNTER — PATIENT MESSAGE (OUTPATIENT)
Dept: PULMONOLOGY | Facility: CLINIC | Age: 68
End: 2018-11-28

## 2018-11-30 ENCOUNTER — PATIENT MESSAGE (OUTPATIENT)
Dept: PULMONOLOGY | Facility: CLINIC | Age: 68
End: 2018-11-30

## 2018-12-20 ENCOUNTER — PATIENT MESSAGE (OUTPATIENT)
Dept: PULMONOLOGY | Facility: CLINIC | Age: 68
End: 2018-12-20

## 2019-01-11 ENCOUNTER — PATIENT MESSAGE (OUTPATIENT)
Dept: PULMONOLOGY | Facility: CLINIC | Age: 69
End: 2019-01-11

## 2019-01-11 DIAGNOSIS — J84.10 PULMONARY FIBROSIS: Primary | ICD-10-CM

## 2019-01-11 DIAGNOSIS — Z00.00 ROUTINE GENERAL MEDICAL EXAMINATION AT A HEALTH CARE FACILITY: Primary | ICD-10-CM

## 2019-01-17 ENCOUNTER — PATIENT MESSAGE (OUTPATIENT)
Dept: PULMONOLOGY | Facility: CLINIC | Age: 69
End: 2019-01-17

## 2019-01-23 ENCOUNTER — HOSPITAL ENCOUNTER (OUTPATIENT)
Dept: PULMONOLOGY | Facility: CLINIC | Age: 69
Discharge: HOME | End: 2019-01-23
Payer: COMMERCIAL

## 2019-01-23 ENCOUNTER — OFFICE VISIT (OUTPATIENT)
Dept: PULMONOLOGY | Facility: CLINIC | Age: 69
End: 2019-01-23
Payer: COMMERCIAL

## 2019-01-23 ENCOUNTER — CLINICAL SUPPORT (OUTPATIENT)
Dept: INTERNAL MEDICINE | Facility: CLINIC | Age: 69
End: 2019-01-23
Payer: COMMERCIAL

## 2019-01-23 VITALS
BODY MASS INDEX: 30.07 KG/M2 | HEART RATE: 83 BPM | DIASTOLIC BLOOD PRESSURE: 74 MMHG | OXYGEN SATURATION: 96 % | HEIGHT: 69 IN | SYSTOLIC BLOOD PRESSURE: 132 MMHG | WEIGHT: 203 LBS

## 2019-01-23 DIAGNOSIS — J84.10 PULMONARY FIBROSIS: ICD-10-CM

## 2019-01-23 DIAGNOSIS — J84.9 ILD (INTERSTITIAL LUNG DISEASE): Primary | ICD-10-CM

## 2019-01-23 DIAGNOSIS — Z00.00 ROUTINE GENERAL MEDICAL EXAMINATION AT A HEALTH CARE FACILITY: Primary | ICD-10-CM

## 2019-01-23 LAB
ALBUMIN SERPL BCP-MCNC: 3.9 G/DL
ALBUMIN SERPL BCP-MCNC: 3.9 G/DL
ALP SERPL-CCNC: 95 U/L
ALP SERPL-CCNC: 95 U/L
ALT SERPL W/O P-5'-P-CCNC: 20 U/L
ALT SERPL W/O P-5'-P-CCNC: 20 U/L
ANION GAP SERPL CALC-SCNC: 9 MMOL/L
AST SERPL-CCNC: 27 U/L
AST SERPL-CCNC: 27 U/L
BILIRUB DIRECT SERPL-MCNC: 0.3 MG/DL
BILIRUB SERPL-MCNC: 0.8 MG/DL
BILIRUB SERPL-MCNC: 0.8 MG/DL
BUN SERPL-MCNC: 16 MG/DL
CALCIUM SERPL-MCNC: 10 MG/DL
CHLORIDE SERPL-SCNC: 94 MMOL/L
CO2 SERPL-SCNC: 27 MMOL/L
CREAT SERPL-MCNC: 0.8 MG/DL
ERYTHROCYTE [DISTWIDTH] IN BLOOD BY AUTOMATED COUNT: 12.1 %
EST. GFR  (AFRICAN AMERICAN): >60 ML/MIN/1.73 M^2
EST. GFR  (NON AFRICAN AMERICAN): >60 ML/MIN/1.73 M^2
GLUCOSE SERPL-MCNC: 106 MG/DL
HCT VFR BLD AUTO: 45.2 %
HGB BLD-MCNC: 15.4 G/DL
MCH RBC QN AUTO: 30.2 PG
MCHC RBC AUTO-ENTMCNC: 34.1 G/DL
MCV RBC AUTO: 89 FL
PLATELET # BLD AUTO: 261 K/UL
PMV BLD AUTO: 8.8 FL
POTASSIUM SERPL-SCNC: 4.1 MMOL/L
PROT SERPL-MCNC: 7.1 G/DL
PROT SERPL-MCNC: 7.1 G/DL
RBC # BLD AUTO: 5.1 M/UL
SODIUM SERPL-SCNC: 130 MMOL/L
WBC # BLD AUTO: 7.02 K/UL

## 2019-01-23 PROCEDURE — 80053 COMPREHEN METABOLIC PANEL: CPT

## 2019-01-23 PROCEDURE — 99999 PR PBB SHADOW E&M-EST. PATIENT-LVL III: ICD-10-PCS | Mod: PBBFAC,,, | Performed by: INTERNAL MEDICINE

## 2019-01-23 PROCEDURE — 99214 PR OFFICE/OUTPT VISIT, EST, LEVL IV, 30-39 MIN: ICD-10-PCS | Mod: S$GLB,,, | Performed by: INTERNAL MEDICINE

## 2019-01-23 PROCEDURE — 94729 DIFFUSING CAPACITY: CPT | Mod: S$GLB,,, | Performed by: INTERNAL MEDICINE

## 2019-01-23 PROCEDURE — 94729 PR C02/MEMBANE DIFFUSE CAPACITY: ICD-10-PCS | Mod: S$GLB,,, | Performed by: INTERNAL MEDICINE

## 2019-01-23 PROCEDURE — 94727 GAS DIL/WSHOT DETER LNG VOL: CPT | Mod: S$GLB,,, | Performed by: INTERNAL MEDICINE

## 2019-01-23 PROCEDURE — 99999 PR PBB SHADOW E&M-EST. PATIENT-LVL III: CPT | Mod: PBBFAC,,, | Performed by: INTERNAL MEDICINE

## 2019-01-23 PROCEDURE — 94727 PR PULM FUNCTION TEST BY GAS: ICD-10-PCS | Mod: S$GLB,,, | Performed by: INTERNAL MEDICINE

## 2019-01-23 PROCEDURE — 99214 OFFICE O/P EST MOD 30 MIN: CPT | Mod: S$GLB,,, | Performed by: INTERNAL MEDICINE

## 2019-01-23 PROCEDURE — 1101F PT FALLS ASSESS-DOCD LE1/YR: CPT | Mod: CPTII,S$GLB,, | Performed by: INTERNAL MEDICINE

## 2019-01-23 PROCEDURE — 85027 COMPLETE CBC AUTOMATED: CPT

## 2019-01-23 PROCEDURE — 1101F PR PT FALLS ASSESS DOC 0-1 FALLS W/OUT INJ PAST YR: ICD-10-PCS | Mod: CPTII,S$GLB,, | Performed by: INTERNAL MEDICINE

## 2019-01-23 RX ORDER — DESONIDE 0.5 MG/ML
1 LOTION TOPICAL 2 TIMES DAILY
Refills: 3 | COMMUNITY
Start: 2019-01-15

## 2019-01-23 NOTE — PROGRESS NOTES
Subjective:      Patient ID: Manpreet Hamilton is a 68 y.o. male.    Chief Complaint: Interstitial Lung Disease    HPI 68 male with UIP, comes for his periodic follow up. He is doing well, only an infrequent bout of diarrhea. He is stable, his PFT's are essentially unchanged form his last visit November 15th. Today's  FVC: 2.34 l 60% and TLC: 3.36 liters 54% the DLCO is slighllty lower at 59%.       Pulmonary Function Tests 11/15/2018   TLC (liters) 3.16   DLCO (ml/mmHg sec) 18.1   TLC% 50   DLCO% 73     Review of Systems   Constitutional: Negative.    HENT: Negative.    Eyes: Negative.    Respiratory: Negative.  Positive for dyspnea on extertion.         ILD on OFEV       Cardiovascular: Negative.    Genitourinary: Negative.    Musculoskeletal: Negative.    Skin: Negative.    Gastrointestinal: Negative.         Occasional diarrhea from OFEV   Neurological: Negative.    Psychiatric/Behavioral: Negative.      Objective:     Physical Exam   Constitutional: He is oriented to person, place, and time. He appears well-developed and well-nourished.   HENT:   Head: Normocephalic and atraumatic.   Right Ear: External ear normal.   Left Ear: External ear normal.   Eyes: Conjunctivae and EOM are normal. Pupils are equal, round, and reactive to light.   Neck: Normal range of motion. Neck supple.   Cardiovascular: Normal rate, regular rhythm and normal heart sounds.   Pulmonary/Chest: Effort normal and breath sounds normal.   Bi basilar velcro rales.    Resting Sa02: 98% falls to 93% after walking the length of the carter and back. Rebounds to 98% in 90 seconds.    Abdominal: Soft. Bowel sounds are normal.   Musculoskeletal: Normal range of motion.   Neurological: He is alert and oriented to person, place, and time. He has normal reflexes.   Skin: Skin is warm and dry.   Psychiatric: He has a normal mood and affect. His behavior is normal. Judgment and thought content normal.       Assessment:     1. ILD (interstitial lung  disease)      Outpatient Encounter Medications as of 1/23/2019   Medication Sig Dispense Refill    desonide (DESOWEN) 0.05 % lotion 1 application 2 (two) times daily.  3    vitamin D3-vitamin K2 1000-90 unit-mcg TbDL 1 tablet once daily.      atorvastatin (LIPITOR) 20 MG tablet TAKE ONE TABLET BY MOUTH EVERY EVENING 90 tablet 4    doxycycline (VIBRAMYCIN) 100 MG Cap TAKE ONE CAPSULE BY MOUTH EVERY DAY FOR THE FIRST 5 DAYS OF EACH MONTH 15 capsule 6    nintedanib (OFEV) 150 mg Cap Take 1 tablet by mouth 2 (two) times daily.       valsartan-hydrochlorothiazide (DIOVAN-HCT) 320-25 mg per tablet Take 1 tablet by mouth once daily.  3    [DISCONTINUED] BESIVANCE 0.6 % DrpS 1 drop 3 (three) times daily.  1    [DISCONTINUED] DUREZOL 0.05 % Drop ophthalmic solution Place 1 drop into the left eye 4 (four) times daily.  1    [DISCONTINUED] PROLENSA 0.07 % Drop 1 drop once daily.  0    [DISCONTINUED] sildenafil (VIAGRA) 100 MG tablet Take 100 mg by mouth as needed.       No facility-administered encounter medications on file as of 1/23/2019.        Plan:   ILD Stable on OFEV Today's labs are normal.  Problem List Items Addressed This Visit     ILD (interstitial lung disease) - Primary

## 2019-03-25 ENCOUNTER — PATIENT MESSAGE (OUTPATIENT)
Dept: PULMONOLOGY | Facility: CLINIC | Age: 69
End: 2019-03-25

## 2019-04-09 ENCOUNTER — TELEPHONE (OUTPATIENT)
Dept: PULMONOLOGY | Facility: CLINIC | Age: 69
End: 2019-04-09

## 2019-04-17 DIAGNOSIS — J84.112 UIP (USUAL INTERSTITIAL PNEUMONITIS): Primary | ICD-10-CM

## 2019-04-18 ENCOUNTER — PATIENT MESSAGE (OUTPATIENT)
Dept: PULMONOLOGY | Facility: CLINIC | Age: 69
End: 2019-04-18

## 2019-04-23 ENCOUNTER — PATIENT MESSAGE (OUTPATIENT)
Dept: PULMONOLOGY | Facility: CLINIC | Age: 69
End: 2019-04-23

## 2019-05-09 ENCOUNTER — PATIENT MESSAGE (OUTPATIENT)
Dept: PULMONOLOGY | Facility: CLINIC | Age: 69
End: 2019-05-09

## 2019-05-10 ENCOUNTER — CLINICAL SUPPORT (OUTPATIENT)
Dept: INTERNAL MEDICINE | Facility: CLINIC | Age: 69
End: 2019-05-10
Payer: COMMERCIAL

## 2019-05-10 ENCOUNTER — OFFICE VISIT (OUTPATIENT)
Dept: PULMONOLOGY | Facility: CLINIC | Age: 69
End: 2019-05-10
Payer: COMMERCIAL

## 2019-05-10 ENCOUNTER — HOSPITAL ENCOUNTER (OUTPATIENT)
Dept: PULMONOLOGY | Facility: CLINIC | Age: 69
Discharge: HOME | End: 2019-05-10
Payer: COMMERCIAL

## 2019-05-10 VITALS
OXYGEN SATURATION: 98 % | BODY MASS INDEX: 29.47 KG/M2 | HEART RATE: 80 BPM | DIASTOLIC BLOOD PRESSURE: 72 MMHG | WEIGHT: 199 LBS | HEIGHT: 69 IN | SYSTOLIC BLOOD PRESSURE: 128 MMHG

## 2019-05-10 DIAGNOSIS — J84.9 ILD (INTERSTITIAL LUNG DISEASE): Primary | ICD-10-CM

## 2019-05-10 DIAGNOSIS — J84.112 UIP (USUAL INTERSTITIAL PNEUMONITIS): ICD-10-CM

## 2019-05-10 DIAGNOSIS — T88.7XXA MEDICATION SIDE EFFECT: Primary | ICD-10-CM

## 2019-05-10 LAB
ALBUMIN SERPL BCP-MCNC: 4.2 G/DL (ref 3.5–5.2)
ALP SERPL-CCNC: 93 U/L (ref 55–135)
ALT SERPL W/O P-5'-P-CCNC: 20 U/L (ref 10–44)
ANION GAP SERPL CALC-SCNC: 10 MMOL/L (ref 8–16)
AST SERPL-CCNC: 30 U/L (ref 10–40)
BASOPHILS # BLD AUTO: 0.07 K/UL (ref 0–0.2)
BASOPHILS NFR BLD: 1 % (ref 0–1.9)
BILIRUB SERPL-MCNC: 0.5 MG/DL (ref 0.1–1)
BUN SERPL-MCNC: 15 MG/DL (ref 8–23)
CALCIUM SERPL-MCNC: 10.3 MG/DL (ref 8.7–10.5)
CHLORIDE SERPL-SCNC: 89 MMOL/L (ref 95–110)
CO2 SERPL-SCNC: 31 MMOL/L (ref 23–29)
CREAT SERPL-MCNC: 0.8 MG/DL (ref 0.5–1.4)
DIFFERENTIAL METHOD: ABNORMAL
EOSINOPHIL # BLD AUTO: 0.6 K/UL (ref 0–0.5)
EOSINOPHIL NFR BLD: 8.9 % (ref 0–8)
ERYTHROCYTE [DISTWIDTH] IN BLOOD BY AUTOMATED COUNT: 11.9 % (ref 11.5–14.5)
EST. GFR  (AFRICAN AMERICAN): >60 ML/MIN/1.73 M^2
EST. GFR  (NON AFRICAN AMERICAN): >60 ML/MIN/1.73 M^2
GLUCOSE SERPL-MCNC: 101 MG/DL (ref 70–110)
HCT VFR BLD AUTO: 45.7 % (ref 40–54)
HGB BLD-MCNC: 15.8 G/DL (ref 14–18)
IMM GRANULOCYTES # BLD AUTO: 0.02 K/UL (ref 0–0.04)
IMM GRANULOCYTES NFR BLD AUTO: 0.3 % (ref 0–0.5)
LYMPHOCYTES # BLD AUTO: 1.5 K/UL (ref 1–4.8)
LYMPHOCYTES NFR BLD: 22.3 % (ref 18–48)
MCH RBC QN AUTO: 31.2 PG (ref 27–31)
MCHC RBC AUTO-ENTMCNC: 34.6 G/DL (ref 32–36)
MCV RBC AUTO: 90 FL (ref 82–98)
MONOCYTES # BLD AUTO: 0.7 K/UL (ref 0.3–1)
MONOCYTES NFR BLD: 10.9 % (ref 4–15)
NEUTROPHILS # BLD AUTO: 3.9 K/UL (ref 1.8–7.7)
NEUTROPHILS NFR BLD: 56.6 % (ref 38–73)
NRBC BLD-RTO: 0 /100 WBC
PLATELET # BLD AUTO: 281 K/UL (ref 150–350)
PMV BLD AUTO: 8.6 FL (ref 9.2–12.9)
POTASSIUM SERPL-SCNC: 4.7 MMOL/L (ref 3.5–5.1)
PROT SERPL-MCNC: 7.1 G/DL (ref 6–8.4)
RBC # BLD AUTO: 5.06 M/UL (ref 4.6–6.2)
SODIUM SERPL-SCNC: 130 MMOL/L (ref 136–145)
WBC # BLD AUTO: 6.82 K/UL (ref 3.9–12.7)

## 2019-05-10 PROCEDURE — 94729 DIFFUSING CAPACITY: CPT | Mod: S$GLB,,, | Performed by: INTERNAL MEDICINE

## 2019-05-10 PROCEDURE — 94727 PR PULM FUNCTION TEST BY GAS: ICD-10-PCS | Mod: S$GLB,,, | Performed by: INTERNAL MEDICINE

## 2019-05-10 PROCEDURE — 94729 PR C02/MEMBANE DIFFUSE CAPACITY: ICD-10-PCS | Mod: S$GLB,,, | Performed by: INTERNAL MEDICINE

## 2019-05-10 PROCEDURE — 85025 COMPLETE CBC W/AUTO DIFF WBC: CPT

## 2019-05-10 PROCEDURE — 1101F PT FALLS ASSESS-DOCD LE1/YR: CPT | Mod: CPTII,S$GLB,, | Performed by: INTERNAL MEDICINE

## 2019-05-10 PROCEDURE — 99999 PR PBB SHADOW E&M-EST. PATIENT-LVL III: CPT | Mod: PBBFAC,,, | Performed by: INTERNAL MEDICINE

## 2019-05-10 PROCEDURE — 99214 PR OFFICE/OUTPT VISIT, EST, LEVL IV, 30-39 MIN: ICD-10-PCS | Mod: S$GLB,,, | Performed by: INTERNAL MEDICINE

## 2019-05-10 PROCEDURE — 94727 GAS DIL/WSHOT DETER LNG VOL: CPT | Mod: S$GLB,,, | Performed by: INTERNAL MEDICINE

## 2019-05-10 PROCEDURE — 80053 COMPREHEN METABOLIC PANEL: CPT

## 2019-05-10 PROCEDURE — 86765 RUBEOLA ANTIBODY: CPT

## 2019-05-10 PROCEDURE — 99999 PR PBB SHADOW E&M-EST. PATIENT-LVL III: ICD-10-PCS | Mod: PBBFAC,,, | Performed by: INTERNAL MEDICINE

## 2019-05-10 PROCEDURE — 99214 OFFICE O/P EST MOD 30 MIN: CPT | Mod: S$GLB,,, | Performed by: INTERNAL MEDICINE

## 2019-05-10 PROCEDURE — 1101F PR PT FALLS ASSESS DOC 0-1 FALLS W/OUT INJ PAST YR: ICD-10-PCS | Mod: CPTII,S$GLB,, | Performed by: INTERNAL MEDICINE

## 2019-05-10 RX ORDER — LOPERAMIDE HYDROCHLORIDE 2 MG/1
1 CAPSULE ORAL 3 TIMES DAILY PRN
Refills: 0 | Status: ON HOLD | COMMUNITY
Start: 2019-03-04 | End: 2019-08-28 | Stop reason: HOSPADM

## 2019-05-10 NOTE — LETTER
May 17, 2019    Manpreet Hamilton  [address]  [place] [address]             [facility] - Pulmonary Services  [address]  [facility] [address]  Phone: [phone] Dear Pancho,      I hope that your week end wedding was a blast. The weather held off to Sunday so you should have had a nice night.  Your titer for measles is positive that means that you have immunity. I am forwarding your labs and PFT's to share with Dr. Scanlon. I think that are stable and that you are doing well.      If you have any questions or concerns, please don't hesitate to call.    Sincerely,        Matias Pradhan MD

## 2019-05-13 LAB
RUBEOLA IGG ANTIBODY: 2.2 ISR (ref 0–0.9)
RUBEOLA INTERPRETATION: POSITIVE

## 2019-05-13 NOTE — PROGRESS NOTES
Subjective:      Patient ID: Manpreet Hamilton is a 68 y.o. male.    Chief Complaint: No chief complaint on file.    HPI  69 yo long term patient who developed ILD after a URI last year. He initially responded to steroids and behaved like   But last June his process seemed to re activate . He has seen Dr. Shoemaker at [facility] and was evaluated at the [facility] and the consensus is that he has UIP He is on OFEV and tolerating it well. He denies cough, has had little to no GI distress. He notes that playing golf, he is good for 9 holes and that if he visit want of his millls that climbing stairs bother him  His labs today are normal. And his lung volume show moderate reduction in FVC: 61% and TLC at 50%but his DLCO has  Improved  from last visit and now is  67%/  Resting Sa02: 97% and it fell to 91% after walking the length of the carter and back.  At his request, checking his measles titer.r      Pulmonary Function Tests 11/15/2018   TLC (liters) 3.16   DLCO (ml/mmHg sec) 18.1   TLC% 50   DLCO% 73     Review of Systems   Constitutional: Negative.    HENT: Negative.    Eyes: Negative.    Respiratory: Negative.  Positive for dyspnea on extertion.         On OFEV for ILD and doing well.    Cardiovascular: Negative.    Genitourinary: Negative.    Musculoskeletal: Negative.    Skin: Negative.    Gastrointestinal: Negative.    Neurological: Negative.    Psychiatric/Behavioral: Negative.      Objective:     Physical Exam   Constitutional: He is oriented to person, place, and time. He appears well-developed and well-nourished.   In no distress at rest.   HENT:   Head: Normocephalic and atraumatic.   Right Ear: External ear normal.   Left Ear: External ear normal.   Eyes: Pupils are equal, round, and reactive to light. Conjunctivae and EOM are normal.   Neck: Normal range of motion. Neck supple.   Cardiovascular: Normal rate, regular rhythm and normal heart sounds.   Pulmonary/Chest: Effort normal and breath sounds normal.    No velcro rales appreciated.   Abdominal: Soft. Bowel sounds are normal.   Musculoskeletal: Normal range of motion.   Neurological: He is alert and oriented to person, place, and time. He has normal reflexes.   Skin: Skin is warm and dry.   Psychiatric: He has a normal mood and affect. His behavior is normal. Judgment and thought content normal.       Assessment:     1. ILD (interstitial lung disease)      Outpatient Encounter Medications as of 5/10/2019   Medication Sig Dispense Refill    atorvastatin (LIPITOR) 20 MG tablet TAKE ONE TABLET BY MOUTH EVERY EVENING 90 tablet 4    desonide (DESOWEN) 0.05 % lotion 1 application 2 (two) times daily.  3    doxycycline (VIBRAMYCIN) 100 MG Cap TAKE ONE CAPSULE BY MOUTH EVERY DAY FOR THE FIRST 5 DAYS OF EACH MONTH 15 capsule 6    loperamide (IMODIUM) 2 mg capsule 1 capsule 3 (three) times daily as needed.  0    nintedanib (OFEV) 150 mg Cap Take 1 tablet by mouth 2 (two) times daily.       valsartan-hydrochlorothiazide (DIOVAN-HCT) 320-25 mg per tablet Take 1 tablet by mouth once daily.  3    vitamin D3-vitamin K2 1000-90 unit-mcg TbDL 1 tablet once daily.       No facility-administered encounter medications on file as of 5/10/2019.        Plan:   IMP: Stable ILD on OFEV  Problem List Items Addressed This Visit     ILD (interstitial lung disease) - Primary

## 2019-05-17 DIAGNOSIS — J84.112 UIP (USUAL INTERSTITIAL PNEUMONITIS): Primary | ICD-10-CM

## 2019-06-10 ENCOUNTER — PATIENT MESSAGE (OUTPATIENT)
Dept: PULMONOLOGY | Facility: CLINIC | Age: 69
End: 2019-06-10

## 2019-07-09 ENCOUNTER — TELEPHONE (OUTPATIENT)
Dept: PULMONOLOGY | Facility: CLINIC | Age: 69
End: 2019-07-09

## 2019-07-10 ENCOUNTER — TELEPHONE (OUTPATIENT)
Dept: GASTROENTEROLOGY | Facility: CLINIC | Age: 69
End: 2019-07-10

## 2019-07-10 ENCOUNTER — ANESTHESIA EVENT (OUTPATIENT)
Dept: SURGERY | Facility: HOSPITAL | Age: 69
DRG: 377 | End: 2019-07-10
Payer: COMMERCIAL

## 2019-07-10 ENCOUNTER — HOSPITAL ENCOUNTER (INPATIENT)
Facility: HOSPITAL | Age: 69
LOS: 12 days | Discharge: HOME HEALTH | DRG: 377 | End: 2019-07-22
Attending: INTERNAL MEDICINE | Admitting: INTERNAL MEDICINE
Payer: COMMERCIAL

## 2019-07-10 ENCOUNTER — ANESTHESIA EVENT (OUTPATIENT)
Dept: ENDOSCOPY | Facility: HOSPITAL | Age: 69
DRG: 377 | End: 2019-07-10
Payer: COMMERCIAL

## 2019-07-10 ENCOUNTER — ANESTHESIA (OUTPATIENT)
Dept: ENDOSCOPY | Facility: HOSPITAL | Age: 69
DRG: 377 | End: 2019-07-10
Payer: COMMERCIAL

## 2019-07-10 DIAGNOSIS — K27.9 PUD (PEPTIC ULCER DISEASE): Primary | ICD-10-CM

## 2019-07-10 DIAGNOSIS — K27.9 PUD (PEPTIC ULCER DISEASE): ICD-10-CM

## 2019-07-10 DIAGNOSIS — K92.2 GASTROINTESTINAL HEMORRHAGE, UNSPECIFIED GASTROINTESTINAL HEMORRHAGE TYPE: ICD-10-CM

## 2019-07-10 DIAGNOSIS — J84.10 PULMONARY FIBROSIS: ICD-10-CM

## 2019-07-10 DIAGNOSIS — J84.10 PULMONARY FIBROSIS: Primary | ICD-10-CM

## 2019-07-10 DIAGNOSIS — K92.2 GIB (GASTROINTESTINAL BLEEDING): ICD-10-CM

## 2019-07-10 DIAGNOSIS — K92.2 GI BLEED: ICD-10-CM

## 2019-07-10 DIAGNOSIS — D62 ACUTE BLOOD LOSS ANEMIA: ICD-10-CM

## 2019-07-10 LAB
ABO + RH BLD: NORMAL
ALBUMIN SERPL BCP-MCNC: 1.7 G/DL (ref 3.5–5.2)
ALBUMIN SERPL BCP-MCNC: 1.8 G/DL (ref 3.5–5.2)
ALBUMIN SERPL BCP-MCNC: 2.1 G/DL (ref 3.5–5.2)
ALP SERPL-CCNC: 33 U/L (ref 55–135)
ALP SERPL-CCNC: 40 U/L (ref 55–135)
ALP SERPL-CCNC: 44 U/L (ref 55–135)
ALT SERPL W/O P-5'-P-CCNC: 10 U/L (ref 10–44)
ALT SERPL W/O P-5'-P-CCNC: 8 U/L (ref 10–44)
ALT SERPL W/O P-5'-P-CCNC: 9 U/L (ref 10–44)
ANION GAP SERPL CALC-SCNC: 4 MMOL/L (ref 8–16)
ANION GAP SERPL CALC-SCNC: 4 MMOL/L (ref 8–16)
ANION GAP SERPL CALC-SCNC: 6 MMOL/L (ref 8–16)
ANISOCYTOSIS BLD QL SMEAR: SLIGHT
APTT BLDCRRT: 21.4 SEC (ref 21–32)
AST SERPL-CCNC: 11 U/L (ref 10–40)
AST SERPL-CCNC: 14 U/L (ref 10–40)
AST SERPL-CCNC: 18 U/L (ref 10–40)
BASO STIPL BLD QL SMEAR: ABNORMAL
BASOPHILS # BLD AUTO: 0.03 K/UL (ref 0–0.2)
BASOPHILS # BLD AUTO: 0.03 K/UL (ref 0–0.2)
BASOPHILS # BLD AUTO: 0.04 K/UL (ref 0–0.2)
BASOPHILS NFR BLD: 0.2 % (ref 0–1.9)
BASOPHILS NFR BLD: 0.2 % (ref 0–1.9)
BASOPHILS NFR BLD: 0.4 % (ref 0–1.9)
BASOPHILS NFR BLD: 0.4 % (ref 0–1.9)
BASOPHILS NFR BLD: 0.5 % (ref 0–1.9)
BASOPHILS NFR BLD: 0.5 % (ref 0–1.9)
BILIRUB SERPL-MCNC: 0.3 MG/DL (ref 0.1–1)
BILIRUB SERPL-MCNC: 0.3 MG/DL (ref 0.1–1)
BILIRUB SERPL-MCNC: 0.4 MG/DL (ref 0.1–1)
BILIRUB UR QL STRIP: NEGATIVE
BLD GP AB SCN CELLS X3 SERPL QL: NORMAL
BLD PROD TYP BPU: NORMAL
BLOOD UNIT EXPIRATION DATE: NORMAL
BLOOD UNIT TYPE CODE: 5100
BLOOD UNIT TYPE CODE: 7300
BLOOD UNIT TYPE: NORMAL
BUN SERPL-MCNC: 24 MG/DL (ref 8–23)
BUN SERPL-MCNC: 34 MG/DL (ref 8–23)
BUN SERPL-MCNC: 37 MG/DL (ref 8–23)
CALCIUM SERPL-MCNC: 6.3 MG/DL (ref 8.7–10.5)
CALCIUM SERPL-MCNC: 6.6 MG/DL (ref 8.7–10.5)
CALCIUM SERPL-MCNC: 7.5 MG/DL (ref 8.7–10.5)
CHLORIDE SERPL-SCNC: 104 MMOL/L (ref 95–110)
CHLORIDE SERPL-SCNC: 108 MMOL/L (ref 95–110)
CHLORIDE SERPL-SCNC: 110 MMOL/L (ref 95–110)
CLARITY UR REFRACT.AUTO: CLEAR
CO2 SERPL-SCNC: 19 MMOL/L (ref 23–29)
CO2 SERPL-SCNC: 21 MMOL/L (ref 23–29)
CO2 SERPL-SCNC: 22 MMOL/L (ref 23–29)
CODING SYSTEM: NORMAL
COLOR UR AUTO: NORMAL
CREAT SERPL-MCNC: 0.5 MG/DL (ref 0.5–1.4)
CREAT SERPL-MCNC: 0.7 MG/DL (ref 0.5–1.4)
CREAT SERPL-MCNC: 0.7 MG/DL (ref 0.5–1.4)
DIFFERENTIAL METHOD: ABNORMAL
DISPENSE STATUS: NORMAL
EOSINOPHIL # BLD AUTO: 0.1 K/UL (ref 0–0.5)
EOSINOPHIL # BLD AUTO: 0.1 K/UL (ref 0–0.5)
EOSINOPHIL # BLD AUTO: 0.3 K/UL (ref 0–0.5)
EOSINOPHIL # BLD AUTO: 0.3 K/UL (ref 0–0.5)
EOSINOPHIL # BLD AUTO: 0.4 K/UL (ref 0–0.5)
EOSINOPHIL # BLD AUTO: 0.4 K/UL (ref 0–0.5)
EOSINOPHIL NFR BLD: 0.4 % (ref 0–8)
EOSINOPHIL NFR BLD: 0.4 % (ref 0–8)
EOSINOPHIL NFR BLD: 3.8 % (ref 0–8)
EOSINOPHIL NFR BLD: 3.8 % (ref 0–8)
EOSINOPHIL NFR BLD: 4.5 % (ref 0–8)
EOSINOPHIL NFR BLD: 4.5 % (ref 0–8)
ERYTHROCYTE [DISTWIDTH] IN BLOOD BY AUTOMATED COUNT: 14.6 % (ref 11.5–14.5)
ERYTHROCYTE [DISTWIDTH] IN BLOOD BY AUTOMATED COUNT: 15 % (ref 11.5–14.5)
ERYTHROCYTE [DISTWIDTH] IN BLOOD BY AUTOMATED COUNT: 15 % (ref 11.5–14.5)
ERYTHROCYTE [DISTWIDTH] IN BLOOD BY AUTOMATED COUNT: 15.1 % (ref 11.5–14.5)
ERYTHROCYTE [DISTWIDTH] IN BLOOD BY AUTOMATED COUNT: 15.1 % (ref 11.5–14.5)
ERYTHROCYTE [DISTWIDTH] IN BLOOD BY AUTOMATED COUNT: 15.6 % (ref 11.5–14.5)
EST. GFR  (AFRICAN AMERICAN): >60 ML/MIN/1.73 M^2
EST. GFR  (NON AFRICAN AMERICAN): >60 ML/MIN/1.73 M^2
GIANT PLATELETS BLD QL SMEAR: PRESENT
GLUCOSE SERPL-MCNC: 104 MG/DL (ref 70–110)
GLUCOSE SERPL-MCNC: 154 MG/DL (ref 70–110)
GLUCOSE SERPL-MCNC: 87 MG/DL (ref 70–110)
GLUCOSE UR QL STRIP: NEGATIVE
HAPTOGLOB SERPL-MCNC: 130 MG/DL (ref 30–250)
HCT VFR BLD AUTO: 19.2 % (ref 40–54)
HCT VFR BLD AUTO: 20.8 % (ref 40–54)
HCT VFR BLD AUTO: 23 % (ref 40–54)
HCT VFR BLD AUTO: 23 % (ref 40–54)
HCT VFR BLD AUTO: 28.8 % (ref 40–54)
HCT VFR BLD AUTO: 29.4 % (ref 40–54)
HGB BLD-MCNC: 6.8 G/DL (ref 14–18)
HGB BLD-MCNC: 7 G/DL (ref 14–18)
HGB BLD-MCNC: 7.6 G/DL (ref 14–18)
HGB BLD-MCNC: 7.6 G/DL (ref 14–18)
HGB BLD-MCNC: 9.6 G/DL (ref 14–18)
HGB BLD-MCNC: 9.7 G/DL (ref 14–18)
HGB UR QL STRIP: NEGATIVE
HYPOCHROMIA BLD QL SMEAR: ABNORMAL
IMM GRANULOCYTES # BLD AUTO: 0.02 K/UL (ref 0–0.04)
IMM GRANULOCYTES # BLD AUTO: 0.04 K/UL (ref 0–0.04)
IMM GRANULOCYTES # BLD AUTO: 0.12 K/UL (ref 0–0.04)
IMM GRANULOCYTES # BLD AUTO: 0.19 K/UL (ref 0–0.04)
IMM GRANULOCYTES NFR BLD AUTO: 0.2 % (ref 0–0.5)
IMM GRANULOCYTES NFR BLD AUTO: 0.2 % (ref 0–0.5)
IMM GRANULOCYTES NFR BLD AUTO: 0.3 % (ref 0–0.5)
IMM GRANULOCYTES NFR BLD AUTO: 0.4 % (ref 0–0.5)
IMM GRANULOCYTES NFR BLD AUTO: 0.7 % (ref 0–0.5)
IMM GRANULOCYTES NFR BLD AUTO: 0.8 % (ref 0–0.5)
INR PPP: 1 (ref 0.8–1.2)
KETONES UR QL STRIP: NEGATIVE
LACTATE SERPL-SCNC: 1 MMOL/L (ref 0.5–2.2)
LDH SERPL L TO P-CCNC: 157 U/L (ref 110–260)
LEUKOCYTE ESTERASE UR QL STRIP: NEGATIVE
LIPASE SERPL-CCNC: 19 U/L (ref 4–60)
LYMPHOCYTES # BLD AUTO: 0.6 K/UL (ref 1–4.8)
LYMPHOCYTES # BLD AUTO: 0.8 K/UL (ref 1–4.8)
LYMPHOCYTES # BLD AUTO: 1.1 K/UL (ref 1–4.8)
LYMPHOCYTES # BLD AUTO: 1.2 K/UL (ref 1–4.8)
LYMPHOCYTES NFR BLD: 11.9 % (ref 18–48)
LYMPHOCYTES NFR BLD: 16.3 % (ref 18–48)
LYMPHOCYTES NFR BLD: 2.6 % (ref 18–48)
LYMPHOCYTES NFR BLD: 4.7 % (ref 18–48)
MAGNESIUM SERPL-MCNC: 1.7 MG/DL (ref 1.6–2.6)
MCH RBC QN AUTO: 29.8 PG (ref 27–31)
MCH RBC QN AUTO: 29.8 PG (ref 27–31)
MCH RBC QN AUTO: 30.2 PG (ref 27–31)
MCH RBC QN AUTO: 30.5 PG (ref 27–31)
MCH RBC QN AUTO: 30.5 PG (ref 27–31)
MCH RBC QN AUTO: 30.6 PG (ref 27–31)
MCHC RBC AUTO-ENTMCNC: 33 G/DL (ref 32–36)
MCHC RBC AUTO-ENTMCNC: 33.3 G/DL (ref 32–36)
MCHC RBC AUTO-ENTMCNC: 33.7 G/DL (ref 32–36)
MCHC RBC AUTO-ENTMCNC: 35.4 G/DL (ref 32–36)
MCV RBC AUTO: 86 FL (ref 82–98)
MCV RBC AUTO: 90 FL (ref 82–98)
MCV RBC AUTO: 90 FL (ref 82–98)
MCV RBC AUTO: 91 FL (ref 82–98)
MCV RBC AUTO: 91 FL (ref 82–98)
MCV RBC AUTO: 93 FL (ref 82–98)
MONOCYTES # BLD AUTO: 0.6 K/UL (ref 0.3–1)
MONOCYTES # BLD AUTO: 0.8 K/UL (ref 0.3–1)
MONOCYTES # BLD AUTO: 1.2 K/UL (ref 0.3–1)
MONOCYTES # BLD AUTO: 1.2 K/UL (ref 0.3–1)
MONOCYTES NFR BLD: 5.3 % (ref 4–15)
MONOCYTES NFR BLD: 6.7 % (ref 4–15)
MONOCYTES NFR BLD: 7.2 % (ref 4–15)
MONOCYTES NFR BLD: 7.2 % (ref 4–15)
MONOCYTES NFR BLD: 7.7 % (ref 4–15)
MONOCYTES NFR BLD: 8.5 % (ref 4–15)
NEUTROPHILS # BLD AUTO: 15.3 K/UL (ref 1.8–7.7)
NEUTROPHILS # BLD AUTO: 20.5 K/UL (ref 1.8–7.7)
NEUTROPHILS # BLD AUTO: 5.2 K/UL (ref 1.8–7.7)
NEUTROPHILS # BLD AUTO: 6.7 K/UL (ref 1.8–7.7)
NEUTROPHILS # BLD AUTO: 6.7 K/UL (ref 1.8–7.7)
NEUTROPHILS # BLD AUTO: 6.8 K/UL (ref 1.8–7.7)
NEUTROPHILS NFR BLD: 71.5 % (ref 38–73)
NEUTROPHILS NFR BLD: 75 % (ref 38–73)
NEUTROPHILS NFR BLD: 75.7 % (ref 38–73)
NEUTROPHILS NFR BLD: 75.7 % (ref 38–73)
NEUTROPHILS NFR BLD: 87.3 % (ref 38–73)
NEUTROPHILS NFR BLD: 90.7 % (ref 38–73)
NITRITE UR QL STRIP: NEGATIVE
NRBC BLD-RTO: 0 /100 WBC
OVALOCYTES BLD QL SMEAR: ABNORMAL
PH UR STRIP: 6 [PH] (ref 5–8)
PHOSPHATE SERPL-MCNC: 2.9 MG/DL (ref 2.7–4.5)
PLATELET # BLD AUTO: 119 K/UL (ref 150–350)
PLATELET # BLD AUTO: 132 K/UL (ref 150–350)
PLATELET # BLD AUTO: 133 K/UL (ref 150–350)
PLATELET # BLD AUTO: 141 K/UL (ref 150–350)
PLATELET # BLD AUTO: 141 K/UL (ref 150–350)
PLATELET # BLD AUTO: 159 K/UL (ref 150–350)
PLATELET BLD QL SMEAR: ABNORMAL
PMV BLD AUTO: 10.1 FL (ref 9.2–12.9)
PMV BLD AUTO: 9.8 FL (ref 9.2–12.9)
PMV BLD AUTO: 9.9 FL (ref 9.2–12.9)
PMV BLD AUTO: 9.9 FL (ref 9.2–12.9)
POIKILOCYTOSIS BLD QL SMEAR: SLIGHT
POLYCHROMASIA BLD QL SMEAR: ABNORMAL
POTASSIUM SERPL-SCNC: 3.3 MMOL/L (ref 3.5–5.1)
POTASSIUM SERPL-SCNC: 4.5 MMOL/L (ref 3.5–5.1)
POTASSIUM SERPL-SCNC: 4.7 MMOL/L (ref 3.5–5.1)
PROT SERPL-MCNC: 3.1 G/DL (ref 6–8.4)
PROT SERPL-MCNC: 3.2 G/DL (ref 6–8.4)
PROT SERPL-MCNC: 3.9 G/DL (ref 6–8.4)
PROT UR QL STRIP: NEGATIVE
PROTHROMBIN TIME: 10.1 SEC (ref 9–12.5)
RBC # BLD AUTO: 2.23 M/UL (ref 4.6–6.2)
RBC # BLD AUTO: 2.29 M/UL (ref 4.6–6.2)
RBC # BLD AUTO: 2.55 M/UL (ref 4.6–6.2)
RBC # BLD AUTO: 2.55 M/UL (ref 4.6–6.2)
RBC # BLD AUTO: 3.18 M/UL (ref 4.6–6.2)
RBC # BLD AUTO: 3.18 M/UL (ref 4.6–6.2)
SODIUM SERPL-SCNC: 130 MMOL/L (ref 136–145)
SODIUM SERPL-SCNC: 133 MMOL/L (ref 136–145)
SODIUM SERPL-SCNC: 135 MMOL/L (ref 136–145)
SP GR UR STRIP: 1.02 (ref 1–1.03)
TRANS ERYTHROCYTES VOL PATIENT: NORMAL ML
TRANS PLATPHERESIS VOL PATIENT: NORMAL ML
TROPONIN I SERPL DL<=0.01 NG/ML-MCNC: 0.01 NG/ML (ref 0–0.03)
URN SPEC COLLECT METH UR: NORMAL
WBC # BLD AUTO: 17.51 K/UL (ref 3.9–12.7)
WBC # BLD AUTO: 22.62 K/UL (ref 3.9–12.7)
WBC # BLD AUTO: 7.28 K/UL (ref 3.9–12.7)
WBC # BLD AUTO: 8.83 K/UL (ref 3.9–12.7)
WBC # BLD AUTO: 8.83 K/UL (ref 3.9–12.7)
WBC # BLD AUTO: 9.01 K/UL (ref 3.9–12.7)

## 2019-07-10 PROCEDURE — 31500 INSERT EMERGENCY AIRWAY: CPT | Mod: 59,,, | Performed by: INTERNAL MEDICINE

## 2019-07-10 PROCEDURE — P9021 RED BLOOD CELLS UNIT: HCPCS

## 2019-07-10 PROCEDURE — 20000000 HC ICU ROOM

## 2019-07-10 PROCEDURE — P9035 PLATELET PHERES LEUKOREDUCED: HCPCS

## 2019-07-10 PROCEDURE — 99291 CRITICAL CARE FIRST HOUR: CPT | Mod: 25,,, | Performed by: INTERNAL MEDICINE

## 2019-07-10 PROCEDURE — 25000003 PHARM REV CODE 250: Performed by: NURSE ANESTHETIST, CERTIFIED REGISTERED

## 2019-07-10 PROCEDURE — 25000003 PHARM REV CODE 250

## 2019-07-10 PROCEDURE — 37000008 HC ANESTHESIA 1ST 15 MINUTES: Performed by: INTERNAL MEDICINE

## 2019-07-10 PROCEDURE — 83010 ASSAY OF HAPTOGLOBIN QUANT: CPT

## 2019-07-10 PROCEDURE — 37000009 HC ANESTHESIA EA ADD 15 MINS: Performed by: INTERNAL MEDICINE

## 2019-07-10 PROCEDURE — 25000003 PHARM REV CODE 250: Performed by: STUDENT IN AN ORGANIZED HEALTH CARE EDUCATION/TRAINING PROGRAM

## 2019-07-10 PROCEDURE — 93799 UNLISTED CV SVC/PROCEDURE: CPT | Mod: 26,,, | Performed by: NURSE PRACTITIONER

## 2019-07-10 PROCEDURE — 63600175 PHARM REV CODE 636 W HCPCS: Performed by: HOSPITALIST

## 2019-07-10 PROCEDURE — 83735 ASSAY OF MAGNESIUM: CPT

## 2019-07-10 PROCEDURE — 31500 PR INSERT, EMERGENCY ENDOTRACH AIRWAY: ICD-10-PCS | Mod: 59,,, | Performed by: INTERNAL MEDICINE

## 2019-07-10 PROCEDURE — D9220A PRA ANESTHESIA: ICD-10-PCS | Mod: ANES,,, | Performed by: ANESTHESIOLOGY

## 2019-07-10 PROCEDURE — 25000003 PHARM REV CODE 250: Performed by: HOSPITALIST

## 2019-07-10 PROCEDURE — 84484 ASSAY OF TROPONIN QUANT: CPT

## 2019-07-10 PROCEDURE — 63600175 PHARM REV CODE 636 W HCPCS: Performed by: NURSE ANESTHETIST, CERTIFIED REGISTERED

## 2019-07-10 PROCEDURE — D9220A PRA ANESTHESIA: ICD-10-PCS | Mod: 59,ANES,, | Performed by: ANESTHESIOLOGY

## 2019-07-10 PROCEDURE — 27000221 HC OXYGEN, UP TO 24 HOURS

## 2019-07-10 PROCEDURE — 63600175 PHARM REV CODE 636 W HCPCS: Performed by: STUDENT IN AN ORGANIZED HEALTH CARE EDUCATION/TRAINING PROGRAM

## 2019-07-10 PROCEDURE — 99900035 HC TECH TIME PER 15 MIN (STAT)

## 2019-07-10 PROCEDURE — 88305 TISSUE EXAM BY PATHOLOGIST: CPT | Mod: 26,,, | Performed by: PATHOLOGY

## 2019-07-10 PROCEDURE — D9220A PRA ANESTHESIA: ICD-10-PCS | Mod: CRNA,,, | Performed by: NURSE ANESTHETIST, CERTIFIED REGISTERED

## 2019-07-10 PROCEDURE — 99253 IP/OBS CNSLTJ NEW/EST LOW 45: CPT | Mod: 25,,, | Performed by: INTERNAL MEDICINE

## 2019-07-10 PROCEDURE — 36415 COLL VENOUS BLD VENIPUNCTURE: CPT

## 2019-07-10 PROCEDURE — 83605 ASSAY OF LACTIC ACID: CPT

## 2019-07-10 PROCEDURE — 43239 EGD BIOPSY SINGLE/MULTIPLE: CPT | Performed by: INTERNAL MEDICINE

## 2019-07-10 PROCEDURE — 36800 INSERTION OF CANNULA: CPT | Mod: 59,,, | Performed by: INTERNAL MEDICINE

## 2019-07-10 PROCEDURE — 85610 PROTHROMBIN TIME: CPT

## 2019-07-10 PROCEDURE — P9017 PLASMA 1 DONOR FRZ W/IN 8 HR: HCPCS

## 2019-07-10 PROCEDURE — 83615 LACTATE (LD) (LDH) ENZYME: CPT

## 2019-07-10 PROCEDURE — 43239 EGD BIOPSY SINGLE/MULTIPLE: CPT | Mod: ,,, | Performed by: INTERNAL MEDICINE

## 2019-07-10 PROCEDURE — 27201012 HC FORCEPS, HOT/COLD, DISP: Performed by: INTERNAL MEDICINE

## 2019-07-10 PROCEDURE — 63600175 PHARM REV CODE 636 W HCPCS

## 2019-07-10 PROCEDURE — 88305 TISSUE EXAM BY PATHOLOGIST: CPT | Performed by: PATHOLOGY

## 2019-07-10 PROCEDURE — 80053 COMPREHEN METABOLIC PANEL: CPT | Mod: 91

## 2019-07-10 PROCEDURE — 43239 PR EGD, FLEX, W/BIOPSY, SGL/MULTI: ICD-10-PCS | Mod: ,,, | Performed by: INTERNAL MEDICINE

## 2019-07-10 PROCEDURE — 86920 COMPATIBILITY TEST SPIN: CPT

## 2019-07-10 PROCEDURE — D9220A PRA ANESTHESIA: Mod: ANES,,, | Performed by: ANESTHESIOLOGY

## 2019-07-10 PROCEDURE — 93799 PR PERIPHERAL IVUS ONLY: ICD-10-PCS | Mod: 26,,, | Performed by: NURSE PRACTITIONER

## 2019-07-10 PROCEDURE — C9113 INJ PANTOPRAZOLE SODIUM, VIA: HCPCS | Performed by: STUDENT IN AN ORGANIZED HEALTH CARE EDUCATION/TRAINING PROGRAM

## 2019-07-10 PROCEDURE — D9220A PRA ANESTHESIA: Mod: 59,ANES,, | Performed by: ANESTHESIOLOGY

## 2019-07-10 PROCEDURE — 84100 ASSAY OF PHOSPHORUS: CPT

## 2019-07-10 PROCEDURE — 99253 PR INITIAL INPATIENT CONSULT,LEVL III: ICD-10-PCS | Mod: 25,,, | Performed by: INTERNAL MEDICINE

## 2019-07-10 PROCEDURE — 99291 PR CRITICAL CARE, E/M 30-74 MINUTES: ICD-10-PCS | Mod: 25,,, | Performed by: INTERNAL MEDICINE

## 2019-07-10 PROCEDURE — D9220A PRA ANESTHESIA: Mod: 59,CRNA,, | Performed by: NURSE ANESTHETIST, CERTIFIED REGISTERED

## 2019-07-10 PROCEDURE — 86901 BLOOD TYPING SEROLOGIC RH(D): CPT

## 2019-07-10 PROCEDURE — 36430 TRANSFUSION BLD/BLD COMPNT: CPT

## 2019-07-10 PROCEDURE — 25500020 PHARM REV CODE 255: Performed by: INTERNAL MEDICINE

## 2019-07-10 PROCEDURE — D9220A PRA ANESTHESIA: Mod: CRNA,,, | Performed by: NURSE ANESTHETIST, CERTIFIED REGISTERED

## 2019-07-10 PROCEDURE — 81003 URINALYSIS AUTO W/O SCOPE: CPT

## 2019-07-10 PROCEDURE — 88305 TISSUE SPECIMEN TO PATHOLOGY - SURGERY: ICD-10-PCS | Mod: 26,,, | Performed by: PATHOLOGY

## 2019-07-10 PROCEDURE — 94002 VENT MGMT INPAT INIT DAY: CPT

## 2019-07-10 PROCEDURE — 83690 ASSAY OF LIPASE: CPT

## 2019-07-10 PROCEDURE — D9220A PRA ANESTHESIA: ICD-10-PCS | Mod: 59,CRNA,, | Performed by: NURSE ANESTHETIST, CERTIFIED REGISTERED

## 2019-07-10 PROCEDURE — 85025 COMPLETE CBC W/AUTO DIFF WBC: CPT

## 2019-07-10 PROCEDURE — 94761 N-INVAS EAR/PLS OXIMETRY MLT: CPT

## 2019-07-10 PROCEDURE — 63600175 PHARM REV CODE 636 W HCPCS: Performed by: ANESTHESIOLOGY

## 2019-07-10 PROCEDURE — 36800 PR INSERT CANNULA,VEIN-VEIN: ICD-10-PCS | Mod: 59,,, | Performed by: INTERNAL MEDICINE

## 2019-07-10 PROCEDURE — 85730 THROMBOPLASTIN TIME PARTIAL: CPT

## 2019-07-10 RX ORDER — FENTANYL CITRATE 50 UG/ML
INJECTION, SOLUTION INTRAMUSCULAR; INTRAVENOUS
Status: COMPLETED
Start: 2019-07-10 | End: 2019-07-10

## 2019-07-10 RX ORDER — SUCCINYLCHOLINE CHLORIDE 20 MG/ML
150 INJECTION INTRAMUSCULAR; INTRAVENOUS ONCE
Status: COMPLETED | OUTPATIENT
Start: 2019-07-10 | End: 2019-07-10

## 2019-07-10 RX ORDER — PANTOPRAZOLE SODIUM 40 MG/1
40 TABLET, DELAYED RELEASE ORAL EVERY 12 HOURS
Qty: 60 TABLET | Refills: 5 | Status: SHIPPED | OUTPATIENT
Start: 2019-07-10 | End: 2019-07-10 | Stop reason: SDUPTHER

## 2019-07-10 RX ORDER — FENTANYL CITRATE 50 UG/ML
INJECTION, SOLUTION INTRAMUSCULAR; INTRAVENOUS
Status: DISCONTINUED | OUTPATIENT
Start: 2019-07-10 | End: 2019-07-10

## 2019-07-10 RX ORDER — PHENYLEPHRINE HCL IN 0.9% NACL 1 MG/10 ML
SYRINGE (ML) INTRAVENOUS
Status: COMPLETED
Start: 2019-07-10 | End: 2019-07-10

## 2019-07-10 RX ORDER — HYDROCODONE BITARTRATE AND ACETAMINOPHEN 500; 5 MG/1; MG/1
TABLET ORAL
Status: DISCONTINUED | OUTPATIENT
Start: 2019-07-10 | End: 2019-07-10

## 2019-07-10 RX ORDER — PANTOPRAZOLE SODIUM 40 MG/10ML
40 INJECTION, POWDER, LYOPHILIZED, FOR SOLUTION INTRAVENOUS 2 TIMES DAILY
Status: DISCONTINUED | OUTPATIENT
Start: 2019-07-10 | End: 2019-07-10

## 2019-07-10 RX ORDER — LORAZEPAM 2 MG/ML
4 INJECTION INTRAMUSCULAR ONCE
Status: DISCONTINUED | OUTPATIENT
Start: 2019-07-10 | End: 2019-07-10

## 2019-07-10 RX ORDER — POTASSIUM CHLORIDE 7.45 MG/ML
10 INJECTION INTRAVENOUS
Status: COMPLETED | OUTPATIENT
Start: 2019-07-10 | End: 2019-07-10

## 2019-07-10 RX ORDER — PROPOFOL 10 MG/ML
VIAL (ML) INTRAVENOUS
Status: DISCONTINUED | OUTPATIENT
Start: 2019-07-10 | End: 2019-07-10

## 2019-07-10 RX ORDER — NOREPINEPHRINE BITARTRATE/D5W 4MG/250ML
0.02 PLASTIC BAG, INJECTION (ML) INTRAVENOUS CONTINUOUS
Status: DISCONTINUED | OUTPATIENT
Start: 2019-07-10 | End: 2019-07-10

## 2019-07-10 RX ORDER — LORAZEPAM 2 MG/ML
INJECTION INTRAMUSCULAR
Status: DISPENSED
Start: 2019-07-10 | End: 2019-07-11

## 2019-07-10 RX ORDER — SUCCINYLCHOLINE CHLORIDE 20 MG/ML
INJECTION INTRAMUSCULAR; INTRAVENOUS
Status: COMPLETED
Start: 2019-07-10 | End: 2019-07-10

## 2019-07-10 RX ORDER — FERROUS SULFATE 325(65) MG
325 TABLET ORAL EVERY 12 HOURS
Qty: 60 TABLET | Refills: 4 | Status: SHIPPED | OUTPATIENT
Start: 2019-07-10 | End: 2019-07-10 | Stop reason: SDUPTHER

## 2019-07-10 RX ORDER — FENTANYL CITRATE 50 UG/ML
50 INJECTION, SOLUTION INTRAMUSCULAR; INTRAVENOUS
Status: DISCONTINUED | OUTPATIENT
Start: 2019-07-10 | End: 2019-07-15

## 2019-07-10 RX ORDER — MIDAZOLAM HYDROCHLORIDE 1 MG/ML
INJECTION, SOLUTION INTRAMUSCULAR; INTRAVENOUS
Status: DISCONTINUED | OUTPATIENT
Start: 2019-07-10 | End: 2019-07-10

## 2019-07-10 RX ORDER — SODIUM CHLORIDE 0.9 % (FLUSH) 0.9 %
10 SYRINGE (ML) INJECTION
Status: DISCONTINUED | OUTPATIENT
Start: 2019-07-10 | End: 2019-07-22 | Stop reason: HOSPADM

## 2019-07-10 RX ORDER — HYDROCODONE BITARTRATE AND ACETAMINOPHEN 500; 5 MG/1; MG/1
TABLET ORAL
Status: DISCONTINUED | OUTPATIENT
Start: 2019-07-10 | End: 2019-07-12

## 2019-07-10 RX ORDER — FENTANYL CITRATE-0.9 % NACL/PF 10 MCG/ML
PLASTIC BAG, INJECTION (ML) INTRAVENOUS CONTINUOUS
Status: DISCONTINUED | OUTPATIENT
Start: 2019-07-10 | End: 2019-07-12

## 2019-07-10 RX ORDER — PROPOFOL 10 MG/ML
5 INJECTION, EMULSION INTRAVENOUS CONTINUOUS
Status: DISCONTINUED | OUTPATIENT
Start: 2019-07-10 | End: 2019-07-12

## 2019-07-10 RX ORDER — FERROUS SULFATE 325(65) MG
325 TABLET ORAL EVERY 12 HOURS
Qty: 60 TABLET | Refills: 4 | Status: SHIPPED | OUTPATIENT
Start: 2019-07-10

## 2019-07-10 RX ORDER — PANTOPRAZOLE SODIUM 40 MG/1
40 TABLET, DELAYED RELEASE ORAL EVERY 12 HOURS
Qty: 60 TABLET | Refills: 5 | Status: SHIPPED | OUTPATIENT
Start: 2019-07-10 | End: 2020-01-06

## 2019-07-10 RX ORDER — ETOMIDATE 2 MG/ML
12 INJECTION INTRAVENOUS ONCE
Status: COMPLETED | OUTPATIENT
Start: 2019-07-10 | End: 2019-07-10

## 2019-07-10 RX ORDER — NOREPINEPHRINE BITARTRATE/D5W 4MG/250ML
PLASTIC BAG, INJECTION (ML) INTRAVENOUS
Status: COMPLETED
Start: 2019-07-10 | End: 2019-07-10

## 2019-07-10 RX ORDER — PROPOFOL 10 MG/ML
INJECTION, EMULSION INTRAVENOUS
Status: COMPLETED
Start: 2019-07-10 | End: 2019-07-10

## 2019-07-10 RX ORDER — NOREPINEPHRINE BITARTRATE/D5W 4MG/250ML
0.02 PLASTIC BAG, INJECTION (ML) INTRAVENOUS CONTINUOUS
Status: DISCONTINUED | OUTPATIENT
Start: 2019-07-10 | End: 2019-07-14

## 2019-07-10 RX ORDER — PHENYLEPHRINE HYDROCHLORIDE 10 MG/ML
INJECTION INTRAVENOUS
Status: DISCONTINUED | OUTPATIENT
Start: 2019-07-10 | End: 2019-07-10

## 2019-07-10 RX ORDER — ROCURONIUM BROMIDE 10 MG/ML
INJECTION, SOLUTION INTRAVENOUS
Status: DISCONTINUED | OUTPATIENT
Start: 2019-07-10 | End: 2019-07-10

## 2019-07-10 RX ORDER — ETOMIDATE 2 MG/ML
INJECTION INTRAVENOUS
Status: COMPLETED
Start: 2019-07-10 | End: 2019-07-10

## 2019-07-10 RX ORDER — FENTANYL CITRATE 50 UG/ML
50 INJECTION, SOLUTION INTRAMUSCULAR; INTRAVENOUS ONCE
Status: COMPLETED | OUTPATIENT
Start: 2019-07-10 | End: 2019-07-10

## 2019-07-10 RX ADMIN — ETOMIDATE 12 MG: 40 INJECTION, SOLUTION INTRAVENOUS at 04:07

## 2019-07-10 RX ADMIN — POTASSIUM CHLORIDE 10 MEQ: 10 INJECTION, SOLUTION INTRAVENOUS at 03:07

## 2019-07-10 RX ADMIN — PROPOFOL 30 MCG/KG/MIN: 10 INJECTION, EMULSION INTRAVENOUS at 04:07

## 2019-07-10 RX ADMIN — PROPOFOL 20 MG: 10 INJECTION, EMULSION INTRAVENOUS at 04:07

## 2019-07-10 RX ADMIN — ERYTHROMYCIN LACTOBIONATE 500 MG: 500 INJECTION, POWDER, LYOPHILIZED, FOR SOLUTION INTRAVENOUS at 07:07

## 2019-07-10 RX ADMIN — PHENYLEPHRINE HYDROCHLORIDE 100 MCG: 10 INJECTION INTRAVENOUS at 04:07

## 2019-07-10 RX ADMIN — IOHEXOL 75 ML: 350 INJECTION, SOLUTION INTRAVENOUS at 05:07

## 2019-07-10 RX ADMIN — SUCCINYLCHOLINE CHLORIDE 150 MG: 20 INJECTION, SOLUTION INTRAMUSCULAR; INTRAVENOUS at 04:07

## 2019-07-10 RX ADMIN — POTASSIUM CHLORIDE 10 MEQ: 10 INJECTION, SOLUTION INTRAVENOUS at 04:07

## 2019-07-10 RX ADMIN — ETOMIDATE 12 MG: 2 INJECTION INTRAVENOUS at 04:07

## 2019-07-10 RX ADMIN — ROCURONIUM BROMIDE 20 MG: 10 INJECTION, SOLUTION INTRAVENOUS at 10:07

## 2019-07-10 RX ADMIN — PANTOPRAZOLE SODIUM 8 MG/HR: 40 INJECTION, POWDER, FOR SOLUTION INTRAVENOUS at 10:07

## 2019-07-10 RX ADMIN — Medication 1 MG: at 06:07

## 2019-07-10 RX ADMIN — FENTANYL CITRATE 50 MCG: 50 INJECTION INTRAMUSCULAR; INTRAVENOUS at 05:07

## 2019-07-10 RX ADMIN — FENTANYL CITRATE 50 MCG: 50 INJECTION INTRAMUSCULAR; INTRAVENOUS at 06:07

## 2019-07-10 RX ADMIN — PROPOFOL 20 MG: 10 INJECTION, EMULSION INTRAVENOUS at 03:07

## 2019-07-10 RX ADMIN — PHENYLEPHRINE HYDROCHLORIDE 200 MCG: 10 INJECTION INTRAVENOUS at 03:07

## 2019-07-10 RX ADMIN — Medication 0.04 MCG/KG/MIN: at 05:07

## 2019-07-10 RX ADMIN — NOREPINEPHRINE BITARTRATE 0.06 MCG/KG/MIN: 1 INJECTION, SOLUTION, CONCENTRATE INTRAVENOUS at 10:07

## 2019-07-10 RX ADMIN — SODIUM CHLORIDE 1000 ML: 0.9 INJECTION, SOLUTION INTRAVENOUS at 02:07

## 2019-07-10 RX ADMIN — FENTANYL CITRATE 50 MCG: 50 INJECTION, SOLUTION INTRAMUSCULAR; INTRAVENOUS at 02:07

## 2019-07-10 RX ADMIN — FENTANYL CITRATE 100 MCG: 50 INJECTION, SOLUTION INTRAMUSCULAR; INTRAVENOUS at 10:07

## 2019-07-10 RX ADMIN — Medication 0.1 MCG/KG/MIN: at 06:07

## 2019-07-10 RX ADMIN — SUCCINYLCHOLINE CHLORIDE 150 MG: 20 INJECTION INTRAMUSCULAR; INTRAVENOUS at 04:07

## 2019-07-10 RX ADMIN — MIDAZOLAM HYDROCHLORIDE 2 MG: 1 INJECTION, SOLUTION INTRAMUSCULAR; INTRAVENOUS at 10:07

## 2019-07-10 RX ADMIN — Medication 50 MCG/HR: at 07:07

## 2019-07-10 RX ADMIN — PROPOFOL 50 MG: 10 INJECTION, EMULSION INTRAVENOUS at 03:07

## 2019-07-10 RX ADMIN — FENTANYL CITRATE 50 MCG: 50 INJECTION INTRAMUSCULAR; INTRAVENOUS at 02:07

## 2019-07-10 RX ADMIN — PHENYLEPHRINE HYDROCHLORIDE 300 MCG: 10 INJECTION INTRAVENOUS at 03:07

## 2019-07-10 RX ADMIN — PANTOPRAZOLE SODIUM 8 MG/HR: 40 INJECTION, POWDER, FOR SOLUTION INTRAVENOUS at 06:07

## 2019-07-10 RX ADMIN — PANTOPRAZOLE SODIUM 40 MG: 40 INJECTION, POWDER, LYOPHILIZED, FOR SOLUTION INTRAVENOUS at 09:07

## 2019-07-10 RX ADMIN — PROPOFOL 30 MG: 10 INJECTION, EMULSION INTRAVENOUS at 03:07

## 2019-07-10 NOTE — PROCEDURES
"Manpreet Hamilton is a 68 y.o. male patient.    Temp: 97.6 °F (36.4 °C) (07/10/19 1400)  Pulse: 108 (07/10/19 1500)  Resp: 18 (07/10/19 1500)  BP: 113/61 (07/10/19 1500)  SpO2: 97 % (07/10/19 1500)  Weight: 92.8 kg (204 lb 9.4 oz) (07/10/19 0025)  Height: 5' 8" (172.7 cm) (07/10/19 0025)       Central Line  Date/Time: 7/10/2019 3:20 PM  Location procedure was performed: [facility] CARDIAC MEDICAL ICU ([facility])  Performed by: Tyler Mendez MD  Pre-operative Diagnosis: GI Bleed  Post-operative diagnosis: GI Bleed  Consent Done: Yes  Time out: Immediately prior to procedure a "time out" was called to verify the correct patient, procedure, equipment, support staff and site/side marked as required.  Indications: vascular access  Anesthesia: local infiltration    Anesthesia:  Local Anesthetic: lidocaine 1% without epinephrine  Preparation: skin prepped with ChloraPrep  Skin prep agent dried: skin prep agent completely dried prior to procedure  Sterile barriers: all five maximum sterile barriers used - cap, mask, sterile gown, sterile gloves, and large sterile sheet  Hand hygiene: hand hygiene performed prior to central venous catheter insertion  Location details: right internal jugular  Catheter type: triple lumen  Ultrasound guidance: yes  Vessel Caliber: small, patent, compressibility normal  Vascular Doppler: not done  Needle advanced into vessel with real time Ultrasound guidance.  Guidewire confirmed in vessel.  Sterile sheath used.  Esophageal manometry: No, no adequate blood flow from angiocath without using syringe for suction.  Number of attempts: 2  Complications: none  Estimated blood loss (mL): 2  Specimens: No  Post-procedure: line sutured,  chlorhexidine patch,  sterile dressing applied and blood return through all ports  Complications: No          Tyler Mendez  7/10/2019    "

## 2019-07-10 NOTE — SUBJECTIVE & OBJECTIVE
Past Medical History:   Diagnosis Date    Hyperlipemia     Hypertension     Nasal congestion     Pulmonary fibrosis        Past Surgical History:   Procedure Laterality Date    CATARACT EXTRACTION Left     COLONOSCOPY N/A 2016    Performed by Leno Padilla MD at [facility] ENDO (4TH FLR)    HAND SURGERY      left    HIATAL HERNIA REPAIR      MICRODISCECTOMY, SPINE MIS Right L5- S1 Right 2018    Performed by Angel Pendleton MD at [facility] OR 2ND FLR    TONSILLECTOMY, ADENOIDECTOMY      VASECTOMY         Review of patient's allergies indicates:   Allergen Reactions    Penicillins        Family History     Problem Relation (Age of Onset)    Coronary artery disease Mother, Sister    Dementia Father    Multiple sclerosis Sister    Parkinsonism Father        Tobacco Use    Smoking status: Former Smoker     Packs/day: 1.50     Years: 20.00     Pack years: 30.00     Types: Cigarettes     Last attempt to quit: 3/17/1985     Years since quittin.3    Smokeless tobacco: Former User     Quit date: 1986   Substance and Sexual Activity    Alcohol use: Yes     Alcohol/week: 16.8 oz     Types: 28 Glasses of wine per week     Comment: 6-7 bottles per week    Drug use: No    Sexual activity: Yes     Partners: Female      Review of Systems   Constitutional: Negative for fever.   HENT: Negative for drooling and facial swelling.    Eyes: Negative for discharge and redness.   Respiratory: Negative for shortness of breath.    Cardiovascular: Negative for chest pain.   Gastrointestinal: Positive for blood in stool. Negative for vomiting.   Musculoskeletal: Negative for joint swelling.   Skin: Negative for rash.   Neurological: Negative for facial asymmetry and speech difficulty.   Psychiatric/Behavioral: Negative for behavioral problems.     Objective:     Vital Signs (Most Recent):  Temp: 97 °F (36.1 °C) (07/10/19 0330)  Pulse: 105 (07/10/19 0330)  Resp: (!) 29 (07/10/19 0330)  BP: 112/61 (07/10/19  0330)  SpO2: 98 % (07/10/19 0330) Vital Signs (24h Range):  Temp:  [97 °F (36.1 °C)-99.3 °F (37.4 °C)] 97 °F (36.1 °C)  Pulse:  [] 105  Resp:  [10-31] 29  SpO2:  [97 %-99 %] 98 %  BP: ()/(53-74) 112/61   Weight: 92.8 kg (204 lb 9.4 oz)  Body mass index is 31.11 kg/m².      Intake/Output Summary (Last 24 hours) at 7/10/2019 0439  Last data filed at 7/10/2019 0304  Gross per 24 hour   Intake 120 ml   Output 1025 ml   Net -905 ml       Physical Exam   Constitutional: He appears well-developed and well-nourished.   HENT:   Head: Normocephalic and atraumatic.   Eyes: EOM are normal. Right eye exhibits no discharge. Left eye exhibits no discharge.   Neck: No tracheal deviation present.   Cardiovascular: Normal rate, regular rhythm and normal heart sounds. Exam reveals no gallop and no friction rub.   No murmur heard.  Pulmonary/Chest: Effort normal and breath sounds normal. No stridor. No respiratory distress. He has no wheezes.   Abdominal: Soft. He exhibits no distension. There is no tenderness. There is no guarding.   Musculoskeletal: He exhibits no edema or deformity.   Neurological: He is alert.   Skin: Skin is warm and dry.       Vents:     Lines/Drains/Airways     Drain                 Urethral Catheter 07/09/19 Straight-tip 16 Fr. 1 day          Peripheral Intravenous Line                 Peripheral IV - Double Lumen 20 G Right;Anterior Upper Arm -- days         Peripheral IV - Single Lumen 07/10/19 0300 18 G Anterior;Left Antecubital less than 1 day              Significant Labs:    CBC/Anemia Profile:  Recent Labs   Lab 07/10/19  0034   WBC 7.28   HGB 7.0*   HCT 20.8*   *   MCV 91   RDW 14.6*        Chemistries:  Recent Labs   Lab 07/10/19  0034   *   K 3.3*      CO2 19*   BUN 24*   CREATININE 0.5   CALCIUM 6.3*   ALBUMIN 1.8*   PROT 3.2*   BILITOT 0.3   ALKPHOS 33*   ALT 8*   AST 11     Lactic: 1  Lipase: 19    All pertinent labs within the past 24 hours have been  reviewed.    Significant Imaging: I have reviewed and interpreted all pertinent imaging results/findings within the past 24 hours.

## 2019-07-10 NOTE — [employer] PATIENT INSTRUCTIONS
Discharge Summary/Instructions after an Endoscopic Procedure  Patient Name: Manpreet Hamilton  Patient MRN: [id number]  Patient YOB: 1950  Wednesday, July 10, 2019  Jimmy Saldana MD  RESTRICTIONS:  During your procedure today, you received medications for sedation.  These   medications may affect your judgment, balance and coordination.  Therefore,   for 24 hours, you have the following restrictions:   - DO NOT drive a car, operate machinery, make legal/financial decisions,   sign important papers or drink alcohol.    ACTIVITY:  Today: no heavy lifting, straining or running due to procedural   sedation/anesthesia.  The following day: return to full activity including work.  DIET:  Eat and drink normally unless instructed otherwise.     TREATMENT FOR COMMON SIDE EFFECTS:  - Mild abdominal pain, nausea, belching, bloating or excessive gas:  rest,   eat lightly and use a heating pad.  - Sore Throat: treat with throat lozenges and/or gargle with warm salt   water.  - Because air was used during the procedure, expelling large amounts of air   from your rectum or belching is normal.  - If a bowel prep was taken, you may not have a bowel movement for 1-3 days.    This is normal.  SYMPTOMS TO WATCH FOR AND REPORT TO YOUR PHYSICIAN:  1. Abdominal pain or bloating, other than gas cramps.  2. Chest pain.  3. Back pain.  4. Signs of infection such as: chills or fever occurring within 24 hours   after the procedure.  5. Rectal bleeding, which would show as bright red, maroon, or black stools.   (A tablespoon of blood from the rectum is not serious, especially if   hemorrhoids are present.)  6. Vomiting.  7. Weakness or dizziness.  GO DIRECTLY TO THE NEAREST EMERGENCY ROOM IF YOU HAVE ANY OF THE FOLLOWING:      Difficulty breathing              Chills and/or fever over 101 F   Persistent vomiting and/or vomiting blood   Severe abdominal pain   Severe chest pain   Black, tarry stools   Bleeding- more than one  tablespoon   Any other symptom or condition that you feel may need urgent attention  Your doctor recommends these additional instructions:  If any biopsies were taken, your doctors clinic will contact you in 1 to 2   weeks with any results.  - Return patient to ICU for ongoing care.   - Intubate patient to protect airway.  - NPO. Consider prokinetic to help clear blood clots.  - Continue protonix gtt.  - Perform stat CTA AP bleeding protocol. If positive, consult IR for   embolization.  - If negative, will repeat EGD ASAP to evaluate mucosa after blood products   have cleared from stomach.  - The findings and recommendations were discussed with the patient's primary   physician.   - The findings and recommendations were discussed with the designated   responsible adult.   - Consider avoiding all non-steroidal anti-inflammatory drugs (aspirin,   ibuprofen, naproxen, etc.), Recommend you discuss with your prescribing   doctor (of your aspirin) to see if cardiovascular benefits of your aspirin   outweigh the risks of GI bleeding.  - Give Protonix (pantoprazole): initiate therapy with 80 mg IV bolus, then 8   mg/hr IV by continuous infusion.  For questions, problems or results please call your physician - Jimmy Saldana MD at Work:  [phone].  [facility], EMERGENCY ROOM PHONE NUMBER: [phone]  IF A COMPLICATION OR EMERGENCY SITUATION ARISES AND YOU ARE UNABLE TO REACH   YOUR PHYSICIAN - GO DIRECTLY TO THE EMERGENCY ROOM.  Jimmy Saldana MD  7/10/2019 4:41:52 PM  This report has been verified and signed electronically.  [employer]

## 2019-07-10 NOTE — ANESTHESIA PREPROCEDURE EVALUATION
07/10/2019  Manpreet Hamilton is a 68 y.o., male.  Pre-operative evaluation for EGD (ESOPHAGOGASTRODUODENOSCOPY) (N/A)    Chief Complaint: GI bleed, gastri/duodenal ulcers, recent massive blood transfusion    PMH:  Interstitial lung disease on nintedanab and tapered off prednisone (?)  sciatica  HTN-valsartan/HCTZ  Transfusion ordered - 3 units? HCT 19.2, plt 132,000  Past Surgical History:   Procedure Laterality Date    CATARACT EXTRACTION Left     COLONOSCOPY N/A 8/5/2016    Performed by Leon Padilla MD at [facility] ENDO (4TH FLR)    HAND SURGERY      left    HIATAL HERNIA REPAIR      MICRODISCECTOMY, SPINE MIS Right L5- S1 Right 6/27/2018    Performed by Angel Pendleton MD at [facility] OR 2ND FLR    TONSILLECTOMY, ADENOIDECTOMY      VASECTOMY           Vital Signs Range (Last 24H):  Temp:  [36.1 °C (97 °F)-36.9 °C (98.4 °F)]   Pulse:  []   Resp:  [5-35]   BP: ()/(48-74)   SpO2:  [97 %-100 %]       CBC:     Recent Labs   Lab 07/10/19  0034 07/10/19  0626 07/10/19  1103   WBC 7.28 8.83  8.83 9.01   RBC 2.29* 2.55*  2.55* 2.23*   HGB 7.0* 7.6*  7.6* 6.8*   HCT 20.8* 23.0*  23.0* 19.2*   * 141*  141* 132*   MCV 91 90  90 86   MCH 30.6 29.8  29.8 30.5   MCHC 33.7 33.0  33.0 35.4       CMP:   Recent Labs   Lab 07/10/19  0034 07/10/19  0625   * 130*   K 3.3* 4.7    104   CO2 19* 22*   BUN 24* 34*   CREATININE 0.5 0.7   GLU 87 104   MG  --  1.7   PHOS  --  2.9   CALCIUM 6.3* 7.5*   ALBUMIN 1.8* 2.1*   PROT 3.2* 3.9*   ALKPHOS 33* 40*   ALT 8* 9*   AST 11 14   BILITOT 0.3 0.3       INR:  Recent Labs   Lab 07/10/19  0625   INR 1.0   APTT 21.4         Diagnostic Studies:  1. The EKG portion of this study is negative for ischemia at a high workload, and peak heart rate of 155 bpm (99% of predicted).   2. Exercise capacity is excellent.   3. Blood pressure response to exercise  was normal (Presenting BP: 119/68 Peak BP: 183/67).   4. The following arrhythmias were present: rare PACs & PVCs.   5. There were no symptoms of chest discomfort or significant dyspnea throughout the protocol.   6. The Duke treadmill score was 8 suggesting a low probability for future cardiovascular events.    This document has been electronically    SIGNED BY: Ne Grossman M.D. On: 01/30/2013 09:37      EKG:  Vent. Rate : 090 BPM     Atrial Rate : 090 BPM     P-R Int : 152 ms          QRS Dur : 086 ms      QT Int : 348 ms       P-R-T Axes : 027 007 010 degrees     QTc Int : 425 ms    Normal sinus rhythm  Possible Left atrial enlargement  Borderline Abnormal ECG  When compared with ECG of 14-MAY-2018 13:12,  No significant change was found  Confirmed by LONNIE GILLILAND MD ([id number]) on 11/15/2018 3:15:35 PM    Referred By: ALFRED REBOLLEDO           Confirmed By:LONNIE GILLILAND MD      2D Echo:  Anesthesia Evaluation    I have reviewed the Patient Summary Reports.     I have reviewed the Nursing Notes.   I have reviewed the Medications.     Review of Systems  Anesthesia Hx:  No problems with previous Anesthesia    Social:  Former Smoker    Pulmonary:  Pulmonary Normal        Physical Exam  General:  Well nourished    Airway/Jaw/Neck:  Airway Findings: Mouth Opening: Normal Tongue: Normal  General Airway Assessment: Average  Mallampati: I  TM Distance: Normal, at least 6 cm  Jaw/Neck Findings:  Neck ROM: Normal ROM      Dental:  Dental Findings: In tact   Chest/Lungs:  Chest/Lungs Findings: Tachypnea     Heart/Vascular:  Heart Findings: Rate: Tachycardia  Rhythm: Regular Rhythm        Mental Status:  Mental Status Findings:  Cooperative, Alert and Oriented         Anesthesia Plan  Type of Anesthesia, risks & benefits discussed:  Anesthesia Type:  general  Patient's Preference:   Intra-op Monitoring Plan: standard ASA monitors  Intra-op Monitoring Plan Comments:   Post Op Pain Control Plan: per primary service following  discharge from PACU  Post Op Pain Control Plan Comments:   Induction:   IV  Beta Blocker:  Patient is not currently on a Beta-Blocker (No further documentation required).       Informed Consent: Patient understands risks and agrees with Anesthesia plan.  Questions answered. Anesthesia consent signed with patient.  ASA Score: 3  emergent   Day of Surgery Review of History & Physical:    H&P update referred to the surgeon.         Ready For Surgery From Anesthesia Perspective.

## 2019-07-10 NOTE — SIGNIFICANT EVENT
12/18/2018    [facility] [address]  [place] [address]    RE: Yolanda Watson       Dear Colleague,    I had the pleasure of seeing Yolanda Watson in the [facility].    HPI and Plan:   See dictation    Orders Placed This Encounter   Procedures     Basic metabolic panel     Follow-Up with Cardiac Advanced Practice Provider       Orders Placed This Encounter   Medications     carvedilol (COREG) 25 MG tablet     Sig: Take 0.5 tablets (12.5 mg) by mouth 2 times daily (with meals)     Dispense:  60 tablet     Refill:  11     spironolactone (ALDACTONE) 25 MG tablet     Sig: Take 1 tablet (25 mg) by mouth daily     Dispense:  30 tablet     Refill:  11       Medications Discontinued During This Encounter   Medication Reason     CIPROFLOXACIN PO Therapy completed     ONDANSETRON PO Not filled/taken by Patient     diphenoxylate-atropine (LOMOTIL) 2.5-0.025 MG per tablet Therapy completed     carvedilol (COREG) 12.5 MG tablet Reorder         Encounter Diagnoses   Name Primary?     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)      Malignant neoplasm of nipple of right breast in female, unspecified estrogen receptor status (H) Yes     Benign essential hypertension        CURRENT MEDICATIONS:  Current Outpatient Medications   Medication Sig Dispense Refill     ANASTROZOLE PO Take 1 mg by mouth At Bedtime        ASPIRIN PO Take 81 mg by mouth At Bedtime        carvedilol (COREG) 25 MG tablet Take 0.5 tablets (12.5 mg) by mouth 2 times daily (with meals) 60 tablet 11     lisinopril (PRINIVIL/ZESTRIL) 20 MG tablet Take 40 mg by mouth At Bedtime        multivitamin, therapeutic (THERA-VIT) TABS Take 1 tablet by mouth At Bedtime No iron       PARoxetine HCl (PAXIL PO) Take 20 mg by mouth At Bedtime       spironolactone (ALDACTONE) 25 MG tablet Take 1 tablet (25 mg) by mouth daily 30 tablet 11     atorvastatin (LIPITOR) 20 MG tablet Take 20 mg by mouth At Bedtime    CCS Attending  Called to bedside by Critical Care fellow for intubation.  Per the GI staff, patient had a stomach full of blood and needed a CT of his abdomen to rule out active bleed.      Arrived and found Dr. Montalvo attempting to place a NGT in patient.  Patient was obtunded, not following commands, and hypotensive.   CRNA was providing reuben bumps.     I was informed by them that the patient had received propofol for EGD anesthesia.  I was also informed that the anesthetic case was over, however the patient needed to be intubated due to his stomach being full blood (thus the placement of the NG tube) and resuscitation.  In addition the patient was noted to be coughing profusely, and I was concerned patient may have had aspiration event.      Critical care team proceeded with active resuscitation with blood products and intubation.  Please see intubation note.      Cole Valencia MD  [phone]  Critical Care Medicine  [facility]  5:12 PM 7/10/2019        cholestyramine (QUESTRAN) 4 g Packet Take 1 packet (4 g) by mouth 2 times daily (Patient not taking: Reported on 12/18/2018) 90 each 0     nystatin (MYCOSTATIN) 208883 UNIT/ML suspension Take 5 mLs (500,000 Units) by mouth 4 times daily (Patient not taking: Reported on 12/18/2018) 120 mL 0     PROCHLORPERAZINE MALEATE PO Take 10 mg by mouth every 4 hours as needed for nausea or vomiting         ALLERGIES     Allergies   Allergen Reactions     Demerol [Meperidine] Nausea and Vomiting     Sulfa Drugs Other (See Comments)     Extreme chills     Penicillins Rash     Swelling In mouth and tongue       PAST MEDICAL HISTORY:  Past Medical History:   Diagnosis Date     Acoustic neuroma (H)      Acoustic neuroma (H)      Acute arthropathy     lower leg     Acute depression      Anemia     iron deficeincy     Anxiety      Arthritis     osteoarthrosis of knee     Breast cancer (H)     left breast     Breast cancer, left breast (H) 02/2018     Breast cancer, right breast (H) 1/2016     Cervical cancer (H)      Colon cancer (H)      Colon cancer (H)      Depression      Diabetes mellitus (H)     pre-diabetic no longer taking Metformin     Dyspnea      Dyspnea      Gastro-oesophageal reflux disease      H/O: lung cancer 2013     Herpes      Herpes zoster      Herpes zoster      HTN (hypertension)      Hyperlipidaemia      Hyperlipidemia      Hyperlipidemia      Iron deficiency anemia      Lung cancer (H)      Lung nodule 2013    Lung cancer     Lung nodule      Nausea & vomiting      Osteoarthritis of knee      Pain in female pelvis      Pain in female pelvis      Pre-diabetes      Preop general physical exam      Rib lesion      Rib lesion      Shoulder pain, acute     right     Tachycardia      Torn meniscus      Torn meniscus        PAST SURGICAL HISTORY:  Past Surgical History:   Procedure Laterality Date     APPENDECTOMY  1966    done with [facility]     ARTHROSCOPY KNEE  2012    right knee scoped     BIOPSY NODE SENTINEL  Right 2/3/2016    Procedure: BIOPSY NODE SENTINEL;  Surgeon: Flory Pinto MD;  Location: [facility]     BIOPSY NODE SENTINEL Left 3/5/2018    Procedure: BIOPSY NODE SENTINEL;;  Surgeon: Denae Perez MD;  Location: [facility]     BREAST BIOPSY, RT/LT       cataracts       cataracts       CERVICAL CANCER SCREENING RESULTS DOCUMENTED AND REVIEWED       CHOLECYSTECTOMY  1991     CL AFF SURGICAL PATHOLOGY       CL AFF SURGICAL PATHOLOGY      acoustic neuroma left side excised [facility] Dr. Longoria 6/17/14     COLONOSCOPY       EXCISE NODE MEDIASTINAL  3/12/2013    Procedure: EXCISE NODE MEDIASTINAL;;  Surgeon: Lorne Arenas MD;  Location:  OR     EYE SURGERY      fiona cataracts     GENITOURINARY SURGERY      bladder sling     GYN SURGERY  1966    hysterectomy for cervical cancer *ovaries remain     HYSTERECTOMY  1966    Cervical CA, paps done every other year, done with appendectomy     INSERT PORT VASCULAR ACCESS Left 3/5/2018    Procedure: INSERT PORT VASCULAR ACCESS;;  Surgeon: Denae Perez MD;  Location: [facility]     INSERT PORT VASCULAR ACCESS N/A 3/5/2018    Procedure: INSERT PORT VASCULAR ACCESS;;  Surgeon: Denae Perez MD;  Location: [facility]     LAPAROSCOPIC ASSISTED COLECTOMY Right 10/27/2016    Procedure: LAPAROSCOPIC ASSISTED COLECTOMY;  Surgeon: Umang Arteaga MD;  Location:  OR     LOBECTOMY LUNG  3/12/2013    Procedure: LOBECTOMY LUNG;;  Surgeon: Lorne Arenas MD;  Location:  OR     LUMPECTOMY BREAST WITH SEED LOCALIZATION Right 2/3/2016    Procedure: LUMPECTOMY BREAST WITH SEED LOCALIZATION;  Surgeon: Flory Pinto MD;  Location: [facility]     LUMPECTOMY BREAST WITH SEED LOCALIZATION Left 3/5/2018    Procedure: LUMPECTOMY BREAST WITH SEED LOCALIZATION;  SEED LOCALIZED LEFT BREAST LUMPECTOMY, WITH LEFT SENTINEL NODE BIOPSY, PORT PLACEMENT;  Surgeon: Denae Perez MD;  Location: [facility]     MASTECTOMY SIMPLE Left 3/28/2018    Procedure: MASTECTOMY SIMPLE;  LEFT BREAST  MASTECTOMY ;  Surgeon: Denae Perez MD;  Location:  SD     MINI ARC SLING OPERATION FOR STRESS INCONTINENCE  2009     RESECT RIB  2/15/2013    Procedure: RESECT RIB;  RESECTION PORTION RIGHT TWELVETH RIB;  Surgeon: Lorne Arenas MD;  Location:  OR     THORACOTOMY  3/12/2013    Procedure: THORACOTOMY;  RIGHT THORACOTOMY, RIGHT MIDDLE AND LOWER LUNG LOBECTOMY, MEDIASTINAL LYMPH NODE DISSECTION ;  Surgeon: Lorne Arenas MD;  Location:  OR     TONSILLECTOMY         FAMILY HISTORY:  Family History   Problem Relation Age of Onset     Breast Cancer Paternal Grandmother      Colon Cancer Other         not specified     Fractures Other         Hip fracture, Aunt     Hyperlipidemia Mother      Hypertension Mother      Heart Disease Mother      Osteoporosis Mother      Hyperlipidemia Sister      Hyperlipidemia Brother      Prostate Cancer Brother      Hypertension Sister      Heart Disease Maternal Grandfather      Prostate Cancer Father      Colon Cancer Cousin         son of paternal aunt with breast cancer.     Breast Cancer Paternal Aunt      Colon Cancer Paternal Aunt      Other Cancer Paternal Uncle         Esophageal     Other Cancer Maternal Aunt        SOCIAL HISTORY:  Social History     Socioeconomic History     Marital status:      Spouse name: None     Number of children: 3     Years of education: None     Highest education level: None   Social Needs     Financial resource strain: None     Food insecurity - worry: None     Food insecurity - inability: None     Transportation needs - medical: None     Transportation needs - non-medical: None   Occupational History     Employer: RETIRED   Tobacco Use     Smoking status: Former Smoker     Packs/day: 1.00     Years: 45.00     Pack years: 45.00     Types: Cigarettes     Last attempt to quit: 1998     Years since quittin.6     Smokeless tobacco: Never Used     Tobacco comment: quit    Substance and Sexual Activity      "Alcohol use: Yes     Alcohol/week: 0.0 oz     Comment: glass of wine a day     Drug use: No     Sexual activity: Yes     Partners: Male     Birth control/protection: Female Surgical   Other Topics Concern     Parent/sibling w/ CABG, MI or angioplasty before 65F 55M? Not Asked   Social History Narrative    12/2015    -    3 kids    Retired        Pap-11/8/2010 WNL    Mammo-12/2/13     Colonoscopy-3/3/2010 WNL, no further recommended per pt     Dexa-1/1/2008       Review of Systems:  Skin:  Negative       Eyes:  Positive for glasses;cataracts    ENT:  Positive for deafness;hearing loss    Respiratory:  Positive for dyspnea on exertion;shortness of breath     Cardiovascular:  Negative      Gastroenterology: Negative      Genitourinary:         Musculoskeletal:  Positive for joint pain both knees and right shoulder   Neurologic:  Positive for headaches rare headache  Psychiatric:  Positive for sleep disturbances    Heme/Lymph/Imm:  Negative      Endocrine:  Negative        Physical Exam:  Vitals: /74 (BP Location: Right arm, Patient Position: Sitting, Cuff Size: Adult Regular)   Pulse 84   Ht 1.753 m (5' 9\")   Wt 85.3 kg (188 lb 1.6 oz)   Breastfeeding? No   BMI 27.78 kg/m       Constitutional:  cooperative;in no acute distress        Skin:  warm and dry to the touch          Head:  normocephalic, no masses or lesions        Eyes:  conjunctivae and lids unremarkable        Lymph:No Cervical lymphadenopathy present     ENT:  no pallor or cyanosis, dentition good        Neck:  carotid pulses are full and equal bilaterally;JVP normal        Respiratory:  clear to auscultation    diminished in R base    Cardiac: regular rhythm;normal S1 and S2;no murmurs, gallops or rubs detected                pulses full and equal, no bruits auscultated                                        GI:  abdomen soft;no masses;non-tender        Extremities and Muscular Skeletal:  no deformities, clubbing, cyanosis, erythema " observed;no edema              Neurological:  no gross motor deficits        Psych:  Alert and Oriented x 3          CC  Bartolome Clay MD  [address]  [place] [id number]                    Thank you for allowing me to participate in the care of your patient.      Sincerely,     Dinorah Miller,      [facility]    cc:   Bartolome Clay MD  [address]  [place] [id number]

## 2019-07-10 NOTE — PROCEDURES
"Manpreet Hamilton is a 68 y.o. male patient.    Temp: 98.4 °F (36.9 °C) (07/10/19 0025)  Pulse: 100 (07/10/19 0200)  Resp: 17 (07/10/19 0200)  BP: 130/61 (07/10/19 0200)  SpO2: 97 % (07/10/19 0200)  Weight: 92.8 kg (204 lb 9.4 oz) (07/10/19 0025)  Height: 5' 8" (172.7 cm) (07/10/19 0025)       Insert peripheral IV  Date/Time: 7/10/2019 2:52 AM  Performed by: Claritza Luna NP  Authorized by: Claritza Luna NP   Consent: Verbal consent obtained.  Risks and benefits: risks, benefits and alternatives were discussed  Consent given by: patient  Patient understanding: patient states understanding of the procedure being performed  Patient consent: the patient's understanding of the procedure matches consent given  Procedure consent: procedure consent matches procedure scheduled  Imaging studies: imaging studies available  Required items: required blood products, implants, devices, and special equipment available  Patient identity confirmed: verbally with patient, provided demographic data, arm band and hospital-assigned identification number  Local anesthesia used: no    Anesthesia:  Local anesthesia used: no    Sedation:  Patient sedated: no    Patient tolerance: Patient tolerated the procedure well with no immediate complications  Comments: 20g PIV placed under ultrasound guidance using seldinger technique          Claritza Luna  7/10/2019    "

## 2019-07-10 NOTE — PROCEDURES
"Manpreet Hamilton is a 68 y.o. male patient.    Temp: 97.6 °F (36.4 °C) (07/10/19 1654)  Pulse: (!) 123 (07/10/19 1654)  Resp: (!) 35 (07/10/19 1654)  BP: (!) 154/94 (07/10/19 1654)  SpO2: 100 % (07/10/19 1654)  Weight: 92.8 kg (204 lb 9.4 oz) (07/10/19 0025)  Height: 5' 8" (172.7 cm) (07/10/19 0025)       Intubation  Date/Time: 7/10/2019 6:08 PM  Location procedure was performed: [facility] ([facility])  Performed by: Tyler Mendez MD  Authorized by: Tyler Mendez MD   Pre-operative diagnosis: GI Hemorrhage  Post-operative diagnosis: GI Hemorrhage  Consent Done: Emergent Situation  Indications: airway protection  Intubation method: video-assisted  Patient status: paralyzed (RSI)  Preoxygenation: nonrebreather mask  Sedatives: etomidate  Paralytic: succinylcholine  Laryngoscope size: Mac 3  Tube size: 8.0 mm  Tube type: cuffed  Number of attempts: 1  Cricoid pressure: no  Cords visualized: yes  Post-procedure assessment: chest rise and CO2 detector  Breath sounds: clear and equal  Cuff inflated: yes  ETT to lip: 24 cm  Tube secured with: ETT mckeon  Chest x-ray interpreted by me.  Chest x-ray findings: endotracheal tube in appropriate position  Patient tolerance: Patient tolerated the procedure well with no immediate complications  Complications: No  Specimens: No          Tyler Mendez  7/10/2019  "

## 2019-07-10 NOTE — ASSESSMENT & PLAN NOTE
- Hemoglobin 7 on admission to [facility], will transfuse 1 unit  - Has received 9 units PRBC, 2 units FFP  - Protonix 40 mg BID  - Endoscopy in [place] with non-bleeding gastric ulcer and duodenal bulbs  - GI consulted, appreciate recs. Patient NPO since midnight.

## 2019-07-10 NOTE — PLAN OF CARE
Problem: Adult Inpatient Plan of Care  Goal: Plan of Care Review  Received pt, as a transfer from outside hospital. Pt. Is AAO x4, VSS. Pt. Admitted for a GIB. H/H 7/20, transfusing 1 Unit PRBCs, Pt. Is NPO for a possible EGD. Frequent vitals and assessment per flowsheet, plan of care reviewed with  Mr. Hamilton, questions/concerns addressed, will continue to monitor pt.

## 2019-07-10 NOTE — CONSULTS
[facility]  Gastroenterology  Consult Note    Patient Name: Manpreet Hamilton  MRN: [id number]  Admission Date: 7/10/2019  Hospital Length of Stay: 0 days  Code Status: Full Code   Attending Provider: Cole Valencia*   Consulting Provider: Loyd Maria MD  Primary Care Physician: Radames Pradhan MD  Principal Problem:<principal problem not specified>    Inpatient consult to Gastroenterology  Consult performed by: Loyd Marai MD  Consult ordered by: Anat Herman MD        Subjective:     HPI: Manpreet Hamilton is a 68 y.o. male w/ PMH of HTN, HLD, UIP, transferred from [place] for acute GI bleed. Patient was on a cruise near [place], when suddenly on the cruise had BRBPR and eventually turned to black tarry stools on 7/8/19. His BP on ship was 88/52, , hb was 9.7, baseline 15, transffered to [place], was hypotensive and tachycardic there, hb dropped to 7, he received 8 units of PRBC and 3 units of FFP, received PPI ggt, and EGD there showed small superficial gastric ulcer and duodenal bulb ulcer, no active bleed. He takes asa 81 daily, and 4 alcoholic beverages nightly, denies GI bleed in the past, denies having EGD, had colonoscopy in 2016 for routine screening. In the ER here HR is 100s, bp 107/53 not on pressors, hb is 7, receiving 1 unit PRBC currently in the ICU. No more active bleeds this morning, last BM was last night was black tarry, he denies further hematochezia. He does feel dizzy when standing from seated position. He denies dysphagia, odynophagia, heart burn, abdominal pain, he doesn't take PPI daily.     Past Medical History:   Diagnosis Date    Hyperlipemia     Hypertension     Nasal congestion     Pulmonary fibrosis        Past Surgical History:   Procedure Laterality Date    CATARACT EXTRACTION Left     COLONOSCOPY N/A 8/5/2016    Performed by [facility] at [facility] ([facility] FLR)    HAND SURGERY      left    HIATAL HERNIA REPAIR       MICRODISCECTOMY, SPINE MIS Right L5- S1 Right 2018    Performed by Angel Pendleton [facility] at [facility]    TONSILLECTOMY, ADENOIDECTOMY      VASECTOMY         Review of patient's allergies indicates:   Allergen Reactions    Penicillins      Family History     Problem Relation (Age of Onset)    Coronary artery disease Mother, Sister    Dementia Father    Multiple sclerosis Sister    Parkinsonism Father        Tobacco Use    Smoking status: Former Smoker     Packs/day: 1.50     Years: 20.00     Pack years: 30.00     Types: Cigarettes     Last attempt to quit: 3/17/1985     Years since quittin.3    Smokeless tobacco: Former User     Quit date: 1986   Substance and Sexual Activity    Alcohol use: Yes     Alcohol/week: 16.8 oz     Types: 28 Glasses of wine per week     Comment: 6-7 bottles per week    Drug use: No    Sexual activity: Yes     Partners: Female     Review of Systems   Constitutional: Positive for appetite change and fatigue. Negative for activity change and diaphoresis.   HENT: Negative for congestion.    Eyes: Negative for visual disturbance.   Respiratory: Negative for chest tightness.    Cardiovascular: Negative for chest pain.   Gastrointestinal: Negative for abdominal distention, abdominal pain, anal bleeding and blood in stool.   Genitourinary: Negative for difficulty urinating.   Musculoskeletal: Negative for arthralgias.   Skin: Negative for color change and rash.   Neurological: Positive for light-headedness. Negative for syncope.     Objective:     Vital Signs (Most Recent):  Temp: 97.9 °F (36.6 °C) (07/10/19 0715)  Pulse: 103 (07/10/19 0700)  Resp: (!) 27 (07/10/19 0700)  BP: (!) 107/53 (07/10/19 0700)  SpO2: 99 % (07/10/19 0700) Vital Signs (24h Range):  Temp:  [97 °F (36.1 °C)-99.3 °F (37.4 °C)] 97.9 °F (36.6 °C)  Pulse:  [] 103  Resp:  [5-31] 27  SpO2:  [97 %-100 %] 99 %  BP: ()/(53-74) 107/53     Weight: 92.8 kg (204 lb 9.4 oz) (07/10/19 0025)  Body  mass index is 31.11 kg/m².      Intake/Output Summary (Last 24 hours) at 7/10/2019 0813  Last data filed at 7/10/2019 0800  Gross per 24 hour   Intake 701.67 ml   Output 1560 ml   Net -858.33 ml       Lines/Drains/Airways     Drain                 Urethral Catheter 07/09/19 Straight-tip 16 Fr. 1 day          Peripheral Intravenous Line                 Peripheral IV - Double Lumen 20 G Right;Anterior Upper Arm -- days         Peripheral IV - Single Lumen 07/10/19 0300 18 G Anterior;Left Antecubital less than 1 day                Physical Exam   Constitutional: No distress.   HENT:   Head: Normocephalic and atraumatic.   Eyes: Conjunctivae are normal.   Neck: Neck supple.   Cardiovascular: Normal rate and regular rhythm.   Pulmonary/Chest: Effort normal and breath sounds normal.   Abdominal: Soft. Normal appearance and bowel sounds are normal. He exhibits no distension. There is no tenderness. There is no rigidity, no rebound and no guarding.   Neurological: He is alert.   Skin: Skin is warm and dry. He is not diaphoretic.       Significant Labs:  All pertinent lab results from the last 24 hours have been reviewed.    Significant Imaging:  Imaging results within the past 24 hours have been reviewed.    Assessment/Plan:     Active Diagnoses:    Diagnosis Date Noted POA    GIB (gastrointestinal bleeding) [K92.2]  Yes      Problems Resolved During this Admission:       Hematochezia  Melena  Acute blood loss anemia requiring numerous transfusions of PRBC    hb 7.6 this AM, tachycardic, orthostatic, INR 1  -NPO  -volume resuscitation per primary  -transfuse to keep at least above 7, per primary  -PPI 80 once, then PPI ggt, or PPI 40 BID  -Goal hb >7, plt >50, INR <1.5  -avoid NSAIDS, ASA, anticoagulants  -plan for EGD today      Thank you for your consult. I will follow-up with patient. Please contact us if you have any additional questions.    Loyd Maria MD  Gastroenterology  [facility]

## 2019-07-10 NOTE — TRANSFER OF CARE
"Anesthesia Transfer of Care Note    Patient: Manpreet Hamilton    Procedure(s) Performed: Procedure(s) (LRB):  EGD (ESOPHAGOGASTRODUODENOSCOPY) (N/A)    Patient location: ICU    Anesthesia Type: general    Transport from OR: Transported from OR on 2-3 L/min O2 by NC with adequate spontaneous ventilation    Post pain: adequate analgesia    Post assessment: no apparent anesthetic complications and tolerated procedure well    Post vital signs: stable    Level of consciousness: awake, alert and oriented    Nausea/Vomiting: no nausea/vomiting    Complications: none    Transfer of care protocol was followed      Last vitals:   Visit Vitals  BP (!) 106/58   Pulse 109   Temp 36.4 °C (97.6 °F) (Oral)   Resp 10   Ht 5' 8" (1.727 m)   Wt 92.8 kg (204 lb 9.4 oz)   SpO2 100%   BMI 31.11 kg/m²     "

## 2019-07-10 NOTE — SIGNIFICANT EVENT
CCS Attending  Called to bedside by Critical Care fellow for intubation.  Per the GI staff, patient had a stomach full of blood and needed a CT of his abdomen to rule out active bleed.     Arrived and found Dr. Montalvo attempting to place a NGT in patient.  Patient was obtunded, not following commands, and hypotensive.   CRNA was providing reuben bumps.    I was informed by them that the patient had received propofol for EGD anesthesia.  I was also informed that the anesthetic case was over, however the patient needed to be intubated due to his stomach being full blood (thus the placement of the NG tube) and resuscitation.  In addition the patient was noted to be coughing profusely, and I was concerned patient may have had aspiration event.     Critical care team proceeded with active resuscitation with blood products and intubation.  Please see intubation note.     Cole Valencia MD  [phone]  Critical Care Medicine  [facility]  5:12 PM 7/10/2019

## 2019-07-10 NOTE — CARE UPDATE
Pt intubated at bedside by md. Placed on mechanical ventilation with documented settings. Tolerating well. Will continue to monitor.

## 2019-07-10 NOTE — HPI
The patient is a 68M with a PMH of HTN, HLD, UIP (dx 1 year ago, followed by Dr. Pradhan, on ofev) presenting as transfer from [place] for GI bleed. The patient was on a cruise with his family in [place] when he suddenly began having bright red bowel movements that progressed to melena. This was on Saturday the 8th. On board the ship his BP was 88/52, pulse 119. Labs with hemoglobin of 9.7 (baseline 15). He was transferred to [place] where he became progressively more hypotensive (to systolic of 70's) and tachycardic to 120's. His Hg dropped to 7. He was admitted to [place] for 1 night prior to transfer to [facility]. While at [place], he received 8 units total of PRBC, 3 units FFP. He was placed on PPI drip and had an endoscopy which showed a small superficial gastric ulcer and duodenal bulb ulcers with no active bleeding.     The patient takes a baby aspirin daily and drinks approximately 4 alcoholic beverages nightly. Has never before had GI bleed.

## 2019-07-10 NOTE — ASSESSMENT & PLAN NOTE
- Dx 1 year ago, managed by Dr. Pradhan  - Hold zhaoev per Dr. Pradhan  - PFT's stable  - No oxygenation issue this admission

## 2019-07-10 NOTE — PLAN OF CARE
Radames Pradhan MD  [address] / [place] [address]    [facility] [place] [address]  [address]  [place] [address]  Phone: [phone] Fax: [phone]    Payor: BLUE CROSS BLUE SHIELD / Plan: BCBS ALL OUT OF STATE / Product Type: PPO /     Extended Emergency Contact Information  Primary Emergency Contact: Traci Hamilton  Address: [address]           [place] [address] [facility]  Home Phone: [phone]  Mobile Phone: [phone]  Relation: Spouse    Future Appointments   Date Time Provider Department Center   8/6/2019  8:30 AM Matias Pradhan MD [facility] [place]        07/10/19 1535   Discharge Assessment   Assessment Type Discharge Planning Assessment   Confirmed/corrected address and phone number on facesheet? Yes   Assessment information obtained from? Patient;Caregiver;Medical Record   Expected Length of Stay (days) 3   Communicated expected length of stay with patient/caregiver yes   Prior to hospitilization cognitive status: Alert/Oriented   Prior to hospitalization functional status: Independent   Current cognitive status: Alert/Oriented   Current Functional Status: Independent   Facility Arrived From: Transfer from [place], [place]    Lives With spouse   Able to Return to Prior Arrangements yes   Is patient able to care for self after discharge? Yes   Who are your caregiver(s) and their phone number(s)? Traci Hamilton (spouse) [phone]   Patient's perception of discharge disposition home or selfcare   Readmission Within the Last 30 Days no previous admission in last 30 days   Patient currently being followed by outpatient case management? No   Patient currently receives any other outside agency services? No   Equipment Currently Used at Home none   Do you have any problems affording any of your prescribed medications? No   Is the patient taking medications as prescribed?  yes   Does the patient have transportation home? Yes   Transportation Anticipated family or friend will provide   Discharge Plan A Home with family   Discharge Plan B Home Health;Home with family   DME Needed Upon Discharge  none   Patient/Family in Agreement with Plan yes   Does the patient have transportation to healthcare appointments? Yes       Kiana Aviles RN, [facility]  Case Management-Critical Care     [phone]

## 2019-07-10 NOTE — H&P
[facility]  Critical Care Medicine  History & Physical    Patient Name: Manpreet Hamilton  MRN: [id number]  Admission Date: 7/10/2019  Hospital Length of Stay: 0 days  Code Status: Full Code  Attending Physician: Cole Valencia*   Primary Care Provider: Radames Pradhan MD   Principal Problem: <principal problem not specified>    Subjective:     HPI:  The patient is a 68M with a PMH of HTN, HLD, UIP (dx 1 year ago, followed by Dr. Pradhan, on ofev) presenting as transfer from [place] for GI bleed. The patient was on a cruise with his family in [place] when he suddenly began having bright red bowel movements that progressed to melena. This was on Saturday the 8th. On board the ship his BP was 88/52, pulse 119. Labs with hemoglobin of 9.7 (baseline 15). He was transferred to [place] where he became progressively more hypotensive (to systolic of 70's) and tachycardic to 120's. His Hg dropped to 7. He was admitted to [place] for 1 night prior to transfer to [facility]. While at [place], he received 8 units total of PRBC, 3 units FFP. He was placed on PPI drip and had an endoscopy which showed a small superficial gastric ulcer and duodenal bulb ulcers with no active bleeding.     The patient takes a baby aspirin daily and drinks approximately 4 alcoholic beverages nightly. Has never before had GI bleed.    Hospital/ICU Course:  No notes on file     Past Medical History:   Diagnosis Date    Hyperlipemia     Hypertension     Nasal congestion     Pulmonary fibrosis        Past Surgical History:   Procedure Laterality Date    CATARACT EXTRACTION Left     COLONOSCOPY N/A 8/5/2016    Performed by Leon Padilla MD at [facility] ENDO (4TH FLR)    HAND SURGERY      left    HIATAL HERNIA REPAIR      MICRODISCECTOMY, SPINE MIS Right L5- S1 Right 6/27/2018    Performed by Angel Pendleton MD at [facility] OR 2ND FLR    TONSILLECTOMY, ADENOIDECTOMY      VASECTOMY         Review of patient's allergies indicates:    Allergen Reactions    Penicillins        Family History     Problem Relation (Age of Onset)    Coronary artery disease Mother, Sister    Dementia Father    Multiple sclerosis Sister    Parkinsonism Father        Tobacco Use    Smoking status: Former Smoker     Packs/day: 1.50     Years: 20.00     Pack years: 30.00     Types: Cigarettes     Last attempt to quit: 3/17/1985     Years since quittin.3    Smokeless tobacco: Former User     Quit date: 1986   Substance and Sexual Activity    Alcohol use: Yes     Alcohol/week: 16.8 oz     Types: 28 Glasses of wine per week     Comment: 6-7 bottles per week    Drug use: No    Sexual activity: Yes     Partners: Female      Review of Systems   Constitutional: Negative for fever.   HENT: Negative for drooling and facial swelling.    Eyes: Negative for discharge and redness.   Respiratory: Negative for shortness of breath.    Cardiovascular: Negative for chest pain.   Gastrointestinal: Positive for blood in stool. Negative for vomiting.   Musculoskeletal: Negative for joint swelling.   Skin: Negative for rash.   Neurological: Negative for facial asymmetry and speech difficulty.   Psychiatric/Behavioral: Negative for behavioral problems.     Objective:     Vital Signs (Most Recent):  Temp: 97 °F (36.1 °C) (07/10/19 0330)  Pulse: 105 (07/10/19 0330)  Resp: (!) 29 (07/10/19 0330)  BP: 112/61 (07/10/19 0330)  SpO2: 98 % (07/10/19 0330) Vital Signs (24h Range):  Temp:  [97 °F (36.1 °C)-99.3 °F (37.4 °C)] 97 °F (36.1 °C)  Pulse:  [] 105  Resp:  [10-] 29  SpO2:  [97 %-99 %] 98 %  BP: ()/(53-74) 112/61   Weight: 92.8 kg (204 lb 9.4 oz)  Body mass index is 31.11 kg/m².      Intake/Output Summary (Last 24 hours) at 7/10/2019 0430  Last data filed at 7/10/2019 0304  Gross per 24 hour   Intake 120 ml   Output 1025 ml   Net -905 ml       Physical Exam   Constitutional: He appears well-developed and well-nourished.   HENT:   Head: Normocephalic and atraumatic.    Eyes: EOM are normal. Right eye exhibits no discharge. Left eye exhibits no discharge.   Neck: No tracheal deviation present.   Cardiovascular: Normal rate, regular rhythm and normal heart sounds. Exam reveals no gallop and no friction rub.   No murmur heard.  Pulmonary/Chest: Effort normal and breath sounds normal. No stridor. No respiratory distress. He has no wheezes.   Abdominal: Soft. He exhibits no distension. There is no tenderness. There is no guarding.   Musculoskeletal: He exhibits no edema or deformity.   Neurological: He is alert.   Skin: Skin is warm and dry.       Vents:     Lines/Drains/Airways     Drain                 Urethral Catheter 07/09/19 Straight-tip 16 Fr. 1 day          Peripheral Intravenous Line                 Peripheral IV - Double Lumen 20 G Right;Anterior Upper Arm -- days         Peripheral IV - Single Lumen 07/10/19 0300 18 G Anterior;Left Antecubital less than 1 day              Significant Labs:    CBC/Anemia Profile:  Recent Labs   Lab 07/10/19  0034   WBC 7.28   HGB 7.0*   HCT 20.8*   *   MCV 91   RDW 14.6*        Chemistries:  Recent Labs   Lab 07/10/19  0034   *   K 3.3*      CO2 19*   BUN 24*   CREATININE 0.5   CALCIUM 6.3*   ALBUMIN 1.8*   PROT 3.2*   BILITOT 0.3   ALKPHOS 33*   ALT 8*   AST 11     Lactic: 1  Lipase: 19    All pertinent labs within the past 24 hours have been reviewed.    Significant Imaging: I have reviewed and interpreted all pertinent imaging results/findings within the past 24 hours.    Assessment/Plan:     Pulmonary  Pulmonary fibrosis  - Dx 1 year ago, managed by Dr. Pradhan  - Hold ofev per Dr. Pradhan  - PFT's stable  - No oxygenation issue this admission    GI  GIB (gastrointestinal bleeding)  - Hemoglobin 7 on admission to [facility], will transfuse 1 unit  - Has received 9 units PRBC, 2 units FFP  - Protonix 40 mg BID  - Endoscopy in [place] with non-bleeding gastric ulcer and duodenal bulbs  - GI consulted, appreciate recs. Patient  NPO since midnight.         Critical Care Daily Checklist:    A: Awake: RASS Goal/Actual Goal:    Actual: Shane Agitation Sedation Scale (RASS): Alert and calm   B: Spontaneous Breathing Trial Performed?     C: SAT & SBT Coordinated?  No                      D: Delirium: CAM-ICU Overall CAM-ICU: Negative   E: Early Mobility Performed? Yes   F: Feeding Goal:    Status:     Current Diet Order   Procedures    Diet NPO      AS: Analgesia/Sedation None   T: Thromboembolic Prophylaxis Held in light of GI bleed   H: HOB > 300 Yes   U: Stress Ulcer Prophylaxis (if needed) Protonix   G: Glucose Control NA   B: Bowel Function     I: Indwelling Catheter (Lines & Ribeiro) Necessity Ribeiro   D: De-escalation of Antimicrobials/Pharmacotherapies No    Plan for the day/ETD Pending GI recs    Code Status:  Family/Goals of Care: Full Code  -       Critical secondary to Patient has a condition that poses threat to life and bodily function: Gi bleed     Critical care was time spent personally by me on the following activities: development of treatment plan with patient or surrogate and bedside caregivers, discussions with consultants, evaluation of patient's response to treatment, examination of patient, ordering and performing treatments and interventions, ordering and review of laboratory studies, ordering and review of radiographic studies, pulse oximetry, re-evaluation of patient's condition. This critical care time did not overlap with that of any other provider or involve time for any procedures.     Anat Herman MD  Critical Care Medicine  [facility]

## 2019-07-11 ENCOUNTER — ANESTHESIA (OUTPATIENT)
Dept: SURGERY | Facility: HOSPITAL | Age: 69
DRG: 377 | End: 2019-07-11
Payer: COMMERCIAL

## 2019-07-11 LAB
ALBUMIN SERPL BCP-MCNC: 2.1 G/DL (ref 3.5–5.2)
ALLENS TEST: ABNORMAL
ALP SERPL-CCNC: 50 U/L (ref 55–135)
ALT SERPL W/O P-5'-P-CCNC: 11 U/L (ref 10–44)
ANION GAP SERPL CALC-SCNC: 6 MMOL/L (ref 8–16)
AST SERPL-CCNC: 17 U/L (ref 10–40)
BASOPHILS # BLD AUTO: 0.01 K/UL (ref 0–0.2)
BASOPHILS # BLD AUTO: 0.02 K/UL (ref 0–0.2)
BASOPHILS # BLD AUTO: 0.04 K/UL (ref 0–0.2)
BASOPHILS # BLD AUTO: 0.04 K/UL (ref 0–0.2)
BASOPHILS NFR BLD: 0.1 % (ref 0–1.9)
BASOPHILS NFR BLD: 0.2 % (ref 0–1.9)
BASOPHILS NFR BLD: 0.3 % (ref 0–1.9)
BASOPHILS NFR BLD: 0.4 % (ref 0–1.9)
BILIRUB SERPL-MCNC: 0.3 MG/DL (ref 0.1–1)
BLD PROD TYP BPU: NORMAL
BLD PROD TYP BPU: NORMAL
BLOOD UNIT EXPIRATION DATE: NORMAL
BLOOD UNIT EXPIRATION DATE: NORMAL
BLOOD UNIT TYPE CODE: 5100
BLOOD UNIT TYPE CODE: 5100
BLOOD UNIT TYPE: NORMAL
BLOOD UNIT TYPE: NORMAL
BUN SERPL-MCNC: 37 MG/DL (ref 8–23)
CALCIUM SERPL-MCNC: 7 MG/DL (ref 8.7–10.5)
CHLORIDE SERPL-SCNC: 104 MMOL/L (ref 95–110)
CO2 SERPL-SCNC: 21 MMOL/L (ref 23–29)
CODING SYSTEM: NORMAL
CODING SYSTEM: NORMAL
CREAT SERPL-MCNC: 0.8 MG/DL (ref 0.5–1.4)
DELSYS: ABNORMAL
DIFFERENTIAL METHOD: ABNORMAL
DISPENSE STATUS: NORMAL
DISPENSE STATUS: NORMAL
EOSINOPHIL # BLD AUTO: 0.4 K/UL (ref 0–0.5)
EOSINOPHIL # BLD AUTO: 0.5 K/UL (ref 0–0.5)
EOSINOPHIL # BLD AUTO: 0.6 K/UL (ref 0–0.5)
EOSINOPHIL # BLD AUTO: 0.7 K/UL (ref 0–0.5)
EOSINOPHIL NFR BLD: 3.3 % (ref 0–8)
EOSINOPHIL NFR BLD: 4.9 % (ref 0–8)
EOSINOPHIL NFR BLD: 6 % (ref 0–8)
EOSINOPHIL NFR BLD: 6.9 % (ref 0–8)
EOSINOPHIL NFR BLD: 7 % (ref 0–8)
EOSINOPHIL NFR BLD: 7.5 % (ref 0–8)
ERYTHROCYTE [DISTWIDTH] IN BLOOD BY AUTOMATED COUNT: 15.3 % (ref 11.5–14.5)
ERYTHROCYTE [DISTWIDTH] IN BLOOD BY AUTOMATED COUNT: 15.4 % (ref 11.5–14.5)
ERYTHROCYTE [DISTWIDTH] IN BLOOD BY AUTOMATED COUNT: 15.4 % (ref 11.5–14.5)
ERYTHROCYTE [DISTWIDTH] IN BLOOD BY AUTOMATED COUNT: 15.6 % (ref 11.5–14.5)
ERYTHROCYTE [DISTWIDTH] IN BLOOD BY AUTOMATED COUNT: 15.6 % (ref 11.5–14.5)
ERYTHROCYTE [DISTWIDTH] IN BLOOD BY AUTOMATED COUNT: 15.8 % (ref 11.5–14.5)
ERYTHROCYTE [SEDIMENTATION RATE] IN BLOOD BY WESTERGREN METHOD: 16 MM/H
EST. GFR  (AFRICAN AMERICAN): >60 ML/MIN/1.73 M^2
EST. GFR  (NON AFRICAN AMERICAN): >60 ML/MIN/1.73 M^2
FIO2: 30
GLUCOSE SERPL-MCNC: 152 MG/DL (ref 70–110)
HCO3 UR-SCNC: 20.5 MMOL/L (ref 24–28)
HCT VFR BLD AUTO: 23 % (ref 40–54)
HCT VFR BLD AUTO: 24 % (ref 40–54)
HCT VFR BLD AUTO: 24.7 % (ref 40–54)
HCT VFR BLD AUTO: 26.4 % (ref 40–54)
HCT VFR BLD AUTO: 26.8 % (ref 40–54)
HCT VFR BLD AUTO: 27.5 % (ref 40–54)
HGB BLD-MCNC: 7.8 G/DL (ref 14–18)
HGB BLD-MCNC: 8.1 G/DL (ref 14–18)
HGB BLD-MCNC: 8.3 G/DL (ref 14–18)
HGB BLD-MCNC: 8.7 G/DL (ref 14–18)
HGB BLD-MCNC: 9 G/DL (ref 14–18)
HGB BLD-MCNC: 9.1 G/DL (ref 14–18)
IMM GRANULOCYTES # BLD AUTO: 0.03 K/UL (ref 0–0.04)
IMM GRANULOCYTES # BLD AUTO: 0.04 K/UL (ref 0–0.04)
IMM GRANULOCYTES # BLD AUTO: 0.05 K/UL (ref 0–0.04)
IMM GRANULOCYTES # BLD AUTO: 0.09 K/UL (ref 0–0.04)
IMM GRANULOCYTES NFR BLD AUTO: 0.3 % (ref 0–0.5)
IMM GRANULOCYTES NFR BLD AUTO: 0.4 % (ref 0–0.5)
IMM GRANULOCYTES NFR BLD AUTO: 0.4 % (ref 0–0.5)
IMM GRANULOCYTES NFR BLD AUTO: 0.5 % (ref 0–0.5)
IMM GRANULOCYTES NFR BLD AUTO: 0.5 % (ref 0–0.5)
IMM GRANULOCYTES NFR BLD AUTO: 0.6 % (ref 0–0.5)
LYMPHOCYTES # BLD AUTO: 0.4 K/UL (ref 1–4.8)
LYMPHOCYTES # BLD AUTO: 0.6 K/UL (ref 1–4.8)
LYMPHOCYTES # BLD AUTO: 0.9 K/UL (ref 1–4.8)
LYMPHOCYTES # BLD AUTO: 0.9 K/UL (ref 1–4.8)
LYMPHOCYTES # BLD AUTO: 1.2 K/UL (ref 1–4.8)
LYMPHOCYTES # BLD AUTO: 1.2 K/UL (ref 1–4.8)
LYMPHOCYTES NFR BLD: 10.7 % (ref 18–48)
LYMPHOCYTES NFR BLD: 11.8 % (ref 18–48)
LYMPHOCYTES NFR BLD: 4.7 % (ref 18–48)
LYMPHOCYTES NFR BLD: 7.5 % (ref 18–48)
LYMPHOCYTES NFR BLD: 8.3 % (ref 18–48)
LYMPHOCYTES NFR BLD: 9.5 % (ref 18–48)
MAGNESIUM SERPL-MCNC: 1.7 MG/DL (ref 1.6–2.6)
MCH RBC QN AUTO: 29.7 PG (ref 27–31)
MCH RBC QN AUTO: 30.2 PG (ref 27–31)
MCH RBC QN AUTO: 30.4 PG (ref 27–31)
MCH RBC QN AUTO: 30.5 PG (ref 27–31)
MCH RBC QN AUTO: 30.7 PG (ref 27–31)
MCH RBC QN AUTO: 31 PG (ref 27–31)
MCHC RBC AUTO-ENTMCNC: 32.5 G/DL (ref 32–36)
MCHC RBC AUTO-ENTMCNC: 33.1 G/DL (ref 32–36)
MCHC RBC AUTO-ENTMCNC: 33.6 G/DL (ref 32–36)
MCHC RBC AUTO-ENTMCNC: 33.8 G/DL (ref 32–36)
MCHC RBC AUTO-ENTMCNC: 33.9 G/DL (ref 32–36)
MCHC RBC AUTO-ENTMCNC: 34.1 G/DL (ref 32–36)
MCV RBC AUTO: 89 FL (ref 82–98)
MCV RBC AUTO: 90 FL (ref 82–98)
MCV RBC AUTO: 91 FL (ref 82–98)
MCV RBC AUTO: 92 FL (ref 82–98)
MIN VOL: 8.53
MODE: ABNORMAL
MONOCYTES # BLD AUTO: 0.8 K/UL (ref 0.3–1)
MONOCYTES # BLD AUTO: 0.9 K/UL (ref 0.3–1)
MONOCYTES # BLD AUTO: 0.9 K/UL (ref 0.3–1)
MONOCYTES # BLD AUTO: 1 K/UL (ref 0.3–1)
MONOCYTES # BLD AUTO: 1.1 K/UL (ref 0.3–1)
MONOCYTES # BLD AUTO: 1.1 K/UL (ref 0.3–1)
MONOCYTES NFR BLD: 10.4 % (ref 4–15)
MONOCYTES NFR BLD: 10.8 % (ref 4–15)
MONOCYTES NFR BLD: 11.1 % (ref 4–15)
MONOCYTES NFR BLD: 12 % (ref 4–15)
MONOCYTES NFR BLD: 7.8 % (ref 4–15)
MONOCYTES NFR BLD: 9 % (ref 4–15)
NEUTROPHILS # BLD AUTO: 11.5 K/UL (ref 1.8–7.7)
NEUTROPHILS # BLD AUTO: 5.1 K/UL (ref 1.8–7.7)
NEUTROPHILS # BLD AUTO: 5.8 K/UL (ref 1.8–7.7)
NEUTROPHILS # BLD AUTO: 6.7 K/UL (ref 1.8–7.7)
NEUTROPHILS # BLD AUTO: 7 K/UL (ref 1.8–7.7)
NEUTROPHILS # BLD AUTO: 7.6 K/UL (ref 1.8–7.7)
NEUTROPHILS NFR BLD: 68 % (ref 38–73)
NEUTROPHILS NFR BLD: 71.3 % (ref 38–73)
NEUTROPHILS NFR BLD: 72.7 % (ref 38–73)
NEUTROPHILS NFR BLD: 74 % (ref 38–73)
NEUTROPHILS NFR BLD: 79.7 % (ref 38–73)
NEUTROPHILS NFR BLD: 80.8 % (ref 38–73)
NRBC BLD-RTO: 0 /100 WBC
NUM UNITS TRANS FFP: NORMAL
NUM UNITS TRANS FFP: NORMAL
PCO2 BLDA: 43.8 MMHG (ref 35–45)
PEEP: 5
PH SMN: 7.28 [PH] (ref 7.35–7.45)
PHOSPHATE SERPL-MCNC: 3.4 MG/DL (ref 2.7–4.5)
PIP: 27
PLATELET # BLD AUTO: 131 K/UL (ref 150–350)
PLATELET # BLD AUTO: 136 K/UL (ref 150–350)
PLATELET # BLD AUTO: 138 K/UL (ref 150–350)
PLATELET # BLD AUTO: 139 K/UL (ref 150–350)
PLATELET # BLD AUTO: 165 K/UL (ref 150–350)
PLATELET # BLD AUTO: 166 K/UL (ref 150–350)
PMV BLD AUTO: 10 FL (ref 9.2–12.9)
PMV BLD AUTO: 10 FL (ref 9.2–12.9)
PMV BLD AUTO: 10.1 FL (ref 9.2–12.9)
PMV BLD AUTO: 10.3 FL (ref 9.2–12.9)
PMV BLD AUTO: 9.6 FL (ref 9.2–12.9)
PMV BLD AUTO: 9.6 FL (ref 9.2–12.9)
PO2 BLDA: 122 MMHG (ref 80–100)
POC BE: -6 MMOL/L
POC SATURATED O2: 98 % (ref 95–100)
POC TCO2: 22 MMOL/L (ref 23–27)
POTASSIUM SERPL-SCNC: 4 MMOL/L (ref 3.5–5.1)
PROT SERPL-MCNC: 4 G/DL (ref 6–8.4)
RBC # BLD AUTO: 2.54 M/UL (ref 4.6–6.2)
RBC # BLD AUTO: 2.68 M/UL (ref 4.6–6.2)
RBC # BLD AUTO: 2.68 M/UL (ref 4.6–6.2)
RBC # BLD AUTO: 2.93 M/UL (ref 4.6–6.2)
RBC # BLD AUTO: 2.96 M/UL (ref 4.6–6.2)
RBC # BLD AUTO: 2.98 M/UL (ref 4.6–6.2)
SAMPLE: ABNORMAL
SITE: ABNORMAL
SODIUM SERPL-SCNC: 131 MMOL/L (ref 136–145)
SP02: 100
VT: 450
WBC # BLD AUTO: 10.72 K/UL (ref 3.9–12.7)
WBC # BLD AUTO: 14.43 K/UL (ref 3.9–12.7)
WBC # BLD AUTO: 7.56 K/UL (ref 3.9–12.7)
WBC # BLD AUTO: 7.85 K/UL (ref 3.9–12.7)
WBC # BLD AUTO: 8.59 K/UL (ref 3.9–12.7)
WBC # BLD AUTO: 9.22 K/UL (ref 3.9–12.7)

## 2019-07-11 PROCEDURE — 94150 VITAL CAPACITY TEST: CPT

## 2019-07-11 PROCEDURE — 99900035 HC TECH TIME PER 15 MIN (STAT)

## 2019-07-11 PROCEDURE — 99291 CRITICAL CARE FIRST HOUR: CPT | Mod: ,,, | Performed by: INTERNAL MEDICINE

## 2019-07-11 PROCEDURE — 82803 BLOOD GASES ANY COMBINATION: CPT

## 2019-07-11 PROCEDURE — 63600175 PHARM REV CODE 636 W HCPCS: Performed by: STUDENT IN AN ORGANIZED HEALTH CARE EDUCATION/TRAINING PROGRAM

## 2019-07-11 PROCEDURE — C9113 INJ PANTOPRAZOLE SODIUM, VIA: HCPCS | Performed by: STUDENT IN AN ORGANIZED HEALTH CARE EDUCATION/TRAINING PROGRAM

## 2019-07-11 PROCEDURE — 37000009 HC ANESTHESIA EA ADD 15 MINS: Performed by: INTERNAL MEDICINE

## 2019-07-11 PROCEDURE — 63600175 PHARM REV CODE 636 W HCPCS

## 2019-07-11 PROCEDURE — 36600 WITHDRAWAL OF ARTERIAL BLOOD: CPT

## 2019-07-11 PROCEDURE — 84100 ASSAY OF PHOSPHORUS: CPT

## 2019-07-11 PROCEDURE — 99223 1ST HOSP IP/OBS HIGH 75: CPT | Mod: ,,, | Performed by: SURGERY

## 2019-07-11 PROCEDURE — 83735 ASSAY OF MAGNESIUM: CPT

## 2019-07-11 PROCEDURE — 63600175 PHARM REV CODE 636 W HCPCS: Performed by: NURSE PRACTITIONER

## 2019-07-11 PROCEDURE — 85025 COMPLETE CBC W/AUTO DIFF WBC: CPT | Mod: 91

## 2019-07-11 PROCEDURE — 27000221 HC OXYGEN, UP TO 24 HOURS

## 2019-07-11 PROCEDURE — 20000000 HC ICU ROOM

## 2019-07-11 PROCEDURE — 27100171 HC OXYGEN HIGH FLOW UP TO 24 HOURS

## 2019-07-11 PROCEDURE — 25000003 PHARM REV CODE 250: Performed by: STUDENT IN AN ORGANIZED HEALTH CARE EDUCATION/TRAINING PROGRAM

## 2019-07-11 PROCEDURE — 99223 PR INITIAL HOSPITAL CARE,LEVL III: ICD-10-PCS | Mod: ,,, | Performed by: SURGERY

## 2019-07-11 PROCEDURE — 99291 PR CRITICAL CARE, E/M 30-74 MINUTES: ICD-10-PCS | Mod: ,,, | Performed by: INTERNAL MEDICINE

## 2019-07-11 PROCEDURE — 37000008 HC ANESTHESIA 1ST 15 MINUTES: Performed by: INTERNAL MEDICINE

## 2019-07-11 PROCEDURE — 80053 COMPREHEN METABOLIC PANEL: CPT

## 2019-07-11 PROCEDURE — 25000003 PHARM REV CODE 250

## 2019-07-11 RX ORDER — DIPHENHYDRAMINE HYDROCHLORIDE 50 MG/ML
25 INJECTION INTRAMUSCULAR; INTRAVENOUS EVERY 6 HOURS PRN
Status: DISCONTINUED | OUTPATIENT
Start: 2019-07-11 | End: 2019-07-22 | Stop reason: HOSPADM

## 2019-07-11 RX ORDER — LORAZEPAM/0.9% SODIUM CHLORIDE 100MG/0.1L
2 PLASTIC BAG, INJECTION (ML) INTRAVENOUS ONCE
Status: COMPLETED | OUTPATIENT
Start: 2019-07-11 | End: 2019-07-11

## 2019-07-11 RX ORDER — CHLORHEXIDINE GLUCONATE ORAL RINSE 1.2 MG/ML
15 SOLUTION DENTAL 2 TIMES DAILY
Status: DISCONTINUED | OUTPATIENT
Start: 2019-07-11 | End: 2019-07-11

## 2019-07-11 RX ORDER — DIPHENHYDRAMINE HYDROCHLORIDE 50 MG/ML
25 INJECTION INTRAMUSCULAR; INTRAVENOUS ONCE
Status: COMPLETED | OUTPATIENT
Start: 2019-07-11 | End: 2019-07-11

## 2019-07-11 RX ADMIN — PANTOPRAZOLE SODIUM 8 MG/HR: 40 INJECTION, POWDER, FOR SOLUTION INTRAVENOUS at 03:07

## 2019-07-11 RX ADMIN — Medication 0.1 MCG/KG/MIN: at 12:07

## 2019-07-11 RX ADMIN — PANTOPRAZOLE SODIUM 8 MG/HR: 40 INJECTION, POWDER, FOR SOLUTION INTRAVENOUS at 09:07

## 2019-07-11 RX ADMIN — DIPHENHYDRAMINE HYDROCHLORIDE 25 MG: 50 INJECTION, SOLUTION INTRAMUSCULAR; INTRAVENOUS at 05:07

## 2019-07-11 RX ADMIN — PANTOPRAZOLE SODIUM 8 MG/HR: 40 INJECTION, POWDER, FOR SOLUTION INTRAVENOUS at 07:07

## 2019-07-11 RX ADMIN — CHLORHEXIDINE GLUCONATE 0.12% ORAL RINSE 15 ML: 1.2 LIQUID ORAL at 09:07

## 2019-07-11 RX ADMIN — PROPOFOL 25 MCG/KG/MIN: 10 INJECTION, EMULSION INTRAVENOUS at 04:07

## 2019-07-11 RX ADMIN — DIPHENHYDRAMINE HYDROCHLORIDE 25 MG: 50 INJECTION, SOLUTION INTRAMUSCULAR; INTRAVENOUS at 08:07

## 2019-07-11 RX ADMIN — MAGNESIUM SULFATE IN WATER 2 G: 40 INJECTION, SOLUTION INTRAVENOUS at 08:07

## 2019-07-11 NOTE — PROGRESS NOTES
[facility]  General Surgery  Progress Note    Subjective:     History of Present Illness:  No notes on file    Post-Op Info:  Procedure(s) (LRB):  EGD (ESOPHAGOGASTRODUODENOSCOPY) (N/A)   Day of Surgery     Interval History: GI scoped last night and saw numerous non-bleeding ulcers in stomach and duodenal bulb. H/H appears stable this morning with no evidence of further bleeding.     Medications:  Continuous Infusions:   fentanyl 5 mL/hr at 07/11/19 0900    norepinephrine bitartrate-D5W Stopped (07/11/19 0800)    pantoprozole (PROTONIX) IV infusion 8 mg/hr (07/11/19 0945)    propofol Stopped (07/11/19 0645)     Scheduled Meds:   chlorhexidine  15 mL Mouth/Throat BID    magnesium sulfate  2 g Intravenous Once     PRN Meds:sodium chloride, diphenhydrAMINE, diphenhydrAMINE-zinc acetate 2-0.1%, fentaNYL, sodium chloride 0.9%     Review of patient's allergies indicates:   Allergen Reactions    Penicillins     Erythromycin Rash     Objective:     Vital Signs (Most Recent):  Temp: 97.7 °F (36.5 °C) (07/11/19 0700)  Pulse: 98 (07/11/19 0900)  Resp: 20 (07/11/19 0900)  BP: 106/62 (07/11/19 0900)  SpO2: 100 % (07/11/19 0900) Vital Signs (24h Range):  Temp:  [97.6 °F (36.4 °C)-98.4 °F (36.9 °C)] 97.7 °F (36.5 °C)  Pulse:  [] 98  Resp:  [0-56] 20  SpO2:  [92 %-100 %] 100 %  BP: ()/(41-94) 106/62     Weight: 92.8 kg (204 lb 9.4 oz)  Body mass index is 31.11 kg/m².    Intake/Output - Last 3 Shifts       07/09 0700 - 07/10 0659 07/10 0700 - 07/11 0659 07/11 0700 - 07/12 0659    P.O. 120      I.V. (mL/kg)  676 (7.3) 106.3 (1.1)    Blood 281.7 1353.9     IV Piggyback 300 1110     Total Intake(mL/kg) 701.7 (7.6) 3139.8 (33.8) 106.3 (1.1)    Urine (mL/kg/hr) 1375 1820 (0.8) 160 (0.6)    Other  50     Stool  0     Total Output 1375 1870 160    Net -673.3 +1269.8 -53.8           Stool Occurrence  3 x           Physical Exam   Constitutional: He appears well-developed and well-nourished.   HENT:    Head: Normocephalic and atraumatic.   Eyes: EOM are normal. Right eye exhibits no discharge. Left eye exhibits no discharge.   Neck: No tracheal deviation present.   Cardiovascular: Normal rate, regular rhythm and normal heart sounds. Exam reveals no gallop and no friction rub.   No murmur heard.  Pulmonary/Chest: Effort normal and breath sounds normal. No stridor. No respiratory distress. He has no wheezes.   Abdominal: Soft. He exhibits no distension. There is no tenderness. There is no guarding.   Musculoskeletal: He exhibits no edema or deformity.   Neurological: He is alert.   Skin: Skin is warm and dry.   Nursing note and vitals reviewed.      Significant Labs:  CBC:   Recent Labs   Lab 07/11/19  0807   WBC 7.56   RBC 2.93*   HGB 8.7*   HCT 26.8*   *   MCV 92   MCH 29.7   MCHC 32.5     CMP:   Recent Labs   Lab 07/11/19  0146   *   CALCIUM 7.0*   ALBUMIN 2.1*   PROT 4.0*   *   K 4.0   CO2 21*      BUN 37*   CREATININE 0.8   ALKPHOS 50*   ALT 11   AST 17   BILITOT 0.3       Significant Diagnostics:  I have reviewed all pertinent imaging results/findings within the past 24 hours.    Assessment/Plan:     * GIB (gastrointestinal bleeding)  68-year-old male with BRBPR initially transfused 5 units of blood and 1 unit of platelets and FFP. Re-scope with GI overnight showed multiple non-bleeding ulcers in stomach and duodenum. H/H now stable. Continues on protonix drip.     -no indication for acute surgical intervention at this time. Patient seems stable clinically.   -general surgery will sign off.   -if there is concern for continued bleeding would defer to GI and then IR for embolization and finally surgery for life-saving gastrectomy or other surgical operation as indicated.         William Parrish MD  General Surgery  [facility]

## 2019-07-11 NOTE — PLAN OF CARE
Problem: Adult Inpatient Plan of Care  Goal: Plan of Care Review  Outcome: Ongoing (interventions implemented as appropriate)  Pt AAOx4 at beginning of shift. Pt without complaints of pain. Pt increasely weaker throughout the morning, unable to stand at bedside to urinate. BP began decreasing. CCS consented to place central line this afternoon, pt tolerated procedure well. Immediately after line placement GI at bedside for EGD. Once procedure was finished, CC called to bedside to intubate pt to protect airway. No interventions occurred on the scope, stomach full of blood per MD. Pt intubated immediately per Critical care team. Pt tolerated procedure well. OG tube placed and on suction, with bright red blood coming out. Pt BP unstable, levo gtt initiated. 4 units RBC given emergently over~4 hours. 1 unit FFP and platelets given. Pt sedated on propofol as well. CT chest abdomen pelvis done, awaiting results. Surgery consulted and GI plan to re scope in the morning. POC reviewed with patient, all questions and concerns addressed. Will continue to monitor.

## 2019-07-11 NOTE — PLAN OF CARE
Problem: Adult Inpatient Plan of Care  Goal: Plan of Care Review  Outcome: Ongoing (interventions implemented as appropriate)  Pt. Remains intubated/sedated, VSS. Levo, fentanyl, propofol and pantoprazole infusing. EEG done overnight, pt. Tolerated well. H/H stable ( 9.9/27.1), New rash noted on pt.abdomen and groin area ( MD aware). Frequent vitals and assessment per flowsheet, plan of care reviewed with Mr. Hamilton, concerns addressed, will continue to monitor pt.

## 2019-07-11 NOTE — CONSULTS
[facility]  General Surgery  Consult Note    Consults  Subjective:     Chief Complaint/Reason for Admission: GI bleed    History of Present Illness:   Manpreet Hamilton is a 68 y.o. male with h/o pulmonary fibrosis, HTN, HLD who was transferred from [place], [place] while on a cruise for acute GI bleed. Reportedly initially presented as BRBPR on 7/8/2019. EGD in [place] showed a small superficial gastric ulcer and superficial duodenal bulb ulcer without any active bleeding. Last colonoscopy was in 2016 and was wnl. Patient with surgical history of laparoscopic hiatal hernia repair 10-15 years prior, reportedly has not had issues with GERD since this surgery. Patient takes 81mg of aspirin daily but reportedly does not take NSAIDs frequently and is a nonsmoker. Since admission to [facility], patient has received 5PRBC, 1FFP, and 1 Plt with most recent H/H of 9.7/29.4. Attempt was made at endoscopy, but this was aborted due to blood in the stomach. He was intubated for airway protection and an NGT was placed. CTA of chest and abdomen did not show active bleeding. Patient was started on erythromycin with plans to rescope patient. General surgery was consulted for surgical management if necessary.     No current facility-administered medications on file prior to encounter.      Current Outpatient Medications on File Prior to Encounter   Medication Sig    atorvastatin (LIPITOR) 20 MG tablet TAKE ONE TABLET BY MOUTH EVERY EVENING    desonide (DESOWEN) 0.05 % lotion 1 application 2 (two) times daily.    doxycycline (VIBRAMYCIN) 100 MG Cap TAKE ONE CAPSULE BY MOUTH EVERY DAY FOR THE FIRST 5 DAYS OF EACH MONTH    loperamide (IMODIUM) 2 mg capsule 1 capsule 3 (three) times daily as needed.    nintedanib (OFEV) 150 mg Cap Take 1 tablet by mouth 2 (two) times daily.     valsartan-hydrochlorothiazide (DIOVAN-HCT) 320-25 mg per tablet Take 1 tablet by mouth once daily.    vitamin D3-vitamin K2 1000-90 unit-mcg TbDL 1  tablet once daily.       Review of patient's allergies indicates:   Allergen Reactions    Penicillins        Past Medical History:   Diagnosis Date    Hyperlipemia     Hypertension     Nasal congestion     Pulmonary fibrosis      Past Surgical History:   Procedure Laterality Date    CATARACT EXTRACTION Left     COLONOSCOPY N/A 2016    Performed by Leon [facility] at [facility] ENDO (4TH FLR)    HAND SURGERY      left    HIATAL HERNIA REPAIR      MICRODISCECTOMY, SPINE MIS Right L5- S1 Right 2018    Performed by Angel Pendleton MD at [facility] OR 2ND FLR    TONSILLECTOMY, ADENOIDECTOMY      VASECTOMY       Family History     Problem Relation (Age of Onset)    Coronary artery disease Mother, Sister    Dementia Father    Multiple sclerosis Sister    Parkinsonism Father        Tobacco Use    Smoking status: Former Smoker     Packs/day: 1.50     Years: 20.00     Pack years: 30.00     Types: Cigarettes     Last attempt to quit: 3/17/1985     Years since quittin.3    Smokeless tobacco: Former User     Quit date: 1986   Substance and Sexual Activity    Alcohol use: Yes     Alcohol/week: 16.8 oz     Types: 28 Glasses of wine per week     Comment: 6-7 bottles per week    Drug use: No    Sexual activity: Yes     Partners: Female     Review of Systems   Unable to perform ROS: Intubated     Objective:     Vital Signs (Most Recent):  Temp: 98.1 °F (36.7 °C) (07/10/19 1817)  Pulse: 94 (07/10/19 1817)  Resp: 18 (07/10/19 1817)  BP: 116/65 (07/10/19 1830)  SpO2: 100 % (07/10/19 1817) Vital Signs (24h Range):  Temp:  [97 °F (36.1 °C)-98.4 °F (36.9 °C)] 98.1 °F (36.7 °C)  Pulse:  [] 94  Resp:  [5-56] 18  SpO2:  [95 %-100 %] 100 %  BP: ()/(41-94) 116/65     Weight: 92.8 kg (204 lb 9.4 oz)  Body mass index is 31.11 kg/m².      Intake/Output Summary (Last 24 hours) at 7/10/2019 1928  Last data filed at 7/10/2019 1817  Gross per 24 hour   Intake 3055.52 ml   Output 1960 ml   Net 1095.52 ml        Physical Exam   Constitutional: He appears well-developed and well-nourished. No distress.   Cardiovascular: Normal rate and regular rhythm.   Pulmonary/Chest:   Intubated  On minimal vent settings   Abdominal: Soft. He exhibits no distension. There is no tenderness.   NG tube with ~300ml bloody output       Significant Labs:  BMP:   Recent Labs   Lab 07/10/19  0625 07/10/19  1801    154*   * 133*   K 4.7 4.5    108   CO2 22* 21*   BUN 34* 37*   CREATININE 0.7 0.7   CALCIUM 7.5* 6.6*   MG 1.7  --      CBC:   Recent Labs   Lab 07/10/19  1801   WBC 22.62*   RBC 3.18*   HGB 9.7*   HCT 29.4*   *   MCV 93   MCH 30.5   MCHC 33.0       Significant Diagnostics:  I have reviewed all pertinent imaging results/findings within the past 24 hours.    Assessment/Plan:     Active Diagnoses:    Diagnosis Date Noted POA    Acute blood loss anemia [D62] 07/10/2019 Yes    GIB (gastrointestinal bleeding) [K92.2]  Yes      Problems Resolved During this Admission:     Continue resuscitation  Q4h CBC  On protonix gtt  GI planning to rescope to evaluate for site of bleeding  Surgery will continue to follow    Thank you for your consult. I will follow-up with patient. Please contact us if you have any additional questions.    Vicki Rahman MD  General Surgery  [facility]

## 2019-07-11 NOTE — PROGRESS NOTES
[facility]  Critical Care Medicine  Progress Note    Patient Name: Manpreet Hamilton  MRN: [id number]  Admission Date: 7/10/2019  Hospital Length of Stay: 1 days  Code Status: Full Code  Attending Provider: Cole Valencia*  Primary Care Provider: Radames Pradhan MD   Principal Problem: GIB (gastrointestinal bleeding)    Subjective:     HPI:  The patient is a 68M with a PMH of HTN, HLD, UIP (dx 1 year ago, followed by Dr. Pradhan, on ofev) presenting as transfer from [place] for GI bleed. The patient was on a cruise with his family in [place] when he suddenly began having bright red bowel movements that progressed to melena. This was on Saturday the 8th. On board the ship his BP was 88/52, pulse 119. Labs with hemoglobin of 9.7 (baseline 15). He was transferred to [place] where he became progressively more hypotensive (to systolic of 70's) and tachycardic to 120's. His Hg dropped to 7. He was admitted to [place] for 1 night prior to transfer to [facility]. While at [place], he received 8 units total of PRBC, 3 units FFP. He was placed on PPI drip and had an endoscopy which showed a small superficial gastric ulcer and duodenal bulb ulcers with no active bleeding.     The patient takes a baby aspirin daily and drinks approximately 4 alcoholic beverages nightly. Has never before had GI bleed.    Hospital/ICU Course:  Patient arrived to ICU and was urgently taken to endoscopy. Stomach full of blood, impairing visualization of source. Due to high risk of aspiration and necessity of repeat EGD, patient was intubated for airway protection.     Required transfusion of additional 5 U PRBC, 1 plt, and 1 FFP. Erythromycin given to clear patient's stomach of blood. Developed erythematous rash, and benadryl given. No wheezing appreciated on exam at that time/    Repeat EGD performed overnight (7/10) which showed numerous ulcers in the stomach and duodenum with no active bleeding. Patient brought back to ICU,  and placed on PPI gtt per GI recs.    This morning, patient successfully extubated. Hemoglobin remains stable.    Interval History/Significant Events: Please see hospital course above.    Review of Systems   Unable to perform ROS: Intubated     Objective:     Vital Signs (Most Recent):  Temp: 98.2 °F (36.8 °C) (07/11/19 1100)  Pulse: 101 (07/11/19 1500)  Resp: 17 (07/11/19 1500)  BP: (!) 105/58 (07/11/19 1500)  SpO2: 99 % (07/11/19 1500) Vital Signs (24h Range):  Temp:  [97.7 °F (36.5 °C)-98.4 °F (36.9 °C)] 98.2 °F (36.8 °C)  Pulse:  [] 101  Resp:  [0-33] 17  SpO2:  [92 %-100 %] 99 %  BP: ()/(41-72) 105/58   Weight: 92.8 kg (204 lb 9.4 oz)  Body mass index is 31.11 kg/m².      Intake/Output Summary (Last 24 hours) at 7/11/2019 1659  Last data filed at 7/11/2019 1500  Gross per 24 hour   Intake 1466.24 ml   Output 1995 ml   Net -528.76 ml       Physical Exam   Constitutional: He appears well-developed and well-nourished.   Intubated  RASS 0, following commands    HENT:   Head: Normocephalic and atraumatic.   Eyes: EOM are normal. Right eye exhibits no discharge. Left eye exhibits no discharge.   Neck: No tracheal deviation present.   Cardiovascular: Normal rate, regular rhythm and normal heart sounds. Exam reveals no gallop and no friction rub.   No murmur heard.  Pulmonary/Chest: Effort normal and breath sounds normal. No stridor. No respiratory distress. He has no wheezes.   Abdominal: Soft. He exhibits no distension. There is no tenderness. There is no guarding.   Musculoskeletal: He exhibits no edema or deformity.   Neurological: He is alert.   Skin: Skin is warm and dry.       Vents:  Vent Mode: A/C (07/11/19 0743)  Ventilator Initiated: Yes (07/10/19 1640)  Set Rate: 20 bmp (07/11/19 0743)  Vt Set: 450 mL (07/11/19 0743)  Pressure Support: 0 cmH20 (07/11/19 0743)  PEEP/CPAP: 5 cmH20 (07/11/19 0743)  Oxygen Concentration (%): 30 (07/11/19 1201)  Peak Airway Pressure: 27 cmH2O (07/11/19 0521)  Plateau  Pressure: 0 cmH20 (07/11/19 0521)  Total Ve: 8.05 mL (07/11/19 0521)  F/VT Ratio<105 (RSBI): (!) 8.57 (07/11/19 0521)  Lines/Drains/Airways     Central Venous Catheter Line                 Percutaneous Central Line Insertion/Assessment - triple lumen  07/10/19 1511 right internal jugular 1 day          Drain                 Urethral Catheter 07/10/19 2030 Straight-tip 16 Fr. less than 1 day          Peripheral Intravenous Line                 Peripheral IV - Double Lumen 20 G Right;Anterior Upper Arm -- days         Peripheral IV - Single Lumen 07/10/19 0300 18 G Anterior;Left Antecubital 1 day              Significant Labs:    CBC/Anemia Profile:  Recent Labs   Lab 07/11/19  0807 07/11/19  1308 07/11/19  1545   WBC 7.56 7.85 9.22   HGB 8.7* 8.1* 8.3*   HCT 26.8* 24.0* 24.7*   * 136* 139*   MCV 92 90 92   RDW 15.4* 15.6* 15.6*        Chemistries:  Recent Labs   Lab 07/10/19  0625 07/10/19  1801 07/11/19  0146   * 133* 131*   K 4.7 4.5 4.0    108 104   CO2 22* 21* 21*   BUN 34* 37* 37*   CREATININE 0.7 0.7 0.8   CALCIUM 7.5* 6.6* 7.0*   ALBUMIN 2.1* 1.7* 2.1*   PROT 3.9* 3.1* 4.0*   BILITOT 0.3 0.4 0.3   ALKPHOS 40* 44* 50*   ALT 9* 10 11   AST 14 18 17   MG 1.7  --  1.7   PHOS 2.9  --  3.4       All pertinent labs within the past 24 hours have been reviewed.    Significant Imaging:  I have reviewed and interpreted all pertinent imaging results/findings within the past 24 hours.      ABG  Recent Labs   Lab 07/11/19 0521   PH 7.277*   PO2 122*   PCO2 43.8   HCO3 20.5*   BE -6     Assessment/Plan:     Pulmonary  Pulmonary fibrosis  - Dx 1 year ago, managed by Dr. Pradhan  - Hold ofev per Dr. Pradhan  - PFT's stable  - No oxygenation issue this admission    Oncology  Acute blood loss anemia  - Hemoglobin 7 on arrival   - Has received 14 units PRBC, 3 units FFP, 1 plt between facility and [place] and [facility]  - CBC q6h  - See GIB     GI  * GIB (gastrointestinal bleeding)  - Initial EGD obscured due to  large amount of blood in stomach, repeat with numerous ulcers in stomach and duodenum with no active bleeding.  - Will continue on PPI gtt x 72 hours per GI recs  - Will start clear liquid diet  - CBC q6h       Critical Care Daily Checklist:    A: Awake: RASS Goal/Actual Goal:    Actual: Shane Agitation Sedation Scale (RASS): Alert and calm   B: Spontaneous Breathing Trial Performed? Spon. Breathing Trial Initiated?: Initiated (07/11/19 1702)   C: SAT & SBT Coordinated?  Yes, extubated today                      D: Delirium: CAM-ICU Overall CAM-ICU: Negative   E: Early Mobility Performed? Yes   F: Feeding Goal:    Status:     Current Diet Order   Procedures    Diet clear liquid      AS: Analgesia/Sedation None   T: Thromboembolic Prophylaxis Held due to bleed   H: HOB > 300 Yes   U: Stress Ulcer Prophylaxis (if needed) PPI gtt   G: Glucose Control N/a   B: Bowel Function Stool Occurrence: 1   I: Indwelling Catheter (Lines & Ribeiro) Necessity Central line, PIV x 2, Ribeiro   D: De-escalation of Antimicrobials/Pharmacotherapies N/a    Plan for the day/ETD Extubate    Code Status:  Family/Goals of Care: Full Code         Critical secondary to Patient has a condition that poses threat to life and bodily function: GI hemorrhage.      Critical care was time spent personally by me on the following activities: development of treatment plan with patient or surrogate and bedside caregivers, discussions with consultants, evaluation of patient's response to treatment, examination of patient, ordering and performing treatments and interventions, ordering and review of laboratory studies, ordering and review of radiographic studies, pulse oximetry, re-evaluation of patient's condition. This critical care time did not overlap with that of any other provider or involve time for any procedures.     Mayo Rico MD  Critical Care Medicine  [facility]

## 2019-07-11 NOTE — TRANSFER OF CARE
"Anesthesia Transfer of Care Note    Patient: Manpreet Hamilton    Procedure(s) Performed: Procedure(s) (LRB):  EGD (ESOPHAGOGASTRODUODENOSCOPY) (N/A)    Patient location: ICU    Anesthesia Type: general (patient remained in ICU for procedure)    Transport from OR: Upon arrival to PACU/ICU, patient attached to ventilator and auscultated to confirm bilateral breath sounds and adequate TV    Post pain: adequate analgesia    Post assessment: no apparent anesthetic complications and tolerated procedure well    Post vital signs: stable    Level of consciousness: awake    Nausea/Vomiting: no nausea/vomiting    Complications: none    Transfer of care protocol was followed      Last vitals:   Visit Vitals  BP (!) 87/57 (BP Location: Right arm, Patient Position: Lying)   Pulse (!) 118   Temp 36.9 °C (98.4 °F) (Axillary)   Resp (!) 8   Ht 5' 8" (1.727 m)   Wt 92.8 kg (204 lb 9.4 oz)   SpO2 98%   BMI 31.11 kg/m²     "

## 2019-07-11 NOTE — ASSESSMENT & PLAN NOTE
- Initial EGD obscured due to large amount of blood in stomach, repeat with numerous ulcers in stomach and duodenum with no active bleeding.  - Will continue on PPI gtt x 72 hours per GI recs  - Will start clear liquid diet  - CBC q6h

## 2019-07-11 NOTE — PROGRESS NOTES
New rash noted on pt.'s groin area, abdomen and tight, MD ans NP notified, no new orders, will continue to monitor pt.

## 2019-07-11 NOTE — ANESTHESIA PREPROCEDURE EVALUATION
[facility]  Anesthesia Pre-Operative Evaluation         Patient Name: Manpreet Hamilton  YOB: 1950  MRN: [id number]    SUBJECTIVE:     Pre-operative evaluation for Procedure(s) (LRB):  EGD (ESOPHAGOGASTRODUODENOSCOPY) (N/A)     07/10/2019    Manpreet Hamilton is a 68 y.o. male w/ a significant PMHx of HTN, HLD who presents with GI bleed. Patient had EGD earlier today and has had 5units PRBCs, 1 FFP and 1 plt but still having bloody output.    Currently intubated and sedated on 0.05mcg/kg/min of levo.  Last H/h 9.6/28.8    Patient now presents for the above procedure(s).      LDA:        Percutaneous Central Line Insertion/Assessment - triple lumen  07/10/19 1511 right internal jugular (Active)   Number of days: 0            Peripheral IV - Single Lumen 07/10/19 0300 18 G Anterior;Left Antecubital (Active)   Site Assessment Clean;Dry;Intact;No redness;No swelling 7/10/2019  3:00 PM   Line Status Infusing 7/10/2019  3:00 PM   Dressing Status Clean;Dry;Intact 7/10/2019  3:00 PM   Dressing Change Due 07/14/19 7/10/2019  3:00 PM   Site Change Due 07/14/19 7/10/2019  7:01 AM   Reason Not Rotated Not due 7/10/2019  3:00 PM   Number of days: 0            Peripheral IV - Double Lumen 20 G Right;Anterior Upper Arm (Active)   Site Assessment Clean;Dry;Intact;No redness;No swelling 7/10/2019  3:00 PM   Line 1 Status Flushed;Saline locked 7/10/2019  3:00 PM   Line 2 Status Flushed;Saline locked 7/10/2019  3:00 PM   Dressing Status Clean;Dry;Intact 7/10/2019  3:00 PM   Dressing Intervention New dressing 7/10/2019  3:00 PM   Dressing Change Due 07/15/19 7/10/2019  3:00 PM   Site Change Due 07/15/19 7/10/2019  7:01 AM   Reason Not Rotated Not due 7/10/2019  3:00 PM   Number of days:             Urethral Catheter 07/10/19 2030 Straight-tip 16 Fr. (Active)   Output (mL) 100 mL 7/10/2019  9:00 PM   Number of days: 0       Drips:    fentanyl 10 mL/hr at 07/10/19 2100    norepinephrine bitartrate-D5W  0.05 mcg/kg/min (07/10/19 2100)    pantoprozole (PROTONIX) IV infusion 8 mg/hr (07/10/19 2100)    propofol 25 mcg/kg/min (07/10/19 2100)       Patient Active Problem List   Diagnosis    Screening    Abnormal findings on diagnostic imaging of lung    Pulmonary fibrosis    ILD (interstitial lung disease)    Lumbar radiculopathy    Cough    GIB (gastrointestinal bleeding)    Acute blood loss anemia       Review of patient's allergies indicates:   Allergen Reactions    Penicillins        Current Inpatient Medications:   lorazepam  4 mg Intravenous Once       No current facility-administered medications on file prior to encounter.      Current Outpatient Medications on File Prior to Encounter   Medication Sig Dispense Refill    atorvastatin (LIPITOR) 20 MG tablet TAKE ONE TABLET BY MOUTH EVERY EVENING 90 tablet 4    desonide (DESOWEN) 0.05 % lotion 1 application 2 (two) times daily.  3    doxycycline (VIBRAMYCIN) 100 MG Cap TAKE ONE CAPSULE BY MOUTH EVERY DAY FOR THE FIRST 5 DAYS OF EACH MONTH 15 capsule 6    loperamide (IMODIUM) 2 mg capsule 1 capsule 3 (three) times daily as needed.  0    nintedanib (OFEV) 150 mg Cap Take 1 tablet by mouth 2 (two) times daily.       valsartan-hydrochlorothiazide (DIOVAN-HCT) 320-25 mg per tablet Take 1 tablet by mouth once daily.  3    vitamin D3-vitamin K2 1000-90 unit-mcg TbDL 1 tablet once daily.         Past Surgical History:   Procedure Laterality Date    CATARACT EXTRACTION Left     COLONOSCOPY N/A 8/5/2016    Performed by Leon Padilla [facility] at [facility] ENDO (4TH FLR)    HAND SURGERY      left    HIATAL HERNIA REPAIR      MICRODISCECTOMY, SPINE MIS Right L5- S1 Right 6/27/2018    Performed by Angel Pendleton MD at [facility] OR 2ND FLR    TONSILLECTOMY, ADENOIDECTOMY      VASECTOMY         Social History     Socioeconomic History    Marital status:      Spouse name: Not on file    Number of children: Not on file    Years of education: Not on file     Highest education level: Not on file   Occupational History    Not on file   Social Needs    Financial resource strain: Not on file    Food insecurity:     Worry: Not on file     Inability: Not on file    Transportation needs:     Medical: Not on file     Non-medical: Not on file   Tobacco Use    Smoking status: Former Smoker     Packs/day: 1.50     Years: 20.00     Pack years: 30.00     Types: Cigarettes     Last attempt to quit: 3/17/1985     Years since quittin.3    Smokeless tobacco: Former User     Quit date: 1986   Substance and Sexual Activity    Alcohol use: Yes     Alcohol/week: 16.8 oz     Types: 28 Glasses of wine per week     Comment: 6-7 bottles per week    Drug use: No    Sexual activity: Yes     Partners: Female   Lifestyle    Physical activity:     Days per week: Not on file     Minutes per session: Not on file    Stress: Not on file   Relationships    Social connections:     Talks on phone: Not on file     Gets together: Not on file     Attends Faith service: Not on file     Active member of club or organization: Not on file     Attends meetings of clubs or organizations: Not on file     Relationship status: Not on file   Other Topics Concern    Not on file   Social History Narrative    Not on file       OBJECTIVE:     Vital Signs Range (Last 24H):  Temp:  [36.1 °C (97 °F)-36.9 °C (98.4 °F)]   Pulse:  []   Resp:  [5-56]   BP: ()/(41-94)   SpO2:  [92 %-100 %]       Significant Labs:  Lab Results   Component Value Date    WBC 17.51 (H) 07/10/2019    HGB 9.6 (L) 07/10/2019    HCT 28.8 (L) 07/10/2019     07/10/2019    CHOL 200 (H) 11/15/2018    TRIG 81 11/15/2018    HDL 70 11/15/2018    ALT 10 07/10/2019    AST 18 07/10/2019     (L) 07/10/2019    K 4.5 07/10/2019     07/10/2019    CREATININE 0.7 07/10/2019    BUN 37 (H) 07/10/2019    CO2 21 (L) 07/10/2019    TSH 0.751 11/15/2018    PSA 1.4 11/15/2018    INR 1.0 07/10/2019    HGBA1C 5.3  11/15/2018       Diagnostic Studies: No relevant studies.    EKG: No recent studies available.    2D ECHO:  No results found for this or any previous visit.      ASSESSMENT/PLAN:         Anesthesia Evaluation    I have reviewed the Patient Summary Reports.    I have reviewed the Nursing Notes.   I have reviewed the Medications.     Review of Systems  Anesthesia Hx:  No problems with previous Anesthesia  History of prior surgery of interest to airway management or planning: Denies Family Hx of Anesthesia complications.   Denies Personal Hx of Anesthesia complications.   Social:  Former Smoker    Cardiovascular:   Hypertension    Pulmonary:  Pulmonary Normal    Hepatic/GI:   GIB       Physical Exam  General:  Well nourished    Airway/Jaw/Neck:  Airway Findings: Mouth Opening: Normal Tongue: Normal  General Airway Assessment: Average       Chest/Lungs:  Chest/Lungs Findings: (mechanical ventilation)    Heart/Vascular:  Heart Findings: Rate: Tachycardia  Rhythm: Regular Rhythm        Mental Status:  Mental Status Findings: (sedated)         Anesthesia Plan  Type of Anesthesia, risks & benefits discussed:  Anesthesia Type:  general  Patient's Preference:   Intra-op Monitoring Plan: standard ASA monitors and arterial line  Intra-op Monitoring Plan Comments:   Post Op Pain Control Plan: per primary service following discharge from PACU  Post Op Pain Control Plan Comments:   Induction:   IV  Beta Blocker:  Patient is not currently on a Beta-Blocker (No further documentation required).       Informed Consent: Patient representative understands risks and agrees with Anesthesia plan.  Questions answered. Anesthesia consent signed with patient representative.  ASA Score: 3  emergent   Day of Surgery Review of History & Physical:    H&P update referred to the provider.         Ready For Surgery From Anesthesia Perspective.

## 2019-07-11 NOTE — ASSESSMENT & PLAN NOTE
- Hemoglobin 7 on arrival   - Has received 14 units PRBC, 3 units FFP, 1 plt between facility and [place] [facility]  - CBC q6h  - See GIB

## 2019-07-11 NOTE — [employer] PATIENT INSTRUCTIONS
Discharge Summary/Instructions after an Endoscopic Procedure  Patient Name: Manpreet Hamilton  Patient MRN: [id number]  Patient YOB: 1950  Wednesday, July 10, 2019  Jimmy Saldana MD  RESTRICTIONS:  During your procedure today, you received medications for sedation.  These   medications may affect your judgment, balance and coordination.  Therefore,   for 24 hours, you have the following restrictions:   - DO NOT drive a car, operate machinery, make legal/financial decisions,   sign important papers or drink alcohol.    ACTIVITY:  Today: no heavy lifting, straining or running due to procedural   sedation/anesthesia.  The following day: return to full activity including work.  DIET:  Eat and drink normally unless instructed otherwise.     TREATMENT FOR COMMON SIDE EFFECTS:  - Mild abdominal pain, nausea, belching, bloating or excessive gas:  rest,   eat lightly and use a heating pad.  - Sore Throat: treat with throat lozenges and/or gargle with warm salt   water.  - Because air was used during the procedure, expelling large amounts of air   from your rectum or belching is normal.  - If a bowel prep was taken, you may not have a bowel movement for 1-3 days.    This is normal.  SYMPTOMS TO WATCH FOR AND REPORT TO YOUR PHYSICIAN:  1. Abdominal pain or bloating, other than gas cramps.  2. Chest pain.  3. Back pain.  4. Signs of infection such as: chills or fever occurring within 24 hours   after the procedure.  5. Rectal bleeding, which would show as bright red, maroon, or black stools.   (A tablespoon of blood from the rectum is not serious, especially if   hemorrhoids are present.)  6. Vomiting.  7. Weakness or dizziness.  GO DIRECTLY TO THE NEAREST EMERGENCY ROOM IF YOU HAVE ANY OF THE FOLLOWING:      Difficulty breathing              Chills and/or fever over 101 F   Persistent vomiting and/or vomiting blood   Severe abdominal pain   Severe chest pain   Black, tarry stools   Bleeding- more than one  tablespoon   Any other symptom or condition that you feel may need urgent attention  Your doctor recommends these additional instructions:  If any biopsies were taken, your doctors clinic will contact you in 1 to 2   weeks with any results.  - Return patient to ICU for ongoing care.   - Give Protonix (pantoprazole): initiate therapy with 80 mg IV bolus, then 8   mg/hr IV by continuous infusion for 3 days then Use Protonix (pantoprazole)   40 mg by mouth every 12 hours for 12 weeks (Rx sent to your pharmacy and   copy given to you). If you every need to be on an NSAID or aspirin in the   future you will need to take a PPI (Proton Pump Inhibitor) like   Pantoprazole 40mg once daily as long as your on an Aspirin or your ulcers   will return.  - Repeat upper endoscopy in 8 weeks to check healing.   - Await pathology results.   - Telephone endoscopist for pathology results in 1 week.   - Return to GI clinic in 4 weeks with Dr. Saldana [phone] call to   confirm apt..   - Ferrous sulfate at 325 mg orally every 12 hours for 12 weeks.    Follow up with hemoglobin in 2 week - Orders placed..   - The findings and recommendations were discussed with the patient's primary   physician.   - The findings and recommendations were discussed with the patient's family.     - Consider avoiding all non-steroidal anti-inflammatory drugs (aspirin,   ibuprofen, naproxen, etc.), unless needed for cardiovascular protection.    Recommend you discuss with your prescribing doctor (of your aspirin) to see   if cardiovascular benefits of your aspirin outweigh the risks of GI   bleeding.  For questions, problems or results please call your physician - Jimmy Saldana MD at Work:  [phone].  [facility], EMERGENCY ROOM PHONE NUMBER: [phone]  IF A COMPLICATION OR EMERGENCY SITUATION ARISES AND YOU ARE UNABLE TO REACH   YOUR PHYSICIAN - GO DIRECTLY TO THE EMERGENCY ROOM.  Jimmy Saldana MD  7/10/2019 11:10:05 PM  This  report has been verified and signed electronically.  [employer]

## 2019-07-11 NOTE — NURSING
Pt H/H 7.8/23, no further bloody bm's, no s/s bleeding. Slightly tachycardic w exertion but returns to 110's at rest. [facility] notified, no new orders received.

## 2019-07-11 NOTE — ANESTHESIA POSTPROCEDURE EVALUATION
Anesthesia Post Evaluation    Patient: Manpreet Hamilton    Procedure(s) Performed: Procedure(s) (LRB):  EGD (ESOPHAGOGASTRODUODENOSCOPY) (N/A)    Final Anesthesia Type: general  Patient location during evaluation: ICU  Patient participation: No - Unable to Participate, Intubation  Level of consciousness: sedated  Post-procedure vital signs: reviewed and stable  Pain management: adequate  Airway patency: patent  PONV status at discharge: No PONV  Anesthetic complications: no      Cardiovascular status: stable and tachycardic (low dose norepi infusion)  Respiratory status: ventilator  Hydration status: euvolemic  Follow-up not needed.          Vitals Value Taken Time   /70 7/11/2019  9:31 AM   Temp 36.5 °C (97.7 °F) 7/11/2019  7:00 AM   Pulse 107 7/11/2019  9:50 AM   Resp 23 7/11/2019  9:50 AM   SpO2 99 % 7/11/2019  9:50 AM   Vitals shown include unvalidated device data.      No case tracking events are documented in the log.      Pain/Dannielle Score: Pain Rating Prior to Med Admin: 0 (7/11/2019  6:45 AM)  Pain Rating Post Med Admin: 0 (7/10/2019  7:31 PM)

## 2019-07-11 NOTE — ASSESSMENT & PLAN NOTE
68-year-old male with BRBPR initially transfused 5 units of blood and 1 unit of platelets and FFP. Re-scope with GI overnight showed multiple non-bleeding ulcers in stomach and duodenum. H/H now stable. Continues on protonix drip.     -no indication for acute surgical intervention at this time. Patient seems stable clinically.   -general surgery will sign off.   -if there is concern for continued bleeding would defer to GI and then IR for embolization and finally surgery for life-saving gastrectomy or other surgical operation as indicated.

## 2019-07-11 NOTE — H&P
[facility]  History & Physical    Subjective:      Chief Complaint/Reason for Admission:    EGD for upper GI bleed    Manpreet Hamilton is a 68 y.o. male.    Past Medical History:   Diagnosis Date    Hyperlipemia     Hypertension     Nasal congestion     Pulmonary fibrosis      Past Surgical History:   Procedure Laterality Date    CATARACT EXTRACTION Left     COLONOSCOPY N/A 2016    Performed by Leon Padilla MD at [facility] ENDO (4TH FLR)    HAND SURGERY      left    HIATAL HERNIA REPAIR      MICRODISCECTOMY, SPINE MIS Right L5- S1 Right 2018    Performed by Angel Pendleton MD at [facility] OR 2ND FLR    TONSILLECTOMY, ADENOIDECTOMY      VASECTOMY       Family History   Problem Relation Age of Onset    Coronary artery disease Mother     Parkinsonism Father     Dementia Father     Coronary artery disease Sister     Multiple sclerosis Sister      Social History     Tobacco Use    Smoking status: Former Smoker     Packs/day: 1.50     Years: 20.00     Pack years: 30.00     Types: Cigarettes     Last attempt to quit: 3/17/1985     Years since quittin.3    Smokeless tobacco: Former User     Quit date: 1986   Substance Use Topics    Alcohol use: Yes     Alcohol/week: 16.8 oz     Types: 28 Glasses of wine per week     Comment: 6-7 bottles per week    Drug use: No       PTA Medications   Medication Sig    atorvastatin (LIPITOR) 20 MG tablet TAKE ONE TABLET BY MOUTH EVERY EVENING    desonide (DESOWEN) 0.05 % lotion 1 application 2 (two) times daily.    doxycycline (VIBRAMYCIN) 100 MG Cap TAKE ONE CAPSULE BY MOUTH EVERY DAY FOR THE FIRST 5 DAYS OF EACH MONTH    loperamide (IMODIUM) 2 mg capsule 1 capsule 3 (three) times daily as needed.    nintedanib (OFEV) 150 mg Cap Take 1 tablet by mouth 2 (two) times daily.     valsartan-hydrochlorothiazide (DIOVAN-HCT) 320-25 mg per tablet Take 1 tablet by mouth once daily.    vitamin D3-vitamin K2 1000-90 unit-mcg TbDL 1 tablet  once daily.     Review of patient's allergies indicates:   Allergen Reactions    Penicillins         Review of Systems   Constitutional: Negative for chills and fever.   Respiratory: Negative for shortness of breath.         Intubated to protect airway with his upper GI bleed   Cardiovascular: Negative for chest pain.   Gastrointestinal: Positive for blood in stool.       Objective:      Vital Signs (Most Recent)  Temp: 98.4 °F (36.9 °C) (07/10/19 1915)  Pulse: 110 (07/10/19 2000)  Resp: 11 (07/10/19 2000)  BP: 105/61 (07/10/19 2000)  SpO2: (!) 92 % (07/10/19 2000)    Vital Signs Range (Last 24H):  Temp:  [97 °F (36.1 °C)-98.4 °F (36.9 °C)]   Pulse:  []   Resp:  [5-56]   BP: ()/(41-94)   SpO2:  [92 %-100 %]     Physical Exam   Constitutional: He appears well-developed and well-nourished.   Cardiovascular:   tachycardia   Pulmonary/Chest:   intubated   Abdominal: Soft.   Neurological:   inutbated   Skin: Skin is warm and dry.         Assessment:      Active Hospital Problems    Diagnosis  POA    Acute blood loss anemia [D62]  Yes    GIB (gastrointestinal bleeding) [K92.2]  Yes      Resolved Hospital Problems   No resolved problems to display.       Plan:    EGD for upper GI bleeding patient with protected airway intubated and CTA no active bleeding and no AAA and CCS has prokinetic given and plan for repeat EGD tonight

## 2019-07-11 NOTE — ANESTHESIA POSTPROCEDURE EVALUATION
Anesthesia Post Evaluation    Patient: Manpreet Hamilton    Procedure(s) Performed: Procedure(s) (LRB):  EGD (ESOPHAGOGASTRODUODENOSCOPY) (N/A)    Final Anesthesia Type: general  Patient location during evaluation: ICU  Patient participation: No - Unable to Participate, Sedation  Level of consciousness: sedated  Post-procedure vital signs: reviewed and stable  Pain management: adequate  Airway patency: patent  PONV status at discharge: No PONV  Anesthetic complications: no      Cardiovascular status: blood pressure returned to baseline  Respiratory status: ETT and ventilator  Hydration status: euvolemic  Follow-up not needed.          Vitals Value Taken Time   /57 7/10/2019 11:46 PM   Temp 36.9 °C (98.4 °F) 7/10/2019  7:15 PM   Pulse 105 7/10/2019 11:59 PM   Resp 9 7/10/2019 11:59 PM   SpO2 100 % 7/10/2019 11:59 PM   Vitals shown include unvalidated device data.      No case tracking events are documented in the log.      Pain/Dannielle Score: Pain Rating Prior to Med Admin: 0 (7/10/2019  8:00 PM)  Pain Rating Post Med Admin: 0 (7/10/2019  7:31 PM)

## 2019-07-11 NOTE — SUBJECTIVE & OBJECTIVE
Interval History: GI scoped last night and saw numerous non-bleeding ulcers in stomach and duodenal bulb. H/H appears stable this morning with no evidence of further bleeding.     Medications:  Continuous Infusions:   fentanyl 5 mL/hr at 07/11/19 0900    norepinephrine bitartrate-D5W Stopped (07/11/19 0800)    pantoprozole (PROTONIX) IV infusion 8 mg/hr (07/11/19 0945)    propofol Stopped (07/11/19 0645)     Scheduled Meds:   chlorhexidine  15 mL Mouth/Throat BID    magnesium sulfate  2 g Intravenous Once     PRN Meds:sodium chloride, diphenhydrAMINE, diphenhydrAMINE-zinc acetate 2-0.1%, fentaNYL, sodium chloride 0.9%     Review of patient's allergies indicates:   Allergen Reactions    Penicillins     Erythromycin Rash     Objective:     Vital Signs (Most Recent):  Temp: 97.7 °F (36.5 °C) (07/11/19 0700)  Pulse: 98 (07/11/19 0900)  Resp: 20 (07/11/19 0900)  BP: 106/62 (07/11/19 0900)  SpO2: 100 % (07/11/19 0900) Vital Signs (24h Range):  Temp:  [97.6 °F (36.4 °C)-98.4 °F (36.9 °C)] 97.7 °F (36.5 °C)  Pulse:  [] 98  Resp:  [0-56] 20  SpO2:  [92 %-100 %] 100 %  BP: ()/(41-94) 106/62     Weight: 92.8 kg (204 lb 9.4 oz)  Body mass index is 31.11 kg/m².    Intake/Output - Last 3 Shifts       07/09 0700 - 07/10 0659 07/10 0700 - 07/11 0659 07/11 0700 - 07/12 0659    P.O. 120      I.V. (mL/kg)  676 (7.3) 106.3 (1.1)    Blood 281.7 1353.9     IV Piggyback 300 1110     Total Intake(mL/kg) 701.7 (7.6) 3139.8 (33.8) 106.3 (1.1)    Urine (mL/kg/hr) 1375 1820 (0.8) 160 (0.6)    Other  50     Stool  0     Total Output 1375 1870 160    Net -673.3 +1269.8 -53.8           Stool Occurrence  3 x           Physical Exam   Constitutional: He appears well-developed and well-nourished.   HENT:   Head: Normocephalic and atraumatic.   Eyes: EOM are normal. Right eye exhibits no discharge. Left eye exhibits no discharge.   Neck: No tracheal deviation present.   Cardiovascular: Normal rate, regular rhythm and normal heart  sounds. Exam reveals no gallop and no friction rub.   No murmur heard.  Pulmonary/Chest: Effort normal and breath sounds normal. No stridor. No respiratory distress. He has no wheezes.   Abdominal: Soft. He exhibits no distension. There is no tenderness. There is no guarding.   Musculoskeletal: He exhibits no edema or deformity.   Neurological: He is alert.   Skin: Skin is warm and dry.   Nursing note and vitals reviewed.      Significant Labs:  CBC:   Recent Labs   Lab 07/11/19  0807   WBC 7.56   RBC 2.93*   HGB 8.7*   HCT 26.8*   *   MCV 92   MCH 29.7   MCHC 32.5     CMP:   Recent Labs   Lab 07/11/19  0146   *   CALCIUM 7.0*   ALBUMIN 2.1*   PROT 4.0*   *   K 4.0   CO2 21*      BUN 37*   CREATININE 0.8   ALKPHOS 50*   ALT 11   AST 17   BILITOT 0.3       Significant Diagnostics:  I have reviewed all pertinent imaging results/findings within the past 24 hours.

## 2019-07-11 NOTE — TREATMENT PLAN
Gastroenterology Treatment Plan    Interval History  EGD completed: no active bleeding, multiple gastric ulcers and duodenal bulb ulcer (all clean based), biopsies taken    Plan  - status post EGD (see final note for details)  - Avoid NSAIDs  - Continue PPI gtt x72hrs from now  - continue to monitor serial H/Hs  - will f/u biopsies for H pylori  - Discussed with family and primary team    Thank you for involving us in the care of Manpreet Hamilton. We will continue to follow. Please call with any additional questions, concerns or changes in the patient's clinical status.      Callum Mcleod MD  Gastroenterology Fellow, PGY4  [facility]

## 2019-07-11 NOTE — NURSING
Pt extubated to 4L nasal cannula, tolerating well. Sats >93%, coughing up thick sputum, uses yanker at bedside. Wctm.

## 2019-07-11 NOTE — SUBJECTIVE & OBJECTIVE
Interval History/Significant Events: Please see hospital course above.    Review of Systems   Unable to perform ROS: Intubated     Objective:     Vital Signs (Most Recent):  Temp: 98.2 °F (36.8 °C) (07/11/19 1100)  Pulse: 101 (07/11/19 1500)  Resp: 17 (07/11/19 1500)  BP: (!) 105/58 (07/11/19 1500)  SpO2: 99 % (07/11/19 1500) Vital Signs (24h Range):  Temp:  [97.7 °F (36.5 °C)-98.4 °F (36.9 °C)] 98.2 °F (36.8 °C)  Pulse:  [] 101  Resp:  [0-33] 17  SpO2:  [92 %-100 %] 99 %  BP: ()/(41-72) 105/58   Weight: 92.8 kg (204 lb 9.4 oz)  Body mass index is 31.11 kg/m².      Intake/Output Summary (Last 24 hours) at 7/11/2019 1659  Last data filed at 7/11/2019 1500  Gross per 24 hour   Intake 1466.24 ml   Output 1995 ml   Net -528.76 ml       Physical Exam   Constitutional: He appears well-developed and well-nourished.   Intubated  RASS 0, following commands    HENT:   Head: Normocephalic and atraumatic.   Eyes: EOM are normal. Right eye exhibits no discharge. Left eye exhibits no discharge.   Neck: No tracheal deviation present.   Cardiovascular: Normal rate, regular rhythm and normal heart sounds. Exam reveals no gallop and no friction rub.   No murmur heard.  Pulmonary/Chest: Effort normal and breath sounds normal. No stridor. No respiratory distress. He has no wheezes.   Abdominal: Soft. He exhibits no distension. There is no tenderness. There is no guarding.   Musculoskeletal: He exhibits no edema or deformity.   Neurological: He is alert.   Skin: Skin is warm and dry.       Vents:  Vent Mode: A/C (07/11/19 0743)  Ventilator Initiated: Yes (07/10/19 1640)  Set Rate: 20 bmp (07/11/19 0743)  Vt Set: 450 mL (07/11/19 0743)  Pressure Support: 0 cmH20 (07/11/19 0743)  PEEP/CPAP: 5 cmH20 (07/11/19 0743)  Oxygen Concentration (%): 30 (07/11/19 1201)  Peak Airway Pressure: 27 cmH2O (07/11/19 0521)  Plateau Pressure: 0 cmH20 (07/11/19 0521)  Total Ve: 8.05 mL (07/11/19 0521)  F/VT Ratio<105 (RSBI): (!) 8.57 (07/11/19  0521)  Lines/Drains/Airways     Central Venous Catheter Line                 Percutaneous Central Line Insertion/Assessment - triple lumen  07/10/19 1511 right internal jugular 1 day          Drain                 Urethral Catheter 07/10/19 2030 Straight-tip 16 Fr. less than 1 day          Peripheral Intravenous Line                 Peripheral IV - Double Lumen 20 G Right;Anterior Upper Arm -- days         Peripheral IV - Single Lumen 07/10/19 0300 18 G Anterior;Left Antecubital 1 day              Significant Labs:    CBC/Anemia Profile:  Recent Labs   Lab 07/11/19  0807 07/11/19  1308 07/11/19  1545   WBC 7.56 7.85 9.22   HGB 8.7* 8.1* 8.3*   HCT 26.8* 24.0* 24.7*   * 136* 139*   MCV 92 90 92   RDW 15.4* 15.6* 15.6*        Chemistries:  Recent Labs   Lab 07/10/19  0625 07/10/19  1801 07/11/19  0146   * 133* 131*   K 4.7 4.5 4.0    108 104   CO2 22* 21* 21*   BUN 34* 37* 37*   CREATININE 0.7 0.7 0.8   CALCIUM 7.5* 6.6* 7.0*   ALBUMIN 2.1* 1.7* 2.1*   PROT 3.9* 3.1* 4.0*   BILITOT 0.3 0.4 0.3   ALKPHOS 40* 44* 50*   ALT 9* 10 11   AST 14 18 17   MG 1.7  --  1.7   PHOS 2.9  --  3.4       All pertinent labs within the past 24 hours have been reviewed.    Significant Imaging:  I have reviewed and interpreted all pertinent imaging results/findings within the past 24 hours.

## 2019-07-11 NOTE — HOSPITAL COURSE
Patient arrived to ICU and was urgently taken to endoscopy. Stomach full of blood, impairing visualization of source. Due to high risk of aspiration and necessity of repeat EGD, patient was intubated for airway protection.     Required transfusion of additional 5 U PRBC, 1 plt, and 1 FFP. Erythromycin given to clear patient's stomach of blood. Developed erythematous rash, and benadryl given. No wheezing appreciated on exam at that time.    Repeat EGD performed overnight (7/10) which showed numerous ulcers in the stomach and duodenum with no active bleeding. Patient brought back to ICU, and placed on PPI gtt per GI recs.    Patient was successfully extubated on 7/11; however, hemoglobin began trending downward again later in the day. Active bleeding overnight requiring 3 units PRBCs. GI repeated EGD the morning of 7/12 again with numerous clean-based gastric and duodenal ulcers. Possible active bleeding seen in the cardia of the stomach along the lesser curvature. Clip placed. Patient taken to IR for angio, left gastric embolized. Left intubated due to concern for aspiration again due to large amount of blood in stomach.    Overnight, patient's hemoglobin did trend down slightly, but stayed overall stable (8->~7.5). 3 melanotic stools, which is expected given volume of blood in stomach. Patient also spiked fever of 102.1. Cultures sent, CXR repeated. No focal consolidation, but some edema superimposed on chronic lung changes seen. Started on vanc, cefepime and flagyl given recent procedures and risk of aspiration prior to initial intubation. Lower suspicion for transfusion reaction as it had been an number of hours since most recent transfusion. Also given dose of lasix due to peripheral edema on exam and pulmonary edema on CXR.     On the evening of 7/13, patient did require 1 unit PRBC for hg of 6.7, which increased to 8.7 following transfusion. May represent oozing from site of embolization or AVMs seen on EGD.  Again trending down slowly today. No melanotic bowel movements. Extubated to nasal cannula on 7/14. Melanotic bowel movements continue but hemoglobin remains stable, cough continued productive of clear sputum. 07/15 Hgb stable at 8.8, plan for step down

## 2019-07-12 ENCOUNTER — ANESTHESIA EVENT (OUTPATIENT)
Dept: ENDOSCOPY | Facility: HOSPITAL | Age: 69
DRG: 377 | End: 2019-07-12
Payer: COMMERCIAL

## 2019-07-12 ENCOUNTER — TELEPHONE (OUTPATIENT)
Dept: ENDOSCOPY | Facility: HOSPITAL | Age: 69
End: 2019-07-12

## 2019-07-12 ENCOUNTER — ANESTHESIA (OUTPATIENT)
Dept: ENDOSCOPY | Facility: HOSPITAL | Age: 69
DRG: 377 | End: 2019-07-12
Payer: COMMERCIAL

## 2019-07-12 LAB
ABO + RH BLD: NORMAL
ALBUMIN SERPL BCP-MCNC: 1.5 G/DL (ref 3.5–5.2)
ALLENS TEST: ABNORMAL
ALP SERPL-CCNC: 50 U/L (ref 55–135)
ALT SERPL W/O P-5'-P-CCNC: 7 U/L (ref 10–44)
ANION GAP SERPL CALC-SCNC: 6 MMOL/L (ref 8–16)
AST SERPL-CCNC: 17 U/L (ref 10–40)
BACTERIA #/AREA URNS AUTO: ABNORMAL /HPF
BASOPHILS # BLD AUTO: 0.01 K/UL (ref 0–0.2)
BASOPHILS # BLD AUTO: 0.02 K/UL (ref 0–0.2)
BASOPHILS # BLD AUTO: 0.02 K/UL (ref 0–0.2)
BASOPHILS # BLD AUTO: 0.03 K/UL (ref 0–0.2)
BASOPHILS # BLD AUTO: 0.03 K/UL (ref 0–0.2)
BASOPHILS NFR BLD: 0.1 % (ref 0–1.9)
BASOPHILS NFR BLD: 0.2 % (ref 0–1.9)
BASOPHILS NFR BLD: 0.2 % (ref 0–1.9)
BASOPHILS NFR BLD: 0.3 % (ref 0–1.9)
BASOPHILS NFR BLD: 0.3 % (ref 0–1.9)
BILIRUB SERPL-MCNC: 0.4 MG/DL (ref 0.1–1)
BILIRUB UR QL STRIP: NEGATIVE
BLD GP AB SCN CELLS X3 SERPL QL: NORMAL
BLD PROD TYP BPU: NORMAL
BLOOD UNIT EXPIRATION DATE: NORMAL
BLOOD UNIT TYPE CODE: 5100
BLOOD UNIT TYPE: NORMAL
BUN SERPL-MCNC: 19 MG/DL (ref 8–23)
CA-I BLDV-SCNC: 0.96 MMOL/L (ref 1.06–1.42)
CA-I BLDV-SCNC: 1.02 MMOL/L (ref 1.06–1.42)
CALCIUM SERPL-MCNC: 6.8 MG/DL (ref 8.7–10.5)
CHLORIDE SERPL-SCNC: 104 MMOL/L (ref 95–110)
CLARITY UR REFRACT.AUTO: ABNORMAL
CO2 SERPL-SCNC: 21 MMOL/L (ref 23–29)
CODING SYSTEM: NORMAL
COLOR UR AUTO: YELLOW
CREAT SERPL-MCNC: 0.7 MG/DL (ref 0.5–1.4)
DELSYS: ABNORMAL
DIFFERENTIAL METHOD: ABNORMAL
DISPENSE STATUS: NORMAL
EOSINOPHIL # BLD AUTO: 0.1 K/UL (ref 0–0.5)
EOSINOPHIL # BLD AUTO: 0.3 K/UL (ref 0–0.5)
EOSINOPHIL # BLD AUTO: 0.4 K/UL (ref 0–0.5)
EOSINOPHIL # BLD AUTO: 0.4 K/UL (ref 0–0.5)
EOSINOPHIL # BLD AUTO: 0.5 K/UL (ref 0–0.5)
EOSINOPHIL NFR BLD: 0.3 % (ref 0–8)
EOSINOPHIL NFR BLD: 3 % (ref 0–8)
EOSINOPHIL NFR BLD: 3.5 % (ref 0–8)
EOSINOPHIL NFR BLD: 4.5 % (ref 0–8)
EOSINOPHIL NFR BLD: 5.8 % (ref 0–8)
ERYTHROCYTE [DISTWIDTH] IN BLOOD BY AUTOMATED COUNT: 13.5 % (ref 11.5–14.5)
ERYTHROCYTE [DISTWIDTH] IN BLOOD BY AUTOMATED COUNT: 13.7 % (ref 11.5–14.5)
ERYTHROCYTE [DISTWIDTH] IN BLOOD BY AUTOMATED COUNT: 14 % (ref 11.5–14.5)
ERYTHROCYTE [DISTWIDTH] IN BLOOD BY AUTOMATED COUNT: 15.3 % (ref 11.5–14.5)
ERYTHROCYTE [DISTWIDTH] IN BLOOD BY AUTOMATED COUNT: 15.7 % (ref 11.5–14.5)
EST. GFR  (AFRICAN AMERICAN): >60 ML/MIN/1.73 M^2
EST. GFR  (NON AFRICAN AMERICAN): >60 ML/MIN/1.73 M^2
FIBRINOGEN PPP-MCNC: 289 MG/DL (ref 182–366)
GLUCOSE SERPL-MCNC: 143 MG/DL (ref 70–110)
GLUCOSE UR QL STRIP: NEGATIVE
HCT VFR BLD AUTO: 18.6 % (ref 40–54)
HCT VFR BLD AUTO: 20.4 % (ref 40–54)
HCT VFR BLD AUTO: 21 % (ref 40–54)
HCT VFR BLD AUTO: 23.4 % (ref 40–54)
HCT VFR BLD AUTO: 24 % (ref 40–54)
HCT VFR BLD CALC: <15 %PCV (ref 36–54)
HGB BLD-MCNC: 6.2 G/DL (ref 14–18)
HGB BLD-MCNC: 6.8 G/DL (ref 14–18)
HGB BLD-MCNC: 7 G/DL (ref 14–18)
HGB BLD-MCNC: 7.7 G/DL (ref 14–18)
HGB BLD-MCNC: 8 G/DL (ref 14–18)
HGB UR QL STRIP: NEGATIVE
HYALINE CASTS UR QL AUTO: 0 /LPF
IMM GRANULOCYTES # BLD AUTO: 0.04 K/UL (ref 0–0.04)
IMM GRANULOCYTES # BLD AUTO: 0.05 K/UL (ref 0–0.04)
IMM GRANULOCYTES # BLD AUTO: 0.1 K/UL (ref 0–0.04)
IMM GRANULOCYTES # BLD AUTO: 0.1 K/UL (ref 0–0.04)
IMM GRANULOCYTES # BLD AUTO: 0.33 K/UL (ref 0–0.04)
IMM GRANULOCYTES NFR BLD AUTO: 0.5 % (ref 0–0.5)
IMM GRANULOCYTES NFR BLD AUTO: 0.6 % (ref 0–0.5)
IMM GRANULOCYTES NFR BLD AUTO: 0.6 % (ref 0–0.5)
IMM GRANULOCYTES NFR BLD AUTO: 0.9 % (ref 0–0.5)
IMM GRANULOCYTES NFR BLD AUTO: 3.2 % (ref 0–0.5)
INR PPP: 1.2 (ref 0.8–1.2)
KETONES UR QL STRIP: ABNORMAL
LEUKOCYTE ESTERASE UR QL STRIP: ABNORMAL
LYMPHOCYTES # BLD AUTO: 0.4 K/UL (ref 1–4.8)
LYMPHOCYTES # BLD AUTO: 0.5 K/UL (ref 1–4.8)
LYMPHOCYTES # BLD AUTO: 0.5 K/UL (ref 1–4.8)
LYMPHOCYTES # BLD AUTO: 0.6 K/UL (ref 1–4.8)
LYMPHOCYTES # BLD AUTO: 0.7 K/UL (ref 1–4.8)
LYMPHOCYTES NFR BLD: 2.4 % (ref 18–48)
LYMPHOCYTES NFR BLD: 4.5 % (ref 18–48)
LYMPHOCYTES NFR BLD: 5 % (ref 18–48)
LYMPHOCYTES NFR BLD: 6.2 % (ref 18–48)
LYMPHOCYTES NFR BLD: 8.3 % (ref 18–48)
MAGNESIUM SERPL-MCNC: 1.9 MG/DL (ref 1.6–2.6)
MCH RBC QN AUTO: 30.3 PG (ref 27–31)
MCH RBC QN AUTO: 30.4 PG (ref 27–31)
MCH RBC QN AUTO: 30.5 PG (ref 27–31)
MCH RBC QN AUTO: 30.8 PG (ref 27–31)
MCH RBC QN AUTO: 30.9 PG (ref 27–31)
MCHC RBC AUTO-ENTMCNC: 32.9 G/DL (ref 32–36)
MCHC RBC AUTO-ENTMCNC: 33.3 G/DL (ref 32–36)
MCV RBC AUTO: 91 FL (ref 82–98)
MCV RBC AUTO: 92 FL (ref 82–98)
MCV RBC AUTO: 93 FL (ref 82–98)
MICROSCOPIC COMMENT: ABNORMAL
MONOCYTES # BLD AUTO: 0.7 K/UL (ref 0.3–1)
MONOCYTES # BLD AUTO: 1 K/UL (ref 0.3–1)
MONOCYTES # BLD AUTO: 1.2 K/UL (ref 0.3–1)
MONOCYTES NFR BLD: 10.3 % (ref 4–15)
MONOCYTES NFR BLD: 11.6 % (ref 4–15)
MONOCYTES NFR BLD: 6.5 % (ref 4–15)
MONOCYTES NFR BLD: 9 % (ref 4–15)
MONOCYTES NFR BLD: 9.5 % (ref 4–15)
NEUTROPHILS # BLD AUTO: 15.9 K/UL (ref 1.8–7.7)
NEUTROPHILS # BLD AUTO: 5.3 K/UL (ref 1.8–7.7)
NEUTROPHILS # BLD AUTO: 7.9 K/UL (ref 1.8–7.7)
NEUTROPHILS # BLD AUTO: 8.7 K/UL (ref 1.8–7.7)
NEUTROPHILS # BLD AUTO: 9.2 K/UL (ref 1.8–7.7)
NEUTROPHILS NFR BLD: 75.5 % (ref 38–73)
NEUTROPHILS NFR BLD: 75.6 % (ref 38–73)
NEUTROPHILS NFR BLD: 80.5 % (ref 38–73)
NEUTROPHILS NFR BLD: 81.1 % (ref 38–73)
NEUTROPHILS NFR BLD: 90 % (ref 38–73)
NITRITE UR QL STRIP: NEGATIVE
NRBC BLD-RTO: 0 /100 WBC
NUM UNITS TRANS FFP: NORMAL
NUM UNITS TRANS FFP: NORMAL
PH UR STRIP: 8 [PH] (ref 5–8)
PHOSPHATE SERPL-MCNC: 2.5 MG/DL (ref 2.7–4.5)
PLATELET # BLD AUTO: 123 K/UL (ref 150–350)
PLATELET # BLD AUTO: 125 K/UL (ref 150–350)
PLATELET # BLD AUTO: 126 K/UL (ref 150–350)
PLATELET # BLD AUTO: 137 K/UL (ref 150–350)
PLATELET # BLD AUTO: 143 K/UL (ref 150–350)
PMV BLD AUTO: 10 FL (ref 9.2–12.9)
PMV BLD AUTO: 10.1 FL (ref 9.2–12.9)
PMV BLD AUTO: 9.6 FL (ref 9.2–12.9)
PMV BLD AUTO: 9.7 FL (ref 9.2–12.9)
PMV BLD AUTO: 9.8 FL (ref 9.2–12.9)
POTASSIUM SERPL-SCNC: 4 MMOL/L (ref 3.5–5.1)
PROT SERPL-MCNC: 3 G/DL (ref 6–8.4)
PROT UR QL STRIP: ABNORMAL
PROTHROMBIN TIME: 11.8 SEC (ref 9–12.5)
RBC # BLD AUTO: 2.03 M/UL (ref 4.6–6.2)
RBC # BLD AUTO: 2.21 M/UL (ref 4.6–6.2)
RBC # BLD AUTO: 2.3 M/UL (ref 4.6–6.2)
RBC # BLD AUTO: 2.54 M/UL (ref 4.6–6.2)
RBC # BLD AUTO: 2.59 M/UL (ref 4.6–6.2)
RBC #/AREA URNS AUTO: 0 /HPF (ref 0–4)
SAMPLE: ABNORMAL
SITE: ABNORMAL
SODIUM SERPL-SCNC: 131 MMOL/L (ref 136–145)
SP GR UR STRIP: 1.03 (ref 1–1.03)
TRANS ERYTHROCYTES VOL PATIENT: NORMAL ML
TRANS PLATPHERESIS VOL PATIENT: NORMAL ML
URN SPEC COLLECT METH UR: ABNORMAL
WBC # BLD AUTO: 10.46 K/UL (ref 3.9–12.7)
WBC # BLD AUTO: 10.72 K/UL (ref 3.9–12.7)
WBC # BLD AUTO: 11.37 K/UL (ref 3.9–12.7)
WBC # BLD AUTO: 17.72 K/UL (ref 3.9–12.7)
WBC # BLD AUTO: 6.95 K/UL (ref 3.9–12.7)
WBC #/AREA URNS AUTO: 6 /HPF (ref 0–5)

## 2019-07-12 PROCEDURE — D9220A PRA ANESTHESIA: ICD-10-PCS | Mod: ANES,,, | Performed by: ANESTHESIOLOGY

## 2019-07-12 PROCEDURE — P9017 PLASMA 1 DONOR FRZ W/IN 8 HR: HCPCS

## 2019-07-12 PROCEDURE — 85014 HEMATOCRIT: CPT

## 2019-07-12 PROCEDURE — 99900035 HC TECH TIME PER 15 MIN (STAT)

## 2019-07-12 PROCEDURE — 43270 EGD LESION ABLATION: CPT | Mod: 22,,, | Performed by: INTERNAL MEDICINE

## 2019-07-12 PROCEDURE — 27201042 HC RETRIEVAL NET: Performed by: INTERNAL MEDICINE

## 2019-07-12 PROCEDURE — 37000009 HC ANESTHESIA EA ADD 15 MINS: Performed by: INTERNAL MEDICINE

## 2019-07-12 PROCEDURE — 99291 CRITICAL CARE FIRST HOUR: CPT | Mod: ,,, | Performed by: INTERNAL MEDICINE

## 2019-07-12 PROCEDURE — 20000000 HC ICU ROOM

## 2019-07-12 PROCEDURE — 43270 EGD LESION ABLATION: CPT | Performed by: INTERNAL MEDICINE

## 2019-07-12 PROCEDURE — 81001 URINALYSIS AUTO W/SCOPE: CPT

## 2019-07-12 PROCEDURE — 63600175 PHARM REV CODE 636 W HCPCS: Performed by: STUDENT IN AN ORGANIZED HEALTH CARE EDUCATION/TRAINING PROGRAM

## 2019-07-12 PROCEDURE — 94002 VENT MGMT INPAT INIT DAY: CPT

## 2019-07-12 PROCEDURE — D9220A PRA ANESTHESIA: Mod: CRNA,,, | Performed by: NURSE ANESTHETIST, CERTIFIED REGISTERED

## 2019-07-12 PROCEDURE — P9021 RED BLOOD CELLS UNIT: HCPCS

## 2019-07-12 PROCEDURE — 25000003 PHARM REV CODE 250: Performed by: NURSE ANESTHETIST, CERTIFIED REGISTERED

## 2019-07-12 PROCEDURE — 43255 PR EGD, FLEX, W/CTRL BLEED, ANY METHOD: ICD-10-PCS | Mod: 59,,, | Performed by: INTERNAL MEDICINE

## 2019-07-12 PROCEDURE — 82330 ASSAY OF CALCIUM: CPT | Mod: 91

## 2019-07-12 PROCEDURE — 94761 N-INVAS EAR/PLS OXIMETRY MLT: CPT

## 2019-07-12 PROCEDURE — 85610 PROTHROMBIN TIME: CPT

## 2019-07-12 PROCEDURE — P9035 PLATELET PHERES LEUKOREDUCED: HCPCS

## 2019-07-12 PROCEDURE — 84100 ASSAY OF PHOSPHORUS: CPT

## 2019-07-12 PROCEDURE — 63600175 PHARM REV CODE 636 W HCPCS: Performed by: NURSE ANESTHETIST, CERTIFIED REGISTERED

## 2019-07-12 PROCEDURE — 37000008 HC ANESTHESIA 1ST 15 MINUTES: Performed by: INTERNAL MEDICINE

## 2019-07-12 PROCEDURE — 83735 ASSAY OF MAGNESIUM: CPT

## 2019-07-12 PROCEDURE — 43255 EGD CONTROL BLEEDING ANY: CPT | Mod: 59,,, | Performed by: INTERNAL MEDICINE

## 2019-07-12 PROCEDURE — 27100171 HC OXYGEN HIGH FLOW UP TO 24 HOURS

## 2019-07-12 PROCEDURE — 82330 ASSAY OF CALCIUM: CPT

## 2019-07-12 PROCEDURE — 27200997: Performed by: INTERNAL MEDICINE

## 2019-07-12 PROCEDURE — 43270 PR EGD, FLEX, W/ABLATION, TUMOR/POLYP/LESION(S), W/ DILATION: ICD-10-PCS | Mod: 22,,, | Performed by: INTERNAL MEDICINE

## 2019-07-12 PROCEDURE — 63600175 PHARM REV CODE 636 W HCPCS

## 2019-07-12 PROCEDURE — D9220A PRA ANESTHESIA: Mod: ANES,,, | Performed by: ANESTHESIOLOGY

## 2019-07-12 PROCEDURE — 80053 COMPREHEN METABOLIC PANEL: CPT

## 2019-07-12 PROCEDURE — 43255 EGD CONTROL BLEEDING ANY: CPT | Performed by: INTERNAL MEDICINE

## 2019-07-12 PROCEDURE — C9113 INJ PANTOPRAZOLE SODIUM, VIA: HCPCS | Performed by: STUDENT IN AN ORGANIZED HEALTH CARE EDUCATION/TRAINING PROGRAM

## 2019-07-12 PROCEDURE — 86901 BLOOD TYPING SEROLOGIC RH(D): CPT

## 2019-07-12 PROCEDURE — 86920 COMPATIBILITY TEST SPIN: CPT

## 2019-07-12 PROCEDURE — 94003 VENT MGMT INPAT SUBQ DAY: CPT

## 2019-07-12 PROCEDURE — 85025 COMPLETE CBC W/AUTO DIFF WBC: CPT | Mod: 91

## 2019-07-12 PROCEDURE — 99900026 HC AIRWAY MAINTENANCE (STAT)

## 2019-07-12 PROCEDURE — 85384 FIBRINOGEN ACTIVITY: CPT

## 2019-07-12 PROCEDURE — 25000003 PHARM REV CODE 250: Performed by: STUDENT IN AN ORGANIZED HEALTH CARE EDUCATION/TRAINING PROGRAM

## 2019-07-12 PROCEDURE — 27000221 HC OXYGEN, UP TO 24 HOURS

## 2019-07-12 PROCEDURE — 99291 PR CRITICAL CARE, E/M 30-74 MINUTES: ICD-10-PCS | Mod: ,,, | Performed by: INTERNAL MEDICINE

## 2019-07-12 PROCEDURE — 36430 TRANSFUSION BLD/BLD COMPNT: CPT

## 2019-07-12 PROCEDURE — 25500020 PHARM REV CODE 255: Performed by: INTERNAL MEDICINE

## 2019-07-12 PROCEDURE — 25000003 PHARM REV CODE 250

## 2019-07-12 PROCEDURE — D9220A PRA ANESTHESIA: ICD-10-PCS | Mod: CRNA,,, | Performed by: NURSE ANESTHETIST, CERTIFIED REGISTERED

## 2019-07-12 PROCEDURE — 87040 BLOOD CULTURE FOR BACTERIA: CPT

## 2019-07-12 RX ORDER — ETOMIDATE 2 MG/ML
INJECTION INTRAVENOUS
Status: DISCONTINUED | OUTPATIENT
Start: 2019-07-12 | End: 2019-07-12

## 2019-07-12 RX ORDER — PROPOFOL 10 MG/ML
INJECTION, EMULSION INTRAVENOUS
Status: DISPENSED
Start: 2019-07-12 | End: 2019-07-12

## 2019-07-12 RX ORDER — ACETAMINOPHEN 650 MG/1
650 SUPPOSITORY RECTAL EVERY 6 HOURS PRN
Status: DISCONTINUED | OUTPATIENT
Start: 2019-07-12 | End: 2019-07-22 | Stop reason: HOSPADM

## 2019-07-12 RX ORDER — FENTANYL CITRATE-0.9 % NACL/PF 10 MCG/ML
25 PLASTIC BAG, INJECTION (ML) INTRAVENOUS CONTINUOUS
Status: DISCONTINUED | OUTPATIENT
Start: 2019-07-12 | End: 2019-07-14

## 2019-07-12 RX ORDER — PROPOFOL 10 MG/ML
INJECTION, EMULSION INTRAVENOUS
Status: COMPLETED
Start: 2019-07-12 | End: 2019-07-12

## 2019-07-12 RX ORDER — FENTANYL CITRATE 50 UG/ML
50 INJECTION, SOLUTION INTRAMUSCULAR; INTRAVENOUS
Status: DISCONTINUED | OUTPATIENT
Start: 2019-07-12 | End: 2019-07-15

## 2019-07-12 RX ORDER — HYDROCODONE BITARTRATE AND ACETAMINOPHEN 500; 5 MG/1; MG/1
TABLET ORAL
Status: DISCONTINUED | OUTPATIENT
Start: 2019-07-12 | End: 2019-07-15

## 2019-07-12 RX ORDER — SODIUM CHLORIDE 9 MG/ML
INJECTION, SOLUTION INTRAVENOUS CONTINUOUS PRN
Status: DISCONTINUED | OUTPATIENT
Start: 2019-07-12 | End: 2019-07-12

## 2019-07-12 RX ORDER — MIDAZOLAM HYDROCHLORIDE 1 MG/ML
INJECTION, SOLUTION INTRAMUSCULAR; INTRAVENOUS
Status: DISCONTINUED | OUTPATIENT
Start: 2019-07-12 | End: 2019-07-12

## 2019-07-12 RX ORDER — HYDROCODONE BITARTRATE AND ACETAMINOPHEN 500; 5 MG/1; MG/1
TABLET ORAL
Status: DISCONTINUED | OUTPATIENT
Start: 2019-07-12 | End: 2019-07-12

## 2019-07-12 RX ORDER — SUCCINYLCHOLINE CHLORIDE 20 MG/ML
INJECTION INTRAMUSCULAR; INTRAVENOUS
Status: DISCONTINUED | OUTPATIENT
Start: 2019-07-12 | End: 2019-07-12

## 2019-07-12 RX ORDER — PROPOFOL 10 MG/ML
5 INJECTION, EMULSION INTRAVENOUS CONTINUOUS
Status: DISCONTINUED | OUTPATIENT
Start: 2019-07-12 | End: 2019-07-13

## 2019-07-12 RX ORDER — FENTANYL CITRATE 50 UG/ML
50 INJECTION, SOLUTION INTRAMUSCULAR; INTRAVENOUS
Status: DISCONTINUED | OUTPATIENT
Start: 2019-07-20 | End: 2019-07-15

## 2019-07-12 RX ADMIN — ETOMIDATE 6 MG: 2 INJECTION, SOLUTION INTRAVENOUS at 07:07

## 2019-07-12 RX ADMIN — Medication 25 MCG/HR: at 12:07

## 2019-07-12 RX ADMIN — PANTOPRAZOLE SODIUM 8 MG/HR: 40 INJECTION, POWDER, FOR SOLUTION INTRAVENOUS at 12:07

## 2019-07-12 RX ADMIN — ETOMIDATE 4 MG: 2 INJECTION, SOLUTION INTRAVENOUS at 07:07

## 2019-07-12 RX ADMIN — SUCCINYLCHOLINE CHLORIDE 120 MG: 20 INJECTION, SOLUTION INTRAMUSCULAR; INTRAVENOUS at 07:07

## 2019-07-12 RX ADMIN — PANTOPRAZOLE SODIUM 8 MG/HR: 40 INJECTION, POWDER, FOR SOLUTION INTRAVENOUS at 06:07

## 2019-07-12 RX ADMIN — MIDAZOLAM HYDROCHLORIDE 2 MG: 1 INJECTION, SOLUTION INTRAMUSCULAR; INTRAVENOUS at 07:07

## 2019-07-12 RX ADMIN — ETOMIDATE 10 MG: 2 INJECTION, SOLUTION INTRAVENOUS at 07:07

## 2019-07-12 RX ADMIN — PROPOFOL 50 MCG/KG/MIN: 10 INJECTION, EMULSION INTRAVENOUS at 07:07

## 2019-07-12 RX ADMIN — SODIUM CHLORIDE: 9 INJECTION, SOLUTION INTRAVENOUS at 07:07

## 2019-07-12 RX ADMIN — PROPOFOL 50 MCG/KG/MIN: 10 INJECTION, EMULSION INTRAVENOUS at 10:07

## 2019-07-12 RX ADMIN — IOHEXOL 80 ML: 350 INJECTION, SOLUTION INTRAVENOUS at 10:07

## 2019-07-12 RX ADMIN — Medication 0.06 MCG/KG/MIN: at 07:07

## 2019-07-12 RX ADMIN — PANTOPRAZOLE SODIUM 8 MG/HR: 40 INJECTION, POWDER, FOR SOLUTION INTRAVENOUS at 10:07

## 2019-07-12 RX ADMIN — Medication 0.06 MCG/KG/MIN: at 06:07

## 2019-07-12 RX ADMIN — CALCIUM GLUCONATE 1000 MG: 98 INJECTION, SOLUTION INTRAVENOUS at 12:07

## 2019-07-12 RX ADMIN — PROPOFOL 50 MCG/KG/MIN: 10 INJECTION, EMULSION INTRAVENOUS at 12:07

## 2019-07-12 RX ADMIN — ACETAMINOPHEN 650 MG: 650 SUPPOSITORY RECTAL at 10:07

## 2019-07-12 RX ADMIN — PROPOFOL 50 MCG/KG/MIN: 10 INJECTION, EMULSION INTRAVENOUS at 04:07

## 2019-07-12 RX ADMIN — ETOMIDATE 6 MG: 2 INJECTION, SOLUTION INTRAVENOUS at 08:07

## 2019-07-12 RX ADMIN — ETOMIDATE 10 MG: 2 INJECTION, SOLUTION INTRAVENOUS at 08:07

## 2019-07-12 RX ADMIN — Medication 0.02 MCG/KG/MIN: at 05:07

## 2019-07-12 RX ADMIN — PROPOFOL 50 MCG/KG/MIN: 10 INJECTION, EMULSION INTRAVENOUS at 11:07

## 2019-07-12 NOTE — ASSESSMENT & PLAN NOTE
- Dx 1 year ago, managed by Dr. Pradhna  - Hold zhaoev per Dr. Pradhan  - PFT's stable  - No oxygenation issue this admission

## 2019-07-12 NOTE — NURSING
Patient arrived back to unit from IR. Leg immobilizer in place. Patient VSS. Will continue to monitor patient.

## 2019-07-12 NOTE — PROGRESS NOTES
[facility]  Critical Care Medicine  Progress Note    Patient Name: Manpreet Hamilton  MRN: [id number]  Admission Date: 7/10/2019  Hospital Length of Stay: 2 days  Code Status: Full Code  Attending Provider: Cole Valencia*  Primary Care Provider: Radames Pradhan MD   Principal Problem: GIB (gastrointestinal bleeding)    Subjective:     HPI:  The patient is a 68M with a PMH of HTN, HLD, UIP (dx 1 year ago, followed by Dr. Pradhan, on ofev) presenting as transfer from [place] for GI bleed. The patient was on a cruise with his family in [place] when he suddenly began having bright red bowel movements that progressed to melena. This was on Saturday the 8th. On board the ship his BP was 88/52, pulse 119. Labs with hemoglobin of 9.7 (baseline 15). He was transferred to [place] where he became progressively more hypotensive (to systolic of 70's) and tachycardic to 120's. His Hg dropped to 7. He was admitted to [place] for 1 night prior to transfer to [facility]. While at [place], he received 8 units total of PRBC, 3 units FFP. He was placed on PPI drip and had an endoscopy which showed a small superficial gastric ulcer and duodenal bulb ulcers with no active bleeding.     The patient takes a baby aspirin daily and drinks approximately 4 alcoholic beverages nightly. Has never before had GI bleed.    Hospital/ICU Course:  Patient arrived to ICU and was urgently taken to endoscopy. Stomach full of blood, impairing visualization of source. Due to high risk of aspiration and necessity of repeat EGD, patient was intubated for airway protection.     Required transfusion of additional 5 U PRBC, 1 plt, and 1 FFP. Erythromycin given to clear patient's stomach of blood. Developed erythematous rash, and benadryl given. No wheezing appreciated on exam at that time/    Repeat EGD performed overnight (7/10) which showed numerous ulcers in the stomach and duodenum with no active bleeding. Patient brought back to ICU,  and placed on PPI gtt per GI recs.    Patient was successfully extubated on 7/11; however, hemoglobin began trending downward again later in the day. Active bleeding overnight requiring 3 units PRBCs. GI repeated EGD the morning of 7/12 again with numerous clean-based gastric and duodenal ulcers. Possible active bleeding seen in the cardia of the stomach along the lesser curvature. Clip placed. IR consulted for CTA vs angiogram.     Interval History/Significant Events: Please see hospital course above.    Review of Systems   Unable to perform ROS: Intubated     Objective:     Vital Signs (Most Recent):  Temp: 98.6 °F (37 °C) (07/12/19 0705)  Pulse: (!) 114 (07/12/19 0705)  Resp: (!) 27 (07/12/19 0705)  BP: 111/63 (07/12/19 0705)  SpO2: 97 % (07/12/19 0705) Vital Signs (24h Range):  Temp:  [98.2 °F (36.8 °C)-100 °F (37.8 °C)] 98.6 °F (37 °C)  Pulse:  [] 114  Resp:  [7-47] 27  SpO2:  [90 %-100 %] 97 %  BP: ()/(49-72) 111/63   Weight: 92.8 kg (204 lb 9.4 oz)  Body mass index is 31.11 kg/m².      Intake/Output Summary (Last 24 hours) at 7/12/2019 0826  Last data filed at 7/12/2019 0818  Gross per 24 hour   Intake 4478.36 ml   Output 1786 ml   Net 2692.36 ml       Physical Exam   Constitutional: He appears well-developed and well-nourished.   Intubated  RASS 0, following commands    HENT:   Head: Normocephalic and atraumatic.   Eyes: EOM are normal. Right eye exhibits no discharge. Left eye exhibits no discharge.   Neck: No tracheal deviation present.   Cardiovascular: Normal rate, regular rhythm and normal heart sounds. Exam reveals no gallop and no friction rub.   No murmur heard.  Pulmonary/Chest: Effort normal and breath sounds normal. No stridor. No respiratory distress. He has no wheezes.   Abdominal: Soft. He exhibits no distension. There is no tenderness. There is no guarding.   Musculoskeletal: He exhibits no edema or deformity.   Neurological: He is alert.   Skin: Skin is warm and dry.        Vents:  Vent Mode: A/C (07/12/19 0806)  Ventilator Initiated: Yes (07/12/19 0806)  Set Rate: 14 bmp (07/12/19 0806)  Vt Set: 500 mL (07/12/19 0806)  Pressure Support: 0 cmH20 (07/12/19 0806)  PEEP/CPAP: 5 cmH20 (07/12/19 0806)  Oxygen Concentration (%): 50 (07/12/19 0806)  Peak Airway Pressure: 22 cmH2O (07/12/19 0806)  Plateau Pressure: 0 cmH20 (07/12/19 0806)  Total Ve: 13.3 mL (07/12/19 0806)  F/VT Ratio<105 (RSBI): (!) 8.57 (07/11/19 0521)  Lines/Drains/Airways     Central Venous Catheter Line                 Percutaneous Central Line Insertion/Assessment - triple lumen  07/10/19 1511 right internal jugular 1 day          Airway                 Airway - Non-Surgical 07/12/19 0714 Endotracheal Tube less than 1 day          Peripheral Intravenous Line                 Peripheral IV - Double Lumen 20 G Right;Anterior Upper Arm -- days         Peripheral IV - Single Lumen 07/10/19 0300 18 G Anterior;Left Antecubital 2 days              Significant Labs:    CBC/Anemia Profile:  Recent Labs   Lab 07/11/19  1809 07/12/19  0005 07/12/19  0450 07/12/19  0535   WBC 8.59 6.95 10.72  --    HGB 7.8* 7.0* 6.2*  --    HCT 23.0* 21.0* 18.6* <15*   * 137* 143*  --    MCV 91 91 92  --    RDW 15.8* 15.7* 15.3*  --         Chemistries:  Recent Labs   Lab 07/10/19  1801 07/11/19  0146 07/12/19  0443   * 131* 131*   K 4.5 4.0 4.0    104 104   CO2 21* 21* 21*   BUN 37* 37* 19   CREATININE 0.7 0.8 0.7   CALCIUM 6.6* 7.0* 6.8*   ALBUMIN 1.7* 2.1* 1.5*   PROT 3.1* 4.0* 3.0*   BILITOT 0.4 0.3 0.4   ALKPHOS 44* 50* 50*   ALT 10 11 7*   AST 18 17 17   MG  --  1.7 1.9   PHOS  --  3.4 2.5*       All pertinent labs within the past 24 hours have been reviewed.    Significant Imaging:  I have reviewed and interpreted all pertinent imaging results/findings within the past 24 hours.      AB  Recent Labs   Lab 07/11/19  0521   PH 7.277*   PO2 122*   PCO2 43.8   HCO3 20.5*   BE -6     Assessment/Plan:      Pulmonary  Pulmonary fibrosis  - Dx 1 year ago, managed by Dr. Pradhan  - Hold ofev per Dr. Pradhan  - PFT's stable  - No oxygenation issue this admission    Oncology  Acute blood loss anemia  - Hemoglobin 7 on arrival   - Has received 18 units PRBC, 5 units FFP, 2 plt between facility and [place] [facility]  - CBC q4h  - See GIB     GI  * GIB (gastrointestinal bleeding)  - Initial EGD obscured due to large amount of blood in stomach, repeat with numerous ulcers in stomach and duodenum with no active bleeding (7/10)  - CTA following scope (7/10) with no active bleeding visualized. Some enhancement noted at site of Nissen; however, read at that time felt mucosal enhancement more likely than active bleed  - Patient began actively bleeding again overnight 7/11, transfused 4 additional units, repeat scope again with large amount of blood in stomach, possible active bleed see in cardia along lesser curvature. Source unable to be visualized, clip placed to meeta location for IR.   - Case discussed with IR staff, will follow up recs  - CBC q4h       Critical Care Daily Checklist:    A: Awake: RASS Goal/Actual Goal:    Actual: Shane Agitation Sedation Scale (RASS): Alert and calm   B: Spontaneous Breathing Trial Performed? Spon. Breathing Trial Initiated?: Initiated (07/11/19 1475)   C: SAT & SBT Coordinated?  Not indicated                      D: Delirium: CAM-ICU Overall CAM-ICU: Negative   E: Early Mobility Performed? Yes   F: Feeding Goal:    Status:     Current Diet Order   Procedures    Diet NPO      AS: Analgesia/Sedation Propofol   T: Thromboembolic Prophylaxis Held due to bleed   H: HOB > 300 Yes   U: Stress Ulcer Prophylaxis (if needed) N/a   G: Glucose Control N/a   B: Bowel Function Stool Occurrence: 1   I: Indwelling Catheter (Lines & Ribeiro) Necessity PIV, central    D: De-escalation of Antimicrobials/Pharmacotherapies N/a    Plan for the day/ETD EGD, IR consult    Code Status:  Family/Goals of Care: Full  Code         Critical secondary to Patient has a condition that poses threat to life and bodily function: GI hemorrhage.      Critical care was time spent personally by me on the following activities: development of treatment plan with patient or surrogate and bedside caregivers, discussions with consultants, evaluation of patient's response to treatment, examination of patient, ordering and performing treatments and interventions, ordering and review of laboratory studies, ordering and review of radiographic studies, pulse oximetry, re-evaluation of patient's condition. This critical care time did not overlap with that of any other provider or involve time for any procedures.     Mayo Rico MD  Critical Care Medicine  [facility]

## 2019-07-12 NOTE — NURSING
Post transfusion of 1 U PRBCs patient BP 70-80s/50s MAP 55-64, patient pale, diaphoretic, nauseated/bloated/constipated.  Called team, Dr. Nguyen to bedside. Levo started at 0.02 titrated up to maintain MAP >65. Currently at 0.1 mcg/kg/min with MAP 73. 500 cc LR bolus currently running. Labs sent, awaiting results, POC Hct <15, blood on the way to transfuse.

## 2019-07-12 NOTE — ASSESSMENT & PLAN NOTE
- Initial EGD obscured due to large amount of blood in stomach, repeat with numerous ulcers in stomach and duodenum with no active bleeding (7/10)  - CTA following scope (7/10) with no active bleeding visualized. Some enhancement noted at site of Nissen; however, read at that time felt mucosal enhancement more likely than active bleed  - Patient began actively bleeding again overnight 7/11, transfused 4 additional units, repeat scope again with large amount of blood in stomach, possible active bleed see in cardia along lesser curvature. Source unable to be visualized, clip placed to meeta location for IR.   - Case discussed with IR staff, will follow up recs  - CBC q4h

## 2019-07-12 NOTE — ANESTHESIA POSTPROCEDURE EVALUATION
Anesthesia Post Evaluation    Patient: Manpreet Hamilton    Procedure(s) Performed: Procedure(s) (LRB):  EGD (ESOPHAGOGASTRODUODENOSCOPY) (N/A)    Final Anesthesia Type: general  Patient location during evaluation: ICU  Patient participation: No - Unable to Participate, Intubation  Level of consciousness: sedated  Post-procedure vital signs: reviewed and stable    Anesthetic complications: no      Cardiovascular status: hemodynamically stable  Respiratory status: intubated and ventilator  Hydration status: hypovolemic  Follow-up not needed.          Vitals Value Taken Time   /49 7/12/2019  8:56 AM   Temp 37 °C (98.6 °F) 7/12/2019  7:05 AM   Pulse 90 7/12/2019  8:56 AM   Resp 26 7/12/2019  8:56 AM   SpO2 99 % 7/12/2019  8:56 AM   Vitals shown include unvalidated device data.      No case tracking events are documented in the log.      Pain/Dannielle Score: Pain Rating Prior to Med Admin: 0 (7/11/2019  9:45 AM)

## 2019-07-12 NOTE — ASSESSMENT & PLAN NOTE
- Hemoglobin 7 on arrival   - Has received 18 units PRBC, 5 units FFP, 2 plt between facility and [place] and [facility]  - CBC q4h  - See GIB

## 2019-07-12 NOTE — PROGRESS NOTES
Procedure complete. Pt tolerated well. Site sealed w/5fr exoseal at 10:20. Hob to remain flat x2 hours until 12:20p.m. Site clean dry and intact. No bleeding or hematoma. icu nurse remains at bedside.pt to return to room in bed in care of icu nurse and respiratory therapist.

## 2019-07-12 NOTE — TRANSFER OF CARE
"Anesthesia Transfer of Care Note    Patient: Manpreet Hamilton    Procedure(s) Performed: Procedure(s) (LRB):  EGD (ESOPHAGOGASTRODUODENOSCOPY) (N/A)    Patient location: ICU    Anesthesia Type: general    Transport from OR: Upon arrival to PACU/ICU, patient attached to ventilator and auscultated to confirm bilateral breath sounds and adequate TV. Continuous ECG monitoring in transport. Continuous SpO2 monitoring in transport    Post pain: adequate analgesia    Post assessment: no apparent anesthetic complications and tolerated procedure well    Post vital signs: stable    Level of consciousness: sedated    Nausea/Vomiting: no nausea/vomiting    Complications: none    Transfer of care protocol was followed      Last vitals:   Visit Vitals  /63 (BP Location: Right arm, Patient Position: Lying)   Pulse (!) 114   Temp 37 °C (98.6 °F) (Axillary)   Resp (!) 27   Ht 5' 8" (1.727 m)   Wt 92.8 kg (204 lb 9.4 oz)   SpO2 97%   BMI 31.11 kg/m²     "

## 2019-07-12 NOTE — H&P
[facility]  History & Physical    Subjective:      Chief Complaint/Reason for Admission:     EGD    Manpreet Hamilton is a 68 y.o. male.    Past Medical History:   Diagnosis Date    Hyperlipemia     Hypertension     Nasal congestion     Pulmonary fibrosis      Past Surgical History:   Procedure Laterality Date    CATARACT EXTRACTION Left     COLONOSCOPY N/A 2016    Performed by Leon Padilla MD at [facility] (4TH FLR)    EGD (ESOPHAGOGASTRODUODENOSCOPY) N/A 7/10/2019    Performed by Jimmy Saldana MD at [facility] END[facility] (2ND FLR)    HAND SURGERY      left    HIATAL HERNIA REPAIR      MICRODISCECTOMY, SPINE MIS Right L5- S1 Right 2018    Performed by Angel Pendleton MD at [facility] OR 2ND FLR    TONSILLECTOMY, ADENOIDECTOMY      VASECTOMY       Family History   Problem Relation Age of Onset    Coronary artery disease Mother     Parkinsonism Father     Dementia Father     Coronary artery disease Sister     Multiple sclerosis Sister      Social History     Tobacco Use    Smoking status: Former Smoker     Packs/day: 1.50     Years: 20.00     Pack years: 30.00     Types: Cigarettes     Last attempt to quit: 3/17/1985     Years since quittin.3    Smokeless tobacco: Former User     Quit date: 1986   Substance Use Topics    Alcohol use: Yes     Alcohol/week: 16.8 oz     Types: 28 Glasses of wine per week     Comment: 6-7 bottles per week    Drug use: No       PTA Medications   Medication Sig    atorvastatin (LIPITOR) 20 MG tablet TAKE ONE TABLET BY MOUTH EVERY EVENING    desonide (DESOWEN) 0.05 % lotion 1 application 2 (two) times daily.    doxycycline (VIBRAMYCIN) 100 MG Cap TAKE ONE CAPSULE BY MOUTH EVERY DAY FOR THE FIRST 5 DAYS OF EACH MONTH    loperamide (IMODIUM) 2 mg capsule 1 capsule 3 (three) times daily as needed.    nintedanib (OFEV) 150 mg Cap Take 1 tablet by mouth 2 (two) times daily.     valsartan-hydrochlorothiazide (DIOVAN-HCT) 320-25 mg per  tablet Take 1 tablet by mouth once daily.    vitamin D3-vitamin K2 1000-90 unit-mcg TbDL 1 tablet once daily.     Review of patient's allergies indicates:   Allergen Reactions    Penicillins     Erythromycin Rash        Review of Systems   Constitutional: Negative for chills, fever and weight loss.   Respiratory: Negative for shortness of breath.    Cardiovascular: Negative for chest pain.   Gastrointestinal: Positive for melena.       Objective:      Vital Signs (Most Recent)  Temp: 98.6 °F (37 °C) (07/12/19 0550)  Pulse: (!) 122 (07/12/19 0600)  Resp: (!) 24 (07/12/19 0600)  BP: (!) 107/55 (07/12/19 0600)  SpO2: 97 % (07/12/19 0600)    Vital Signs Range (Last 24H):  Temp:  [97.7 °F (36.5 °C)-100 °F (37.8 °C)]   Pulse:  []   Resp:  [7-47]   BP: ()/(49-72)   SpO2:  [90 %-100 %]     Physical Exam   Constitutional: He is oriented to person, place, and time. He appears well-developed and well-nourished.   Cardiovascular: Normal rate.   Pulmonary/Chest: Effort normal.   Abdominal: Soft.   Neurological: He is alert and oriented to person, place, and time.   Skin: Skin is warm and dry.   Psychiatric: He has a normal mood and affect. His behavior is normal. Judgment and thought content normal.         Assessment:      Active Hospital Problems    Diagnosis  POA    *GIB (gastrointestinal bleeding) [K92.2]  Yes    Acute blood loss anemia [D62]  Yes    Pulmonary fibrosis [J84.10]  Yes      Resolved Hospital Problems   No resolved problems to display.       Plan:    Emergent EGD for melena

## 2019-07-12 NOTE — PROCEDURES
Radiology Post-Procedure Note    Pre Op Diagnosis: UGI bleed  Post Op Diagnosis: Same    Procedure: visceral angiogram and gelfoam embolization of left gastric artery    Procedure performed by: Mohinder Stapleton MD; Angel Monge MD (res)    Written Informed Consent Obtained: Yes  Specimen Removed: NO  Estimated Blood Loss: Minimal    Findings:   R CFA access with 5F sheath. Celiac and left gastric artery selected. Left gastric artery angiogram shows vessel irregularity and blush. Left gastric artery embolized with gelfoam slurry. Post angiogram shows no flow. See imaging report for details.    Patient tolerated procedure well.    Angel Monge MD  Department of Radiology  Pager: [phone]

## 2019-07-12 NOTE — TREATMENT PLAN
GI Treatment Plan    Manpreet Hamilton is a 68 y.o. male admitted to hospital 7/10/2019 (Hospital Day: 3) due to GIB (gastrointestinal bleeding).     Interval History  EGD this morning  Impression:     - A single non-bleeding angioectasia in the                         duodenum. Treated with argon plasma coagulation                         (APC).                        - Multiple non-bleeding duodenal ulcers with a                         clean ulcer base (Germain Class III).                        - Erythematous duodenopathy.                        - Clotted blood in the entire stomach.                        - A few bleeding angioectasias in the stomach.                         Treated with argon plasma coagulation (APC).                        - 3 cm hiatal hernia.                        - No specimens collected.    IR left gastric artery embolized, post angiogram shows no flow.     Recommendation                        - Give Protonix (pantoprazole): initiate therapy                         with 80 mg IV bolus, then 8 mg/hr IV by continuous                         infusion then Use Protonix (pantoprazole) 40 mg IV                         or or by mouth every 12 hours for 12 weeks.                        - Consider avoiding all non-steroidal                         anti-inflammatory drugs (aspirin, ibuprofen,                         naproxen, etc.), unless needed for cardiovascular                         protection. Recommend you discuss with your                         prescribing doctor (of your aspirin) to see if                         cardiovascular benefits of your aspirin outweigh                         the risks of GI bleeding.                        - Repeat upper endoscopy in 8 weeks to check                         healing.  -trend hb  -call if rebleed  - Plan of care was discussed with primary team.  - We will continue to follow.    Thank you for involving us in the care of Manpreet Hamilton.  Please call with any additional questions, concerns or changes in the patient's clinical status.    Loyd Mraia MD  Gastroenterology Fellow  Spectralink: [phone]

## 2019-07-12 NOTE — TREATMENT PLAN
GI Treatment Plan    Manpreet Hamilton is a 68 y.o. male admitted to hospital 7/10/2019 (Hospital Day: 3) due to GIB (gastrointestinal bleeding).     Interval History  Hb dropped overnight to 7, given 1 unit prbc, drop to 6.2, hypotensive requiring levophed, critical care team transfusion 2 more units prbc, assessed patietn at bedside, has gross melena. Patient on levo for blood pressure support.     Objective  Temp:  [97.7 °F (36.5 °C)-100 °F (37.8 °C)] 98.6 °F (37 °C) (07/12 0550)  Pulse:  [] 122 (07/12 0600)  BP: ()/(49-72) 107/55 (07/12 0600)  Resp:  [7-47] 24 (07/12 0600)  SpO2:  [90 %-100 %] 97 % (07/12 0600)    General: Alert, Oriented x3, no distress  Abdomen: Normoactive bowel sounds. Non-distended. Normal tympany. Soft. Non-tender. No peritoneal signs.    Laboratory    Recent Labs   Lab 07/11/19  1809 07/12/19  0005 07/12/19  0450   HGB 7.8* 7.0* 6.2*       Lab Results   Component Value Date    WBC 10.72 07/12/2019    HGB 6.2 (L) 07/12/2019    HCT 18.6 (LL) 07/12/2019    MCV 92 07/12/2019     (L) 07/12/2019       Lab Results   Component Value Date     (L) 07/12/2019    K 4.0 07/12/2019     07/12/2019    CO2 21 (L) 07/12/2019    BUN 19 07/12/2019    CREATININE 0.7 07/12/2019    CALCIUM 7.0 (L) 07/11/2019    ANIONGAP 6 (L) 07/12/2019    ESTGFRAFRICA >60.0 07/12/2019    EGFRNONAA >60.0 07/12/2019       Lab Results   Component Value Date    ALT 7 (L) 07/12/2019    AST 17 07/12/2019    ALKPHOS 50 (L) 07/12/2019    BILITOT 0.4 07/12/2019       Lab Results   Component Value Date    INR 1.0 07/10/2019    INR 1.0 06/27/2018       Plan    Acute blood loss anemia  Melena  -plan for EGD today  --NPO  -volume resuscitation per primary  -transfuse to keep at least above 7, per primary  -continue PPI ggt  Goal hb >7, plt >50, INR <1.5  -avoid NSAIDS, ASA, anticoagulants  -plan discussed with Dr. Hartman        - Plan of care was discussed with primary team.  - We will continue to  follow.    Thank you for involving us in the care of Manpreet Hamilton. Please call with any additional questions, concerns or changes in the patient's clinical status.    Loyd Maria MD  Gastroenterology Fellow  Spectralink: [phone]

## 2019-07-12 NOTE — H&P
Radiology History & Physical      SUBJECTIVE:     Chief Complaint: GI bleed    History of Present Illness:  Manpreet Hamilton is a 68 y.o. male who presents for visceral angiogram and possible intervention    Past Medical History:   Diagnosis Date    Hyperlipemia     Hypertension     Nasal congestion     Pulmonary fibrosis        Past Surgical History:   Procedure Laterality Date    CATARACT EXTRACTION Left     COLONOSCOPY N/A 8/5/2016    Performed by Leon Padilla MD at [facility] ENDO (4TH FLR)    EGD (ESOPHAGOGASTRODUODENOSCOPY) N/A 7/10/2019    Performed by Jimmy Saldana MD at [facility] ENDO (2ND FLR)    HAND SURGERY      left    HIATAL HERNIA REPAIR      MICRODISCECTOMY, SPINE MIS Right L5- S1 Right 6/27/2018    Performed by Angel Pendleton MD at [facility] OR 2ND FLR    TONSILLECTOMY, ADENOIDECTOMY      VASECTOMY         Home Meds:   Prior to Admission medications    Medication Sig Start Date End Date Taking? Authorizing Provider   atorvastatin (LIPITOR) 20 MG tablet TAKE ONE TABLET BY MOUTH EVERY EVENING 6/4/18   Matias Pradhan MD   desonide (DESOWEN) 0.05 % lotion 1 application 2 (two) times daily. 1/15/19   Historical Provider, MD   doxycycline (VIBRAMYCIN) 100 MG Cap TAKE ONE CAPSULE BY MOUTH EVERY DAY FOR THE FIRST 5 DAYS OF EACH MONTH 6/4/18   Matias Pradhan MD   ferrous sulfate (FEOSOL) 325 mg (65 mg iron) Tab tablet Take 1 tablet (325 mg total) by mouth every 12 (twelve) hours. 7/10/19   Jimmy Saldana MD   loperamide (IMODIUM) 2 mg capsule 1 capsule 3 (three) times daily as needed. 3/4/19   Historical Provider, MD   nintedanib (OFEV) 150 mg Cap Take 1 tablet by mouth 2 (two) times daily.     Historical Provider, MD   pantoprazole (PROTONIX) 40 MG tablet Take 1 tablet (40 mg total) by mouth every 12 (twelve) hours. Take one pill every morning 45 minutes before breakfast in the morning. 7/10/19 1/6/20  Jimmy Saldana MD   valsartan-hydrochlorothiazide (DIOVAN-HCT) 320-25 mg per  tablet Take 1 tablet by mouth once daily. 8/28/17   Historical Provider, MD   vitamin D3-vitamin K2 1000-90 unit-mcg TbDL 1 tablet once daily. 7/1/18   Historical Provider, MD       Anticoagulants/Antiplatelets: no anticoagulation    Allergies:   Review of patient's allergies indicates:   Allergen Reactions    Penicillins     Erythromycin Rash       Sedation History:  no adverse reactions    Review of Systems:   As documented in primary provider H&P.      OBJECTIVE:     Vital Signs (Most Recent)  Temp: 98.6 °F (37 °C) (07/12/19 0705)  Pulse: (!) 114 (07/12/19 0705)  Resp: (!) 27 (07/12/19 0705)  BP: 111/63 (07/12/19 0705)  SpO2: 97 % (07/12/19 0705)    Physical Exam:  ASA: 3  Mallampati: intubated      Laboratory  Lab Results   Component Value Date    INR 1.2 07/12/2019       Lab Results   Component Value Date    WBC 10.72 07/12/2019    HGB 6.2 (L) 07/12/2019    HCT <15 (L) 07/12/2019    MCV 92 07/12/2019     (L) 07/12/2019      Lab Results   Component Value Date     (H) 07/12/2019     (L) 07/12/2019    K 4.0 07/12/2019     07/12/2019    CO2 21 (L) 07/12/2019    BUN 19 07/12/2019    CREATININE 0.7 07/12/2019    CALCIUM 6.8 (LL) 07/12/2019    MG 1.9 07/12/2019    ALT 7 (L) 07/12/2019    AST 17 07/12/2019    ALBUMIN 1.5 (L) 07/12/2019    BILITOT 0.4 07/12/2019    BILIDIR 0.3 01/23/2019       ASSESSMENT/PLAN:     Sedation Plan: up to moderate  Patient will undergo visceral angiogram and possible intervention    Angel Monge MD  Department of Radiology  Pager: [phone]

## 2019-07-12 NOTE — PLAN OF CARE
Pt received to ir 189 for visceral angiogram. Accompanied by icu nurse and resp therapist. Pt entubated.Icu nurse remains at bedside.

## 2019-07-12 NOTE — ANESTHESIA PREPROCEDURE EVALUATION
07/12/2019  Manpreet Hamilton is a 68 y.o., male.    Anesthesia Evaluation    I have reviewed the Patient Summary Reports.    I have reviewed the Nursing Notes.   I have reviewed the Medications.     Review of Systems  Anesthesia Hx:  No problems with previous Anesthesia  History of prior surgery of interest to airway management or planning: Denies Family Hx of Anesthesia complications.   Denies Personal Hx of Anesthesia complications.   Hematology/Oncology:     Oncology Normal    -- Anemia:   EENT/Dental:EENT/Dental Normal   Cardiovascular:   Exercise tolerance: good Hypertension hyperlipidemia ECG has been reviewed.    Pulmonary:  Pulmonary Fibrosis/Interstitial Disease:    Renal/:  Renal/ Normal     Hepatic/GI:  Hepatic/GI Normal    Musculoskeletal:  Spine Disorders: lumbar    Neurological:  Neurology Normal    Endocrine:  Endocrine Normal    Dermatological:  Skin Normal    Psych:  Psychiatric Normal           Physical Exam  General:  Well nourished    Airway/Jaw/Neck:  Airway Findings: Mouth Opening: Normal Tongue: Normal  General Airway Assessment: Adult  Mallampati: II  TM Distance: Normal, at least 6 cm        Eyes/Ears/Nose:  EYES/EARS/NOSE FINDINGS: Normal   Dental:  DENTAL FINDINGS: Normal   Chest/Lungs:  Chest/Lungs Clear    Heart/Vascular:  Heart Findings: Normal Heart murmur: negative Vascular Findings: Normal    Abdomen:  Abdomen Findings: Normal    Musculoskeletal:  Musculoskeletal Findings: Normal   Skin:  Skin Findings: Normal    Mental Status:  Mental Status Findings: Normal        Anesthesia Plan  Type of Anesthesia, risks & benefits discussed:  Anesthesia Type:  general  Patient's Preference:   Intra-op Monitoring Plan: standard ASA monitors  Intra-op Monitoring Plan Comments:   Post Op Pain Control Plan:   Post Op Pain Control Plan Comments:   Induction:   IV  Beta Blocker:  Patient  is not currently on a Beta-Blocker (No further documentation required).       Informed Consent: Patient understands risks and agrees with Anesthesia plan.  Questions answered. Anesthesia consent signed with patient.  ASA Score: 4  emergent   Day of Surgery Review of History & Physical:    H&P update referred to the provider.         Ready For Surgery From Anesthesia Perspective.

## 2019-07-12 NOTE — [employer] PATIENT INSTRUCTIONS
Discharge Summary/Instructions after an Endoscopic Procedure  Patient Name: Manpreet Hamilton  Patient MRN: [id number]  Patient YOB: 1950  Friday, July 12, 2019  Jimmy Saldana MD  RESTRICTIONS:  During your procedure today, you received medications for sedation.  These   medications may affect your judgment, balance and coordination.  Therefore,   for 24 hours, you have the following restrictions:   - DO NOT drive a car, operate machinery, make legal/financial decisions,   sign important papers or drink alcohol.    ACTIVITY:  Today: no heavy lifting, straining or running due to procedural   sedation/anesthesia.  The following day: return to full activity including work.  DIET:  Eat and drink normally unless instructed otherwise.     TREATMENT FOR COMMON SIDE EFFECTS:  - Mild abdominal pain, nausea, belching, bloating or excessive gas:  rest,   eat lightly and use a heating pad.  - Sore Throat: treat with throat lozenges and/or gargle with warm salt   water.  - Because air was used during the procedure, expelling large amounts of air   from your rectum or belching is normal.  - If a bowel prep was taken, you may not have a bowel movement for 1-3 days.    This is normal.  SYMPTOMS TO WATCH FOR AND REPORT TO YOUR PHYSICIAN:  1. Abdominal pain or bloating, other than gas cramps.  2. Chest pain.  3. Back pain.  4. Signs of infection such as: chills or fever occurring within 24 hours   after the procedure.  5. Rectal bleeding, which would show as bright red, maroon, or black stools.   (A tablespoon of blood from the rectum is not serious, especially if   hemorrhoids are present.)  6. Vomiting.  7. Weakness or dizziness.  GO DIRECTLY TO THE NEAREST EMERGENCY ROOM IF YOU HAVE ANY OF THE FOLLOWING:      Difficulty breathing              Chills and/or fever over 101 F   Persistent vomiting and/or vomiting blood   Severe abdominal pain   Severe chest pain   Black, tarry stools   Bleeding- more than one  tablespoon   Any other symptom or condition that you feel may need urgent attention  Your doctor recommends these additional instructions:  If any biopsies were taken, your doctors clinic will contact you in 1 to 2   weeks with any results.  - Return patient to ICU for ongoing care.   - Refer to an interventional radiologist today cased discussed with IR team.     - The findings and recommendations were discussed with the patient's primary   physician and IR team.   - The findings and recommendations were discussed with the patient's family.     - Give Protonix (pantoprazole): initiate therapy with 80 mg IV bolus, then 8   mg/hr IV by continuous infusion then Use Protonix (pantoprazole) 40 mg IV   or or by mouth every 12 hours for 12 weeks.   - Consider avoiding all non-steroidal anti-inflammatory drugs (aspirin,   ibuprofen, naproxen, etc.), unless needed for cardiovascular protection.    Recommend you discuss with your prescribing doctor (of your aspirin) to see   if cardiovascular benefits of your aspirin outweigh the risks of GI   bleeding.  - Repeat upper endoscopy in 8 weeks to check healing.  For questions, problems or results please call your physician - Jimmy Saldana MD at Work:  [phone].  [facility], EMERGENCY ROOM PHONE NUMBER: [phone]  IF A COMPLICATION OR EMERGENCY SITUATION ARISES AND YOU ARE UNABLE TO REACH   YOUR PHYSICIAN - GO DIRECTLY TO THE EMERGENCY ROOM.  Jimmy Saldana MD  7/12/2019 8:55:42 AM  This report has been verified and signed electronically.  [employer]

## 2019-07-12 NOTE — SUBJECTIVE & OBJECTIVE
Interval History/Significant Events: Please see hospital course above.    Review of Systems   Unable to perform ROS: Intubated     Objective:     Vital Signs (Most Recent):  Temp: 98.6 °F (37 °C) (07/12/19 0705)  Pulse: (!) 114 (07/12/19 0705)  Resp: (!) 27 (07/12/19 0705)  BP: 111/63 (07/12/19 0705)  SpO2: 97 % (07/12/19 0705) Vital Signs (24h Range):  Temp:  [98.2 °F (36.8 °C)-100 °F (37.8 °C)] 98.6 °F (37 °C)  Pulse:  [] 114  Resp:  [7-47] 27  SpO2:  [90 %-100 %] 97 %  BP: ()/(49-72) 111/63   Weight: 92.8 kg (204 lb 9.4 oz)  Body mass index is 31.11 kg/m².      Intake/Output Summary (Last 24 hours) at 7/12/2019 0826  Last data filed at 7/12/2019 0818  Gross per 24 hour   Intake 4478.36 ml   Output 1786 ml   Net 2692.36 ml       Physical Exam   Constitutional: He appears well-developed and well-nourished.   Intubated  RASS 0, following commands    HENT:   Head: Normocephalic and atraumatic.   Eyes: EOM are normal. Right eye exhibits no discharge. Left eye exhibits no discharge.   Neck: No tracheal deviation present.   Cardiovascular: Normal rate, regular rhythm and normal heart sounds. Exam reveals no gallop and no friction rub.   No murmur heard.  Pulmonary/Chest: Effort normal and breath sounds normal. No stridor. No respiratory distress. He has no wheezes.   Abdominal: Soft. He exhibits no distension. There is no tenderness. There is no guarding.   Musculoskeletal: He exhibits no edema or deformity.   Neurological: He is alert.   Skin: Skin is warm and dry.       Vents:  Vent Mode: A/C (07/12/19 0806)  Ventilator Initiated: Yes (07/12/19 0806)  Set Rate: 14 bmp (07/12/19 0806)  Vt Set: 500 mL (07/12/19 0806)  Pressure Support: 0 cmH20 (07/12/19 0806)  PEEP/CPAP: 5 cmH20 (07/12/19 0806)  Oxygen Concentration (%): 50 (07/12/19 0806)  Peak Airway Pressure: 22 cmH2O (07/12/19 0806)  Plateau Pressure: 0 cmH20 (07/12/19 0806)  Total Ve: 13.3 mL (07/12/19 0806)  F/VT Ratio<105 (RSBI): (!) 8.57 (07/11/19  0521)  Lines/Drains/Airways     Central Venous Catheter Line                 Percutaneous Central Line Insertion/Assessment - triple lumen  07/10/19 1511 right internal jugular 1 day          Airway                 Airway - Non-Surgical 07/12/19 0714 Endotracheal Tube less than 1 day          Peripheral Intravenous Line                 Peripheral IV - Double Lumen 20 G Right;Anterior Upper Arm -- days         Peripheral IV - Single Lumen 07/10/19 0300 18 G Anterior;Left Antecubital 2 days              Significant Labs:    CBC/Anemia Profile:  Recent Labs   Lab 07/11/19  1809 07/12/19  0005 07/12/19  0450 07/12/19  0535   WBC 8.59 6.95 10.72  --    HGB 7.8* 7.0* 6.2*  --    HCT 23.0* 21.0* 18.6* <15*   * 137* 143*  --    MCV 91 91 92  --    RDW 15.8* 15.7* 15.3*  --         Chemistries:  Recent Labs   Lab 07/10/19  1801 07/11/19  0146 07/12/19  0443   * 131* 131*   K 4.5 4.0 4.0    104 104   CO2 21* 21* 21*   BUN 37* 37* 19   CREATININE 0.7 0.8 0.7   CALCIUM 6.6* 7.0* 6.8*   ALBUMIN 1.7* 2.1* 1.5*   PROT 3.1* 4.0* 3.0*   BILITOT 0.4 0.3 0.4   ALKPHOS 44* 50* 50*   ALT 10 11 7*   AST 18 17 17   MG  --  1.7 1.9   PHOS  --  3.4 2.5*       All pertinent labs within the past 24 hours have been reviewed.    Significant Imaging:  I have reviewed and interpreted all pertinent imaging results/findings within the past 24 hours.

## 2019-07-12 NOTE — PLAN OF CARE
Problem: Fluid Volume Deficit  Goal: Fluid Balance    Intervention: Monitor and Manage Hypovolemia  Levo on at 0.06 mcg/kg/min      Comments: Patient was alert and oriented x 4 throughout shift. Approx 02:00 patient complained of burning around catheter, after removal of catheter while trying to have BM patient complained of SOB. Started patient on 2L NC, elevated HOB. Slight increase in HR from 100s to 110s, MAP dropped from high 60's to 55-62. Team called to bedside, Levo started at 0.02 and titrated up to 0.1 mcg/kg/min. 500 cc bolus of LR given x 1, 3 units of PRBCs given. Patient still extremely weak, pale. Tarry black large BM x2. EGD at bedside.    Currently sedated with EGD in progress. Levo titrated down to 0.06 with MAP sustained 65-72. HR 110s. Lungs clear, frequent productive cough with thick white sputum, intubated for EGD. Bowel sounds present. Ribeiro removed, total /shift. Skin intact with slight tingling in toes prior to sedation. Protonix off while EGD in progress. Levo and blood currently running.

## 2019-07-12 NOTE — PLAN OF CARE
Problem: Adult Inpatient Plan of Care  Goal: Plan of Care Review  Outcome: Ongoing (interventions implemented as appropriate)  Pt AOX4, ZAIDI, afebrile. SR-ST 's, MAP >65. Levo gtt off since this morning. Extubated to nasal cannula without issue, sats >92%, uses yanker at bedside for thick yellow/white oral secretions. No c/o sob. Clear liquid diet ordered, ate 100% dinner tray. No c/o abd discomfort, abd soft +BS, passing gas frequently. 1 large black tarry BM this shift- GI team aware. CBC trending down throughout day. Critical care notified of last H/H. Skin intact- red rash/itching resolved. Protonix gtt ongoing. POC reviewed with patient and his wife at bedside, all questions/concerns addressed.

## 2019-07-13 PROBLEM — R57.8 HEMORRHAGIC SHOCK: Status: ACTIVE | Noted: 2019-07-13

## 2019-07-13 LAB
ALBUMIN SERPL BCP-MCNC: 1.9 G/DL (ref 3.5–5.2)
ALLENS TEST: ABNORMAL
ALP SERPL-CCNC: 58 U/L (ref 55–135)
ALT SERPL W/O P-5'-P-CCNC: 9 U/L (ref 10–44)
ANION GAP SERPL CALC-SCNC: 5 MMOL/L (ref 8–16)
ANISOCYTOSIS BLD QL SMEAR: ABNORMAL
ANISOCYTOSIS BLD QL SMEAR: SLIGHT
AST SERPL-CCNC: 19 U/L (ref 10–40)
BACTERIA #/AREA URNS AUTO: ABNORMAL /HPF
BASO STIPL BLD QL SMEAR: ABNORMAL
BASOPHILS # BLD AUTO: 0.01 K/UL (ref 0–0.2)
BASOPHILS # BLD AUTO: 0.02 K/UL (ref 0–0.2)
BASOPHILS # BLD AUTO: ABNORMAL K/UL (ref 0–0.2)
BASOPHILS NFR BLD: 0 % (ref 0–1.9)
BASOPHILS NFR BLD: 0.1 % (ref 0–1.9)
BASOPHILS NFR BLD: 0.2 % (ref 0–1.9)
BILIRUB SERPL-MCNC: 0.3 MG/DL (ref 0.1–1)
BILIRUB UR QL STRIP: NEGATIVE
BUN SERPL-MCNC: 22 MG/DL (ref 8–23)
CALCIUM SERPL-MCNC: 7.1 MG/DL (ref 8.7–10.5)
CHLORIDE SERPL-SCNC: 104 MMOL/L (ref 95–110)
CLARITY UR REFRACT.AUTO: ABNORMAL
CO2 SERPL-SCNC: 22 MMOL/L (ref 23–29)
COLOR UR AUTO: YELLOW
CREAT SERPL-MCNC: 0.7 MG/DL (ref 0.5–1.4)
DELSYS: ABNORMAL
DIFFERENTIAL METHOD: ABNORMAL
EOSINOPHIL # BLD AUTO: 0.6 K/UL (ref 0–0.5)
EOSINOPHIL # BLD AUTO: ABNORMAL K/UL (ref 0–0.5)
EOSINOPHIL NFR BLD: 11 % (ref 0–8)
EOSINOPHIL NFR BLD: 11 % (ref 0–8)
EOSINOPHIL NFR BLD: 14 % (ref 0–8)
EOSINOPHIL NFR BLD: 5.1 % (ref 0–8)
EOSINOPHIL NFR BLD: 5.7 % (ref 0–8)
EOSINOPHIL NFR BLD: 5.9 % (ref 0–8)
EOSINOPHIL NFR BLD: 6.1 % (ref 0–8)
ERYTHROCYTE [DISTWIDTH] IN BLOOD BY AUTOMATED COUNT: 14.2 % (ref 11.5–14.5)
ERYTHROCYTE [DISTWIDTH] IN BLOOD BY AUTOMATED COUNT: 14.4 % (ref 11.5–14.5)
ERYTHROCYTE [SEDIMENTATION RATE] IN BLOOD BY WESTERGREN METHOD: 14 MM/H
EST. GFR  (AFRICAN AMERICAN): >60 ML/MIN/1.73 M^2
EST. GFR  (NON AFRICAN AMERICAN): >60 ML/MIN/1.73 M^2
FIO2: 35
GIANT PLATELETS BLD QL SMEAR: PRESENT
GLUCOSE SERPL-MCNC: 129 MG/DL (ref 70–110)
GLUCOSE UR QL STRIP: NEGATIVE
HCO3 UR-SCNC: 23.4 MMOL/L (ref 24–28)
HCT VFR BLD AUTO: 21.6 % (ref 40–54)
HCT VFR BLD AUTO: 21.7 % (ref 40–54)
HCT VFR BLD AUTO: 21.9 % (ref 40–54)
HCT VFR BLD AUTO: 22.5 % (ref 40–54)
HCT VFR BLD AUTO: 23.1 % (ref 40–54)
HCT VFR BLD AUTO: 23.3 % (ref 40–54)
HCT VFR BLD AUTO: 23.9 % (ref 40–54)
HGB BLD-MCNC: 7 G/DL (ref 14–18)
HGB BLD-MCNC: 7.2 G/DL (ref 14–18)
HGB BLD-MCNC: 7.3 G/DL (ref 14–18)
HGB BLD-MCNC: 7.4 G/DL (ref 14–18)
HGB BLD-MCNC: 7.7 G/DL (ref 14–18)
HGB UR QL STRIP: NEGATIVE
HYPOCHROMIA BLD QL SMEAR: ABNORMAL
HYPOCHROMIA BLD QL SMEAR: ABNORMAL
IMM GRANULOCYTES # BLD AUTO: 0.08 K/UL (ref 0–0.04)
IMM GRANULOCYTES # BLD AUTO: 0.18 K/UL (ref 0–0.04)
IMM GRANULOCYTES # BLD AUTO: 0.23 K/UL (ref 0–0.04)
IMM GRANULOCYTES # BLD AUTO: 0.32 K/UL (ref 0–0.04)
IMM GRANULOCYTES # BLD AUTO: ABNORMAL K/UL (ref 0–0.04)
IMM GRANULOCYTES NFR BLD AUTO: 0.7 % (ref 0–0.5)
IMM GRANULOCYTES NFR BLD AUTO: 1.8 % (ref 0–0.5)
IMM GRANULOCYTES NFR BLD AUTO: 2.4 % (ref 0–0.5)
IMM GRANULOCYTES NFR BLD AUTO: 3.1 % (ref 0–0.5)
IMM GRANULOCYTES NFR BLD AUTO: ABNORMAL % (ref 0–0.5)
KETONES UR QL STRIP: NEGATIVE
LEUKOCYTE ESTERASE UR QL STRIP: ABNORMAL
LYMPHOCYTES # BLD AUTO: 0.4 K/UL (ref 1–4.8)
LYMPHOCYTES # BLD AUTO: 0.5 K/UL (ref 1–4.8)
LYMPHOCYTES # BLD AUTO: ABNORMAL K/UL (ref 1–4.8)
LYMPHOCYTES NFR BLD: 1 % (ref 18–48)
LYMPHOCYTES NFR BLD: 2 % (ref 18–48)
LYMPHOCYTES NFR BLD: 3.6 % (ref 18–48)
LYMPHOCYTES NFR BLD: 4.1 % (ref 18–48)
LYMPHOCYTES NFR BLD: 4.3 % (ref 18–48)
LYMPHOCYTES NFR BLD: 4.6 % (ref 18–48)
LYMPHOCYTES NFR BLD: 5 % (ref 18–48)
MAGNESIUM SERPL-MCNC: 2 MG/DL (ref 1.6–2.6)
MCH RBC QN AUTO: 29.7 PG (ref 27–31)
MCH RBC QN AUTO: 29.8 PG (ref 27–31)
MCH RBC QN AUTO: 30.2 PG (ref 27–31)
MCH RBC QN AUTO: 30.3 PG (ref 27–31)
MCH RBC QN AUTO: 30.4 PG (ref 27–31)
MCH RBC QN AUTO: 30.6 PG (ref 27–31)
MCH RBC QN AUTO: 30.8 PG (ref 27–31)
MCHC RBC AUTO-ENTMCNC: 31.8 G/DL (ref 32–36)
MCHC RBC AUTO-ENTMCNC: 32 G/DL (ref 32–36)
MCHC RBC AUTO-ENTMCNC: 32.2 G/DL (ref 32–36)
MCHC RBC AUTO-ENTMCNC: 32.3 G/DL (ref 32–36)
MCHC RBC AUTO-ENTMCNC: 32.9 G/DL (ref 32–36)
MCHC RBC AUTO-ENTMCNC: 33.3 G/DL (ref 32–36)
MCHC RBC AUTO-ENTMCNC: 33.3 G/DL (ref 32–36)
MCV RBC AUTO: 91 FL (ref 82–98)
MCV RBC AUTO: 92 FL (ref 82–98)
MCV RBC AUTO: 93 FL (ref 82–98)
MCV RBC AUTO: 93 FL (ref 82–98)
MCV RBC AUTO: 94 FL (ref 82–98)
MICROSCOPIC COMMENT: ABNORMAL
MIN VOL: 9
MODE: ABNORMAL
MONOCYTES # BLD AUTO: 1.2 K/UL (ref 0.3–1)
MONOCYTES # BLD AUTO: 1.3 K/UL (ref 0.3–1)
MONOCYTES # BLD AUTO: ABNORMAL K/UL (ref 0.3–1)
MONOCYTES NFR BLD: 10 % (ref 4–15)
MONOCYTES NFR BLD: 11.3 % (ref 4–15)
MONOCYTES NFR BLD: 11.4 % (ref 4–15)
MONOCYTES NFR BLD: 12.1 % (ref 4–15)
MONOCYTES NFR BLD: 13.3 % (ref 4–15)
MONOCYTES NFR BLD: 24 % (ref 4–15)
MONOCYTES NFR BLD: 5 % (ref 4–15)
NEUTROPHILS # BLD AUTO: 7.1 K/UL (ref 1.8–7.7)
NEUTROPHILS # BLD AUTO: 7.5 K/UL (ref 1.8–7.7)
NEUTROPHILS # BLD AUTO: 7.9 K/UL (ref 1.8–7.7)
NEUTROPHILS # BLD AUTO: 8.6 K/UL (ref 1.8–7.7)
NEUTROPHILS NFR BLD: 63 % (ref 38–73)
NEUTROPHILS NFR BLD: 73.8 % (ref 38–73)
NEUTROPHILS NFR BLD: 75.3 % (ref 38–73)
NEUTROPHILS NFR BLD: 75.8 % (ref 38–73)
NEUTROPHILS NFR BLD: 76 % (ref 38–73)
NEUTROPHILS NFR BLD: 77 % (ref 38–73)
NEUTROPHILS NFR BLD: 79.1 % (ref 38–73)
NITRITE UR QL STRIP: NEGATIVE
NON-SQ EPI CELLS #/AREA URNS AUTO: 1 /HPF
NRBC BLD-RTO: 0 /100 WBC
NRBC BLD-RTO: 1 /100 WBC
OVALOCYTES BLD QL SMEAR: ABNORMAL
OVALOCYTES BLD QL SMEAR: ABNORMAL
PCO2 BLDA: 46.8 MMHG (ref 35–45)
PEEP: 5
PH SMN: 7.31 [PH] (ref 7.35–7.45)
PH UR STRIP: 5 [PH] (ref 5–8)
PHOSPHATE SERPL-MCNC: 2.1 MG/DL (ref 2.7–4.5)
PIP: 26
PLATELET # BLD AUTO: 125 K/UL (ref 150–350)
PLATELET # BLD AUTO: 135 K/UL (ref 150–350)
PLATELET # BLD AUTO: 136 K/UL (ref 150–350)
PLATELET # BLD AUTO: 151 K/UL (ref 150–350)
PLATELET # BLD AUTO: 151 K/UL (ref 150–350)
PLATELET # BLD AUTO: 153 K/UL (ref 150–350)
PLATELET # BLD AUTO: 158 K/UL (ref 150–350)
PLATELET BLD QL SMEAR: ABNORMAL
PMV BLD AUTO: 10.1 FL (ref 9.2–12.9)
PMV BLD AUTO: 10.1 FL (ref 9.2–12.9)
PMV BLD AUTO: 10.2 FL (ref 9.2–12.9)
PMV BLD AUTO: 9.4 FL (ref 9.2–12.9)
PMV BLD AUTO: 9.7 FL (ref 9.2–12.9)
PMV BLD AUTO: 9.7 FL (ref 9.2–12.9)
PMV BLD AUTO: 9.8 FL (ref 9.2–12.9)
PO2 BLDA: 46 MMHG (ref 40–60)
POC BE: -3 MMOL/L
POC SATURATED O2: 78 % (ref 95–100)
POC TCO2: 25 MMOL/L (ref 24–29)
POIKILOCYTOSIS BLD QL SMEAR: SLIGHT
POIKILOCYTOSIS BLD QL SMEAR: SLIGHT
POLYCHROMASIA BLD QL SMEAR: ABNORMAL
POTASSIUM SERPL-SCNC: 4 MMOL/L (ref 3.5–5.1)
PROMYELOCYTES NFR BLD MANUAL: 1 %
PROT SERPL-MCNC: 4 G/DL (ref 6–8.4)
PROT UR QL STRIP: NEGATIVE
RBC # BLD AUTO: 2.3 M/UL (ref 4.6–6.2)
RBC # BLD AUTO: 2.37 M/UL (ref 4.6–6.2)
RBC # BLD AUTO: 2.38 M/UL (ref 4.6–6.2)
RBC # BLD AUTO: 2.42 M/UL (ref 4.6–6.2)
RBC # BLD AUTO: 2.48 M/UL (ref 4.6–6.2)
RBC # BLD AUTO: 2.49 M/UL (ref 4.6–6.2)
RBC # BLD AUTO: 2.55 M/UL (ref 4.6–6.2)
RBC #/AREA URNS AUTO: 3 /HPF (ref 0–4)
SAMPLE: ABNORMAL
SITE: ABNORMAL
SODIUM SERPL-SCNC: 131 MMOL/L (ref 136–145)
SP GR UR STRIP: 1.02 (ref 1–1.03)
SP02: 98
SQUAMOUS #/AREA URNS AUTO: 0 /HPF
URN SPEC COLLECT METH UR: ABNORMAL
VT: 500
WBC # BLD AUTO: 10.47 K/UL (ref 3.9–12.7)
WBC # BLD AUTO: 10.92 K/UL (ref 3.9–12.7)
WBC # BLD AUTO: 11.3 K/UL (ref 3.9–12.7)
WBC # BLD AUTO: 11.54 K/UL (ref 3.9–12.7)
WBC # BLD AUTO: 12.62 K/UL (ref 3.9–12.7)
WBC # BLD AUTO: 9.68 K/UL (ref 3.9–12.7)
WBC # BLD AUTO: 9.9 K/UL (ref 3.9–12.7)
WBC #/AREA URNS AUTO: >100 /HPF (ref 0–5)

## 2019-07-13 PROCEDURE — 20000000 HC ICU ROOM

## 2019-07-13 PROCEDURE — S0030 INJECTION, METRONIDAZOLE: HCPCS

## 2019-07-13 PROCEDURE — 27000221 HC OXYGEN, UP TO 24 HOURS

## 2019-07-13 PROCEDURE — 87088 URINE BACTERIA CULTURE: CPT

## 2019-07-13 PROCEDURE — 87077 CULTURE AEROBIC IDENTIFY: CPT

## 2019-07-13 PROCEDURE — 99291 PR CRITICAL CARE, E/M 30-74 MINUTES: ICD-10-PCS | Mod: ,,, | Performed by: INTERNAL MEDICINE

## 2019-07-13 PROCEDURE — 63600175 PHARM REV CODE 636 W HCPCS

## 2019-07-13 PROCEDURE — 94761 N-INVAS EAR/PLS OXIMETRY MLT: CPT

## 2019-07-13 PROCEDURE — 99900026 HC AIRWAY MAINTENANCE (STAT)

## 2019-07-13 PROCEDURE — 84100 ASSAY OF PHOSPHORUS: CPT

## 2019-07-13 PROCEDURE — 83735 ASSAY OF MAGNESIUM: CPT

## 2019-07-13 PROCEDURE — 87040 BLOOD CULTURE FOR BACTERIA: CPT

## 2019-07-13 PROCEDURE — 85027 COMPLETE CBC AUTOMATED: CPT | Mod: 91

## 2019-07-13 PROCEDURE — 87086 URINE CULTURE/COLONY COUNT: CPT

## 2019-07-13 PROCEDURE — 25000003 PHARM REV CODE 250: Performed by: STUDENT IN AN ORGANIZED HEALTH CARE EDUCATION/TRAINING PROGRAM

## 2019-07-13 PROCEDURE — 87205 SMEAR GRAM STAIN: CPT

## 2019-07-13 PROCEDURE — 99291 CRITICAL CARE FIRST HOUR: CPT | Mod: ,,, | Performed by: INTERNAL MEDICINE

## 2019-07-13 PROCEDURE — 85007 BL SMEAR W/DIFF WBC COUNT: CPT | Mod: 91

## 2019-07-13 PROCEDURE — 82803 BLOOD GASES ANY COMBINATION: CPT

## 2019-07-13 PROCEDURE — 80053 COMPREHEN METABOLIC PANEL: CPT

## 2019-07-13 PROCEDURE — 81001 URINALYSIS AUTO W/SCOPE: CPT

## 2019-07-13 PROCEDURE — 85025 COMPLETE CBC W/AUTO DIFF WBC: CPT

## 2019-07-13 PROCEDURE — 87186 SC STD MICRODIL/AGAR DIL: CPT

## 2019-07-13 PROCEDURE — 94003 VENT MGMT INPAT SUBQ DAY: CPT

## 2019-07-13 PROCEDURE — 25000003 PHARM REV CODE 250

## 2019-07-13 PROCEDURE — 87070 CULTURE OTHR SPECIMN AEROBIC: CPT

## 2019-07-13 PROCEDURE — C9113 INJ PANTOPRAZOLE SODIUM, VIA: HCPCS | Performed by: STUDENT IN AN ORGANIZED HEALTH CARE EDUCATION/TRAINING PROGRAM

## 2019-07-13 PROCEDURE — 99900035 HC TECH TIME PER 15 MIN (STAT)

## 2019-07-13 PROCEDURE — 63600175 PHARM REV CODE 636 W HCPCS: Performed by: STUDENT IN AN ORGANIZED HEALTH CARE EDUCATION/TRAINING PROGRAM

## 2019-07-13 RX ORDER — CHLORHEXIDINE GLUCONATE ORAL RINSE 1.2 MG/ML
15 SOLUTION DENTAL 2 TIMES DAILY
Status: DISCONTINUED | OUTPATIENT
Start: 2019-07-13 | End: 2019-07-16

## 2019-07-13 RX ORDER — VANCOMYCIN 1.75 GRAM/500 ML IN 0.9 % SODIUM CHLORIDE INTRAVENOUS
20
Status: DISCONTINUED | OUTPATIENT
Start: 2019-07-13 | End: 2019-07-15

## 2019-07-13 RX ORDER — FUROSEMIDE 10 MG/ML
20 INJECTION INTRAMUSCULAR; INTRAVENOUS ONCE
Status: COMPLETED | OUTPATIENT
Start: 2019-07-13 | End: 2019-07-13

## 2019-07-13 RX ORDER — FAMOTIDINE 10 MG/ML
20 INJECTION INTRAVENOUS 2 TIMES DAILY
Status: DISCONTINUED | OUTPATIENT
Start: 2019-07-13 | End: 2019-07-13

## 2019-07-13 RX ORDER — DEXMEDETOMIDINE HYDROCHLORIDE 4 UG/ML
0.2 INJECTION, SOLUTION INTRAVENOUS CONTINUOUS
Status: DISCONTINUED | OUTPATIENT
Start: 2019-07-13 | End: 2019-07-14

## 2019-07-13 RX ORDER — METRONIDAZOLE 500 MG/100ML
500 INJECTION, SOLUTION INTRAVENOUS
Status: DISCONTINUED | OUTPATIENT
Start: 2019-07-13 | End: 2019-07-15

## 2019-07-13 RX ORDER — KETOROLAC TROMETHAMINE 30 MG/ML
30 INJECTION, SOLUTION INTRAMUSCULAR; INTRAVENOUS ONCE
Status: COMPLETED | OUTPATIENT
Start: 2019-07-13 | End: 2019-07-13

## 2019-07-13 RX ORDER — METOCLOPRAMIDE HYDROCHLORIDE 5 MG/ML
10 INJECTION INTRAMUSCULAR; INTRAVENOUS EVERY 6 HOURS
Status: DISCONTINUED | OUTPATIENT
Start: 2019-07-13 | End: 2019-07-15

## 2019-07-13 RX ORDER — VANCOMYCIN 1.75 GRAM/500 ML IN 0.9 % SODIUM CHLORIDE INTRAVENOUS
20 ONCE
Status: DISCONTINUED | OUTPATIENT
Start: 2019-07-13 | End: 2019-07-13

## 2019-07-13 RX ORDER — CEFEPIME HYDROCHLORIDE 2 G/1
2 INJECTION, POWDER, FOR SOLUTION INTRAVENOUS
Status: DISCONTINUED | OUTPATIENT
Start: 2019-07-13 | End: 2019-07-16

## 2019-07-13 RX ADMIN — DEXMEDETOMIDINE HYDROCHLORIDE 0.2 MCG/KG/HR: 4 INJECTION, SOLUTION INTRAVENOUS at 05:07

## 2019-07-13 RX ADMIN — PROPOFOL 40 MCG/KG/MIN: 10 INJECTION, EMULSION INTRAVENOUS at 07:07

## 2019-07-13 RX ADMIN — METOCLOPRAMIDE 10 MG: 5 INJECTION, SOLUTION INTRAMUSCULAR; INTRAVENOUS at 12:07

## 2019-07-13 RX ADMIN — PROPOFOL 45 MCG/KG/MIN: 10 INJECTION, EMULSION INTRAVENOUS at 11:07

## 2019-07-13 RX ADMIN — PANTOPRAZOLE SODIUM 8 MG/HR: 40 INJECTION, POWDER, FOR SOLUTION INTRAVENOUS at 09:07

## 2019-07-13 RX ADMIN — METRONIDAZOLE 500 MG: 500 SOLUTION INTRAVENOUS at 10:07

## 2019-07-13 RX ADMIN — Medication 250 MCG/HR: at 02:07

## 2019-07-13 RX ADMIN — PANTOPRAZOLE SODIUM 8 MG/HR: 40 INJECTION, POWDER, FOR SOLUTION INTRAVENOUS at 03:07

## 2019-07-13 RX ADMIN — PROPOFOL 50 MCG/KG/MIN: 10 INJECTION, EMULSION INTRAVENOUS at 03:07

## 2019-07-13 RX ADMIN — METOCLOPRAMIDE 10 MG: 5 INJECTION, SOLUTION INTRAMUSCULAR; INTRAVENOUS at 08:07

## 2019-07-13 RX ADMIN — CEFEPIME 2 G: 2 INJECTION, POWDER, FOR SOLUTION INTRAVENOUS at 09:07

## 2019-07-13 RX ADMIN — KETOROLAC TROMETHAMINE 30 MG: 30 INJECTION, SOLUTION INTRAMUSCULAR at 04:07

## 2019-07-13 RX ADMIN — Medication 250 MCG/HR: at 04:07

## 2019-07-13 RX ADMIN — FUROSEMIDE 20 MG: 10 INJECTION, SOLUTION INTRAMUSCULAR; INTRAVENOUS at 01:07

## 2019-07-13 RX ADMIN — PROPOFOL 40 MCG/KG/MIN: 10 INJECTION, EMULSION INTRAVENOUS at 02:07

## 2019-07-13 RX ADMIN — Medication 0.1 MCG/KG/MIN: at 08:07

## 2019-07-13 RX ADMIN — Medication 0.04 MCG/KG/MIN: at 08:07

## 2019-07-13 RX ADMIN — METOCLOPRAMIDE 10 MG: 5 INJECTION, SOLUTION INTRAMUSCULAR; INTRAVENOUS at 05:07

## 2019-07-13 RX ADMIN — CEFEPIME 2 G: 2 INJECTION, POWDER, FOR SOLUTION INTRAVENOUS at 04:07

## 2019-07-13 RX ADMIN — ACETAMINOPHEN 650 MG: 650 SUPPOSITORY RECTAL at 06:07

## 2019-07-13 RX ADMIN — METRONIDAZOLE 500 MG: 500 SOLUTION INTRAVENOUS at 01:07

## 2019-07-13 RX ADMIN — PANTOPRAZOLE SODIUM 8 MG/HR: 40 INJECTION, POWDER, FOR SOLUTION INTRAVENOUS at 04:07

## 2019-07-13 RX ADMIN — METOCLOPRAMIDE 10 MG: 5 INJECTION, SOLUTION INTRAMUSCULAR; INTRAVENOUS at 11:07

## 2019-07-13 RX ADMIN — DEXMEDETOMIDINE HYDROCHLORIDE 0.5 MCG/KG/HR: 4 INJECTION, SOLUTION INTRAVENOUS at 11:07

## 2019-07-13 RX ADMIN — VANCOMYCIN HYDROCHLORIDE 1750 MG: 100 INJECTION, POWDER, LYOPHILIZED, FOR SOLUTION INTRAVENOUS at 09:07

## 2019-07-13 RX ADMIN — CHLORHEXIDINE GLUCONATE 0.12% ORAL RINSE 15 ML: 1.2 LIQUID ORAL at 09:07

## 2019-07-13 RX ADMIN — CHLORHEXIDINE GLUCONATE 0.12% ORAL RINSE 15 ML: 1.2 LIQUID ORAL at 08:07

## 2019-07-13 NOTE — PROGRESS NOTES
[facility]  Critical Care Medicine  Progress Note    Patient Name: Manpreet Hamilton  MRN: [id number]  Admission Date: 7/10/2019  Hospital Length of Stay: 3 days  Code Status: Full Code  Attending Provider: Cole Valencia*  Primary Care Provider: Radames Pradhan MD   Principal Problem: GIB (gastrointestinal bleeding)    Subjective:     HPI:  The patient is a 68M with a PMH of HTN, HLD, UIP (dx 1 year ago, followed by Dr. Pradhan, on ofev) presenting as transfer from [place] for GI bleed. The patient was on a cruise with his family in [place] when he suddenly began having bright red bowel movements that progressed to melena. This was on Saturday the 8th. On board the ship his BP was 88/52, pulse 119. Labs with hemoglobin of 9.7 (baseline 15). He was transferred to [facility] where he became progressively more hypotensive (to systolic of 70's) and tachycardic to 120's. His Hg dropped to 7. He was admitted to [facility] for 1 night prior to transfer to [facility]. While at [facility], he received 8 units total of PRBC, 3 units FFP. He was placed on PPI drip and had an endoscopy which showed a small superficial gastric ulcer and duodenal bulb ulcers with no active bleeding.     The patient takes a baby aspirin daily and drinks approximately 4 alcoholic beverages nightly. Has never before had GI bleed.    Hospital/ICU Course:  Patient arrived to ICU and was urgently taken to endoscopy. Stomach full of blood, impairing visualization of source. Due to high risk of aspiration and necessity of repeat EGD, patient was intubated for airway protection.     Required transfusion of additional 5 U PRBC, 1 plt, and 1 FFP. Erythromycin given to clear patient's stomach of blood. Developed erythematous rash, and benadryl given. No wheezing appreciated on exam at that time/    Repeat EGD performed overnight (7/10) which showed numerous ulcers in the stomach and duodenum with no active bleeding. Patient brought back to ICU,  and placed on PPI gtt per GI recs.    Patient was successfully extubated on 7/11; however, hemoglobin began trending downward again later in the day. Active bleeding overnight requiring 3 units PRBCs. GI repeated EGD the morning of 7/12 again with numerous clean-based gastric and duodenal ulcers. Possible active bleeding seen in the cardia of the stomach along the lesser curvature. Clip placed. Patient taken to IR for angio, left gastric embolized. Left intubated due to concern for aspiration again due to large amount of blood in stomach.    Overnight, patient's hemoglobin did trend down slightly, but stayed overall stable (8->~7.5). 3 melanotic stools, which is expected given volume of blood in stomach. Patient also spiked fever of 102.1. Cultures sent, CXR repeated. No focal consolidation, but some edema superimposed on chronic lung changes seen. Started on vanc, cefepime and flagyl given recent procedures and risk of aspiration prior to initial intubation. Lower suspicion for transfusion reaction as it had been an number of hours since most recent transfusion. Also given dose of lasix due to peripheral edema on exam and pulmonary edema on CXR.     Interval History/Significant Events: Please see hospital course above.    Review of Systems   Unable to perform ROS: Intubated     Objective:     Vital Signs (Most Recent):  Temp: (!) 100.4 °F (38 °C) (07/13/19 1224)  Pulse: (!) 117 (07/13/19 1300)  Resp: 16 (07/13/19 1300)  BP: (!) 107/55 (07/13/19 1300)  SpO2: 96 % (07/13/19 1300) Vital Signs (24h Range):  Temp:  [98.1 °F (36.7 °C)-103 °F (39.4 °C)] 100.4 °F (38 °C)  Pulse:  [] 117  Resp:  [8-25] 16  SpO2:  [94 %-100 %] 96 %  BP: ()/(48-63) 107/55   Weight: 92.8 kg (204 lb 9.4 oz)  Body mass index is 31.11 kg/m².      Intake/Output Summary (Last 24 hours) at 7/13/2019 1312  Last data filed at 7/13/2019 1300  Gross per 24 hour   Intake 2870.67 ml   Output 1580 ml   Net 1290.67 ml       Physical Exam    Constitutional: He appears well-developed and well-nourished.   Intubated  RASS 0, following commands    HENT:   Head: Normocephalic and atraumatic.   Eyes: EOM are normal. Right eye exhibits no discharge. Left eye exhibits no discharge.   Neck: No tracheal deviation present.   Cardiovascular: Normal rate, regular rhythm and normal heart sounds. Exam reveals no gallop and no friction rub.   No murmur heard.  Pulmonary/Chest: Effort normal and breath sounds normal. No stridor. He has no wheezes.   Abdominal: Soft. He exhibits no distension. There is no tenderness. There is no guarding.   Musculoskeletal: He exhibits edema (noted in upper and lower extremities). He exhibits no deformity.   Neurological: He is alert.   Skin: Skin is warm and dry.       Vents:  Vent Mode: A/C (07/13/19 1158)  Ventilator Initiated: Yes (07/12/19 0806)  Set Rate: 14 bmp (07/13/19 1158)  Vt Set: 560 mL (07/13/19 1158)  Pressure Support: 0 cmH20 (07/13/19 1158)  PEEP/CPAP: 5 cmH20 (07/13/19 1158)  Oxygen Concentration (%): 30 (07/13/19 1300)  Peak Airway Pressure: 29 cmH2O (07/13/19 1158)  Plateau Pressure: 21 cmH20 (07/13/19 1158)  Total Ve: 7.82 mL (07/13/19 1158)  F/VT Ratio<105 (RSBI): (!) 25.09 (07/13/19 1158)  Lines/Drains/Airways     Central Venous Catheter Line                 Percutaneous Central Line Insertion/Assessment - triple lumen  07/10/19 1511 right internal jugular 2 days          Drain                 Urethral Catheter 07/12/19 2200 less than 1 day          Airway                 Airway - Non-Surgical 07/12/19 0714 Endotracheal Tube 1 day              Significant Labs:    CBC/Anemia Profile:  Recent Labs   Lab 07/13/19  0330 07/13/19  0832 07/13/19  1236   WBC 10.47 10.92 11.54   HGB 7.4* 7.7* 7.4*   HCT 23.1* 23.9* 22.5*   * 153 151   MCV 93 94 93   RDW 14.2 14.4 14.4        Chemistries:  Recent Labs   Lab 07/12/19  0443 07/13/19  0236   * 131*   K 4.0 4.0    104   CO2 21* 22*   BUN 19 22    CREATININE 0.7 0.7   CALCIUM 6.8* 7.1*   ALBUMIN 1.5* 1.9*   PROT 3.0* 4.0*   BILITOT 0.4 0.3   ALKPHOS 50* 58   ALT 7* 9*   AST 17 19   MG 1.9 2.0   PHOS 2.5* 2.1*       All pertinent labs within the past 24 hours have been reviewed.    Significant Imaging:  I have reviewed and interpreted all pertinent imaging results/findings within the past 24 hours.      ABG  Recent Labs   Lab 07/13/19  0856   PH 7.306*   PO2 46   PCO2 46.8*   HCO3 23.4*   BE -3     Assessment/Plan:     Pulmonary  Pulmonary fibrosis  - Dx 1 year ago, managed by Dr. Pradhan  - Per Dr. Pradhan:  hold ofev, goal SpO2 95%     Oncology  Acute blood loss anemia  - Hemoglobin 7 on arrival   - Has received 18 units PRBC, 5 units FFP, 2 plt between facility and [place] [facility]  - CBC q4h  - See GIB     GI  * GIB (gastrointestinal bleeding)  - Initial EGD obscured due to large amount of blood in stomach, repeat with numerous ulcers in stomach and duodenum with no active bleeding (7/10)  - CTA following scope (7/10) with no active bleeding visualized. Some enhancement noted at site of Nissen; however, read at that time felt mucosal enhancement more likely than active bleed  - Patient began actively bleeding again overnight 7/11, transfused 4 additional units, repeat scope again with large amount of blood in stomach, possible active bleed see in cardia along lesser curvature. Source unable to be visualized, clip placed to meeta location for IR. Left gastric embolized.   - CBC q4h  - 3 melanotic bowel movements overnight, hemoglobin initially trending downward but overall remained stable    Other  Hemorrhagic shock  - Weaning pressors as tolerated       Critical Care Daily Checklist:    A: Awake: RASS Goal/Actual Goal:    Actual: Shane Agitation Sedation Scale (RASS): Light sedation   B: Spontaneous Breathing Trial Performed? Spon. Breathing Trial Initiated?: Initiated (07/11/19 2825)   C: SAT & SBT Coordinated?  yes                      D: Delirium:  CAM-ICU Overall CAM-ICU: Positive   E: Early Mobility Performed? Yes   F: Feeding Goal:    Status:     Current Diet Order   Procedures    Diet NPO      AS: Analgesia/Sedation Fentanyl, propofol   T: Thromboembolic Prophylaxis Held due to bleed   H: HOB > 300 Yes   U: Stress Ulcer Prophylaxis (if needed) PPI gtt   G: Glucose Control N/a   B: Bowel Function Stool Occurrence: 1   I: Indwelling Catheter (Lines & Ribeiro) Necessity Central line, PIV   D: De-escalation of Antimicrobials/Pharmacotherapies Escalated today due to fever    Plan for the day/ETD Continue MICU care    Code Status:  Family/Goals of Care: Full Code         Critical secondary to Patient has a condition that poses threat to life and bodily function: GI hemorrhage      Critical care was time spent personally by me on the following activities: development of treatment plan with patient or surrogate and bedside caregivers, discussions with consultants, evaluation of patient's response to treatment, examination of patient, ordering and performing treatments and interventions, ordering and review of laboratory studies, ordering and review of radiographic studies, pulse oximetry, re-evaluation of patient's condition. This critical care time did not overlap with that of any other provider or involve time for any procedures.     Mayo Rico MD  Critical Care Medicine  [facility]

## 2019-07-13 NOTE — CARE UPDATE
Tube advanced to 21 at the lip per MD order. Patient tolerated procedure well. Tube mckeon replaced and signed. Patient is resting comfortably on documented vent settings. Will continue to monitor.

## 2019-07-13 NOTE — SUBJECTIVE & OBJECTIVE
Interval History/Significant Events: Please see hospital course above.    Review of Systems   Unable to perform ROS: Intubated     Objective:     Vital Signs (Most Recent):  Temp: (!) 100.4 °F (38 °C) (07/13/19 1224)  Pulse: (!) 117 (07/13/19 1300)  Resp: 16 (07/13/19 1300)  BP: (!) 107/55 (07/13/19 1300)  SpO2: 96 % (07/13/19 1300) Vital Signs (24h Range):  Temp:  [98.1 °F (36.7 °C)-103 °F (39.4 °C)] 100.4 °F (38 °C)  Pulse:  [] 117  Resp:  [8-25] 16  SpO2:  [94 %-100 %] 96 %  BP: ()/(48-63) 107/55   Weight: 92.8 kg (204 lb 9.4 oz)  Body mass index is 31.11 kg/m².      Intake/Output Summary (Last 24 hours) at 7/13/2019 1312  Last data filed at 7/13/2019 1300  Gross per 24 hour   Intake 2870.67 ml   Output 1580 ml   Net 1290.67 ml       Physical Exam   Constitutional: He appears well-developed and well-nourished.   Intubated  RASS 0, following commands    HENT:   Head: Normocephalic and atraumatic.   Eyes: EOM are normal. Right eye exhibits no discharge. Left eye exhibits no discharge.   Neck: No tracheal deviation present.   Cardiovascular: Normal rate, regular rhythm and normal heart sounds. Exam reveals no gallop and no friction rub.   No murmur heard.  Pulmonary/Chest: Effort normal and breath sounds normal. No stridor. He has no wheezes.   Abdominal: Soft. He exhibits no distension. There is no tenderness. There is no guarding.   Musculoskeletal: He exhibits edema (noted in upper and lower extremities). He exhibits no deformity.   Neurological: He is alert.   Skin: Skin is warm and dry.       Vents:  Vent Mode: A/C (07/13/19 1158)  Ventilator Initiated: Yes (07/12/19 0806)  Set Rate: 14 bmp (07/13/19 1158)  Vt Set: 560 mL (07/13/19 1158)  Pressure Support: 0 cmH20 (07/13/19 1158)  PEEP/CPAP: 5 cmH20 (07/13/19 1158)  Oxygen Concentration (%): 30 (07/13/19 1300)  Peak Airway Pressure: 29 cmH2O (07/13/19 1158)  Plateau Pressure: 21 cmH20 (07/13/19 1158)  Total Ve: 7.82 mL (07/13/19 1158)  F/VT  Ratio<105 (RSBI): (!) 25.09 (07/13/19 1158)  Lines/Drains/Airways     Central Venous Catheter Line                 Percutaneous Central Line Insertion/Assessment - triple lumen  07/10/19 1511 right internal jugular 2 days          Drain                 Urethral Catheter 07/12/19 2200 less than 1 day          Airway                 Airway - Non-Surgical 07/12/19 0714 Endotracheal Tube 1 day              Significant Labs:    CBC/Anemia Profile:  Recent Labs   Lab 07/13/19  0330 07/13/19  0832 07/13/19  1236   WBC 10.47 10.92 11.54   HGB 7.4* 7.7* 7.4*   HCT 23.1* 23.9* 22.5*   * 153 151   MCV 93 94 93   RDW 14.2 14.4 14.4        Chemistries:  Recent Labs   Lab 07/12/19  0443 07/13/19  0236   * 131*   K 4.0 4.0    104   CO2 21* 22*   BUN 19 22   CREATININE 0.7 0.7   CALCIUM 6.8* 7.1*   ALBUMIN 1.5* 1.9*   PROT 3.0* 4.0*   BILITOT 0.4 0.3   ALKPHOS 50* 58   ALT 7* 9*   AST 17 19   MG 1.9 2.0   PHOS 2.5* 2.1*       All pertinent labs within the past 24 hours have been reviewed.    Significant Imaging:  I have reviewed and interpreted all pertinent imaging results/findings within the past 24 hours.

## 2019-07-13 NOTE — NURSING
See vital signs and assessments in flowsheets. See below for updates on today's progress.      Pulmonary:  VENT, peep 5, fio2: 30%, ett tube 21 @ Lip oxygen saturations >96% throughout shift     Cardiovascular: Sinus tach, HR: 's, SBP : 's,     Neurological: Sedated, responds to pain, febrile, t-max :103 (Gave 1 tylenol suppository)       Gastrointestinal: x1 BM (tarry stool), bowel sounds present x4 quadrants      Genitourinary: Voids via sullivan catheter 1.6 cc / shift     Integumentary/Other: skin intact, warm to touch     Infusions:  Propofol, PRECEDEX, Fentanyl, Levo, Protonix, KVO     POC: Continue to monitor patients H/H & hemodynamically monitoring. Wean propofol. Try SBT in morning. Continue plan of care. Plan discussed with patient and family at bedside all questions and concerns were addressed. No further questions at this time.

## 2019-07-13 NOTE — TREATMENT PLAN
GI Treatment Plan    Manpreet Hamilton is a 68 y.o. male admitted to hospital 7/10/2019 (Hospital Day: 4) due to GIB (gastrointestinal bleeding).     Interval History  Assessed at bedside, spoke to family, hb slight drop, 2 black tarry stools.     Objective  Temp:  [98.1 °F (36.7 °C)-103 °F (39.4 °C)] 101.8 °F (38.8 °C) (07/13 1105)  Pulse:  [] 118 (07/13 1105)  BP: ()/(48-63) 98/53 (07/13 1105)  Resp:  [7-25] 17 (07/13 1105)  SpO2:  [94 %-100 %] 95 % (07/13 1105)        Laboratory    Recent Labs   Lab 07/13/19  0236 07/13/19  0330 07/13/19  0832   HGB 7.4* 7.4* 7.7*       Lab Results   Component Value Date    WBC 10.92 07/13/2019    HGB 7.7 (L) 07/13/2019    HCT 23.9 (L) 07/13/2019    MCV 94 07/13/2019     07/13/2019       Lab Results   Component Value Date     (L) 07/13/2019    K 4.0 07/13/2019     07/13/2019    CO2 22 (L) 07/13/2019    BUN 22 07/13/2019    CREATININE 0.7 07/13/2019    CALCIUM 7.1 (L) 07/13/2019    ANIONGAP 5 (L) 07/13/2019    ESTGFRAFRICA >60.0 07/13/2019    EGFRNONAA >60.0 07/13/2019       Lab Results   Component Value Date    ALT 9 (L) 07/13/2019    AST 19 07/13/2019    ALKPHOS 58 07/13/2019    BILITOT 0.3 07/13/2019       Lab Results   Component Value Date    INR 1.2 07/12/2019    INR 1.0 07/10/2019    INR 1.0 06/27/2018       Plan  -trend hb  -call if rebleed  - Plan of care was discussed with primary team.  - We will continue to follow.  -IV PPI 40 BID or PPI ggt  - Plan of care was discussed with primary team.  - We will continue to follow.    Thank you for involving us in the care of Manpreet Hamilton. Please call with any additional questions, concerns or changes in the patient's clinical status.    Loyd Maria MD  Gastroenterology Fellow  Spectralink: [phone]

## 2019-07-13 NOTE — NURSING
Notified team of patients' drop in BP 78/45 (58); Increased Levophed to maintain map >65. Patients' pressor requirements have increased over the past couple of hours. No obvious signs of bleeding, nor any bruising present, no recent BM. Will continue to monitor patient and maintain adequate blood pressure.

## 2019-07-13 NOTE — ASSESSMENT & PLAN NOTE
- Initial EGD obscured due to large amount of blood in stomach, repeat with numerous ulcers in stomach and duodenum with no active bleeding (7/10)  - CTA following scope (7/10) with no active bleeding visualized. Some enhancement noted at site of Nissen; however, read at that time felt mucosal enhancement more likely than active bleed  - Patient began actively bleeding again overnight 7/11, transfused 4 additional units, repeat scope again with large amount of blood in stomach, possible active bleed see in cardia along lesser curvature. Source unable to be visualized, clip placed to meeta location for IR. Left gastric embolized.   - CBC q4h  - 3 melanotic bowel movements overnight, hemoglobin initially trending downward but overall remained stable

## 2019-07-13 NOTE — PROGRESS NOTES
Pharmacokinetic Initial Assessment: IV Vancomycin    Assessment/Plan:    Initiate intravenous vancomycin with loading dose of   mg once followed by a maintenance dose of vancomycin 1750mg IV every 24 hours  Desired empiric serum trough concentration is 10 to 20 mcg/mL.  Draw vancomycin trough level 30 min prior to third dose on 07/15/2019 at approximately 08:45  Pharmacy will continue to follow and monitor vancomycin.      Please contact pharmacy at extension [phone] with any questions regarding this assessment.     Thank you for the consult,   Jacqui Lyon     Patient brief summary:  Manpreet Hamilton is a 68 y.o. male initiated on antimicrobial therapy with IV Vancomycin for treatment of suspected sepsis    Drug Allergies:   Review of patient's allergies indicates:   Allergen Reactions    Penicillins     Erythromycin Rash       Actual Body Weight:   92.8 kg    Renal Function:   Estimated Creatinine Clearance: 108.6 mL/min (based on SCr of 0.7 mg/dL).,     Dialysis Method (if applicable):  N/A    CBC (last 72 hours):  Recent Labs   Lab Result Units 07/10/19  1103 07/10/19  1801 07/10/19  2013 07/10/19  2345 07/11/19  0146 07/11/19  0807 07/11/19  1308 07/11/19  1545 07/11/19  1809 07/12/19  0005 07/12/19  0450 07/12/19  1202 07/12/19  1807 07/12/19  1953 07/13/19  0043 07/13/19  0236 07/13/19  0330   WBC K/uL 9.01 22.62* 17.51* 14.43* 10.72 7.56 7.85 9.22 8.59 6.95 10.72 17.72* 11.37 10.46 9.68 9.90 10.47   Hemoglobin g/dL 6.8* 9.7* 9.6* 9.0* 9.1* 8.7* 8.1* 8.3* 7.8* 7.0* 6.2* 6.8* 7.7* 8.0* 7.3* 7.4* 7.4*   Hematocrit % 19.2* 29.4* 28.8* 26.4* 27.5* 26.8* 24.0* 24.7* 23.0* 21.0* 18.6* 20.4* 23.4* 24.0* 21.9* 23.3* 23.1*   Platelets K/uL 132* 133* 159 166 165 138* 136* 139* 131* 137* 143* 126* 123* 125* 125* 136* 135*   Gran% % 75.0* 90.7* 87.3* 79.7* 71.3 68.0 74.0* 72.7 80.8* 75.5* 81.1* 90.0* 80.5* 75.6* 73.8* 75.8* 75.3*   Lymph% % 11.9* 2.6* 4.7* 8.3* 10.7* 11.8* 7.5* 9.5* 4.7* 8.3* 6.2* 2.4* 4.5* 5.0* 4.6*  4.3* 4.1*   Mono% % 8.5 5.3 6.7 7.8 10.4 12.0 10.8 11.1 9.0 9.5 9.0 6.5 10.3 11.6 13.3 12.1 11.3   Eosinophil% % 3.8 0.4 0.4 3.3 6.9 7.5 7.0 6.0 4.9 5.8 3.0 0.3 3.5 4.5 5.7 5.9 6.1   Basophil% % 0.4 0.2 0.2 0.3 0.4 0.3 0.3 0.2 0.1 0.3 0.2 0.2 0.3 0.1 0.2 0.1 0.1   Differential Method  Automated Automated Automated Automated Automated Automated Automated Automated Automated Automated Automated Automated Automated Automated Automated Automated Automated       Metabolic Panel (last 72 hours):  Recent Labs   Lab Result Units 07/10/19  1801 07/11/19  0146 07/12/19  0443 07/12/19  2117 07/13/19  0236   Sodium mmol/L 133* 131* 131*  --  131*   Potassium mmol/L 4.5 4.0 4.0  --  4.0   Chloride mmol/L 108 104 104  --  104   CO2 mmol/L 21* 21* 21*  --  22*   Glucose mg/dL 154* 152* 143*  --  129*   Glucose, UA   --   --   --  Negative  --    BUN, Bld mg/dL 37* 37* 19  --  22   Creatinine mg/dL 0.7 0.8 0.7  --  0.7   Albumin g/dL 1.7* 2.1* 1.5*  --  1.9*   Total Bilirubin mg/dL 0.4 0.3 0.4  --  0.3   Alkaline Phosphatase U/L 44* 50* 50*  --  58   AST U/L 18 17 17  --  19   ALT U/L 10 11 7*  --  9*   Magnesium mg/dL  --  1.7 1.9  --  2.0   Phosphorus mg/dL  --  3.4 2.5*  --  2.1*       Drug levels (last 3 results):  No results for input(s): VANCOMYCINRA, VANCOMYCINPE, VANCOMYCINTR in the last 72 hours.    Microbiologic Results:  Microbiology Results (last 7 days)     Procedure Component Value Units Date/Time    Blood culture [[id number]] Collected:  07/12/19 2205    Order Status:  Completed Specimen:  Blood from Peripheral, Antecubital, Right Updated:  07/13/19 0515     Blood Culture, Routine No Growth to date    Blood Culture #1 **CANNOT BE ORDERED STAT** [[id number]]     Order Status:  No result Specimen:  Blood

## 2019-07-13 NOTE — PLAN OF CARE
Problem: Restraint, Nonbehavioral (Nonviolent)  Intervention: Education  Patient repeatedly reaches for ET tube when restraints are untied during cleaning/turns. Educated repeatedly on need for restraints. Spouse is aware of restraints and why they are in place.       Comments: CASTILLO. See flowsheets for vital signs and assessments. See below for updates on progress made.       Neuro: Sedated, follows commands, Pupils MARICHUY, TMax 102.1 PRN tylenol given rectally.    Cardiovascular: -120s ST, MAP Goal >65 maintained with Levo.      Pulmonary: Lungs coarse diminished. Vent VC PEEP 5, FiO2 35, Rate 20, . SpO2 >94 for the shift.     Gastrointestinal: 2x Black Tarry Small BM. NPO.     Genitourinary: Ribeiro placed. UOP ~1L/shift    Integumentary/other: HAPI prevention bundle in place; Last CHG bath: Full CHG bath and linens/gown/leads changed overnight    Infusions: Levo, Prop, Fentanyl, Protonix gtt per eMAR    POC: Monitor H&H, BMs    Patient progressing towards goals as tolerated, plan of care communicated and reviewed with Manpreet Hamilton. All concerns addressed. Will continue to monitor.

## 2019-07-13 NOTE — NURSING
See vital signs and assessments in flowsheets. See below for updates on today's progress.     Acute Events: EGD in AM, IR afterwards for procedure. Numerous blood products administered throughout shift (refer to flow sheets)      Pulmonary:  VENT, peep 5, fio2: 40%, ett tube 22 @ Lip oxygen saturations >96% throughout shift     Cardiovascular: NSR to sinus tach, HR: 's, SBP : 100-110's,     Neurological: Sedated, responds to pain, afebrile      Gastrointestinal: x1 BM (dark tarry stool), bowel sounds present x4 quadrants,      Genitourinary: voids via urinal 600 cc / shift     Integumentary/Other: skin intact     Infusions:  Propofol, Fentanyl, Levo, Protonix, KVO     POC: Continue to monitor patients H/H & hemodynamically monitoring. Continue plan of care. Plan discussed with patient and family at bedside all questions and concerns were addressed. No further questions at this time.

## 2019-07-13 NOTE — CARE UPDATE
MD requested the tube be pulled back 2cm per Respiratory Communication. Tube was pulled back to 20 at the lip. Patient tolerated well but began breath stacking shortly after. MD was consulted and VT was increased to 530. Patient tolerated well. Ambu bag and mask are at the bedside. Will continue to monitor.

## 2019-07-14 PROBLEM — R57.9 SHOCK: Status: ACTIVE | Noted: 2019-07-13

## 2019-07-14 LAB
ALBUMIN SERPL BCP-MCNC: 1.7 G/DL (ref 3.5–5.2)
ALP SERPL-CCNC: 77 U/L (ref 55–135)
ALT SERPL W/O P-5'-P-CCNC: 14 U/L (ref 10–44)
ANION GAP SERPL CALC-SCNC: 6 MMOL/L (ref 8–16)
ANISOCYTOSIS BLD QL SMEAR: SLIGHT
AST SERPL-CCNC: 33 U/L (ref 10–40)
BASO STIPL BLD QL SMEAR: ABNORMAL
BASOPHILS # BLD AUTO: 0.01 K/UL (ref 0–0.2)
BASOPHILS # BLD AUTO: 0.01 K/UL (ref 0–0.2)
BASOPHILS # BLD AUTO: ABNORMAL K/UL (ref 0–0.2)
BASOPHILS # BLD AUTO: ABNORMAL K/UL (ref 0–0.2)
BASOPHILS NFR BLD: 0 % (ref 0–1.9)
BASOPHILS NFR BLD: 0.1 % (ref 0–1.9)
BASOPHILS NFR BLD: 0.1 % (ref 0–1.9)
BASOPHILS NFR BLD: 1 % (ref 0–1.9)
BILIRUB SERPL-MCNC: 0.5 MG/DL (ref 0.1–1)
BLD PROD TYP BPU: NORMAL
BLOOD UNIT EXPIRATION DATE: NORMAL
BLOOD UNIT TYPE CODE: 5100
BLOOD UNIT TYPE: NORMAL
BUN SERPL-MCNC: 19 MG/DL (ref 8–23)
CALCIUM SERPL-MCNC: 7.4 MG/DL (ref 8.7–10.5)
CHLORIDE SERPL-SCNC: 101 MMOL/L (ref 95–110)
CO2 SERPL-SCNC: 24 MMOL/L (ref 23–29)
CODING SYSTEM: NORMAL
CREAT SERPL-MCNC: 0.7 MG/DL (ref 0.5–1.4)
DIFFERENTIAL METHOD: ABNORMAL
DISPENSE STATUS: NORMAL
EOSINOPHIL # BLD AUTO: 0.6 K/UL (ref 0–0.5)
EOSINOPHIL # BLD AUTO: 0.7 K/UL (ref 0–0.5)
EOSINOPHIL # BLD AUTO: ABNORMAL K/UL (ref 0–0.5)
EOSINOPHIL # BLD AUTO: ABNORMAL K/UL (ref 0–0.5)
EOSINOPHIL NFR BLD: 15 % (ref 0–8)
EOSINOPHIL NFR BLD: 16 % (ref 0–8)
EOSINOPHIL NFR BLD: 7.5 % (ref 0–8)
EOSINOPHIL NFR BLD: 8 % (ref 0–8)
EOSINOPHIL NFR BLD: 8.3 % (ref 0–8)
EOSINOPHIL NFR BLD: 9 % (ref 0–8)
ERYTHROCYTE [DISTWIDTH] IN BLOOD BY AUTOMATED COUNT: 14.1 % (ref 11.5–14.5)
ERYTHROCYTE [DISTWIDTH] IN BLOOD BY AUTOMATED COUNT: 14.2 % (ref 11.5–14.5)
ERYTHROCYTE [DISTWIDTH] IN BLOOD BY AUTOMATED COUNT: 14.3 % (ref 11.5–14.5)
ERYTHROCYTE [DISTWIDTH] IN BLOOD BY AUTOMATED COUNT: 14.3 % (ref 11.5–14.5)
ERYTHROCYTE [DISTWIDTH] IN BLOOD BY AUTOMATED COUNT: 14.4 % (ref 11.5–14.5)
ERYTHROCYTE [DISTWIDTH] IN BLOOD BY AUTOMATED COUNT: 14.5 % (ref 11.5–14.5)
EST. GFR  (AFRICAN AMERICAN): >60 ML/MIN/1.73 M^2
EST. GFR  (NON AFRICAN AMERICAN): >60 ML/MIN/1.73 M^2
GIANT PLATELETS BLD QL SMEAR: PRESENT
GIANT PLATELETS BLD QL SMEAR: PRESENT
GLUCOSE SERPL-MCNC: 120 MG/DL (ref 70–110)
HCT VFR BLD AUTO: 21.2 % (ref 40–54)
HCT VFR BLD AUTO: 23.4 % (ref 40–54)
HCT VFR BLD AUTO: 23.9 % (ref 40–54)
HCT VFR BLD AUTO: 24.2 % (ref 40–54)
HCT VFR BLD AUTO: 25.5 % (ref 40–54)
HCT VFR BLD AUTO: 25.8 % (ref 40–54)
HGB BLD-MCNC: 6.7 G/DL (ref 14–18)
HGB BLD-MCNC: 7.7 G/DL (ref 14–18)
HGB BLD-MCNC: 7.8 G/DL (ref 14–18)
HGB BLD-MCNC: 8 G/DL (ref 14–18)
HGB BLD-MCNC: 8.4 G/DL (ref 14–18)
HGB BLD-MCNC: 8.7 G/DL (ref 14–18)
HYPOCHROMIA BLD QL SMEAR: ABNORMAL
IMM GRANULOCYTES # BLD AUTO: 0.07 K/UL (ref 0–0.04)
IMM GRANULOCYTES # BLD AUTO: 0.15 K/UL (ref 0–0.04)
IMM GRANULOCYTES # BLD AUTO: ABNORMAL K/UL (ref 0–0.04)
IMM GRANULOCYTES NFR BLD AUTO: 0.8 % (ref 0–0.5)
IMM GRANULOCYTES NFR BLD AUTO: 1.9 % (ref 0–0.5)
IMM GRANULOCYTES NFR BLD AUTO: ABNORMAL % (ref 0–0.5)
LYMPHOCYTES # BLD AUTO: 0.4 K/UL (ref 1–4.8)
LYMPHOCYTES # BLD AUTO: 0.4 K/UL (ref 1–4.8)
LYMPHOCYTES # BLD AUTO: ABNORMAL K/UL (ref 1–4.8)
LYMPHOCYTES # BLD AUTO: ABNORMAL K/UL (ref 1–4.8)
LYMPHOCYTES NFR BLD: 10 % (ref 18–48)
LYMPHOCYTES NFR BLD: 10 % (ref 18–48)
LYMPHOCYTES NFR BLD: 4.4 % (ref 18–48)
LYMPHOCYTES NFR BLD: 5.2 % (ref 18–48)
LYMPHOCYTES NFR BLD: 6 % (ref 18–48)
LYMPHOCYTES NFR BLD: 8 % (ref 18–48)
MAGNESIUM SERPL-MCNC: 2 MG/DL (ref 1.6–2.6)
MCH RBC QN AUTO: 29.9 PG (ref 27–31)
MCH RBC QN AUTO: 29.9 PG (ref 27–31)
MCH RBC QN AUTO: 30 PG (ref 27–31)
MCH RBC QN AUTO: 30.3 PG (ref 27–31)
MCHC RBC AUTO-ENTMCNC: 31.6 G/DL (ref 32–36)
MCHC RBC AUTO-ENTMCNC: 32.2 G/DL (ref 32–36)
MCHC RBC AUTO-ENTMCNC: 32.9 G/DL (ref 32–36)
MCHC RBC AUTO-ENTMCNC: 32.9 G/DL (ref 32–36)
MCHC RBC AUTO-ENTMCNC: 33.5 G/DL (ref 32–36)
MCHC RBC AUTO-ENTMCNC: 33.7 G/DL (ref 32–36)
MCV RBC AUTO: 90 FL (ref 82–98)
MCV RBC AUTO: 90 FL (ref 82–98)
MCV RBC AUTO: 91 FL (ref 82–98)
MCV RBC AUTO: 91 FL (ref 82–98)
MCV RBC AUTO: 93 FL (ref 82–98)
MCV RBC AUTO: 95 FL (ref 82–98)
MONOCYTES # BLD AUTO: 0.8 K/UL (ref 0.3–1)
MONOCYTES # BLD AUTO: 0.9 K/UL (ref 0.3–1)
MONOCYTES # BLD AUTO: ABNORMAL K/UL (ref 0.3–1)
MONOCYTES # BLD AUTO: ABNORMAL K/UL (ref 0.3–1)
MONOCYTES NFR BLD: 10.7 % (ref 4–15)
MONOCYTES NFR BLD: 5 % (ref 4–15)
MONOCYTES NFR BLD: 7 % (ref 4–15)
MONOCYTES NFR BLD: 7 % (ref 4–15)
MONOCYTES NFR BLD: 9 % (ref 4–15)
MONOCYTES NFR BLD: 9.6 % (ref 4–15)
NEUTROPHILS # BLD AUTO: 6 K/UL (ref 1.8–7.7)
NEUTROPHILS # BLD AUTO: 6.4 K/UL (ref 1.8–7.7)
NEUTROPHILS NFR BLD: 66 % (ref 38–73)
NEUTROPHILS NFR BLD: 69 % (ref 38–73)
NEUTROPHILS NFR BLD: 75.7 % (ref 38–73)
NEUTROPHILS NFR BLD: 75.7 % (ref 38–73)
NEUTROPHILS NFR BLD: 76 % (ref 38–73)
NEUTROPHILS NFR BLD: 78 % (ref 38–73)
NRBC BLD-RTO: 0 /100 WBC
OVALOCYTES BLD QL SMEAR: ABNORMAL
PHOSPHATE SERPL-MCNC: 2.8 MG/DL (ref 2.7–4.5)
PLATELET # BLD AUTO: 138 K/UL (ref 150–350)
PLATELET # BLD AUTO: 153 K/UL (ref 150–350)
PLATELET # BLD AUTO: 161 K/UL (ref 150–350)
PLATELET # BLD AUTO: 163 K/UL (ref 150–350)
PLATELET # BLD AUTO: 170 K/UL (ref 150–350)
PLATELET # BLD AUTO: 211 K/UL (ref 150–350)
PLATELET BLD QL SMEAR: ABNORMAL
PMV BLD AUTO: 9.6 FL (ref 9.2–12.9)
PMV BLD AUTO: 9.6 FL (ref 9.2–12.9)
PMV BLD AUTO: 9.7 FL (ref 9.2–12.9)
PMV BLD AUTO: 9.8 FL (ref 9.2–12.9)
PMV BLD AUTO: 9.8 FL (ref 9.2–12.9)
PMV BLD AUTO: 9.9 FL (ref 9.2–12.9)
POIKILOCYTOSIS BLD QL SMEAR: SLIGHT
POLYCHROMASIA BLD QL SMEAR: ABNORMAL
POTASSIUM SERPL-SCNC: 3.8 MMOL/L (ref 3.5–5.1)
PROT SERPL-MCNC: 4.3 G/DL (ref 6–8.4)
RBC # BLD AUTO: 2.24 M/UL (ref 4.6–6.2)
RBC # BLD AUTO: 2.57 M/UL (ref 4.6–6.2)
RBC # BLD AUTO: 2.61 M/UL (ref 4.6–6.2)
RBC # BLD AUTO: 2.67 M/UL (ref 4.6–6.2)
RBC # BLD AUTO: 2.8 M/UL (ref 4.6–6.2)
RBC # BLD AUTO: 2.87 M/UL (ref 4.6–6.2)
SODIUM SERPL-SCNC: 131 MMOL/L (ref 136–145)
TOXIC GRANULES BLD QL SMEAR: PRESENT
TOXIC GRANULES BLD QL SMEAR: PRESENT
TRANS ERYTHROCYTES VOL PATIENT: NORMAL ML
WBC # BLD AUTO: 6.78 K/UL (ref 3.9–12.7)
WBC # BLD AUTO: 6.89 K/UL (ref 3.9–12.7)
WBC # BLD AUTO: 7.36 K/UL (ref 3.9–12.7)
WBC # BLD AUTO: 7.95 K/UL (ref 3.9–12.7)
WBC # BLD AUTO: 8.48 K/UL (ref 3.9–12.7)
WBC # BLD AUTO: 8.68 K/UL (ref 3.9–12.7)
WBC TOXIC VACUOLES BLD QL SMEAR: PRESENT
WBC TOXIC VACUOLES BLD QL SMEAR: PRESENT

## 2019-07-14 PROCEDURE — 99900026 HC AIRWAY MAINTENANCE (STAT)

## 2019-07-14 PROCEDURE — 80053 COMPREHEN METABOLIC PANEL: CPT

## 2019-07-14 PROCEDURE — 99291 PR CRITICAL CARE, E/M 30-74 MINUTES: ICD-10-PCS | Mod: ,,, | Performed by: INTERNAL MEDICINE

## 2019-07-14 PROCEDURE — 20000000 HC ICU ROOM

## 2019-07-14 PROCEDURE — 84100 ASSAY OF PHOSPHORUS: CPT

## 2019-07-14 PROCEDURE — 83735 ASSAY OF MAGNESIUM: CPT

## 2019-07-14 PROCEDURE — 99900035 HC TECH TIME PER 15 MIN (STAT)

## 2019-07-14 PROCEDURE — 94003 VENT MGMT INPAT SUBQ DAY: CPT

## 2019-07-14 PROCEDURE — S0030 INJECTION, METRONIDAZOLE: HCPCS

## 2019-07-14 PROCEDURE — P9021 RED BLOOD CELLS UNIT: HCPCS

## 2019-07-14 PROCEDURE — 63600175 PHARM REV CODE 636 W HCPCS: Performed by: STUDENT IN AN ORGANIZED HEALTH CARE EDUCATION/TRAINING PROGRAM

## 2019-07-14 PROCEDURE — C9113 INJ PANTOPRAZOLE SODIUM, VIA: HCPCS | Performed by: STUDENT IN AN ORGANIZED HEALTH CARE EDUCATION/TRAINING PROGRAM

## 2019-07-14 PROCEDURE — 25000003 PHARM REV CODE 250: Performed by: STUDENT IN AN ORGANIZED HEALTH CARE EDUCATION/TRAINING PROGRAM

## 2019-07-14 PROCEDURE — 63600175 PHARM REV CODE 636 W HCPCS

## 2019-07-14 PROCEDURE — 85007 BL SMEAR W/DIFF WBC COUNT: CPT | Mod: 91

## 2019-07-14 PROCEDURE — 99291 CRITICAL CARE FIRST HOUR: CPT | Mod: ,,, | Performed by: INTERNAL MEDICINE

## 2019-07-14 PROCEDURE — 85027 COMPLETE CBC AUTOMATED: CPT | Mod: 91

## 2019-07-14 PROCEDURE — 25000003 PHARM REV CODE 250

## 2019-07-14 PROCEDURE — 85025 COMPLETE CBC W/AUTO DIFF WBC: CPT | Mod: 91

## 2019-07-14 PROCEDURE — 94761 N-INVAS EAR/PLS OXIMETRY MLT: CPT

## 2019-07-14 PROCEDURE — 36430 TRANSFUSION BLD/BLD COMPNT: CPT

## 2019-07-14 PROCEDURE — 82803 BLOOD GASES ANY COMBINATION: CPT

## 2019-07-14 PROCEDURE — 27000221 HC OXYGEN, UP TO 24 HOURS

## 2019-07-14 RX ORDER — HYDROCODONE BITARTRATE AND ACETAMINOPHEN 500; 5 MG/1; MG/1
TABLET ORAL
Status: DISCONTINUED | OUTPATIENT
Start: 2019-07-14 | End: 2019-07-15

## 2019-07-14 RX ORDER — FENTANYL CITRATE-0.9 % NACL/PF 10 MCG/ML
25 PLASTIC BAG, INJECTION (ML) INTRAVENOUS CONTINUOUS
Status: DISCONTINUED | OUTPATIENT
Start: 2019-07-14 | End: 2019-07-14

## 2019-07-14 RX ADMIN — METRONIDAZOLE 500 MG: 500 SOLUTION INTRAVENOUS at 02:07

## 2019-07-14 RX ADMIN — PANTOPRAZOLE SODIUM 8 MG/HR: 40 INJECTION, POWDER, FOR SOLUTION INTRAVENOUS at 02:07

## 2019-07-14 RX ADMIN — DIPHENHYDRAMINE HYDROCHLORIDE 25 MG: 50 INJECTION, SOLUTION INTRAMUSCULAR; INTRAVENOUS at 10:07

## 2019-07-14 RX ADMIN — METRONIDAZOLE 500 MG: 500 SOLUTION INTRAVENOUS at 10:07

## 2019-07-14 RX ADMIN — METRONIDAZOLE 500 MG: 500 SOLUTION INTRAVENOUS at 05:07

## 2019-07-14 RX ADMIN — CEFEPIME 2 G: 2 INJECTION, POWDER, FOR SOLUTION INTRAVENOUS at 12:07

## 2019-07-14 RX ADMIN — METOCLOPRAMIDE 10 MG: 5 INJECTION, SOLUTION INTRAMUSCULAR; INTRAVENOUS at 11:07

## 2019-07-14 RX ADMIN — CEFEPIME 2 G: 2 INJECTION, POWDER, FOR SOLUTION INTRAVENOUS at 10:07

## 2019-07-14 RX ADMIN — SODIUM CHLORIDE, SODIUM LACTATE, POTASSIUM CHLORIDE, AND CALCIUM CHLORIDE 500 ML: .6; .31; .03; .02 INJECTION, SOLUTION INTRAVENOUS at 11:07

## 2019-07-14 RX ADMIN — SODIUM CHLORIDE, SODIUM LACTATE, POTASSIUM CHLORIDE, AND CALCIUM CHLORIDE 500 ML: .6; .31; .03; .02 INJECTION, SOLUTION INTRAVENOUS at 01:07

## 2019-07-14 RX ADMIN — Medication 0.04 MCG/KG/MIN: at 05:07

## 2019-07-14 RX ADMIN — METOCLOPRAMIDE 10 MG: 5 INJECTION, SOLUTION INTRAMUSCULAR; INTRAVENOUS at 05:07

## 2019-07-14 RX ADMIN — METOCLOPRAMIDE 10 MG: 5 INJECTION, SOLUTION INTRAMUSCULAR; INTRAVENOUS at 06:07

## 2019-07-14 RX ADMIN — VANCOMYCIN HYDROCHLORIDE 1750 MG: 100 INJECTION, POWDER, LYOPHILIZED, FOR SOLUTION INTRAVENOUS at 10:07

## 2019-07-14 RX ADMIN — Medication 250 MCG/HR: at 01:07

## 2019-07-14 RX ADMIN — GUAIFENESIN AND DEXTROMETHORPHAN HYDROBROMIDE 1 TABLET: 600; 30 TABLET, EXTENDED RELEASE ORAL at 11:07

## 2019-07-14 RX ADMIN — Medication 0.08 MCG/KG/MIN: at 02:07

## 2019-07-14 RX ADMIN — PANTOPRAZOLE SODIUM 8 MG/HR: 40 INJECTION, POWDER, FOR SOLUTION INTRAVENOUS at 08:07

## 2019-07-14 RX ADMIN — PANTOPRAZOLE SODIUM 8 MG/HR: 40 INJECTION, POWDER, FOR SOLUTION INTRAVENOUS at 10:07

## 2019-07-14 RX ADMIN — Medication 200 MCG/HR: at 01:07

## 2019-07-14 RX ADMIN — CEFEPIME 2 G: 2 INJECTION, POWDER, FOR SOLUTION INTRAVENOUS at 06:07

## 2019-07-14 RX ADMIN — SODIUM CHLORIDE, SODIUM LACTATE, POTASSIUM CHLORIDE, AND CALCIUM CHLORIDE 500 ML: .6; .31; .03; .02 INJECTION, SOLUTION INTRAVENOUS at 02:07

## 2019-07-14 RX ADMIN — DEXMEDETOMIDINE HYDROCHLORIDE 0.5 MCG/KG/HR: 4 INJECTION, SOLUTION INTRAVENOUS at 08:07

## 2019-07-14 RX ADMIN — PANTOPRAZOLE SODIUM 8 MG/HR: 40 INJECTION, POWDER, FOR SOLUTION INTRAVENOUS at 01:07

## 2019-07-14 RX ADMIN — CHLORHEXIDINE GLUCONATE 0.12% ORAL RINSE 15 ML: 1.2 LIQUID ORAL at 09:07

## 2019-07-14 NOTE — PLAN OF CARE
Problem: Infection  Goal: Infection Symptom Resolution    Intervention: Prevent or Manage Infection  Sterile technique used for dressing change to R IJ, explained procedure to patient.       Comments: ELMOEON. See flowsheets for vital signs and assessments. See below for updates on progress made.       Neuro: Sedation meds. Subdued, follows commands, nods appropriately. Afebrile    Cardiovascular: HR 70-100s NS/ST; MAP > 65 maintained with Levo. Pulses palpable      Pulmonary: Lungs dim/coarse; Vented. VC PEEP 5, Rate 17, FiO2 30%, ; Infrequent coughing relieved with deep suctioning. ET tube 24 at the teeth.    Gastrointestinal: NPO; no BM/shift. Metoclopramide given x2 as scheduled. Passing flatus.     Genitourinary: Ribeiro in place, catheter care complete. /shift    Integumentary/other: HAPI prevention bundle in place; Last CHG bath: Full CHG bath given, linens/gown changed overnight.    Infusions: Precedex, fentanyl, levo, protonix per eMAR.    POC: Gave 1 unit PRBCs and 500 cc LR bolus overnight. Waiting on Lab results. Continue to monitor H&H and BP.     Patient progressing towards goals as tolerated, plan of care communicated and reviewed with Manpreet Hamilton. All concerns addressed. Will continue to monitor.

## 2019-07-14 NOTE — PROGRESS NOTES
[facility]  Critical Care Medicine  Progress Note    Patient Name: Manpreet Hamilton  MRN: [id number]  Admission Date: 7/10/2019  Hospital Length of Stay: 4 days  Code Status: Full Code  Attending Provider: Cole Valencia*  Primary Care Provider: Radames Pradhan MD   Principal Problem: GIB (gastrointestinal bleeding)    Subjective:     HPI:  The patient is a 68M with a PMH of HTN, HLD, UIP (dx 1 year ago, followed by Dr. Pradhan, on ofev) presenting as transfer from [place] for GI bleed. The patient was on a cruise with his family in [place] when he suddenly began having bright red bowel movements that progressed to melena. This was on Saturday the 8th. On board the ship his BP was 88/52, pulse 119. Labs with hemoglobin of 9.7 (baseline 15). He was transferred to [place] where he became progressively more hypotensive (to systolic of 70's) and tachycardic to 120's. His Hg dropped to 7. He was admitted to [place] for 1 night prior to transfer to [facility]. While at [place], he received 8 units total of PRBC, 3 units FFP. He was placed on PPI drip and had an endoscopy which showed a small superficial gastric ulcer and duodenal bulb ulcers with no active bleeding.     The patient takes a baby aspirin daily and drinks approximately 4 alcoholic beverages nightly. Has never before had GI bleed.    Hospital/ICU Course:  Patient arrived to ICU and was urgently taken to endoscopy. Stomach full of blood, impairing visualization of source. Due to high risk of aspiration and necessity of repeat EGD, patient was intubated for airway protection.     Required transfusion of additional 5 U PRBC, 1 plt, and 1 FFP. Erythromycin given to clear patient's stomach of blood. Developed erythematous rash, and benadryl given. No wheezing appreciated on exam at that time.    Repeat EGD performed overnight (7/10) which showed numerous ulcers in the stomach and duodenum with no active bleeding. Patient brought back to ICU,  and placed on PPI gtt per GI recs.    Patient was successfully extubated on 7/11; however, hemoglobin began trending downward again later in the day. Active bleeding overnight requiring 3 units PRBCs. GI repeated EGD the morning of 7/12 again with numerous clean-based gastric and duodenal ulcers. Possible active bleeding seen in the cardia of the stomach along the lesser curvature. Clip placed. Patient taken to IR for angio, left gastric embolized. Left intubated due to concern for aspiration again due to large amount of blood in stomach.    Overnight, patient's hemoglobin did trend down slightly, but stayed overall stable (8->~7.5). 3 melanotic stools, which is expected given volume of blood in stomach. Patient also spiked fever of 102.1. Cultures sent, CXR repeated. No focal consolidation, but some edema superimposed on chronic lung changes seen. Started on vanc, cefepime and flagyl given recent procedures and risk of aspiration prior to initial intubation. Lower suspicion for transfusion reaction as it had been an number of hours since most recent transfusion. Also given dose of lasix due to peripheral edema on exam and pulmonary edema on CXR.     On the evening of 7/13, patient did require 1 unit PRBC for hg of 6.7, which increased to 8.7 following transfusion. May represent oozing from site of embolization or AVMs seen on EGD. Again trending down slowly today. No melanotic bowel movements. Will plan for possible SAT/SBT this afternoon should hemoglobin remain stable.    Interval History/Significant Events: Please see hospital course above.    Review of Systems   Unable to perform ROS: Intubated     Objective:     Vital Signs (Most Recent):  Temp: (!) 100.8 °F (38.2 °C) (07/14/19 1105)  Pulse: 81 (07/14/19 1245)  Resp: (!) 27 (07/14/19 1245)  BP: (!) 93/59 (07/14/19 1245)  SpO2: 97 % (07/14/19 1245) Vital Signs (24h Range):  Temp:  [98.3 °F (36.8 °C)-101.8 °F (38.8 °C)] 100.8 °F (38.2 °C)  Pulse:  []  81  Resp:  [14-52] 27  SpO2:  [96 %-99 %] 97 %  BP: ()/(48-73) 93/59   Weight: 92.8 kg (204 lb 9.4 oz)  Body mass index is 31.11 kg/m².      Intake/Output Summary (Last 24 hours) at 7/14/2019 1302  Last data filed at 7/14/2019 1200  Gross per 24 hour   Intake 3907.34 ml   Output 2075 ml   Net 1832.34 ml       Physical Exam   Constitutional: He appears well-developed and well-nourished.   Intubated  RASS 0, following commands    HENT:   Head: Normocephalic and atraumatic.   Eyes: EOM are normal. Right eye exhibits no discharge. Left eye exhibits no discharge.   Neck: No tracheal deviation present.   Cardiovascular: Normal rate, regular rhythm and normal heart sounds. Exam reveals no gallop and no friction rub.   No murmur heard.  Pulmonary/Chest: Effort normal and breath sounds normal. No stridor. He has no wheezes.   Abdominal: Soft. He exhibits no distension. There is no tenderness. There is no guarding.   Musculoskeletal: He exhibits edema (noted in upper and lower extremities). He exhibits no deformity.   Neurological: He is alert.   Skin: Skin is warm and dry.       Vents:  Vent Mode: A/C (07/14/19 1245)  Ventilator Initiated: Yes (07/12/19 0806)  Set Rate: 14 bmp (07/14/19 1245)  Vt Set: 560 mL (07/14/19 1245)  Pressure Support: 0 cmH20 (07/14/19 1245)  PEEP/CPAP: 5 cmH20 (07/14/19 1245)  Oxygen Concentration (%): 30 (07/14/19 1245)  Peak Airway Pressure: 30 cmH2O (07/14/19 1245)  Plateau Pressure: 21 cmH20 (07/14/19 1245)  Total Ve: 13.1 mL (07/14/19 1245)  F/VT Ratio<105 (RSBI): (!) 43.69 (07/14/19 1245)  Lines/Drains/Airways     Central Venous Catheter Line                 Percutaneous Central Line Insertion/Assessment - triple lumen  07/10/19 1511 right internal jugular 3 days          Drain                 Urethral Catheter 07/12/19 2200 1 day          Airway                 Airway - Non-Surgical 07/12/19 0714 Endotracheal Tube 2 days              Significant Labs:    CBC/Anemia Profile:  Recent Labs    Lab 07/14/19  0535 07/14/19  0821 07/14/19  1154   WBC 8.48 6.89 6.78   HGB 8.7* 8.0* 7.7*   HCT 25.8* 23.9* 23.4*    163 161   MCV 90 90 91   RDW 14.2 14.5 14.3        Chemistries:  Recent Labs   Lab 07/13/19  0236 07/14/19  0535   * 131*   K 4.0 3.8    101   CO2 22* 24   BUN 22 19   CREATININE 0.7 0.7   CALCIUM 7.1* 7.4*   ALBUMIN 1.9* 1.7*   PROT 4.0* 4.3*   BILITOT 0.3 0.5   ALKPHOS 58 77   ALT 9* 14   AST 19 33   MG 2.0 2.0   PHOS 2.1* 2.8       All pertinent labs within the past 24 hours have been reviewed.    Significant Imaging:  I have reviewed and interpreted all pertinent imaging results/findings within the past 24 hours.      ABG  Recent Labs   Lab 07/13/19  0856   PH 7.306*   PO2 46   PCO2 46.8*   HCO3 23.4*   BE -3     Assessment/Plan:     Pulmonary  Pulmonary fibrosis  - Dx 1 year ago, managed by Dr. Pradhan  - Per Dr. Pradhan:  hold ofev, goal SpO2 95%     Oncology  Acute blood loss anemia  - Hemoglobin 7 on arrival   - Has received 19 units PRBC, 5 units FFP, 2 plt between facility and [facility]  - CBC q4h  - See GIB     GI  * GIB (gastrointestinal bleeding)  - Initial EGD obscured due to large amount of blood in stomach, repeat with numerous ulcers in stomach and duodenum with no active bleeding (7/10)  - CTA following scope (7/10) with no active bleeding visualized. Some enhancement noted at site of Nissen; however, read at that time felt mucosal enhancement more likely than active bleed  - Patient began actively bleeding again overnight 7/11, transfused 4 additional units, repeat scope again with large amount of blood in stomach, possible active bleed see in cardia along lesser curvature. Source unable to be visualized, clip placed to meeta location for IR. Left gastric embolized.   - CBC q4h  - Has required an additional unit of PRBCs, though no further episodes of melena.     Other  Shock  Hemorrhagic vs septic  - Tranfusing for Hg above 7, as no active signs of  hemorrhage at this time  - Developed fevers 7/12, treating broadly with vanc, cefepime and flagyl  - Weaning pressors as tolerated.       Critical Care Daily Checklist:    A: Awake: RASS Goal/Actual Goal:    Actual: Shane Agitation Sedation Scale (RASS): Alert and calm   B: Spontaneous Breathing Trial Performed? Spon. Breathing Trial Initiated?: Initiated (07/11/19 1369)   C: SAT & SBT Coordinated?  Yes                      D: Delirium: CAM-ICU Overall CAM-ICU: Negative   E: Early Mobility Performed? No   F: Feeding Goal:    Status:     Current Diet Order   Procedures    Diet NPO      AS: Analgesia/Sedation Fentanyl, precedex   T: Thromboembolic Prophylaxis Held due to bleed   H: HOB > 300 Yes   U: Stress Ulcer Prophylaxis (if needed) PPI gtt   G: Glucose Control N/a   B: Bowel Function Stool Occurrence: 1   I: Indwelling Catheter (Lines & Ribeiro) Necessity Checked   D: De-escalation of Antimicrobials/Pharmacotherapies Not indicated    Plan for the day/ETD Continue MICU care    Code Status:  Family/Goals of Care: Full Code         Critical secondary to Patient has a condition that poses threat to life and bodily function: GI hemorrhage.      Critical care was time spent personally by me on the following activities: development of treatment plan with patient or surrogate and bedside caregivers, discussions with consultants, evaluation of patient's response to treatment, examination of patient, ordering and performing treatments and interventions, ordering and review of laboratory studies, ordering and review of radiographic studies, pulse oximetry, re-evaluation of patient's condition. This critical care time did not overlap with that of any other provider or involve time for any procedures.     Mayo Rico MD  Critical Care Medicine  [facility]

## 2019-07-14 NOTE — TREATMENT PLAN
GI Treatment Plan    Manpreet Hamilton is a 68 y.o. male admitted to hospital 7/10/2019 (Hospital Day: 5) due to GIB (gastrointestinal bleeding).     Interval History  Assessed at bedside, no BM overnight. Hb drop, required 1 unit Prbc, pressor requirement coming down per ICU team.     Objective  Temp:  [98.3 °F (36.8 °C)-101.8 °F (38.8 °C)] 100.8 °F (38.2 °C) (07/14 1105)  Pulse:  [] 83 (07/14 1300)  BP: ()/(48-73) 94/55 (07/14 1300)  Resp:  [14-52] 21 (07/14 1300)  SpO2:  [96 %-99 %] 97 % (07/14 1300)        Laboratory    Recent Labs   Lab 07/14/19  0535 07/14/19  0821 07/14/19  1154   HGB 8.7* 8.0* 7.7*       Lab Results   Component Value Date    WBC 6.78 07/14/2019    HGB 7.7 (L) 07/14/2019    HCT 23.4 (L) 07/14/2019    MCV 91 07/14/2019     07/14/2019       Lab Results   Component Value Date     (L) 07/14/2019    K 3.8 07/14/2019     07/14/2019    CO2 24 07/14/2019    BUN 19 07/14/2019    CREATININE 0.7 07/14/2019    CALCIUM 7.4 (L) 07/14/2019    ANIONGAP 6 (L) 07/14/2019    ESTGFRAFRICA >60.0 07/14/2019    EGFRNONAA >60.0 07/14/2019       Lab Results   Component Value Date    ALT 14 07/14/2019    AST 33 07/14/2019    ALKPHOS 77 07/14/2019    BILITOT 0.5 07/14/2019       Lab Results   Component Value Date    INR 1.2 07/12/2019    INR 1.0 07/10/2019    INR 1.0 06/27/2018       Plan  -trend hb  -call if rebleed  - Plan of care was discussed with primary team.  - We will continue to follow.  -IV PPI 40 BID or PPI ggt  - Plan of care was discussed with primary team.  - We will continue to follow.    Thank you for involving us in the care of Manpreet Hamilton. Please call with any additional questions, concerns or changes in the patient's clinical status.    Loyd Maria MD  Gastroenterology Fellow  Spectralink: [phone]

## 2019-07-14 NOTE — ASSESSMENT & PLAN NOTE
Hemorrhagic vs septic  - Tranfusing for Hg above 7, as no active signs of hemorrhage at this time  - Developed fevers 7/12, treating broadly with vanc, cefepime and flagyl  - Weaning pressors as tolerated.

## 2019-07-14 NOTE — NURSING
See vital signs and assessments in flowsheets. See below for updates on today's progress.   Acute Changes: Patient extubate today, levo off, precedex off, fentanyl off    Pulmonary:  EXTUBATED @1723.  3 to 4 Liters NC, 02:>95%, productive cough     Cardiovascular: NSR, HR: 's, SBP : 's, levo off @ 1820    Neurological: Sedated, responds to pain, febrile, t-max :103 (Gave 1 tylenol suppository)       Gastrointestinal: NO BM , bowel sounds present x4 quadrants      Genitourinary: Voids via sullivan catheter 700cc / shift     Integumentary/Other: skin intact, warm to touch     Infusions:  Protonix, KVO     POC: Continue to monitor patients H/H & hemodynamically monitoring. Wean propofol. Try SBT in morning. Continue plan of care. Plan discussed with patient and family at bedside all questions and concerns were addressed. No further questions at this time.

## 2019-07-14 NOTE — SUBJECTIVE & OBJECTIVE
Interval History/Significant Events: Please see hospital course above.    Review of Systems   Unable to perform ROS: Intubated     Objective:     Vital Signs (Most Recent):  Temp: (!) 100.8 °F (38.2 °C) (07/14/19 1105)  Pulse: 81 (07/14/19 1245)  Resp: (!) 27 (07/14/19 1245)  BP: (!) 93/59 (07/14/19 1245)  SpO2: 97 % (07/14/19 1245) Vital Signs (24h Range):  Temp:  [98.3 °F (36.8 °C)-101.8 °F (38.8 °C)] 100.8 °F (38.2 °C)  Pulse:  [] 81  Resp:  [14-52] 27  SpO2:  [96 %-99 %] 97 %  BP: ()/(48-73) 93/59   Weight: 92.8 kg (204 lb 9.4 oz)  Body mass index is 31.11 kg/m².      Intake/Output Summary (Last 24 hours) at 7/14/2019 1302  Last data filed at 7/14/2019 1200  Gross per 24 hour   Intake 3907.34 ml   Output 2075 ml   Net 1832.34 ml       Physical Exam   Constitutional: He appears well-developed and well-nourished.   Intubated  RASS 0, following commands    HENT:   Head: Normocephalic and atraumatic.   Eyes: EOM are normal. Right eye exhibits no discharge. Left eye exhibits no discharge.   Neck: No tracheal deviation present.   Cardiovascular: Normal rate, regular rhythm and normal heart sounds. Exam reveals no gallop and no friction rub.   No murmur heard.  Pulmonary/Chest: Effort normal and breath sounds normal. No stridor. He has no wheezes.   Abdominal: Soft. He exhibits no distension. There is no tenderness. There is no guarding.   Musculoskeletal: He exhibits edema (noted in upper and lower extremities). He exhibits no deformity.   Neurological: He is alert.   Skin: Skin is warm and dry.       Vents:  Vent Mode: A/C (07/14/19 1245)  Ventilator Initiated: Yes (07/12/19 0806)  Set Rate: 14 bmp (07/14/19 1245)  Vt Set: 560 mL (07/14/19 1245)  Pressure Support: 0 cmH20 (07/14/19 1245)  PEEP/CPAP: 5 cmH20 (07/14/19 1245)  Oxygen Concentration (%): 30 (07/14/19 1245)  Peak Airway Pressure: 30 cmH2O (07/14/19 1245)  Plateau Pressure: 21 cmH20 (07/14/19 1245)  Total Ve: 13.1 mL (07/14/19 1245)  F/VT  Ratio<105 (RSBI): (!) 43.69 (07/14/19 1245)  Lines/Drains/Airways     Central Venous Catheter Line                 Percutaneous Central Line Insertion/Assessment - triple lumen  07/10/19 1511 right internal jugular 3 days          Drain                 Urethral Catheter 07/12/19 2200 1 day          Airway                 Airway - Non-Surgical 07/12/19 0714 Endotracheal Tube 2 days              Significant Labs:    CBC/Anemia Profile:  Recent Labs   Lab 07/14/19  0535 07/14/19  0821 07/14/19  1154   WBC 8.48 6.89 6.78   HGB 8.7* 8.0* 7.7*   HCT 25.8* 23.9* 23.4*    163 161   MCV 90 90 91   RDW 14.2 14.5 14.3        Chemistries:  Recent Labs   Lab 07/13/19  0236 07/14/19  0535   * 131*   K 4.0 3.8    101   CO2 22* 24   BUN 22 19   CREATININE 0.7 0.7   CALCIUM 7.1* 7.4*   ALBUMIN 1.9* 1.7*   PROT 4.0* 4.3*   BILITOT 0.3 0.5   ALKPHOS 58 77   ALT 9* 14   AST 19 33   MG 2.0 2.0   PHOS 2.1* 2.8       All pertinent labs within the past 24 hours have been reviewed.    Significant Imaging:  I have reviewed and interpreted all pertinent imaging results/findings within the past 24 hours.

## 2019-07-14 NOTE — CARE UPDATE
SBT ordered at 1546 by Dr. Rico. Patient did well and was reevaluated an hour later. Hemoglobin was found to be low so Dr. Rico ordered that Pressure support be dropped to 5. Patient rested on that for another hour and was deemed ready to extubate by Dr. Rico. Patient was extubated and tolerated procedure well. They are resting on 6L NC. ABG is ordered for 1800. Ambu bag and mask are at the bedside. Will continue to monitor.

## 2019-07-14 NOTE — ASSESSMENT & PLAN NOTE
- Initial EGD obscured due to large amount of blood in stomach, repeat with numerous ulcers in stomach and duodenum with no active bleeding (7/10)  - CTA following scope (7/10) with no active bleeding visualized. Some enhancement noted at site of Nissen; however, read at that time felt mucosal enhancement more likely than active bleed  - Patient began actively bleeding again overnight 7/11, transfused 4 additional units, repeat scope again with large amount of blood in stomach, possible active bleed see in cardia along lesser curvature. Source unable to be visualized, clip placed to meeta location for IR. Left gastric embolized.   - CBC q4h  - Has required an additional unit of PRBCs, though no further episodes of melena.

## 2019-07-14 NOTE — ASSESSMENT & PLAN NOTE
- Hemoglobin 7 on arrival   - Has received 19 units PRBC, 5 units FFP, 2 plt between facility and [facility] and [facility]  - CBC q4h  - See GIB

## 2019-07-15 PROBLEM — J96.01 ACUTE HYPOXEMIC RESPIRATORY FAILURE: Status: ACTIVE | Noted: 2019-07-15

## 2019-07-15 LAB
ALBUMIN SERPL BCP-MCNC: 1.7 G/DL (ref 3.5–5.2)
ALLENS TEST: ABNORMAL
ALP SERPL-CCNC: 132 U/L (ref 55–135)
ALT SERPL W/O P-5'-P-CCNC: 18 U/L (ref 10–44)
ANION GAP SERPL CALC-SCNC: 7 MMOL/L (ref 8–16)
ANISOCYTOSIS BLD QL SMEAR: SLIGHT
ANISOCYTOSIS BLD QL SMEAR: SLIGHT
AST SERPL-CCNC: 35 U/L (ref 10–40)
BACTERIA SPEC AEROBE CULT: NO GROWTH
BACTERIA UR CULT: ABNORMAL
BASO STIPL BLD QL SMEAR: ABNORMAL
BASOPHILS # BLD AUTO: 0.02 K/UL (ref 0–0.2)
BASOPHILS NFR BLD: 0.3 % (ref 0–1.9)
BASOPHILS NFR BLD: 1 % (ref 0–1.9)
BILIRUB SERPL-MCNC: 0.4 MG/DL (ref 0.1–1)
BUN SERPL-MCNC: 13 MG/DL (ref 8–23)
BURR CELLS BLD QL SMEAR: ABNORMAL
BURR CELLS BLD QL SMEAR: ABNORMAL
CALCIUM SERPL-MCNC: 7.8 MG/DL (ref 8.7–10.5)
CHLORIDE SERPL-SCNC: 102 MMOL/L (ref 95–110)
CO2 SERPL-SCNC: 25 MMOL/L (ref 23–29)
CREAT SERPL-MCNC: 0.7 MG/DL (ref 0.5–1.4)
DELSYS: ABNORMAL
DIFFERENTIAL METHOD: ABNORMAL
EOSINOPHIL # BLD AUTO: 0.4 K/UL (ref 0–0.5)
EOSINOPHIL # BLD AUTO: 0.5 K/UL (ref 0–0.5)
EOSINOPHIL # BLD AUTO: 0.5 K/UL (ref 0–0.5)
EOSINOPHIL NFR BLD: 5.3 % (ref 0–8)
EOSINOPHIL NFR BLD: 6 % (ref 0–8)
EOSINOPHIL NFR BLD: 6 % (ref 0–8)
EOSINOPHIL NFR BLD: 6.1 % (ref 0–8)
ERYTHROCYTE [DISTWIDTH] IN BLOOD BY AUTOMATED COUNT: 13.9 % (ref 11.5–14.5)
ERYTHROCYTE [DISTWIDTH] IN BLOOD BY AUTOMATED COUNT: 14 % (ref 11.5–14.5)
ERYTHROCYTE [DISTWIDTH] IN BLOOD BY AUTOMATED COUNT: 14.1 % (ref 11.5–14.5)
ERYTHROCYTE [DISTWIDTH] IN BLOOD BY AUTOMATED COUNT: 14.3 % (ref 11.5–14.5)
EST. GFR  (AFRICAN AMERICAN): >60 ML/MIN/1.73 M^2
EST. GFR  (NON AFRICAN AMERICAN): >60 ML/MIN/1.73 M^2
FLOW: 4
GIANT PLATELETS BLD QL SMEAR: PRESENT
GLUCOSE SERPL-MCNC: 111 MG/DL (ref 70–110)
GRAM STN SPEC: NORMAL
HCO3 UR-SCNC: 24.2 MMOL/L (ref 24–28)
HCT VFR BLD AUTO: 23.8 % (ref 40–54)
HCT VFR BLD AUTO: 23.9 % (ref 40–54)
HCT VFR BLD AUTO: 24 % (ref 40–54)
HCT VFR BLD AUTO: 24.9 % (ref 40–54)
HGB BLD-MCNC: 8 G/DL (ref 14–18)
HGB BLD-MCNC: 8.5 G/DL (ref 14–18)
IMM GRANULOCYTES # BLD AUTO: 0.04 K/UL (ref 0–0.04)
IMM GRANULOCYTES # BLD AUTO: 0.13 K/UL (ref 0–0.04)
IMM GRANULOCYTES # BLD AUTO: 0.3 K/UL (ref 0–0.04)
IMM GRANULOCYTES # BLD AUTO: ABNORMAL K/UL (ref 0–0.04)
IMM GRANULOCYTES NFR BLD AUTO: 0.6 % (ref 0–0.5)
IMM GRANULOCYTES NFR BLD AUTO: 1.7 % (ref 0–0.5)
IMM GRANULOCYTES NFR BLD AUTO: 3.8 % (ref 0–0.5)
IMM GRANULOCYTES NFR BLD AUTO: ABNORMAL % (ref 0–0.5)
LYMPHOCYTES # BLD AUTO: 0.5 K/UL (ref 1–4.8)
LYMPHOCYTES # BLD AUTO: 0.5 K/UL (ref 1–4.8)
LYMPHOCYTES # BLD AUTO: 0.7 K/UL (ref 1–4.8)
LYMPHOCYTES NFR BLD: 10 % (ref 18–48)
LYMPHOCYTES NFR BLD: 10.5 % (ref 18–48)
LYMPHOCYTES NFR BLD: 6.5 % (ref 18–48)
LYMPHOCYTES NFR BLD: 7.2 % (ref 18–48)
MAGNESIUM SERPL-MCNC: 2 MG/DL (ref 1.6–2.6)
MCH RBC QN AUTO: 29.4 PG (ref 27–31)
MCH RBC QN AUTO: 29.8 PG (ref 27–31)
MCH RBC QN AUTO: 30.1 PG (ref 27–31)
MCH RBC QN AUTO: 30.4 PG (ref 27–31)
MCHC RBC AUTO-ENTMCNC: 33.3 G/DL (ref 32–36)
MCHC RBC AUTO-ENTMCNC: 33.5 G/DL (ref 32–36)
MCHC RBC AUTO-ENTMCNC: 33.6 G/DL (ref 32–36)
MCHC RBC AUTO-ENTMCNC: 34.1 G/DL (ref 32–36)
MCV RBC AUTO: 87 FL (ref 82–98)
MCV RBC AUTO: 88 FL (ref 82–98)
MCV RBC AUTO: 90 FL (ref 82–98)
MCV RBC AUTO: 91 FL (ref 82–98)
MODE: ABNORMAL
MONOCYTES # BLD AUTO: 0.7 K/UL (ref 0.3–1)
MONOCYTES # BLD AUTO: 0.8 K/UL (ref 0.3–1)
MONOCYTES # BLD AUTO: 1.1 K/UL (ref 0.3–1)
MONOCYTES NFR BLD: 10 % (ref 4–15)
MONOCYTES NFR BLD: 10.3 % (ref 4–15)
MONOCYTES NFR BLD: 10.6 % (ref 4–15)
MONOCYTES NFR BLD: 13.6 % (ref 4–15)
NEUTROPHILS # BLD AUTO: 4.9 K/UL (ref 1.8–7.7)
NEUTROPHILS # BLD AUTO: 5.5 K/UL (ref 1.8–7.7)
NEUTROPHILS # BLD AUTO: 5.6 K/UL (ref 1.8–7.7)
NEUTROPHILS NFR BLD: 69.8 % (ref 38–73)
NEUTROPHILS NFR BLD: 72 % (ref 38–73)
NEUTROPHILS NFR BLD: 72.7 % (ref 38–73)
NEUTROPHILS NFR BLD: 74.4 % (ref 38–73)
NEUTS BAND NFR BLD MANUAL: 1 %
NRBC BLD-RTO: 0 /100 WBC
OVALOCYTES BLD QL SMEAR: ABNORMAL
PCO2 BLDA: 46.1 MMHG (ref 35–45)
PH SMN: 7.33 [PH] (ref 7.35–7.45)
PHOSPHATE SERPL-MCNC: 2.2 MG/DL (ref 2.7–4.5)
PLATELET # BLD AUTO: 186 K/UL (ref 150–350)
PLATELET # BLD AUTO: 192 K/UL (ref 150–350)
PLATELET # BLD AUTO: 198 K/UL (ref 150–350)
PLATELET # BLD AUTO: 217 K/UL (ref 150–350)
PLATELET BLD QL SMEAR: ABNORMAL
PLATELET BLD QL SMEAR: ABNORMAL
PMV BLD AUTO: 9.7 FL (ref 9.2–12.9)
PMV BLD AUTO: 9.7 FL (ref 9.2–12.9)
PMV BLD AUTO: 9.8 FL (ref 9.2–12.9)
PMV BLD AUTO: 9.8 FL (ref 9.2–12.9)
PO2 BLDA: 35 MMHG (ref 40–60)
POC BE: -2 MMOL/L
POC SATURATED O2: 62 % (ref 95–100)
POC TCO2: 26 MMOL/L (ref 24–29)
POIKILOCYTOSIS BLD QL SMEAR: SLIGHT
POIKILOCYTOSIS BLD QL SMEAR: SLIGHT
POLYCHROMASIA BLD QL SMEAR: ABNORMAL
POTASSIUM SERPL-SCNC: 3.7 MMOL/L (ref 3.5–5.1)
PROT SERPL-MCNC: 4.2 G/DL (ref 6–8.4)
RBC # BLD AUTO: 2.63 M/UL (ref 4.6–6.2)
RBC # BLD AUTO: 2.66 M/UL (ref 4.6–6.2)
RBC # BLD AUTO: 2.72 M/UL (ref 4.6–6.2)
RBC # BLD AUTO: 2.85 M/UL (ref 4.6–6.2)
SAMPLE: ABNORMAL
SITE: ABNORMAL
SODIUM SERPL-SCNC: 134 MMOL/L (ref 136–145)
SP02: 100
TOXIC GRANULES BLD QL SMEAR: PRESENT
WBC # BLD AUTO: 6.77 K/UL (ref 3.9–12.7)
WBC # BLD AUTO: 7.48 K/UL (ref 3.9–12.7)
WBC # BLD AUTO: 7.71 K/UL (ref 3.9–12.7)
WBC # BLD AUTO: 7.81 K/UL (ref 3.9–12.7)

## 2019-07-15 PROCEDURE — 94761 N-INVAS EAR/PLS OXIMETRY MLT: CPT

## 2019-07-15 PROCEDURE — S0030 INJECTION, METRONIDAZOLE: HCPCS

## 2019-07-15 PROCEDURE — 25000003 PHARM REV CODE 250: Performed by: STUDENT IN AN ORGANIZED HEALTH CARE EDUCATION/TRAINING PROGRAM

## 2019-07-15 PROCEDURE — 80053 COMPREHEN METABOLIC PANEL: CPT

## 2019-07-15 PROCEDURE — 63600175 PHARM REV CODE 636 W HCPCS: Performed by: STUDENT IN AN ORGANIZED HEALTH CARE EDUCATION/TRAINING PROGRAM

## 2019-07-15 PROCEDURE — 85025 COMPLETE CBC W/AUTO DIFF WBC: CPT | Mod: 91

## 2019-07-15 PROCEDURE — 20000000 HC ICU ROOM

## 2019-07-15 PROCEDURE — C9113 INJ PANTOPRAZOLE SODIUM, VIA: HCPCS | Performed by: STUDENT IN AN ORGANIZED HEALTH CARE EDUCATION/TRAINING PROGRAM

## 2019-07-15 PROCEDURE — C9113 INJ PANTOPRAZOLE SODIUM, VIA: HCPCS | Performed by: HOSPITALIST

## 2019-07-15 PROCEDURE — 27000221 HC OXYGEN, UP TO 24 HOURS

## 2019-07-15 PROCEDURE — 85027 COMPLETE CBC AUTOMATED: CPT

## 2019-07-15 PROCEDURE — 99233 SBSQ HOSP IP/OBS HIGH 50: CPT | Mod: ,,, | Performed by: INTERNAL MEDICINE

## 2019-07-15 PROCEDURE — 63600175 PHARM REV CODE 636 W HCPCS

## 2019-07-15 PROCEDURE — 84100 ASSAY OF PHOSPHORUS: CPT

## 2019-07-15 PROCEDURE — 99233 PR SUBSEQUENT HOSPITAL CARE,LEVL III: ICD-10-PCS | Mod: ,,, | Performed by: INTERNAL MEDICINE

## 2019-07-15 PROCEDURE — 85007 BL SMEAR W/DIFF WBC COUNT: CPT

## 2019-07-15 PROCEDURE — 25000003 PHARM REV CODE 250

## 2019-07-15 PROCEDURE — 83735 ASSAY OF MAGNESIUM: CPT

## 2019-07-15 PROCEDURE — 63600175 PHARM REV CODE 636 W HCPCS: Performed by: HOSPITALIST

## 2019-07-15 RX ORDER — FUROSEMIDE 10 MG/ML
40 INJECTION INTRAMUSCULAR; INTRAVENOUS ONCE
Status: COMPLETED | OUTPATIENT
Start: 2019-07-15 | End: 2019-07-15

## 2019-07-15 RX ORDER — PANTOPRAZOLE SODIUM 40 MG/10ML
40 INJECTION, POWDER, LYOPHILIZED, FOR SOLUTION INTRAVENOUS 2 TIMES DAILY
Status: DISCONTINUED | OUTPATIENT
Start: 2019-07-15 | End: 2019-07-22

## 2019-07-15 RX ORDER — POTASSIUM CHLORIDE 14.9 MG/ML
20 INJECTION INTRAVENOUS ONCE
Status: COMPLETED | OUTPATIENT
Start: 2019-07-15 | End: 2019-07-15

## 2019-07-15 RX ADMIN — METOCLOPRAMIDE 10 MG: 5 INJECTION, SOLUTION INTRAMUSCULAR; INTRAVENOUS at 05:07

## 2019-07-15 RX ADMIN — CHLORHEXIDINE GLUCONATE 0.12% ORAL RINSE 15 ML: 1.2 LIQUID ORAL at 09:07

## 2019-07-15 RX ADMIN — PANTOPRAZOLE SODIUM 40 MG: 40 INJECTION, POWDER, FOR SOLUTION INTRAVENOUS at 09:07

## 2019-07-15 RX ADMIN — CEFEPIME 2 G: 2 INJECTION, POWDER, FOR SOLUTION INTRAVENOUS at 04:07

## 2019-07-15 RX ADMIN — METRONIDAZOLE 500 MG: 500 SOLUTION INTRAVENOUS at 05:07

## 2019-07-15 RX ADMIN — POTASSIUM CHLORIDE 20 MEQ: 200 INJECTION, SOLUTION INTRAVENOUS at 08:07

## 2019-07-15 RX ADMIN — PANTOPRAZOLE SODIUM 8 MG/HR: 40 INJECTION, POWDER, FOR SOLUTION INTRAVENOUS at 04:07

## 2019-07-15 RX ADMIN — FUROSEMIDE 40 MG: 10 INJECTION, SOLUTION INTRAMUSCULAR; INTRAVENOUS at 09:07

## 2019-07-15 RX ADMIN — CEFEPIME 2 G: 2 INJECTION, POWDER, FOR SOLUTION INTRAVENOUS at 08:07

## 2019-07-15 RX ADMIN — POTASSIUM PHOSPHATE, MONOBASIC AND POTASSIUM PHOSPHATE, DIBASIC 15 MMOL: 224; 236 INJECTION, SOLUTION, CONCENTRATE INTRAVENOUS at 09:07

## 2019-07-15 RX ADMIN — CEFEPIME 2 G: 2 INJECTION, POWDER, FOR SOLUTION INTRAVENOUS at 12:07

## 2019-07-15 RX ADMIN — FUROSEMIDE 40 MG: 10 INJECTION, SOLUTION INTRAMUSCULAR; INTRAVENOUS at 04:07

## 2019-07-15 RX ADMIN — PANTOPRAZOLE SODIUM 40 MG: 40 INJECTION, POWDER, FOR SOLUTION INTRAVENOUS at 11:07

## 2019-07-15 RX ADMIN — GUAIFENESIN AND DEXTROMETHORPHAN HYDROBROMIDE 1 TABLET: 600; 30 TABLET, EXTENDED RELEASE ORAL at 11:07

## 2019-07-15 NOTE — PROGRESS NOTES
Therapy with vancomycin was discontinued by the provider.  Pharmacy will sign off, please re-consult as needed.    Deanna Sunshine, PharmD, BCCCP  [id number]

## 2019-07-15 NOTE — ASSESSMENT & PLAN NOTE
- Initial EGD obscured due to large amount of blood in stomach, repeat with numerous ulcers in stomach and duodenum with no active bleeding (7/10)  - CTA following scope (7/10) with no active bleeding visualized. Some enhancement noted at site of Nissen; however, read at that time felt mucosal enhancement more likely than active bleed  - Patient began actively bleeding again overnight 7/11, transfused 4 additional units, repeat scope again with large amount of blood in stomach, possible active bleed see in cardia along lesser curvature. Source unable to be visualized, clip placed to meeta location for IR. Left gastric embolized.   - CBC q8h  - Has required an additional unit of PRBCs, melanotic stools overnight but Hb stable

## 2019-07-15 NOTE — SUBJECTIVE & OBJECTIVE
Interval History/Significant Events: Please see hospital course above.    Review of Systems   Constitutional: Positive for fatigue. Negative for chills and fever.   HENT: Negative for rhinorrhea and sinus pain.    Eyes: Negative.    Respiratory: Positive for cough and shortness of breath.    Cardiovascular: Negative for chest pain, palpitations and leg swelling.   Gastrointestinal: Negative.  Negative for diarrhea, nausea and vomiting.   Endocrine: Negative.    Genitourinary: Positive for difficulty urinating. Negative for decreased urine volume and hematuria.   Musculoskeletal: Negative.    Skin: Negative.    Allergic/Immunologic: Negative.    Neurological: Positive for headaches.     Objective:     Vital Signs (Most Recent):  Temp: 98.4 °F (36.9 °C) (07/15/19 0315)  Pulse: 87 (07/15/19 0700)  Resp: 19 (07/15/19 0700)  BP: (!) 113/59 (07/15/19 0700)  SpO2: 98 % (07/15/19 0700) Vital Signs (24h Range):  Temp:  [98.4 °F (36.9 °C)-100.8 °F (38.2 °C)] 98.4 °F (36.9 °C)  Pulse:  [] 87  Resp:  [14-37] 19  SpO2:  [88 %-100 %] 98 %  BP: ()/(51-82) 113/59   Weight: 92.8 kg (204 lb 9.4 oz)  Body mass index is 31.11 kg/m².      Intake/Output Summary (Last 24 hours) at 7/15/2019 0751  Last data filed at 7/15/2019 0700  Gross per 24 hour   Intake 3233.82 ml   Output 1534 ml   Net 1699.82 ml       Physical Exam   Constitutional: He appears well-developed and well-nourished. No distress. Nasal cannula in place.   On 4L via NC  Suction at bedside, cannister with clear sputum, productive cough  Hoarse voice    HENT:   Head: Normocephalic and atraumatic.   Eyes: EOM are normal. Right eye exhibits no discharge. Left eye exhibits no discharge.   Neck: No tracheal deviation present.   Cardiovascular: Normal rate, regular rhythm, S1 normal and S2 normal. Exam reveals no gallop and no friction rub.   Murmur heard.   Systolic murmur is present with a grade of 2/6.  Pulses:       Radial pulses are 2+ on the right side, and 2+  on the left side.        Dorsalis pedis pulses are 2+ on the right side, and 2+ on the left side.   Pulmonary/Chest: Effort normal. No stridor. He has no wheezes. He has rales in the right lower field and the left lower field.   Abdominal: Soft. He exhibits no distension. There is no tenderness. There is no guarding.   Musculoskeletal: He exhibits no edema or deformity.   Neurological: He is alert.   Skin: Skin is warm and dry. There is pallor.   Nursing note and vitals reviewed.      Vents:  Vent Mode: Spont (07/14/19 1704)  Ventilator Initiated: Yes (07/12/19 0806)  Set Rate: 0 bmp (07/14/19 1704)  Vt Set: 560 mL (07/14/19 1704)  Pressure Support: 5 cmH20 (07/14/19 1704)  PEEP/CPAP: 5 cmH20 (07/14/19 1704)  Oxygen Concentration (%): 36 (07/15/19 0347)  Peak Airway Pressure: 11 cmH2O (07/14/19 1704)  Plateau Pressure: 21 cmH20 (07/14/19 1704)  Total Ve: 7.31 mL (07/14/19 1704)  F/VT Ratio<105 (RSBI): (!) 68.97 (07/14/19 1704)  Lines/Drains/Airways     Central Venous Catheter Line                 Percutaneous Central Line Insertion/Assessment - triple lumen  07/10/19 1511 right internal jugular 4 days              Significant Labs:    CBC/Anemia Profile:  Recent Labs   Lab 07/14/19  2019 07/15/19  0034 07/15/19  0426   WBC 8.68 7.71 7.81   HGB 8.4* 8.0* 8.0*   HCT 25.5* 23.9* 23.8*    186 198   MCV 91 91 90   RDW 14.1 14.3 14.0        Chemistries:  Recent Labs   Lab 07/14/19  0535 07/15/19  0426   * 134*   K 3.8 3.7    102   CO2 24 25   BUN 19 13   CREATININE 0.7 0.7   CALCIUM 7.4* 7.8*   ALBUMIN 1.7* 1.7*   PROT 4.3* 4.2*   BILITOT 0.5 0.4   ALKPHOS 77 132   ALT 14 18   AST 33 35   MG 2.0 2.0   PHOS 2.8 2.2*       All pertinent labs within the past 24 hours have been reviewed.    Significant Imaging:  I have reviewed and interpreted all pertinent imaging results/findings within the past 24 hours.

## 2019-07-15 NOTE — ASSESSMENT & PLAN NOTE
- Hemoglobin 7 on arrival   - Has received 19 units PRBC, 5 units FFP, 2 plt between facility and [facility] and [facility]  - CBC q12h  - See GIB

## 2019-07-15 NOTE — PHYSICIAN QUERY
PT Name: Manpreet Hamilton  MR #: [id number]     Physician Query Form - Documentation Clarification      CDS Vijaya West RN, BSN        Contact Information:  [phone]    [email or web address]           This form is a permanent document in the medical record.     Query Date: July 15, 2019    By submitting this query, we are merely seeking further clarification of documentation. Please utilize your independent clinical judgment when addressing the question(s) below.    The Medical record reflects the following:    Supporting Clinical Findings Location in Medical Record   68M with a PMH of HTN, HLD, UIP...suddenly began having bright red bowel movements that progressed to melena. was transferred to [place] where he became progressively more hypotensive (to systolic of 70's) and tachycardic to 120's. His Hg dropped to 7. he received 8 units total of PRBC, 3 units FFP. He was placed on PPI drip and had an endoscopy which showed a small superficial gastric ulcer and duodenal bulb ulcers with no active bleeding.       Impression:               - Multiple non-bleeding duodenal ulcers with a clean ulcer base (Germain Class III).  - Erythematous duodenopathy.  - Non-bleeding gastric ulcers with a clean ulcer base (Germain Class III).   - Erythematous mucosa in the cardia, gastric fundus, gastric body, antrum and prepyloric region of the stomach. Biopsied.  - 3 cm hiatal hernia.   7/10 EGD                                                                             Doctor, Please specify    acuity of both  the duodenal ulcers and the gastric ulcers    associated with above clinical findings.      DUODENAL ULCER     _x____   Acute    _____   Chronic    _____   Acute and chronic    _____   Other, please specify      GASTRIC ULCERS    __x___   Acute     _____    Chronic    _____   Acute and chronic    _____   Other, please specify                                                                                                                   [  ] Clinically Undetermined

## 2019-07-15 NOTE — TREATMENT PLAN
GI Treatment Plan    Manpreet Hamilton is a 68 y.o. male admitted to hospital 7/10/2019 (Hospital Day: 6) due to GIB (gastrointestinal bleeding).     Interval History  Assessed at bedside, no BM overnight. Hb stable.     Plan  -trend hb  -call if rebleed  - Plan of care was discussed with primary team.  - We will continue to follow.  -IV PPI 40 BID or PPI ggt  - Plan of care was discussed with primary team.  - We will continue to follow.    Thank you for involving us in the care of Manpreet Hamilton. Please call with any additional questions, concerns or changes in the patient's clinical status.    Loyd Maria MD  Gastroenterology Fellow  Spectralink: [id number]

## 2019-07-15 NOTE — ASSESSMENT & PLAN NOTE
- previously intubated for airway protection  - remains on 4L NC, normally on RA  - Lasix 40mg IV once and monitor response, rales BL on examination  - follow UOP and re-dose as appropriate

## 2019-07-15 NOTE — ASSESSMENT & PLAN NOTE
Hemorrhagic vs septic  - Tranfusing for Hg above 7, as no active signs of hemorrhage at this time  - Developed fevers 7/12, treating broadly with vanc, cefepime and flagyl  - off of pressors, normotensive

## 2019-07-15 NOTE — PROGRESS NOTES
[facility]  Critical Care Medicine  Progress Note    Patient Name: Manpreet Hamilton  MRN: [id number]  Admission Date: 7/10/2019  Hospital Length of Stay: 5 days  Code Status: Full Code  Attending Provider: Tony De Santiago MD  Primary Care Provider: Radames Pradhan MD   Principal Problem: GIB (gastrointestinal bleeding)    Subjective:     HPI:  The patient is a 68M with a PMH of HTN, HLD, UIP (dx 1 year ago, followed by Dr. Pradhan, on ofev) presenting as transfer from [place] for GI bleed. The patient was on a cruise with his family in [place] when he suddenly began having bright red bowel movements that progressed to melena. This was on Saturday the 8th. On board the ship his BP was 88/52, pulse 119. Labs with hemoglobin of 9.7 (baseline 15). He was transferred to [place] where he became progressively more hypotensive (to systolic of 70's) and tachycardic to 120's. His Hg dropped to 7. He was admitted to [place] for 1 night prior to transfer to [facility]. While at [place], he received 8 units total of PRBC, 3 units FFP. He was placed on PPI drip and had an endoscopy which showed a small superficial gastric ulcer and duodenal bulb ulcers with no active bleeding.     The patient takes a baby aspirin daily and drinks approximately 4 alcoholic beverages nightly. Has never before had GI bleed.    Hospital/ICU Course:  Patient arrived to ICU and was urgently taken to endoscopy. Stomach full of blood, impairing visualization of source. Due to high risk of aspiration and necessity of repeat EGD, patient was intubated for airway protection.     Required transfusion of additional 5 U PRBC, 1 plt, and 1 FFP. Erythromycin given to clear patient's stomach of blood. Developed erythematous rash, and benadryl given. No wheezing appreciated on exam at that time.    Repeat EGD performed overnight (7/10) which showed numerous ulcers in the stomach and duodenum with no active bleeding. Patient brought back to ICU, and  placed on PPI gtt per GI recs.    Patient was successfully extubated on 7/11; however, hemoglobin began trending downward again later in the day. Active bleeding overnight requiring 3 units PRBCs. GI repeated EGD the morning of 7/12 again with numerous clean-based gastric and duodenal ulcers. Possible active bleeding seen in the cardia of the stomach along the lesser curvature. Clip placed. Patient taken to IR for angio, left gastric embolized. Left intubated due to concern for aspiration again due to large amount of blood in stomach.    Overnight, patient's hemoglobin did trend down slightly, but stayed overall stable (8->~7.5). 3 melanotic stools, which is expected given volume of blood in stomach. Patient also spiked fever of 102.1. Cultures sent, CXR repeated. No focal consolidation, but some edema superimposed on chronic lung changes seen. Started on vanc, cefepime and flagyl given recent procedures and risk of aspiration prior to initial intubation. Lower suspicion for transfusion reaction as it had been an number of hours since most recent transfusion. Also given dose of lasix due to peripheral edema on exam and pulmonary edema on CXR.     On the evening of 7/13, patient did require 1 unit PRBC for hg of 6.7, which increased to 8.7 following transfusion. May represent oozing from site of embolization or AVMs seen on EGD. Again trending down slowly today. No melanotic bowel movements. Extubated to nasal cannula on 7/14. Melanotic bowel movements continue but hemoglobin remains stable, cough continued productive of clear sputum.     Interval History/Significant Events: Please see hospital course above.    Review of Systems   Constitutional: Positive for fatigue. Negative for chills and fever.   HENT: Negative for rhinorrhea and sinus pain.    Eyes: Negative.    Respiratory: Positive for cough and shortness of breath.    Cardiovascular: Negative for chest pain, palpitations and leg swelling.   Gastrointestinal:  Negative.  Negative for diarrhea, nausea and vomiting.   Endocrine: Negative.    Genitourinary: Positive for difficulty urinating. Negative for decreased urine volume and hematuria.   Musculoskeletal: Negative.    Skin: Negative.    Allergic/Immunologic: Negative.    Neurological: Positive for headaches.     Objective:     Vital Signs (Most Recent):  Temp: 98.4 °F (36.9 °C) (07/15/19 0315)  Pulse: 87 (07/15/19 0700)  Resp: 19 (07/15/19 0700)  BP: (!) 113/59 (07/15/19 0700)  SpO2: 98 % (07/15/19 0700) Vital Signs (24h Range):  Temp:  [98.4 °F (36.9 °C)-100.8 °F (38.2 °C)] 98.4 °F (36.9 °C)  Pulse:  [] 87  Resp:  [14-37] 19  SpO2:  [88 %-100 %] 98 %  BP: ()/(51-82) 113/59   Weight: 92.8 kg (204 lb 9.4 oz)  Body mass index is 31.11 kg/m².      Intake/Output Summary (Last 24 hours) at 7/15/2019 0751  Last data filed at 7/15/2019 0700  Gross per 24 hour   Intake 3233.82 ml   Output 1534 ml   Net 1699.82 ml       Physical Exam   Constitutional: He appears well-developed and well-nourished. No distress. Nasal cannula in place.   On 4L via NC  Suction at bedside, cannister with clear sputum, productive cough  Hoarse voice    HENT:   Head: Normocephalic and atraumatic.   Eyes: EOM are normal. Right eye exhibits no discharge. Left eye exhibits no discharge.   Neck: No tracheal deviation present.   Cardiovascular: Normal rate, regular rhythm, S1 normal and S2 normal. Exam reveals no gallop and no friction rub.   Murmur heard.   Systolic murmur is present with a grade of 2/6.  Pulses:       Radial pulses are 2+ on the right side, and 2+ on the left side.        Dorsalis pedis pulses are 2+ on the right side, and 2+ on the left side.   Pulmonary/Chest: Effort normal. No stridor. He has no wheezes. He has rales in the right lower field and the left lower field.   Abdominal: Soft. He exhibits no distension. There is no tenderness. There is no guarding.   Musculoskeletal: He exhibits no edema or deformity.    Neurological: He is alert.   Skin: Skin is warm and dry. There is pallor.   Nursing note and vitals reviewed.      Vents:  Vent Mode: Spont (07/14/19 1704)  Ventilator Initiated: Yes (07/12/19 0806)  Set Rate: 0 bmp (07/14/19 1704)  Vt Set: 560 mL (07/14/19 1704)  Pressure Support: 5 cmH20 (07/14/19 1704)  PEEP/CPAP: 5 cmH20 (07/14/19 1704)  Oxygen Concentration (%): 36 (07/15/19 0347)  Peak Airway Pressure: 11 cmH2O (07/14/19 1704)  Plateau Pressure: 21 cmH20 (07/14/19 1704)  Total Ve: 7.31 mL (07/14/19 1704)  F/VT Ratio<105 (RSBI): (!) 68.97 (07/14/19 1704)  Lines/Drains/Airways     Central Venous Catheter Line                 Percutaneous Central Line Insertion/Assessment - triple lumen  07/10/19 1511 right internal jugular 4 days              Significant Labs:    CBC/Anemia Profile:  Recent Labs   Lab 07/14/19  2019 07/15/19  0034 07/15/19  0426   WBC 8.68 7.71 7.81   HGB 8.4* 8.0* 8.0*   HCT 25.5* 23.9* 23.8*    186 198   MCV 91 91 90   RDW 14.1 14.3 14.0        Chemistries:  Recent Labs   Lab 07/14/19  0535 07/15/19  0426   * 134*   K 3.8 3.7    102   CO2 24 25   BUN 19 13   CREATININE 0.7 0.7   CALCIUM 7.4* 7.8*   ALBUMIN 1.7* 1.7*   PROT 4.3* 4.2*   BILITOT 0.5 0.4   ALKPHOS 77 132   ALT 14 18   AST 33 35   MG 2.0 2.0   PHOS 2.8 2.2*       All pertinent labs within the past 24 hours have been reviewed.    Significant Imaging:  I have reviewed and interpreted all pertinent imaging results/findings within the past 24 hours.      ABG  Recent Labs   Lab 07/14/19  1814   PH 7.328*   PO2 35*   PCO2 46.1*   HCO3 24.2   BE -2     Assessment/Plan:     Pulmonary  Acute hypoxemic respiratory failure  - previously intubated for airway protection  - remains on 4L NC, normally on RA  - Lasix 40mg IV once and monitor response, rales BL on examination  - follow UOP and re-dose as appropriate    Pulmonary fibrosis  - Dx 1 year ago, managed by Dr. Pradhan  - Per Dr. Pradhan:  hold ofev, goal SpO2 95%      Oncology  Acute blood loss anemia  - Hemoglobin 7 on arrival   - Has received 19 units PRBC, 5 units FFP, 2 plt between facility and [facility]  - CBC q12h  - See GIB     GI  * GIB (gastrointestinal bleeding)  - Initial EGD obscured due to large amount of blood in stomach, repeat with numerous ulcers in stomach and duodenum with no active bleeding (7/10)  - CTA following scope (7/10) with no active bleeding visualized. Some enhancement noted at site of Nissen; however, read at that time felt mucosal enhancement more likely than active bleed  - Patient began actively bleeding again overnight 7/11, transfused 4 additional units, repeat scope again with large amount of blood in stomach, possible active bleed see in cardia along lesser curvature. Source unable to be visualized, clip placed to meeta location for IR. Left gastric embolized.   - CBC q8h  - Has required an additional unit of PRBCs, melanotic stools overnight but Hb stable    Other  Shock  Hemorrhagic vs septic  - Tranfusing for Hg above 7, as no active signs of hemorrhage at this time  - Developed fevers 7/12, treating broadly with vanc, cefepime and flagyl  - off of pressors, normotensive     Critical Care Daily Checklist:    A: Awake: RASS Goal/Actual Goal:    Actual: Shane Agitation Sedation Scale (RASS): Alert and calm   B: Spontaneous Breathing Trial Performed? Spon. Breathing Trial Initiated?: Initiated (07/14/19 2438)   C: SAT & SBT Coordinated?  NA                D: Delirium: CAM-ICU Overall CAM-ICU: Negative   E: Early Mobility Performed? Yes   F: Feeding Goal:    Status:     Current Diet Order   Procedures    Diet clear liquid      AS: Analgesia/Sedation none   T: Thromboembolic Prophylaxis SCD   H: HOB > 300 yes   U: Stress Ulcer Prophylaxis (if needed) ppi iv GTT   G: Glucose Control none   B: Bowel Function Stool Occurrence: 3   I: Indwelling Catheter (Lines & Ribeiro) Necessity CVC RIJ   D: De-escalation of  Antimicrobials/Pharmacotherapies Presumptive proteus - will de-escalate GNR as appropriate, stopped vanco/flagyl    Plan for the day/ETD Monitor Hb, BP, advance diet     Code Status:  Family/Goals of Care: Full Code         Critical secondary to Patient has a condition that poses threat to life and bodily function: acute blood loss anemia with shock due to GI bleed      Critical care was time spent personally by me on the following activities: development of treatment plan with patient or surrogate and bedside caregivers, discussions with consultants, evaluation of patient's response to treatment, examination of patient, ordering and performing treatments and interventions, ordering and review of laboratory studies, ordering and review of radiographic studies, pulse oximetry, re-evaluation of patient's condition. This critical care time did not overlap with that of any other provider or involve time for any procedures.     Josiah Mcguire MD  Critical Care Medicine  [facility]

## 2019-07-15 NOTE — PLAN OF CARE
Problem: Fall Injury Risk  Goal: Absence of Fall and Fall-Related Injury    Intervention: Identify and Manage Contributors to Fall Injury Risk  No acute events throughout day. See vital signs and assessments in flowsheets. See below for updates on today's progress.    Pulmonary: pt on 3L nasal cannula. SaO2 92-94%    Cardiovascular: NSR on continuous cardiac monitoring. -120. HR .    Neurological: pt oriented. PERRLA.    Gastrointestinal: bowel sounds present.    Genitourinary: pt voids via condom catheter.  ]  Endocrine: electrolytes replaced.    Integumentary/Other: no skin breakdown present.    Infusions: none    Patient progressing towards goals as tolerated, plan of care communicated and reviewed with pt and family. All concerns addressed. Will continue to monitor.

## 2019-07-15 NOTE — PLAN OF CARE
Problem: Noninvasive Ventilation Acute  Goal: Effective Unassisted Ventilation and Oxygenation    Intervention: Monitor and Manage Noninvasive Ventilation  Patient educated on the correlation between the nasal cannula, the SpO2 displayed on the monitor, and the alarms. Repositioned nasal cannula.       Comments: CASTILLO. See flowsheets for vital signs and assessments. See below for updates on progress made.       Neuro: AAOx4, slightly hoarse. Afebrile.     Cardiovascular: HR at beginning of shift in the 90s increased at one point to the 120s and settled back down in the 90s at the end of the shift. NS/ST. BP Stable with MAP > 69 with Levo off for the entire shift. Pulses palpable      Pulmonary: Lungs diminished, NC at 4 L Sats high 90s. Frequent productive cough    Gastrointestinal: Clear liquid diet, 4x Black Tarry BM towards end of shift. H&H drawn after third BM and is stable.     Genitourinary: Gema DC'd, /shift.     Integumentary/other: HAPI prevention bundle in place; Last CHG bath: Full CHG bath given, linens and gowns changed.     Infusions: Protonix per eMAR.     POC: Monitor H&H, BP.     Patient progressing towards goals as tolerated, plan of care communicated and reviewed with Manpreet Hamilton. All concerns addressed. Will continue to monitor.

## 2019-07-15 NOTE — PLAN OF CARE
CM discussed D/C POC needs with CCM team in IDT rounds. Per CCM team, patient is not medically stable for stepdown at this time. Patient requires continued monitoring from CCM service for s/s of bleeding: CBC //levels q8hrs, Melanotic Stools (Last noted 7/14), Febrile (last noted 7/12)-being treated with broad spectrum ABX: Vanc,Flagyl,Cefepime. Patient was weaned off pressors and extubated on 7/14/19 and currently on 4Lpm via nasal cannula. CM remains available for any further patient needs or concerns.        07/15/19 1410   Discharge Reassessment   Assessment Type Discharge Planning Reassessment   Provided patient/caregiver education on the expected discharge date and the discharge plan No   Do you have any problems affording any of your prescribed medications? No   Discharge Plan A Home Health   Discharge Plan B Home with family   DME Needed Upon Discharge  other (see comments)  (TBD)   Can the patient answer the patient profile reliably? Yes, cognitively intact       Kiana Aviles RN, [facility]  Case Management-Critical Care     [phone]

## 2019-07-16 PROBLEM — R57.9 SHOCK: Status: RESOLVED | Noted: 2019-07-13 | Resolved: 2019-07-16

## 2019-07-16 LAB
ALBUMIN SERPL BCP-MCNC: 1.8 G/DL (ref 3.5–5.2)
ALP SERPL-CCNC: 150 U/L (ref 55–135)
ALT SERPL W/O P-5'-P-CCNC: 20 U/L (ref 10–44)
ANION GAP SERPL CALC-SCNC: 12 MMOL/L (ref 8–16)
AST SERPL-CCNC: 29 U/L (ref 10–40)
BASOPHILS # BLD AUTO: 0.02 K/UL (ref 0–0.2)
BASOPHILS NFR BLD: 0.2 % (ref 0–1.9)
BILIRUB SERPL-MCNC: 0.4 MG/DL (ref 0.1–1)
BUN SERPL-MCNC: 10 MG/DL (ref 8–23)
CALCIUM SERPL-MCNC: 8.3 MG/DL (ref 8.7–10.5)
CHLORIDE SERPL-SCNC: 97 MMOL/L (ref 95–110)
CO2 SERPL-SCNC: 28 MMOL/L (ref 23–29)
CREAT SERPL-MCNC: 0.6 MG/DL (ref 0.5–1.4)
DIFFERENTIAL METHOD: ABNORMAL
EOSINOPHIL # BLD AUTO: 0.3 K/UL (ref 0–0.5)
EOSINOPHIL NFR BLD: 3.8 % (ref 0–8)
ERYTHROCYTE [DISTWIDTH] IN BLOOD BY AUTOMATED COUNT: 14 % (ref 11.5–14.5)
EST. GFR  (AFRICAN AMERICAN): >60 ML/MIN/1.73 M^2
EST. GFR  (NON AFRICAN AMERICAN): >60 ML/MIN/1.73 M^2
GLUCOSE SERPL-MCNC: 116 MG/DL (ref 70–110)
HCT VFR BLD AUTO: 25.6 % (ref 40–54)
HGB BLD-MCNC: 8.8 G/DL (ref 14–18)
IMM GRANULOCYTES # BLD AUTO: 0.04 K/UL (ref 0–0.04)
IMM GRANULOCYTES NFR BLD AUTO: 0.4 % (ref 0–0.5)
LYMPHOCYTES # BLD AUTO: 1 K/UL (ref 1–4.8)
LYMPHOCYTES NFR BLD: 10.8 % (ref 18–48)
MAGNESIUM SERPL-MCNC: 1.8 MG/DL (ref 1.6–2.6)
MCH RBC QN AUTO: 30 PG (ref 27–31)
MCHC RBC AUTO-ENTMCNC: 34.4 G/DL (ref 32–36)
MCV RBC AUTO: 87 FL (ref 82–98)
MONOCYTES # BLD AUTO: 1.1 K/UL (ref 0.3–1)
MONOCYTES NFR BLD: 12.3 % (ref 4–15)
NEUTROPHILS # BLD AUTO: 6.5 K/UL (ref 1.8–7.7)
NEUTROPHILS NFR BLD: 72.5 % (ref 38–73)
NRBC BLD-RTO: 0 /100 WBC
PHOSPHATE SERPL-MCNC: 2.1 MG/DL (ref 2.7–4.5)
PLATELET # BLD AUTO: 256 K/UL (ref 150–350)
PLATELET BLD QL SMEAR: ABNORMAL
PMV BLD AUTO: 9.9 FL (ref 9.2–12.9)
POTASSIUM SERPL-SCNC: 3.1 MMOL/L (ref 3.5–5.1)
PROT SERPL-MCNC: 4.6 G/DL (ref 6–8.4)
RBC # BLD AUTO: 2.93 M/UL (ref 4.6–6.2)
SODIUM SERPL-SCNC: 137 MMOL/L (ref 136–145)
WBC # BLD AUTO: 8.95 K/UL (ref 3.9–12.7)

## 2019-07-16 PROCEDURE — 63600175 PHARM REV CODE 636 W HCPCS: Performed by: HOSPITALIST

## 2019-07-16 PROCEDURE — 83735 ASSAY OF MAGNESIUM: CPT

## 2019-07-16 PROCEDURE — 97116 GAIT TRAINING THERAPY: CPT

## 2019-07-16 PROCEDURE — C9113 INJ PANTOPRAZOLE SODIUM, VIA: HCPCS | Performed by: HOSPITALIST

## 2019-07-16 PROCEDURE — 20000000 HC ICU ROOM

## 2019-07-16 PROCEDURE — 94761 N-INVAS EAR/PLS OXIMETRY MLT: CPT

## 2019-07-16 PROCEDURE — 25000003 PHARM REV CODE 250

## 2019-07-16 PROCEDURE — 27000221 HC OXYGEN, UP TO 24 HOURS

## 2019-07-16 PROCEDURE — 97165 OT EVAL LOW COMPLEX 30 MIN: CPT

## 2019-07-16 PROCEDURE — 97161 PT EVAL LOW COMPLEX 20 MIN: CPT

## 2019-07-16 PROCEDURE — 25000003 PHARM REV CODE 250: Performed by: STUDENT IN AN ORGANIZED HEALTH CARE EDUCATION/TRAINING PROGRAM

## 2019-07-16 PROCEDURE — 63600175 PHARM REV CODE 636 W HCPCS

## 2019-07-16 PROCEDURE — 85025 COMPLETE CBC W/AUTO DIFF WBC: CPT

## 2019-07-16 PROCEDURE — 97535 SELF CARE MNGMENT TRAINING: CPT

## 2019-07-16 PROCEDURE — 80053 COMPREHEN METABOLIC PANEL: CPT

## 2019-07-16 PROCEDURE — 84100 ASSAY OF PHOSPHORUS: CPT

## 2019-07-16 RX ORDER — CIPROFLOXACIN 500 MG/1
500 TABLET ORAL EVERY 12 HOURS
Status: DISPENSED | OUTPATIENT
Start: 2019-07-16 | End: 2019-07-19

## 2019-07-16 RX ORDER — POTASSIUM CHLORIDE 20 MEQ/1
40 TABLET, EXTENDED RELEASE ORAL EVERY 4 HOURS
Status: COMPLETED | OUTPATIENT
Start: 2019-07-16 | End: 2019-07-16

## 2019-07-16 RX ORDER — SODIUM,POTASSIUM PHOSPHATES 280-250MG
2 POWDER IN PACKET (EA) ORAL ONCE
Status: COMPLETED | OUTPATIENT
Start: 2019-07-16 | End: 2019-07-16

## 2019-07-16 RX ADMIN — PANTOPRAZOLE SODIUM 40 MG: 40 INJECTION, POWDER, FOR SOLUTION INTRAVENOUS at 08:07

## 2019-07-16 RX ADMIN — POTASSIUM CHLORIDE 40 MEQ: 1500 TABLET, EXTENDED RELEASE ORAL at 10:07

## 2019-07-16 RX ADMIN — CIPROFLOXACIN HYDROCHLORIDE 500 MG: 500 TABLET, FILM COATED ORAL at 09:07

## 2019-07-16 RX ADMIN — CEFEPIME 2 G: 2 INJECTION, POWDER, FOR SOLUTION INTRAVENOUS at 08:07

## 2019-07-16 RX ADMIN — POTASSIUM & SODIUM PHOSPHATES POWDER PACK 280-160-250 MG 1 PACKET: 280-160-250 PACK at 08:07

## 2019-07-16 RX ADMIN — PANTOPRAZOLE SODIUM 40 MG: 40 INJECTION, POWDER, FOR SOLUTION INTRAVENOUS at 09:07

## 2019-07-16 RX ADMIN — CEFEPIME 2 G: 2 INJECTION, POWDER, FOR SOLUTION INTRAVENOUS at 12:07

## 2019-07-16 RX ADMIN — POTASSIUM CHLORIDE 40 MEQ: 1500 TABLET, EXTENDED RELEASE ORAL at 02:07

## 2019-07-16 NOTE — PHYSICIAN QUERY
PT Name: Manpreet Hamilton  MR #: [id number]    Physician Query Form - Respiratory Condition Clarification      CDS Vijaya West RN, BSN        Contact Information:  [phone]    [email or web address]           This form is a permanent document in the medical record.    Query Date: July 16, 2019    By submitting this query, we are merely seeking further clarification of documentation. Please utilize your independent clinical judgment when addressing the question(s) below.    The Medical record contains the following   Indicators   Supporting Clinical Findings Location in Medical Record   X     SOB, REYES, Wheezing, Productive Cough, Use of Accessory Muscles, etc. after EGD, patient was noted to be obtunded, coughing after NG tube placement attempt.  Decision made to electively intubate patient H&P   X     Acute/Chronic Illness Acute GI bleed - has had EGD showing non-concerning gastritis. Still bleeding with symptomatic anemia..suddenly began having bright red bowel movements that progressed to melena.. Hemoglobin 7 on admission to [facility], will transfuse 1 unit  - Has received 9 units PRBC, 2 units FFP.. Endoscopy in [place] with non-bleeding gastric ulcer and duodenal bulbs    second EGD performed overnight. Shows several ulcers including large duodenal. Not actively bleeding.    Stomach full of blood, impairing visualization of source. Due to high risk of aspiration and necessity of repeat EGD, patient was intubated for airway protection.  H&P              7/11 [facility] PN filed @ 11:37pm       Radiology Findings     X     Respiratory Distress or Failure Acute hypoxemic respiratory failure  - previously intubated for airway protection 7/15 [facility] PN filed 5:49pm       Hypoxia or Hypercapnia     X     RR         ABGs         O2 sat   POC PH 7.277 (LL)   POC PCO2 43.8   POC PO2 122 (H)   POC BE -6   POC HCO3 20.5 (L)   POC SATURATED O2 98   POC TCO2 22 (L)   FiO2 30   Vt 450   PiP 27   PEEP 5   Sample ARTERIAL   DelSys Adult  Vent       POC PH  7.306 (L) 7.328 (L)   POC PCO2  46.8 (H) 46.1 (H)   POC PO2  46 35 (LL)   POC BE  -3 -2   POC HCO3  23.4 (L) 24.2   POC SATURATED O2  78 (L) 62 (L)   POC TCO2  25 26   FiO2  35    Vt  500    PiP  26    PEEP  5    Flow   4   Sample  VENOUS VENOUS   DelSys  Adult Vent Nasal Can    POCT 7/11                                     POCT 7/13 --> 7/14       BiPAP/Intubation        Supplemental O2        Home O2, Oxygen Dependence        Treatment     X     Other Hypoxemia - 4 liters of oxygen/min, usually on 0.21 with ILD, dyspnea, suspect volume overload vs transfusion related lung injury - diuresis today    repeated EGD the morning of 7/12 again with numerous clean-based gastric and duodenal ulcers. Possible active bleeding seen in the cardia of the stomach along the lesser curvature. Clip placed. Patient taken to IR for angio, left gastric embolized. Left intubated due to concern for aspiration again due to large amount of blood in stomach.    Extubated to nasal cannula on 7/14   7/15 [facility] PN filed 5:49pm      Respiratory failure can be acute, chronic or both. It is generally further specified as hypoxic, hypercapnic or both. Lastly, it is important to identify an etiology, if known or suspected.   References::  https://www.acphospitalist.org/archives/2013/10/coding.htm http://[employer]/acute-respiratory-failure-know     The clinical guidelines noted below are only system guidelines, and do not replace the providers clinical judgment.    Provider, please specify diagnosis or diagnoses associated with above clinical findings.     [   ] Acute Respiratory Failure with Hypoxia - ABG pO2 < 60 mmHg or O2 sat of 88% on RA and respiratory symptoms documented   [  x ] Acute Respiratory Distress - Generally describes less severe respiratory symptoms (tachypnea, in respiratory distress, increased work of breathing, unable to speak in complete sentences, labored breathing, use of accessory  muscles, RR> 24, cyanosis, dyspnea, wheezing, stridor, lethargy) without sufficient measurements (pO2, SpO2, pH, and pCO2) to meet criteria for respiratory failure    [   ] Hypoxia Only   [   ] No Respiratory Failure, Maintained on Vent for Routine Care or Airway Protection -  purposely intubated for airway protection (e.g.: angioedema, stroke, trauma); without meeting the criteria for respiratory failure.    [   ] Other Respiratory Diagnosis (please specify): _________________________________   [   ]  Clinically Undetermined       Please document in your progress notes daily for the duration of treatment until resolved and include in your discharge summary.

## 2019-07-16 NOTE — PLAN OF CARE
Problem: Occupational Therapy Goal  Goal: Occupational Therapy Goal  Goals to be met by: 7/26/2019    Patient will increase functional independence with ADLs by performing:    Feeding with [place].  UE Dressing with [place].  LE Dressing with [place].  Grooming while standing with [place].  Toileting from toilet with Carver for hygiene and clothing management.        Outcome: Ongoing (interventions implemented as appropriate)  Goals remain appropriate, continue with OT POC.    NADEEM Polo  7/16/2019  Rehab Services

## 2019-07-16 NOTE — SUBJECTIVE & OBJECTIVE
Interval History/Significant Events: NAEO, H/H stable, no melanotic stools.     Review of Systems   Constitutional: Negative for chills, fatigue and fever.   HENT: Negative for rhinorrhea and sinus pain.    Eyes: Negative.    Respiratory: Negative for cough and shortness of breath.    Cardiovascular: Negative for chest pain, palpitations and leg swelling.   Gastrointestinal: Negative.  Negative for diarrhea, nausea and vomiting.   Endocrine: Negative.    Genitourinary: Negative for decreased urine volume, difficulty urinating and hematuria.   Musculoskeletal: Negative.    Skin: Negative.    Allergic/Immunologic: Negative.    Neurological: Negative for seizures and headaches.     Objective:     Vital Signs (Most Recent):  Temp: 98.2 °F (36.8 °C) (07/16/19 0315)  Pulse: 89 (07/16/19 0600)  Resp: (!) 27 (07/16/19 0600)  BP: 113/65 (07/16/19 0600)  SpO2: (!) 90 % (07/16/19 0600) Vital Signs (24h Range):  Temp:  [97.6 °F (36.4 °C)-98.6 °F (37 °C)] 98.2 °F (36.8 °C)  Pulse:  [82-98] 89  Resp:  [19-47] 27  SpO2:  [90 %-98 %] 90 %  BP: (101-162)/(55-74) 113/65   Weight: 92.8 kg (204 lb 9.4 oz)  Body mass index is 31.11 kg/m².      Intake/Output Summary (Last 24 hours) at 7/16/2019 0641  Last data filed at 7/16/2019 0600  Gross per 24 hour   Intake 675 ml   Output 7300 ml   Net -6625 ml       Physical Exam   Constitutional: He appears well-developed and well-nourished. No distress. Nasal cannula in place.   On 1L via NC   HENT:   Head: Normocephalic and atraumatic.   Eyes: EOM are normal. Right eye exhibits no discharge. Left eye exhibits no discharge.   Neck: No tracheal deviation present.   Cardiovascular: Normal rate, regular rhythm, S1 normal and S2 normal. Exam reveals no gallop and no friction rub.   Murmur heard.   Systolic murmur is present with a grade of 2/6.  Pulses:       Radial pulses are 2+ on the right side, and 2+ on the left side.        Dorsalis pedis pulses are 2+ on the right side, and 2+ on the left  side.   Pulmonary/Chest: Effort normal. No stridor. He has no wheezes. He has rales in the right lower field and the left lower field.   Abdominal: Soft. He exhibits no distension. There is no tenderness. There is no guarding.   Musculoskeletal: He exhibits no edema or deformity.   Neurological: He is alert.   Skin: Skin is warm and dry. There is pallor.   Nursing note and vitals reviewed.      Vents:  Vent Mode: Spont (07/14/19 1704)  Ventilator Initiated: Yes (07/12/19 0806)  Set Rate: 0 bmp (07/14/19 1704)  Vt Set: 560 mL (07/14/19 1704)  Pressure Support: 5 cmH20 (07/14/19 1704)  PEEP/CPAP: 5 cmH20 (07/14/19 1704)  Oxygen Concentration (%): 36 (07/15/19 0347)  Peak Airway Pressure: 11 cmH2O (07/14/19 1704)  Plateau Pressure: 21 cmH20 (07/14/19 1704)  Total Ve: 7.31 mL (07/14/19 1704)  F/VT Ratio<105 (RSBI): (!) 68.97 (07/14/19 1704)  Lines/Drains/Airways     Central Venous Catheter Line                 Percutaneous Central Line Insertion/Assessment - triple lumen  07/10/19 1511 right internal jugular 5 days          Drain            Male External Urinary Catheter 07/16/19 0515 Medium less than 1 day              Significant Labs:    CBC/Anemia Profile:  Recent Labs   Lab 07/15/19  0840 07/15/19  1620 07/16/19  0300   WBC 7.48 6.77 8.95   HGB 8.0* 8.5* 8.8*   HCT 24.0* 24.9* 25.6*    217 256   MCV 88 87 87   RDW 14.1 13.9 14.0        Chemistries:  Recent Labs   Lab 07/15/19  0426 07/16/19  0300   * 137   K 3.7 3.1*    97   CO2 25 28   BUN 13 10   CREATININE 0.7 0.6   CALCIUM 7.8* 8.3*   ALBUMIN 1.7* 1.8*   PROT 4.2* 4.6*   BILITOT 0.4 0.4   ALKPHOS 132 150*   ALT 18 20   AST 35 29   MG 2.0 1.8   PHOS 2.2* 2.1*           Significant Imaging:  I have reviewed all pertinent imaging results/findings within the past 24 hours.

## 2019-07-16 NOTE — PT/OT/SLP EVAL
Occupational Therapy   Evaluation    Name: Manpreet Hamilton  MRN: [id number]  Admitting Diagnosis:  GIB (gastrointestinal bleeding) 4 Days Post-Op    Recommendations:     Discharge Recommendations: home health OT  Discharge Equipment Recommendations:  other (see comments)(TBD)  Barriers to discharge:  None    Assessment:     Manpreet Hamilton is a 68 y.o. male with a medical diagnosis of GIB (gastrointestinal bleeding).  He presents with decline in ADLs and functional mobility. Pt tolerated evaluation well and seemed a bit surprised at level of decline in functional mobility. He required min A from therapist on each side for ambulation. Performance deficits affecting function: impaired endurance, impaired cardiopulmonary response to activity, impaired functional mobilty, gait instability.      Rehab Prognosis: Good; patient would benefit from acute skilled OT services to address these deficits and reach maximum level of function.       Plan:     Patient to be seen 4 x/week  to address the above listed problems via self-care/home management, therapeutic activities, therapeutic exercises, neuromuscular re-education  · Plan of Care Expires: 08/14/19  · Plan of Care Reviewed with: patient, spouse, daughter    Subjective     Chief Complaint: Pt c/o of being in bed for so long. He is eager to get moving but agrees he hasn't really been appropriate for OOB.   Patient/Family Comments/goals: Pt agreeable to progressing towards independence with ADLs and functional mobility.     Occupational Profile:  Living Environment: Pt lives in 2SH with bed and bath on first floor.   Previous level of function: independent with self care and functional mobility. Pt works and drives.  Roles and Routines: , employee, dad  Equipment Used at Home:  none  Assistance upon Discharge: family can assist upon d/c home.    Pain/Comfort:  Pain Rating 1: 0/10  Pain Rating Post-Intervention 1: 0/10    Patients cultural, spiritual, Mandaeism  conflicts given the current situation: no    Objective:     Communicated with: RN prior to session.  Patient found supine with telemetry, pulse ox (continuous), blood pressure cuff, central line, Condom Catheter upon OT entry to room.    General Precautions: Standard, fall   Orthopedic Precautions:N/A   Braces: N/A     Occupational Performance:    Bed Mobility:    · Patient completed Scooting/Bridging with independence  · Patient completed Supine to Sit with stand by assistance  · Patient completed Sit to Supine with stand by assistance    Functional Mobility/Transfers:  · Patient completed Sit <> Stand Transfer with contact guard assistance  with  hand-held assist   · Patient completed Bed <> Chair Transfer using Step Transfer technique with minimum assistance with hand-held assist  · Functional Mobility: Pt ambulated in room with min A overall. Required therapist on each side for ambulation.     Activities of Daily Living:  · Feeding:  independence    · Grooming: set up seated. DNT standing, would likely need CGA    · Upper Body Dressing: moderate assistance due to lines  · Lower Body Dressing: minimum assistance has adequate ROM for task.   · Toileting: needs min A x 1 person to walk to toilet, would likely be indepedent with hyg/clothing managment      Cognitive/Visual Perceptual:  Cognitive/Psychosocial Skills:     -       Oriented to: Person, Place, Time and Situation   -       Follows Commands/attention:Follows two-step commands  -       Mood/Affect/Coping skills/emotional control: Appropriate to situation  Visual/Perceptual:  -Intact      Physical Exam:  Balance: -       sitting balance is good, static standing balance good, dynamic standing balance is fair  Upper Extremity Range of Motion:     -       Right Upper Extremity: WFL  -       Left Upper Extremity: WNL  Upper Extremity Strength:    -       Right Upper Extremity: WNL  -       Left Upper Extremity: WNL   Strength: -       Right Upper Extremity:  WNL  -       Left Upper Extremity: WNL  Fine Motor Coordination:    -       Intact  Neurological:  intact, just a little weak from prolonged bedrest    AMPAC 6 Click ADL:  AMPAC Total Score: 20    Treatment & Education:  · Pt educated on role of OT in acute care setting.   · Assisted with ADLs and functional mobility with assist levels noted above  · Pt assisted with ambulating household distances.  Education:    Patient left up in chair with all lines intact, call button in reach and daughter and wife present    GOALS:   Multidisciplinary Problems     Occupational Therapy Goals        Problem: Occupational Therapy Goal    Goal Priority Disciplines Outcome Interventions   Occupational Therapy Goal     OT, PT/OT Ongoing (interventions implemented as appropriate)    Description:  Goals to be met by: 7/26/2019    Patient will increase functional independence with ADLs by performing:    Feeding with Walsh.  UE Dressing with Walsh.  LE Dressing with Walsh.  Grooming while standing with Walsh.  Toileting from toilet with Walsh for hygiene and clothing management.                         History:     Past Medical History:   Diagnosis Date    Hyperlipemia     Hypertension     Nasal congestion     Pulmonary fibrosis          Past Surgical History:   Procedure Laterality Date    CATARACT EXTRACTION Left     COLONOSCOPY N/A 8/5/2016    Performed by [facility] (4TH FLR)    EGD (ESOPHAGOGASTRODUODENOSCOPY) N/A 7/12/2019    Performed by Jimmy Saldana MD at [facility] END[facility] (2ND FLR)    EGD (ESOPHAGOGASTRODUODENOSCOPY) N/A 7/11/2019    Performed by [facility] at [facility] (2ND FLR)    EGD (ESOPHAGOGASTRODUODENOSCOPY) N/A 7/10/2019    Performed by [facility] at [facility] (2ND FLR)    HAND SURGERY      left    HIATAL HERNIA REPAIR      MICRODISCECTOMY, SPINE MIS Right L5- S1 Right 6/27/2018    Performed by Angel Pendleton MD at [facility] OR 2ND FLR     TONSILLECTOMY, ADENOIDECTOMY      VASECTOMY         Time Tracking:     OT Date of Treatment: 07/16/19  OT Start Time: 1032  OT Stop Time: 1057  OT Total Time (min): 25 min    Billable Minutes:Evaluation 10  Self Care/Home Management 15    NADEEM Mcarthur  7/16/2019

## 2019-07-16 NOTE — PLAN OF CARE
Problem: Skin Injury Risk Increased  Goal: Skin Health and Integrity    Intervention: Optimize Skin Protection  Full CHG bath given, linens and gown changed.       Comments: ELMOEON. See flowsheets for vital signs and assessments. See below for updates on progress made.       Neuro: AAOx4, Afebrile, Pupils MARICHUY    Cardiovascular: HR 80-100s NS/ST BP Systolic 100s-130s, MAP >75.       Pulmonary: Weaned off NC overnight. Sat Goal >88. Lungs diminished    Gastrointestinal: Clear liquid Diet, Last BM 07/15 dayshift. Tarry/smear.     Genitourinary: Condom cath in place as of 05:15, UOP 1.2L/shift    Integumentary/other: HAPI prevention bundle in place; SCDs at bedside, patient requested a break from them for a little while.  Last CHG bath: Full CHG bath given overnight linens and gown changed as well as leads replaced.     Infusions: N/a    POC: H&H stable. Step down. Patient spoke with charge nurse, very adamant about wanting PT/OT today    Patient progressing towards goals as tolerated, plan of care communicated and reviewed with Manpreet Hamilton. All concerns addressed. Will continue to monitor.

## 2019-07-16 NOTE — RESIDENT HANDOFF
Handoff     Primary Team: Networked reference to record [facility]  Room Number: 6071/6071 A     Patient Name: Manpreet Hamitlon MRN: [id number]     Date of Birth: [id number] Allergies: Penicillins and Erythromycin     Age: 68 y.o. Admit Date: 7/10/2019     Sex: male  BMI: Body mass index is 31.11 kg/m².     Code Status: Full Code        Illness Level (current clinical status): Watcher - Yes - GI bleed risk    Reason for Admission: GIB (gastrointestinal bleeding)    Brief HPI (pertinent PMH and diagnosis or differential diagnosis): The patient is a 68M with a PMH of HTN, HLD, UIP (dx 1 year ago, followed by Dr. Pradhan, on ofev) presenting as transfer from [place] for GI bleed. The patient was on a cruise with his family in [place] when he suddenly began having bright red bowel movements that progressed to melena. This was on Saturday the 8th. On board the ship his BP was 88/52, pulse 119. Labs with hemoglobin of 9.7 (baseline 15). He was transferred to [place] where he became progressively more hypotensive (to systolic of 70's) and tachycardic to 120's. His Hg dropped to 7. He was admitted to [facility] for 1 night prior to transfer to [facility]. While at [facility], he received 8 units total of PRBC, 3 units FFP. He was placed on PPI drip and had an endoscopy which showed a small superficial gastric ulcer and duodenal bulb ulcers with no active bleeding.      The patient takes a baby aspirin daily and drinks approximately 4 alcoholic beverages nightly. Has never before had GI bleed.    Procedure Date: Emobilization left gastric 07/12    Hospital Course (updated, brief assessment by system or problem, significant events):  Patient arrived to ICU and was urgently taken to endoscopy. Stomach full of blood, impairing visualization of source. Due to high risk of aspiration and necessity of repeat EGD, patient was intubated for airway protection.      Required transfusion of additional 5 U PRBC, 1 plt, and 1 FFP. Erythromycin given to clear  patient's stomach of blood. Developed erythematous rash, and benadryl given. No wheezing appreciated on exam at that time.     Repeat EGD performed overnight (7/10) which showed numerous ulcers in the stomach and duodenum with no active bleeding. Patient brought back to ICU, and placed on PPI gtt per GI recs.     Patient was successfully extubated on 7/11; however, hemoglobin began trending downward again later in the day. Active bleeding overnight requiring 3 units PRBCs. GI repeated EGD the morning of 7/12 again with numerous clean-based gastric and duodenal ulcers. Possible active bleeding seen in the cardia of the stomach along the lesser curvature. Clip placed. Patient taken to IR for angio, left gastric embolized. Left intubated due to concern for aspiration again due to large amount of blood in stomach.     Overnight, patient's hemoglobin did trend down slightly, but stayed overall stable (8->~7.5). 3 melanotic stools, which is expected given volume of blood in stomach. Patient also spiked fever of 102.1. Cultures sent, CXR repeated. No focal consolidation, but some edema superimposed on chronic lung changes seen. Started on vanc, cefepime and flagyl given recent procedures and risk of aspiration prior to initial intubation. Lower suspicion for transfusion reaction as it had been an number of hours since most recent transfusion. Also given dose of lasix due to peripheral edema on exam and pulmonary edema on CXR.      On the evening of 7/13, patient did require 1 unit PRBC for hg of 6.7, which increased to 8.7 following transfusion. May represent oozing from site of embolization or AVMs seen on EGD. Again trending down slowly today. No melanotic bowel movements. Extubated to nasal cannula on 7/14. Melanotic bowel movements continue but hemoglobin remains stable, cough continued productive of clear sputum. 07/15 Hgb stable at 8.8, plan for step down   Tasks (specific, using if-then statements):   Monitor Hgb,  advance diet per GI.     Contingency Plan (special circumstances anticipated and plan):  If worsens consults ICU    Estimated Discharge Date: Per GI    Discharge Disposition: Home or Self Care

## 2019-07-16 NOTE — PT/OT/SLP EVAL
Physical Therapy Evaluation    Patient Name:  Manpreet Hamilton   MRN:  [id number]    Recommendations:     Discharge Recommendations:  home health PT   Discharge Equipment Recommendations: (TBD)   Barriers to discharge: None    Assessment:     Manpreet Hamilton is a 68 y.o. male admitted with a medical diagnosis of GIB (gastrointestinal bleeding).  He presents with the following impairments/functional limitations:  impaired endurance, impaired cardiopulmonary response to activity, impaired functional mobilty, gait instability. Pt may benefit from use of a RW temporarily. Will continue to assess if AD appropriate for this pt. RW ordered to room    Rehab Prognosis: Good; patient would benefit from acute skilled PT services to address these deficits and reach maximum level of function.    Recent Surgery: Procedure(s) (LRB):  EGD (ESOPHAGOGASTRODUODENOSCOPY) (N/A) 4 Days Post-Op    Plan:     During this hospitalization, patient to be seen 4 x/week to address the identified rehab impairments via gait training, therapeutic activities, therapeutic exercises, neuromuscular re-education and progress toward the following goals:    · Plan of Care Expires:  08/14/19    Subjective     Chief Complaint: none reported   Patient/Family Comments/goals: to get better and return home   Pain/Comfort:  · Pain Rating 1: 0/10  · Pain Rating Post-Intervention 1: 0/10    Patients cultural, spiritual, Alevism conflicts given the current situation: no    Living Environment:  Pt lives with wife in a 2SH (R HR) with 3 ZORAIDA and no HR  Prior to admission, patients level of function was indep with ambulation and ADLs.  Equipment used at home: none.  DME owned (not currently used): none.  Upon discharge, patient will have assistance from wife.    Objective:     Communicated with RN prior to session and wife and daughter present in room.  Patient found HOB elevated with telemetry, pulse ox (continuous), blood pressure cuff, central line, Condom  Catheter  upon PT entry to room.    General Precautions: Standard, fall   Orthopedic Precautions:N/A   Braces: N/A     Exams:  · Cognitive Exam:    · AAOx4  · Follows multistep commands  · Communication clear/fluent   · RLE ROM: WFL  · RLE Strength: WFL  · LLE ROM: WFL  · LLE Strength: WFL    Functional Mobility:  · Bed Mobility:     · Scooting: stand by assistance  · Supine to Sit: stand by assistance; increased time   · Transfers:     · Sit to Stand:  contact guard assistance with hand-held assist from EOB   · Gait: 10ft; 20ft with min A using B HHA within room; pt demo'd decreased feliz, decreased step length, narrow ZAID; pt's gait quality slightly improved with cuing       Therapeutic Activities and Exercises:  Educated pt on PT role/POC  Educated pt on importance of OOB activity and daily ambulation  Pt verbalized understanding     T/f to chair to increase tolerance to OOB activity and to create optimal positioning for lung expansion     AM-PAC 6 CLICK MOBILITY  Total Score:19     Patient left up in chair with all lines intact, call button in reach, RN notified and wife and daughter present  present.    GOALS:   Multidisciplinary Problems     Physical Therapy Goals        Problem: Physical Therapy Goal    Goal Priority Disciplines Outcome Goal Variances Interventions   Physical Therapy Goal     PT, PT/OT Ongoing (interventions implemented as appropriate)     Description:  Goals to be met by: 2019    Patient will increase functional independence with mobility by performin. Supine to sit with Supervision - not met  2. Sit to stand transfer with Supervision using LRAD - not met   3. Gait  x 200 feet with Supervision using LRAD - not met  4. Ascend/descend 1 flight of stairs with right Handrails Contact Guard Assistance - not met                      History:     Past Medical History:   Diagnosis Date    Hyperlipemia     Hypertension     Nasal congestion     Pulmonary fibrosis        Past Surgical  History:   Procedure Laterality Date    CATARACT EXTRACTION Left     COLONOSCOPY N/A 8/5/2016    Performed by Leon Padilla MD at [facility] ENDO (4TH FLR)    EGD (ESOPHAGOGASTRODUODENOSCOPY) N/A 7/12/2019    Performed by Jimmy Saldana MD at [facility] ENDO (2ND FLR)    EGD (ESOPHAGOGASTRODUODENOSCOPY) N/A 7/11/2019    Performed by Jimmy Saldana MD at [facility] ENDO (2ND FLR)    EGD (ESOPHAGOGASTRODUODENOSCOPY) N/A 7/10/2019    Performed by Jimmy Saldana MD at [facility] ENDO (2ND FLR)    HAND SURGERY      left    HIATAL HERNIA REPAIR      MICRODISCECTOMY, SPINE MIS Right L5- S1 Right 6/27/2018    Performed by Angel Pendleton MD at [facility] OR 2ND FLR    TONSILLECTOMY, ADENOIDECTOMY      VASECTOMY         Time Tracking:     PT Received On: 07/16/19  PT Start Time: 1036     PT Stop Time: 1104  PT Total Time (min): 28 min     Billable Minutes: Evaluation 10 and Gait Training 8      Xin Reyes, PT, DPT  7/16/2019  [phone]

## 2019-07-16 NOTE — ASSESSMENT & PLAN NOTE
- Initial EGD obscured due to large amount of blood in stomach, repeat with numerous ulcers in stomach and duodenum with no active bleeding (7/10)  - CTA following scope (7/10) with no active bleeding visualized. Some enhancement noted at site of Nissen; however, read at that time felt mucosal enhancement more likely than active bleed  - Patient began actively bleeding again overnight 7/11, transfused 4 additional units, repeat scope again with large amount of blood in stomach, possible active bleed see in cardia along lesser curvature. Source unable to be visualized, clip placed to meeta location for IR. Left gastric embolized.   - CBC q8h  -H/H 8.8/25.6, stable, no melanotic stools overnight

## 2019-07-16 NOTE — PLAN OF CARE
Problem: Physical Therapy Goal  Goal: Physical Therapy Goal  Goals to be met by: 2019    Patient will increase functional independence with mobility by performin. Supine to sit with Supervision - not met  2. Sit to stand transfer with Supervision using LRAD - not met   3. Gait  x 200 feet with Supervision using LRAD - not met  4. Ascend/descend 1 flight of stairs with right Handrails Contact Guard Assistance - not met    Outcome: Ongoing (interventions implemented as appropriate)  Eval completed and goals appropriate

## 2019-07-16 NOTE — PLAN OF CARE
Problem: Adult Inpatient Plan of Care  Goal: Plan of Care Review  Outcome: Ongoing (interventions implemented as appropriate)  Pt continuing to progress toward goals with evidence of improvement. Pt AAO; VSS throughout shift; see flow sheets. No gtts infusing at this time. One tarry BM during shift; H&H trending up; see flow sheets. Plan to step pt down to floor out of ICU; transfer orders in place. No additional events during shift. Plan of care reviewed with pt with evidence of understanding. No needs or concerns at this time. See flow sheets for further documentation.

## 2019-07-17 LAB
ALBUMIN SERPL BCP-MCNC: 1.8 G/DL (ref 3.5–5.2)
ALP SERPL-CCNC: 149 U/L (ref 55–135)
ALT SERPL W/O P-5'-P-CCNC: 26 U/L (ref 10–44)
ANION GAP SERPL CALC-SCNC: 9 MMOL/L (ref 8–16)
AST SERPL-CCNC: 43 U/L (ref 10–40)
BACTERIA BLD CULT: NORMAL
BASOPHILS # BLD AUTO: 0.03 K/UL (ref 0–0.2)
BASOPHILS # BLD AUTO: 0.05 K/UL (ref 0–0.2)
BASOPHILS NFR BLD: 0.3 % (ref 0–1.9)
BASOPHILS NFR BLD: 0.5 % (ref 0–1.9)
BILIRUB SERPL-MCNC: 0.4 MG/DL (ref 0.1–1)
BUN SERPL-MCNC: 11 MG/DL (ref 8–23)
CALCIUM SERPL-MCNC: 8.4 MG/DL (ref 8.7–10.5)
CHLORIDE SERPL-SCNC: 100 MMOL/L (ref 95–110)
CO2 SERPL-SCNC: 28 MMOL/L (ref 23–29)
CREAT SERPL-MCNC: 0.6 MG/DL (ref 0.5–1.4)
DIFFERENTIAL METHOD: ABNORMAL
DIFFERENTIAL METHOD: ABNORMAL
EOSINOPHIL # BLD AUTO: 0.2 K/UL (ref 0–0.5)
EOSINOPHIL # BLD AUTO: 0.3 K/UL (ref 0–0.5)
EOSINOPHIL NFR BLD: 2.3 % (ref 0–8)
EOSINOPHIL NFR BLD: 2.7 % (ref 0–8)
ERYTHROCYTE [DISTWIDTH] IN BLOOD BY AUTOMATED COUNT: 13.9 % (ref 11.5–14.5)
ERYTHROCYTE [DISTWIDTH] IN BLOOD BY AUTOMATED COUNT: 14 % (ref 11.5–14.5)
EST. GFR  (AFRICAN AMERICAN): >60 ML/MIN/1.73 M^2
EST. GFR  (NON AFRICAN AMERICAN): >60 ML/MIN/1.73 M^2
GLUCOSE SERPL-MCNC: 113 MG/DL (ref 70–110)
HCT VFR BLD AUTO: 25.1 % (ref 40–54)
HCT VFR BLD AUTO: 25.9 % (ref 40–54)
HGB BLD-MCNC: 8.2 G/DL (ref 14–18)
HGB BLD-MCNC: 8.5 G/DL (ref 14–18)
IMM GRANULOCYTES # BLD AUTO: 0.03 K/UL (ref 0–0.04)
IMM GRANULOCYTES # BLD AUTO: 0.03 K/UL (ref 0–0.04)
IMM GRANULOCYTES NFR BLD AUTO: 0.3 % (ref 0–0.5)
IMM GRANULOCYTES NFR BLD AUTO: 0.3 % (ref 0–0.5)
LYMPHOCYTES # BLD AUTO: 1 K/UL (ref 1–4.8)
LYMPHOCYTES # BLD AUTO: 1.2 K/UL (ref 1–4.8)
LYMPHOCYTES NFR BLD: 11.8 % (ref 18–48)
LYMPHOCYTES NFR BLD: 12.7 % (ref 18–48)
MAGNESIUM SERPL-MCNC: 1.9 MG/DL (ref 1.6–2.6)
MCH RBC QN AUTO: 29.2 PG (ref 27–31)
MCH RBC QN AUTO: 29.2 PG (ref 27–31)
MCHC RBC AUTO-ENTMCNC: 32.7 G/DL (ref 32–36)
MCHC RBC AUTO-ENTMCNC: 32.8 G/DL (ref 32–36)
MCV RBC AUTO: 89 FL (ref 82–98)
MCV RBC AUTO: 89 FL (ref 82–98)
MONOCYTES # BLD AUTO: 1 K/UL (ref 0.3–1)
MONOCYTES # BLD AUTO: 1.2 K/UL (ref 0.3–1)
MONOCYTES NFR BLD: 11.6 % (ref 4–15)
MONOCYTES NFR BLD: 12.5 % (ref 4–15)
NEUTROPHILS # BLD AUTO: 6.4 K/UL (ref 1.8–7.7)
NEUTROPHILS # BLD AUTO: 6.5 K/UL (ref 1.8–7.7)
NEUTROPHILS NFR BLD: 71.3 % (ref 38–73)
NEUTROPHILS NFR BLD: 73.7 % (ref 38–73)
NRBC BLD-RTO: 0 /100 WBC
NRBC BLD-RTO: 0 /100 WBC
PHOSPHATE SERPL-MCNC: 2 MG/DL (ref 2.7–4.5)
PLATELET # BLD AUTO: 289 K/UL (ref 150–350)
PLATELET # BLD AUTO: 319 K/UL (ref 150–350)
PMV BLD AUTO: 9.7 FL (ref 9.2–12.9)
PMV BLD AUTO: 9.9 FL (ref 9.2–12.9)
POTASSIUM SERPL-SCNC: 3.6 MMOL/L (ref 3.5–5.1)
PROT SERPL-MCNC: 4.5 G/DL (ref 6–8.4)
RBC # BLD AUTO: 2.81 M/UL (ref 4.6–6.2)
RBC # BLD AUTO: 2.91 M/UL (ref 4.6–6.2)
SODIUM SERPL-SCNC: 137 MMOL/L (ref 136–145)
URATE SERPL-MCNC: 4.9 MG/DL (ref 3.4–7)
WBC # BLD AUTO: 8.71 K/UL (ref 3.9–12.7)
WBC # BLD AUTO: 9.19 K/UL (ref 3.9–12.7)

## 2019-07-17 PROCEDURE — 25000003 PHARM REV CODE 250: Performed by: STUDENT IN AN ORGANIZED HEALTH CARE EDUCATION/TRAINING PROGRAM

## 2019-07-17 PROCEDURE — 97535 SELF CARE MNGMENT TRAINING: CPT

## 2019-07-17 PROCEDURE — 80053 COMPREHEN METABOLIC PANEL: CPT

## 2019-07-17 PROCEDURE — 94761 N-INVAS EAR/PLS OXIMETRY MLT: CPT

## 2019-07-17 PROCEDURE — C9113 INJ PANTOPRAZOLE SODIUM, VIA: HCPCS | Performed by: HOSPITALIST

## 2019-07-17 PROCEDURE — 84550 ASSAY OF BLOOD/URIC ACID: CPT

## 2019-07-17 PROCEDURE — 63600175 PHARM REV CODE 636 W HCPCS: Performed by: HOSPITALIST

## 2019-07-17 PROCEDURE — 84100 ASSAY OF PHOSPHORUS: CPT

## 2019-07-17 PROCEDURE — 20600001 HC STEP DOWN PRIVATE ROOM

## 2019-07-17 PROCEDURE — 83735 ASSAY OF MAGNESIUM: CPT

## 2019-07-17 PROCEDURE — 97116 GAIT TRAINING THERAPY: CPT

## 2019-07-17 PROCEDURE — 85025 COMPLETE CBC W/AUTO DIFF WBC: CPT

## 2019-07-17 RX ADMIN — CIPROFLOXACIN HYDROCHLORIDE 500 MG: 500 TABLET, FILM COATED ORAL at 08:07

## 2019-07-17 RX ADMIN — PANTOPRAZOLE SODIUM 40 MG: 40 INJECTION, POWDER, FOR SOLUTION INTRAVENOUS at 09:07

## 2019-07-17 RX ADMIN — CIPROFLOXACIN HYDROCHLORIDE 500 MG: 500 TABLET, FILM COATED ORAL at 09:07

## 2019-07-17 RX ADMIN — PANTOPRAZOLE SODIUM 40 MG: 40 INJECTION, POWDER, FOR SOLUTION INTRAVENOUS at 08:07

## 2019-07-17 RX ADMIN — GUAIFENESIN AND DEXTROMETHORPHAN HYDROBROMIDE 1 TABLET: 600; 30 TABLET, EXTENDED RELEASE ORAL at 08:07

## 2019-07-17 NOTE — PLAN OF CARE
to the beside at this time to see the patient.  Wife currently at the bedside.   informed them that the CM team continues to follow throughout this admission for discharge needs and/or recommendations.   name and number written on the board at the bedside for the patient or the family to call with discharge needs or questions.  Understanding verbalized.

## 2019-07-17 NOTE — TREATMENT PLAN
"GI Treatment Plan    Manpreet Hamilton is a 68 y.o. male admitted to hospital 7/10/2019 (Hospital Day: 8) due to GIB (gastrointestinal bleeding).     Interval History  Assessed at bedside, had black tarry stool this morning, hb drop from 8.8 to 8.5, still has dizziness when walking, denies abdominal pain but does feel abdominal "fullness". No recent transfusion    Abdominal exam:  Non tender, nondistended    Plan  -trend hb  -EGD tomorrow 7/18, NPO from midnight  -IV PPI 40 BID or PPI ggt    - We will continue to follow.    Thank you for involving us in the care of Manpreet Hamilton. Please call with any additional questions, concerns or changes in the patient's clinical status.    Loyd Maria MD  Gastroenterology Fellow  Spectralink: [phone]  "

## 2019-07-17 NOTE — PT/OT/SLP PROGRESS
Occupational Therapy   Treatment    Name: Manpreet Hamilton  MRN: [id number]  Admitting Diagnosis:  GIB (gastrointestinal bleeding)  5 Days Post-Op    Recommendations:     Discharge Recommendations: home health OT  Discharge Equipment Recommendations:  walker, rolling  Barriers to discharge:  None    Assessment:     Manpreet Hamilton is a 68 y.o. male with a medical diagnosis of GIB (gastrointestinal bleeding).  He presents alert and willing to participate in therapy session. Upon arrival, pt found seated UIC with family in room. Performance deficits affecting function are weakness, impaired endurance, impaired functional mobilty, impaired self care skills, impaired balance, pain. He tolerated treatment session well and making significant progress towards goals. He would benefit from HHOT following d/c to continue to progress towards goals and improve quality of life.     Rehab Prognosis:  Good; patient would benefit from acute skilled OT services to address these deficits and reach maximum level of function.       Plan:     Patient to be seen 4 x/week to address the above listed problems via self-care/home management, therapeutic activities, therapeutic exercises, neuromuscular re-education  · Plan of Care Expires: 08/14/19  · Plan of Care Reviewed with: patient    Subjective     Pain/Comfort:  · Pain Rating 1: 5/10(L knee pain)    Objective:     Communicated with: RN prior to session.  Patient found up in chair with (no active lines ) upon OT entry to room.    General Precautions: Standard, fall   Orthopedic Precautions:N/A   Braces: N/A     Occupational Performance:     · Bed mobility  - NT, pt found seated UIC upon arrival    Functional Mobility/Transfers:  · Patient completed Sit <> Stand Transfer with stand by assistance  with  rolling walker  ( pt requires cues for proper hand placement for improved safety during transfers)   · Functional Mobility: Pt completed functional mobility within room and in hallway for  ADL prep requiring SBA using RW for balance and safety. He tolerated well with no LOB or SOB.     Activities of Daily Living:  · Grooming: stand by assistance to complete oral care while standing at sink  · Upper Body Dressing: minimum assistance to brandon gown like jacket while seated [facility] 6 Click ADL: 20    Treatment & Education:  -Pt edu on OT role/POC  -Importance of OOB activity with staff assistance  -Safety during functional t/f and mobility  -White board updated  - Multiple self care tasks completed--as noted above  - All questions/concerns answered within OT scope of practice    Patient left up in chair ( wheelchair) with all lines intact, call button in reach and RN notifiedEducation:    * pt being transferred to TSU    GOALS:   Multidisciplinary Problems     Occupational Therapy Goals        Problem: Occupational Therapy Goal    Goal Priority Disciplines Outcome Interventions   Occupational Therapy Goal     OT, PT/OT Ongoing (interventions implemented as appropriate)    Description:  Goals to be met by: 7/26/2019    Patient will increase functional independence with ADLs by performing:    Feeding with [place].  UE Dressing with [place].  LE Dressing with [place].  Grooming while standing with [place].  Toileting from toilet with [place] for hygiene and clothing management.                         Time Tracking:     OT Date of Treatment: 07/17/19  OT Start Time: 0940  OT Stop Time: 0955  OT Total Time (min): 15 min    Billable Minutes:Self Care/Home Management 15    Margaret Clifford OT  7/17/2019

## 2019-07-17 NOTE — PLAN OF CARE
Problem: Occupational Therapy Goal  Goal: Occupational Therapy Goal  Goals to be met by: 7/26/2019    Patient will increase functional independence with ADLs by performing:    Feeding with [place].  UE Dressing with [place].  LE Dressing with [place].  Grooming while standing with [place].  Toileting from toilet with [place] for hygiene and clothing management.        Outcome: Ongoing (interventions implemented as appropriate)  Pt progressing well with goals. Continue with POC.   Margaret Clifford, OT  7/17/2019

## 2019-07-17 NOTE — PLAN OF CARE
Problem: Physical Therapy Goal  Goal: Physical Therapy Goal  Goals to be met by: 2019    Patient will increase functional independence with mobility by performin. Supine to sit with Supervision - not met  2. Sit to stand transfer with Supervision using LRAD - not met   3. Gait  x 200 feet with Supervision using LRAD - not met  4. Ascend/descend 1 flight of stairs with right Handrails Contact Guard Assistance - not met     Outcome: Ongoing (interventions implemented as appropriate)  Treatment completed and no goals met. Goals appropriate

## 2019-07-17 NOTE — PROGRESS NOTES
Pt transferred from ICU to TSU room 09649.  VSS upon transfer. Report received bedside from pj Moncada RN.    No complaints from pt at this time. Wife at bedside supportive of pt.  Bed lowered and locked in place. Non-skid socks on feet. Pt oriented to room and call cotton.    Will monitor.

## 2019-07-17 NOTE — NURSING
Notified CC1 of pt's mod-large tarry, loose BM this morning. No new orders at this time. Next H&H ordered for 11am. /61. HR 93 NSR. SPO2 96% on RA.

## 2019-07-17 NOTE — NURSING TRANSFER
Nursing Transfer Note      7/17/2019     Transfer To: TSU bed 54483 from [facility]    Transfer via wheelchair    Transported by ELY Etienne    Medicines sent: IVP Protonix    Chart send with patient: YES    Notified: spouse, daughter    Upon arrival to floor: Notified ELY Aviles of pt's arrival.

## 2019-07-17 NOTE — PLAN OF CARE
Problem: Adult Inpatient Plan of Care  Goal: Plan of Care Review  No changes overnight, VSS. H/H stable ( 8/25 ), Frequent vitals and assessment per flowsheet, plan of care reviewed with Mr. Hamilton, questions/concerns addressed, will continue to monitor pt.

## 2019-07-17 NOTE — PROGRESS NOTES
Hospital Medicine  Progress note    [place]: [facility] [place] [facility] B Jordan Price MD  Admit Date: 7/10/2019  MARISSA 7/18/2019  Length of Stay:  LOS: 7 days   Code status: Full Code    Principal Problem:  GIB (gastrointestinal bleeding)    HPI / Hospital Course     Interval hx: Still seeing blood in stool.     ROS     Respiratory: neg for cough neg for shortness of breath  Cardiovascular: neg for chest pain neg for palpitations  Gastrointestinal: neg for nausea neg for vomiting, neg for abdominal pain neg for diarrhea neg for constipation   Behavioral/Psych: neg for depression neg for anxiety    PEx  Temp:  [97.6 °F (36.4 °C)-99.1 °F (37.3 °C)]   Pulse:  []   Resp:  [17-43]   BP: (100-134)/(50-88)   SpO2:  [90 %-100 %]     Intake/Output Summary (Last 24 hours) at 7/17/2019 1340  Last data filed at 7/17/2019 0500  Gross per 24 hour   Intake 0 ml   Output 1325 ml   Net -1325 ml       General Appearance: no acute distress   Heart: regular rate and rhythm  Respiratory: Normal respiratory effort, no crackles   Abdomen: Soft, non-tender; bowel sounds active  Skin: intact.Skin intact  Neurologic:  No focal numbness or weakness  Mental status: Alert, oriented x 4, affect appropriate     Recent Labs   Lab 07/16/19  0300 07/17/19  0258 07/17/19  1137   WBC 8.95 9.19 8.71   HGB 8.8* 8.2* 8.5*   HCT 25.6* 25.1* 25.9*    289 319     Recent Labs   Lab 07/15/19  0426 07/16/19  0300 07/17/19  0258   * 137 137   K 3.7 3.1* 3.6    97 100   CO2 25 28 28   BUN 13 10 11   CREATININE 0.7 0.6 0.6   * 116* 113*   CALCIUM 7.8* 8.3* 8.4*   MG 2.0 1.8 1.9   PHOS 2.2* 2.1* 2.0*     Recent Labs   Lab 07/12/19  0647  07/15/19  0426 07/16/19  0300 07/17/19  0258   ALKPHOS  --    < > 132 150* 149*   ALT  --    < > 18 20 26   AST  --    < > 35 29 43*   ALBUMIN  --    < > 1.7* 1.8* 1.8*   PROT  --    < > 4.2* 4.6* 4.5*   BILITOT  --    < > 0.4 0.4 0.4   INR 1.2  --   --   --   --     < > = values in this interval not displayed.         No results for input(s): POCTGLUCOSE in the last 168 hours.    Scheduled Meds:   ciprofloxacin HCl  500 mg Oral Q12H    pantoprazole  40 mg Intravenous BID     Continuous Infusions:  As Needed:  acetaminophen, dextromethorphan-guaifenesin  mg, diphenhydrAMINE, diphenhydrAMINE-zinc acetate 2-0.1%, sodium chloride 0.9%    Active Hospital Problems    Diagnosis  POA    *GIB (gastrointestinal bleeding) [K92.2]  Yes    Acute hypoxemic respiratory failure [J96.01]  Unknown    Acute blood loss anemia [D62]  Yes    Pulmonary fibrosis [J84.10]  Yes      Resolved Hospital Problems    Diagnosis Date Resolved POA    Shock [R57.9] 07/16/2019 Yes       Assessment and Plan        Acute hypoxemic respiratory failure  - previously intubated for airway protection  - remains on 4L NC, normally on RA  - Lasix 40mg IV once and monitor response, rales BL on examination  - follow UOP and re-dose as appropriate     Pulmonary fibrosis  - Dx 1 year ago, managed by Dr. Pradhan  - Per Dr. Pradhan:  hold ofev, goal SpO2 95%        Acute blood loss anemia  - Hemoglobin 7 on arrival   - Has received 19 units PRBC, 5 units FFP, [facility] [place] [facility]  - CBC q12h  - See GIB        * GIB (gastrointestinal bleeding)  - Initial EGD obscured due to large amount of blood in stomach, repeat with numerous ulcers in stomach and duodenum with no active bleeding (7/10)  - CTA following scope (7/10) with no active bleeding visualized. Some enhancement noted at site of Nissen; however, read at that time felt mucosal enhancement more likely than active bleed  - Patient began actively bleeding again overnight 7/11, transfused 4 additional units, repeat scope again with large amount of blood in stomach, possible active bleed see in cardia along lesser curvature. Source unable to be visualized, clip placed to meeta location for IR. Left gastric embolized.   - CBC q8h  -H/H 8.8/25.6, stable, no melanotic stools overnight         Shock  Hemorrhagic vs septic  - Tranfusing for Hg above 7, as no active signs of hemorrhage at this time  - Developed fevers 7/12, treating broadly with vanc, cefepime and flagyl  - off of pressors, normotensive    Discharge plan:    Time (minutes) spent in care of the patient (Greater than 1/2 spent in direct face-to-face contact)  45 minutes    Jordan Price MD

## 2019-07-17 NOTE — PT/OT/SLP PROGRESS
Physical Therapy Treatment    Patient Name:  Manpreet Hamilton   MRN:  [id number]    Recommendations:     Discharge Recommendations:  home health PT   Discharge Equipment Recommendations: walker, rolling   Barriers to discharge: None    Assessment:     Manpreet Hamilton is a 68 y.o. male admitted with a medical diagnosis of GIB (gastrointestinal bleeding).  He presents with the following impairments/functional limitations:  impaired endurance, impaired cardiopulmonary response to activity, pain, gait instability, impaired functional mobilty. Pt getting ready to transfer out of the ICU. All pt mobility required on this date was SBA. Pt with new onset of L knee pain (pt struggles with L patellar tendinitis, which was being addressed at OPPT). Pt currently is more stable using a RW for mobility but may not need to use it long term once pain and deconditioning improves.    Rehab Prognosis: Good; patient would benefit from acute skilled PT services to address these deficits and reach maximum level of function.    Recent Surgery: Procedure(s) (LRB):  EGD (ESOPHAGOGASTRODUODENOSCOPY) (N/A) 5 Days Post-Op    Plan:     During this hospitalization, patient to be seen 4 x/week to address the identified rehab impairments via gait training, therapeutic activities, therapeutic exercises, neuromuscular re-education and progress toward the following goals:    · Plan of Care Expires:  08/14/19    Subjective     Chief Complaint: pain   Patient/Family Comments/goals: to get better and return home   Pain/Comfort:  · Pain Rating 1: 5/10(L knee pain)  · Pain Addressed 1: Reposition, Distraction  · Pain Rating Post-Intervention 1: 5/10      Objective:     Communicated with RN prior to session and wife present in room.  Patient found up in chair with telemetry, pulse ox (continuous), blood pressure cuff upon PT entry to room.     General Precautions: Standard, fall   Orthopedic Precautions:N/A   Braces: N/A     Functional Mobility:  · Bed  Mobility: NT 2nd to pt found in chair   · Transfers:     · Sit to Stand:  stand by assistance with rolling walker x 2 trials (EOB, chair); cuing for hand placement and RW management   · Gait: 10ft; 50ft; using RW with SBA; RN present; pt with continued L knee pain, pt demo'd decreased feliz       AM-PAC 6 CLICK MOBILITY  Turning over in bed (including adjusting bedclothes, sheets and blankets)?: 4  Sitting down on and standing up from a chair with arms (e.g., wheelchair, bedside commode, etc.): 4  Moving from lying on back to sitting on the side of the bed?: 4  Moving to and from a bed to a chair (including a wheelchair)?: 3  Need to walk in hospital room?: 3  Climbing 3-5 steps with a railing?: 3  Basic Mobility Total Score: 21       Therapeutic Activities and Exercises:  Educated pt on PT role/POC  Educated pt on importance of OOB activity and daily ambulation   Pt verbalized understanding     Standing at sink x 5 minutes with SBA using RW to perform self care tasks with OT     Patient left in WC with all lines intact, call button in reach and RN notified..    GOALS:   Multidisciplinary Problems     Physical Therapy Goals        Problem: Physical Therapy Goal    Goal Priority Disciplines Outcome Goal Variances Interventions   Physical Therapy Goal     PT, PT/OT Ongoing (interventions implemented as appropriate)     Description:  Goals to be met by: 2019    Patient will increase functional independence with mobility by performin. Supine to sit with Supervision - not met  2. Sit to stand transfer with Supervision using LRAD - not met   3. Gait  x 200 feet with Supervision using LRAD - not met  4. Ascend/descend 1 flight of stairs with right Handrails Contact Guard Assistance - not met                      Time Tracking:     PT Received On: 19  PT Start Time: 943     PT Stop Time: 957  PT Total Time (min): 14 min     Billable Minutes: Gait Training 8    Treatment Type: Treatment  PT/PTA: PT        Xin Reyes, PT, DPT  7/17/2019  [phone]

## 2019-07-18 ENCOUNTER — ANESTHESIA EVENT (OUTPATIENT)
Dept: ENDOSCOPY | Facility: HOSPITAL | Age: 69
DRG: 377 | End: 2019-07-18
Payer: COMMERCIAL

## 2019-07-18 ENCOUNTER — ANESTHESIA (OUTPATIENT)
Dept: ENDOSCOPY | Facility: HOSPITAL | Age: 69
DRG: 377 | End: 2019-07-18
Payer: COMMERCIAL

## 2019-07-18 LAB
ALBUMIN SERPL BCP-MCNC: 2.2 G/DL (ref 3.5–5.2)
ALP SERPL-CCNC: 219 U/L (ref 55–135)
ALT SERPL W/O P-5'-P-CCNC: 68 U/L (ref 10–44)
ANION GAP SERPL CALC-SCNC: 11 MMOL/L (ref 8–16)
ANISOCYTOSIS BLD QL SMEAR: SLIGHT
AST SERPL-CCNC: 92 U/L (ref 10–40)
BACTERIA BLD CULT: NORMAL
BASOPHILS # BLD AUTO: 0.04 K/UL (ref 0–0.2)
BASOPHILS NFR BLD: 0.4 % (ref 0–1.9)
BILIRUB SERPL-MCNC: 0.4 MG/DL (ref 0.1–1)
BUN SERPL-MCNC: 12 MG/DL (ref 8–23)
CALCIUM SERPL-MCNC: 9 MG/DL (ref 8.7–10.5)
CHLORIDE SERPL-SCNC: 98 MMOL/L (ref 95–110)
CO2 SERPL-SCNC: 26 MMOL/L (ref 23–29)
CREAT SERPL-MCNC: 0.7 MG/DL (ref 0.5–1.4)
DIFFERENTIAL METHOD: ABNORMAL
EOSINOPHIL # BLD AUTO: 0.2 K/UL (ref 0–0.5)
EOSINOPHIL NFR BLD: 2.1 % (ref 0–8)
ERYTHROCYTE [DISTWIDTH] IN BLOOD BY AUTOMATED COUNT: 13.9 % (ref 11.5–14.5)
EST. GFR  (AFRICAN AMERICAN): >60 ML/MIN/1.73 M^2
EST. GFR  (NON AFRICAN AMERICAN): >60 ML/MIN/1.73 M^2
GIANT PLATELETS BLD QL SMEAR: PRESENT
GLUCOSE SERPL-MCNC: 111 MG/DL (ref 70–110)
HCT VFR BLD AUTO: 28.7 % (ref 40–54)
HGB BLD-MCNC: 9.6 G/DL (ref 14–18)
HYPOCHROMIA BLD QL SMEAR: ABNORMAL
IMM GRANULOCYTES # BLD AUTO: 0.06 K/UL (ref 0–0.04)
IMM GRANULOCYTES NFR BLD AUTO: 0.6 % (ref 0–0.5)
LYMPHOCYTES # BLD AUTO: 1.6 K/UL (ref 1–4.8)
LYMPHOCYTES NFR BLD: 15.5 % (ref 18–48)
MAGNESIUM SERPL-MCNC: 1.9 MG/DL (ref 1.6–2.6)
MCH RBC QN AUTO: 29.6 PG (ref 27–31)
MCHC RBC AUTO-ENTMCNC: 33.4 G/DL (ref 32–36)
MCV RBC AUTO: 89 FL (ref 82–98)
MONOCYTES # BLD AUTO: 1.3 K/UL (ref 0.3–1)
MONOCYTES NFR BLD: 12.8 % (ref 4–15)
NEUTROPHILS # BLD AUTO: 7.2 K/UL (ref 1.8–7.7)
NEUTROPHILS NFR BLD: 68.6 % (ref 38–73)
NRBC BLD-RTO: 0 /100 WBC
OVALOCYTES BLD QL SMEAR: ABNORMAL
PHOSPHATE SERPL-MCNC: 3 MG/DL (ref 2.7–4.5)
PLATELET # BLD AUTO: 370 K/UL (ref 150–350)
PLATELET BLD QL SMEAR: ABNORMAL
PMV BLD AUTO: 9.3 FL (ref 9.2–12.9)
POIKILOCYTOSIS BLD QL SMEAR: SLIGHT
POLYCHROMASIA BLD QL SMEAR: ABNORMAL
POTASSIUM SERPL-SCNC: 3.6 MMOL/L (ref 3.5–5.1)
PROT SERPL-MCNC: 5.3 G/DL (ref 6–8.4)
RBC # BLD AUTO: 3.24 M/UL (ref 4.6–6.2)
SODIUM SERPL-SCNC: 135 MMOL/L (ref 136–145)
TOXIC GRANULES BLD QL SMEAR: PRESENT
WBC # BLD AUTO: 10.47 K/UL (ref 3.9–12.7)

## 2019-07-18 PROCEDURE — 99232 PR SUBSEQUENT HOSPITAL CARE,LEVL II: ICD-10-PCS | Mod: ,,, | Performed by: INTERNAL MEDICINE

## 2019-07-18 PROCEDURE — 43255 EGD CONTROL BLEEDING ANY: CPT | Mod: ,,, | Performed by: INTERNAL MEDICINE

## 2019-07-18 PROCEDURE — 83735 ASSAY OF MAGNESIUM: CPT

## 2019-07-18 PROCEDURE — 27202298: Performed by: INTERNAL MEDICINE

## 2019-07-18 PROCEDURE — 84100 ASSAY OF PHOSPHORUS: CPT

## 2019-07-18 PROCEDURE — 25000003 PHARM REV CODE 250: Performed by: STUDENT IN AN ORGANIZED HEALTH CARE EDUCATION/TRAINING PROGRAM

## 2019-07-18 PROCEDURE — C9113 INJ PANTOPRAZOLE SODIUM, VIA: HCPCS | Performed by: HOSPITALIST

## 2019-07-18 PROCEDURE — D9220A PRA ANESTHESIA: Mod: CRNA,,, | Performed by: NURSE ANESTHETIST, CERTIFIED REGISTERED

## 2019-07-18 PROCEDURE — 43255 EGD CONTROL BLEEDING ANY: CPT | Performed by: INTERNAL MEDICINE

## 2019-07-18 PROCEDURE — 20600001 HC STEP DOWN PRIVATE ROOM

## 2019-07-18 PROCEDURE — 63600175 PHARM REV CODE 636 W HCPCS: Performed by: NURSE ANESTHETIST, CERTIFIED REGISTERED

## 2019-07-18 PROCEDURE — 37000008 HC ANESTHESIA 1ST 15 MINUTES: Performed by: INTERNAL MEDICINE

## 2019-07-18 PROCEDURE — 27200997: Performed by: INTERNAL MEDICINE

## 2019-07-18 PROCEDURE — D9220A PRA ANESTHESIA: ICD-10-PCS | Mod: ANES,,, | Performed by: ANESTHESIOLOGY

## 2019-07-18 PROCEDURE — D9220A PRA ANESTHESIA: ICD-10-PCS | Mod: CRNA,,, | Performed by: NURSE ANESTHETIST, CERTIFIED REGISTERED

## 2019-07-18 PROCEDURE — D9220A PRA ANESTHESIA: Mod: ANES,,, | Performed by: ANESTHESIOLOGY

## 2019-07-18 PROCEDURE — 43255 PR EGD, FLEX, W/CTRL BLEED, ANY METHOD: ICD-10-PCS | Mod: ,,, | Performed by: INTERNAL MEDICINE

## 2019-07-18 PROCEDURE — 85025 COMPLETE CBC W/AUTO DIFF WBC: CPT

## 2019-07-18 PROCEDURE — 37000009 HC ANESTHESIA EA ADD 15 MINS: Performed by: INTERNAL MEDICINE

## 2019-07-18 PROCEDURE — 25000003 PHARM REV CODE 250: Performed by: NURSE ANESTHETIST, CERTIFIED REGISTERED

## 2019-07-18 PROCEDURE — 36415 COLL VENOUS BLD VENIPUNCTURE: CPT

## 2019-07-18 PROCEDURE — 25000003 PHARM REV CODE 250: Performed by: INTERNAL MEDICINE

## 2019-07-18 PROCEDURE — 80053 COMPREHEN METABOLIC PANEL: CPT

## 2019-07-18 PROCEDURE — 63600175 PHARM REV CODE 636 W HCPCS: Performed by: HOSPITALIST

## 2019-07-18 PROCEDURE — 99232 SBSQ HOSP IP/OBS MODERATE 35: CPT | Mod: ,,, | Performed by: INTERNAL MEDICINE

## 2019-07-18 RX ORDER — SODIUM CHLORIDE 9 MG/ML
INJECTION, SOLUTION INTRAVENOUS CONTINUOUS PRN
Status: DISCONTINUED | OUTPATIENT
Start: 2019-07-18 | End: 2019-07-18

## 2019-07-18 RX ORDER — SUCRALFATE 1 G/1
1 TABLET ORAL
Status: DISCONTINUED | OUTPATIENT
Start: 2019-07-18 | End: 2019-07-18

## 2019-07-18 RX ORDER — LIDOCAINE HCL/PF 100 MG/5ML
SYRINGE (ML) INTRAVENOUS
Status: DISCONTINUED | OUTPATIENT
Start: 2019-07-18 | End: 2019-07-18

## 2019-07-18 RX ORDER — PROPOFOL 10 MG/ML
INJECTION, EMULSION INTRAVENOUS
Status: DISCONTINUED | OUTPATIENT
Start: 2019-07-18 | End: 2019-07-18

## 2019-07-18 RX ORDER — PROPOFOL 10 MG/ML
VIAL (ML) INTRAVENOUS CONTINUOUS PRN
Status: DISCONTINUED | OUTPATIENT
Start: 2019-07-18 | End: 2019-07-18

## 2019-07-18 RX ORDER — SUCRALFATE 1 G/10ML
1 SUSPENSION ORAL EVERY 6 HOURS
Status: DISCONTINUED | OUTPATIENT
Start: 2019-07-18 | End: 2019-07-22 | Stop reason: HOSPADM

## 2019-07-18 RX ORDER — SODIUM CHLORIDE 0.9 % (FLUSH) 0.9 %
3 SYRINGE (ML) INJECTION
Status: DISCONTINUED | OUTPATIENT
Start: 2019-07-18 | End: 2019-07-22 | Stop reason: HOSPADM

## 2019-07-18 RX ORDER — GLYCOPYRROLATE 0.2 MG/ML
INJECTION INTRAMUSCULAR; INTRAVENOUS
Status: DISCONTINUED | OUTPATIENT
Start: 2019-07-18 | End: 2019-07-18

## 2019-07-18 RX ADMIN — SUCRALFATE 1 G: 1 SUSPENSION ORAL at 05:07

## 2019-07-18 RX ADMIN — PANTOPRAZOLE SODIUM 40 MG: 40 INJECTION, POWDER, FOR SOLUTION INTRAVENOUS at 11:07

## 2019-07-18 RX ADMIN — GLYCOPYRROLATE 0.1 MG: 0.2 INJECTION, SOLUTION INTRAMUSCULAR; INTRAVENOUS at 09:07

## 2019-07-18 RX ADMIN — PROPOFOL 20 MG: 10 INJECTION, EMULSION INTRAVENOUS at 09:07

## 2019-07-18 RX ADMIN — PROPOFOL 100 MG: 10 INJECTION, EMULSION INTRAVENOUS at 09:07

## 2019-07-18 RX ADMIN — PROPOFOL 20 MG: 10 INJECTION, EMULSION INTRAVENOUS at 10:07

## 2019-07-18 RX ADMIN — PROPOFOL 200 MCG/KG/MIN: 10 INJECTION, EMULSION INTRAVENOUS at 10:07

## 2019-07-18 RX ADMIN — CIPROFLOXACIN HYDROCHLORIDE 500 MG: 500 TABLET, FILM COATED ORAL at 11:07

## 2019-07-18 RX ADMIN — GUAIFENESIN AND DEXTROMETHORPHAN HYDROBROMIDE 1 TABLET: 600; 30 TABLET, EXTENDED RELEASE ORAL at 07:07

## 2019-07-18 RX ADMIN — PANTOPRAZOLE SODIUM 40 MG: 40 INJECTION, POWDER, FOR SOLUTION INTRAVENOUS at 07:07

## 2019-07-18 RX ADMIN — SODIUM CHLORIDE: 0.9 INJECTION, SOLUTION INTRAVENOUS at 09:07

## 2019-07-18 RX ADMIN — SUCRALFATE 1 G: 1 SUSPENSION ORAL at 11:07

## 2019-07-18 RX ADMIN — LIDOCAINE HYDROCHLORIDE 100 MG: 20 INJECTION, SOLUTION INTRAVENOUS at 09:07

## 2019-07-18 NOTE — PLAN OF CARE
Pt AAOx4, VSS on VISI monitor.  No TELE ordered.  Pt c/o some L knee pain.  Ice applied with some relief.  EGD done this am without any issues. No bloody bowel movements today.    Fall precautions in place.  Activity per pt.  Walker at bedside.  Up out of bed with non-skid socks.  Bed lowered and locked.   No acute events/falls/injuries. Wife at bedside. See flowsheet for further assessment findings. Will monitor.

## 2019-07-18 NOTE — INTERVAL H&P NOTE
The patient has been examined and the H&P has been reviewed:    I concur with the findings and no changes have occurred since H&P was written.     History and Exam unchanged from visit. Extubated recently, some cough    Procedure - EGD  Neck - supple  Plan of anesthesia - General  ASA - per anesthesia  Mallampati - per anesthesia  Anesthesia problems - no  Family history of anesthesia problems - no      Anesthesia/Surgery risks, benefits and alternative options discussed and understood by patient/family.          Active Hospital Problems    Diagnosis  POA    *GIB (gastrointestinal bleeding) [K92.2]  Yes    Acute hypoxemic respiratory failure [J96.01]  Unknown    Acute blood loss anemia [D62]  Yes    Pulmonary fibrosis [J84.10]  Yes      Resolved Hospital Problems    Diagnosis Date Resolved POA    Shock [R57.9] 07/16/2019 Yes

## 2019-07-18 NOTE — TREATMENT PLAN
GI Treatment Plan    Manpreet Hamilton is a 68 y.o. male admitted to hospital 7/10/2019 (Hospital Day: 9) due to GIB (gastrointestinal bleeding).     Interval History    EGD done 7/18:  Impression:  - Normal esophagus.                        - 3 cm hiatal hernia.                        - An anti-reflux surgical site was found,                         characterized by healthy appearing mucosa.                        - Oozing gastric ulcer at APC treatment site. Clips                         were placed.                        - One non-bleeding duodenal ulcer with no stigmata                         of bleeding.                        - No specimens collected.    Plan  - Use sucralfate suspension 1 gram PO QID.    - Continue PPI  -trend hb  -watch for rebleed  - Plan of care was discussed with primary team.  - We will continue to follow.    Thank you for involving us in the care of Manpreet Hamilton. Please call with any additional questions, concerns or changes in the patient's clinical status.    Loyd Maria MD  Gastroenterology Fellow  Spectralink: [id number]

## 2019-07-18 NOTE — PROGRESS NOTES
Hospital Medicine  Progress note    [place] [facility] [place] [facility] [place] Jordan Price MD  Admit Date: 7/10/2019  MARISSA 7/18/2019  Length of Stay:  LOS: 8 days   Code status: Full Code    Principal Problem:  GIB (gastrointestinal bleeding)    HPI / Hospital Course     Interval hx: Still seeing blood in stool. EGD with 1 oozing site. Sucralfate added.    ROS     Respiratory: neg for cough neg for shortness of breath  Cardiovascular: neg for chest pain neg for palpitations  Gastrointestinal: neg for nausea neg for vomiting, neg for abdominal pain neg for diarrhea neg for constipation   Behavioral/Psych: neg for depression neg for anxiety    PEx  Temp:  [97.8 °F (36.6 °C)-98.8 °F (37.1 °C)]   Pulse:  [83-97]   Resp:  [14-22]   BP: ()/(64-79)   SpO2:  [89 %-97 %]     Intake/Output Summary (Last 24 hours) at 7/18/2019 1408  Last data filed at 7/18/2019 1104  Gross per 24 hour   Intake 820 ml   Output --   Net 820 ml       General Appearance: no acute distress   Heart: regular rate and rhythm  Respiratory: Normal respiratory effort, no crackles   Abdomen: Soft, non-tender; bowel sounds active  Skin: intact.Skin intact  Neurologic:  No focal numbness or weakness  Mental status: Alert, oriented x 4, affect appropriate     Recent Labs   Lab 07/17/19  0258 07/17/19  1137 07/18/19  0500   WBC 9.19 8.71 10.47   HGB 8.2* 8.5* 9.6*   HCT 25.1* 25.9* 28.7*    319 370*     Recent Labs   Lab 07/16/19  0300 07/17/19  0258 07/18/19  0500    137 135*   K 3.1* 3.6 3.6   CL 97 100 98   CO2 28 28 26   BUN 10 11 12   CREATININE 0.6 0.6 0.7   * 113* 111*   CALCIUM 8.3* 8.4* 9.0   MG 1.8 1.9 1.9   PHOS 2.1* 2.0* 3.0     Recent Labs   Lab 07/12/19  0647  07/16/19  0300 07/17/19  0258 07/18/19  0500   ALKPHOS  --    < > 150* 149* 219*   ALT  --    < > 20 26 68*   AST  --    < > 29 43* 92*   ALBUMIN  --    < > 1.8* 1.8* 2.2*   PROT  --    < > 4.6* 4.5* 5.3*   BILITOT  --    < > 0.4 0.4 0.4   INR 1.2  --   --   --   --     < > = values  in this interval not displayed.        No results for input(s): POCTGLUCOSE in the last 168 hours.    Scheduled Meds:   ciprofloxacin HCl  500 mg Oral Q12H    pantoprazole  40 mg Intravenous BID    sucralfate  1 g Oral Q6H     Continuous Infusions:  As Needed:  acetaminophen, dextromethorphan-guaifenesin  mg, diphenhydrAMINE, diphenhydrAMINE-zinc acetate 2-0.1%, sodium chloride 0.9%, sodium chloride 0.9%    Active Hospital Problems    Diagnosis  POA    *GIB (gastrointestinal bleeding) [K92.2]  Yes    Acute hypoxemic respiratory failure [J96.01]  Unknown    Acute blood loss anemia [D62]  Yes    Pulmonary fibrosis [J84.10]  Yes      Resolved Hospital Problems    Diagnosis Date Resolved POA    Shock [R57.9] 07/16/2019 Yes       Assessment and Plan        Acute hypoxemic respiratory failure  - previously intubated for airway protection  - remains on 4L NC, normally on RA  - Lasix 40mg IV once and monitor response, rales BL on examination  - follow UOP and re-dose as appropriate     Pulmonary fibrosis  - Dx 1 year ago, managed by Dr. Pradhan  - Per Dr. Pradhan:  hold ofev, goal SpO2 95%        Acute blood loss anemia  - Hemoglobin 7 on arrival   - Has received 19 units PRBC, 5 units FFP, 2 plt between facility and [place] and [facility]  - CBC q12h  - See GIB        * GIB (gastrointestinal bleeding)  - Initial EGD obscured due to large amount of blood in stomach, repeat with numerous ulcers in stomach and duodenum with no active bleeding (7/10)  - CTA following scope (7/10) with no active bleeding visualized. Some enhancement noted at site of Nissen; however, read at that time felt mucosal enhancement more likely than active bleed  - Patient began actively bleeding again overnight 7/11, transfused 4 additional units, repeat scope again with large amount of blood in stomach, possible active bleed see in cardia along lesser curvature. Source unable to be visualized, clip placed to meeta location for IR. Left gastric  embolized.   - CBC q8h  -H/H 8.8/25.6, stable, no melanotic stools overnight        Shock  Hemorrhagic vs septic  - Tranfusing for Hg above 7, as no active signs of hemorrhage at this time  - Developed fevers 7/12, treating broadly with vanc, cefepime and flagyl  - off of pressors, normotensive    Discharge plan:    Time (minutes) spent in care of the patient (Greater than 1/2 spent in direct face-to-face contact)  45 minutes    Jordan Price MD

## 2019-07-18 NOTE — TRANSFER OF CARE
"Anesthesia Transfer of Care Note    Patient: Manpreet Hamilton    Procedure(s) Performed: Procedure(s) (LRB):  EGD (ESOPHAGOGASTRODUODENOSCOPY) (N/A)    Patient location: [facility]    Anesthesia Type: general    Transport from OR: Transported from OR on 2-3 L/min O2 by NC with adequate spontaneous ventilation    Post pain: adequate analgesia    Post assessment: no apparent anesthetic complications and tolerated procedure well    Post vital signs: stable    Level of consciousness: awake, alert and oriented    Nausea/Vomiting: no nausea/vomiting    Complications: none    Transfer of care protocol was followed      Last vitals:   Visit Vitals  /70   Pulse 88   Temp 37.1 °C (98.8 °F)   Resp 20   Ht 5' 8" (1.727 m)   Wt 92.8 kg (204 lb 9.4 oz)   SpO2 97%   BMI 31.11 kg/m²     "

## 2019-07-18 NOTE — PROVATION PATIENT INSTRUCTIONS
Discharge Summary/Instructions after an Endoscopic Procedure  Patient Name: Manpreet Hamilton  Patient MRN: [id number]  Patient YOB: 1950  Thursday, July 18, 2019  Fer Fox MD  RESTRICTIONS:  During your procedure today, you received medications for sedation.  These   medications may affect your judgment, balance and coordination.  Therefore,   for 24 hours, you have the following restrictions:   - DO NOT drive a car, operate machinery, make legal/financial decisions,   sign important papers or drink alcohol.    ACTIVITY:  Today: no heavy lifting, straining or running due to procedural   sedation/anesthesia.  The following day: return to full activity including work.  DIET:  Eat and drink normally unless instructed otherwise.     TREATMENT FOR COMMON SIDE EFFECTS:  - Mild abdominal pain, nausea, belching, bloating or excessive gas:  rest,   eat lightly and use a heating pad.  - Sore Throat: treat with throat lozenges and/or gargle with warm salt   water.  - Because air was used during the procedure, expelling large amounts of air   from your rectum or belching is normal.  - If a bowel prep was taken, you may not have a bowel movement for 1-3 days.    This is normal.  SYMPTOMS TO WATCH FOR AND REPORT TO YOUR PHYSICIAN:  1. Abdominal pain or bloating, other than gas cramps.  2. Chest pain.  3. Back pain.  4. Signs of infection such as: chills or fever occurring within 24 hours   after the procedure.  5. Rectal bleeding, which would show as bright red, maroon, or black stools.   (A tablespoon of blood from the rectum is not serious, especially if   hemorrhoids are present.)  6. Vomiting.  7. Weakness or dizziness.  GO DIRECTLY TO THE NEAREST EMERGENCY ROOM IF YOU HAVE ANY OF THE FOLLOWING:      Difficulty breathing              Chills and/or fever over 101 F   Persistent vomiting and/or vomiting blood   Severe abdominal pain   Severe chest pain   Black, tarry stools   Bleeding- more than one tablespoon   Any  other symptom or condition that you feel may need urgent attention  Your doctor recommends these additional instructions:  If any biopsies were taken, your doctors clinic will contact you in 1 to 2   weeks with any results.  - [facility] to [facility].   - The findings and recommendations were discussed with the designated   responsible adult.   - The findings and recommendations were discussed with the referring   physician.   - Use sucralfate suspension 1 gram PO QID.   - Continue PPI  For questions, problems or results please call your physician - Fer Fox MD at Work:  [phone].  [facility], EMERGENCY ROOM PHONE NUMBER: [phone]  IF A COMPLICATION OR EMERGENCY SITUATION ARISES AND YOU ARE UNABLE TO REACH   YOUR PHYSICIAN - GO DIRECTLY TO THE EMERGENCY ROOM.  Fer Fox MD  7/18/2019 10:29:06 AM  This report has been verified and signed electronically.  [employer]

## 2019-07-18 NOTE — NURSING TRANSFER
Pt transferred via stretcher from post-op to 11th floor.  Transfered with spouse  Transported by: Nimo MOSQUERA  Report given to Traci GRAVES PER Handoff on Doc Flowsheet  VSS per Doc Flowsheet  Medicines sent: No  Chart sent with patient: Yes

## 2019-07-18 NOTE — PT/OT/SLP PROGRESS
Physical Therapy      Patient Name:  Manpreet Hamilton   MRN:  [id number]    Patient not seen today secondary to pt away at EGD upon attempt  . Will follow-up next scheduled treatment per PT POC    Demond Galvez, PTA  7/18/2019

## 2019-07-19 LAB
ALBUMIN SERPL BCP-MCNC: 2.1 G/DL (ref 3.5–5.2)
ALP SERPL-CCNC: 199 U/L (ref 55–135)
ALT SERPL W/O P-5'-P-CCNC: 50 U/L (ref 10–44)
ANION GAP SERPL CALC-SCNC: 9 MMOL/L (ref 8–16)
AST SERPL-CCNC: 47 U/L (ref 10–40)
BASOPHILS # BLD AUTO: 0.06 K/UL (ref 0–0.2)
BASOPHILS NFR BLD: 0.6 % (ref 0–1.9)
BILIRUB SERPL-MCNC: 0.4 MG/DL (ref 0.1–1)
BUN SERPL-MCNC: 11 MG/DL (ref 8–23)
CALCIUM SERPL-MCNC: 8.5 MG/DL (ref 8.7–10.5)
CHLORIDE SERPL-SCNC: 99 MMOL/L (ref 95–110)
CO2 SERPL-SCNC: 25 MMOL/L (ref 23–29)
CREAT SERPL-MCNC: 0.7 MG/DL (ref 0.5–1.4)
DIFFERENTIAL METHOD: ABNORMAL
EOSINOPHIL # BLD AUTO: 0.2 K/UL (ref 0–0.5)
EOSINOPHIL NFR BLD: 1.8 % (ref 0–8)
ERYTHROCYTE [DISTWIDTH] IN BLOOD BY AUTOMATED COUNT: 13.9 % (ref 11.5–14.5)
EST. GFR  (AFRICAN AMERICAN): >60 ML/MIN/1.73 M^2
EST. GFR  (NON AFRICAN AMERICAN): >60 ML/MIN/1.73 M^2
GLUCOSE SERPL-MCNC: 109 MG/DL (ref 70–110)
HCT VFR BLD AUTO: 27.1 % (ref 40–54)
HGB BLD-MCNC: 8.7 G/DL (ref 14–18)
IMM GRANULOCYTES # BLD AUTO: 0.02 K/UL (ref 0–0.04)
IMM GRANULOCYTES NFR BLD AUTO: 0.2 % (ref 0–0.5)
LYMPHOCYTES # BLD AUTO: 1.1 K/UL (ref 1–4.8)
LYMPHOCYTES NFR BLD: 11.4 % (ref 18–48)
MAGNESIUM SERPL-MCNC: 1.8 MG/DL (ref 1.6–2.6)
MCH RBC QN AUTO: 28.7 PG (ref 27–31)
MCHC RBC AUTO-ENTMCNC: 32.1 G/DL (ref 32–36)
MCV RBC AUTO: 89 FL (ref 82–98)
MONOCYTES # BLD AUTO: 1.1 K/UL (ref 0.3–1)
MONOCYTES NFR BLD: 11.1 % (ref 4–15)
NEUTROPHILS # BLD AUTO: 7.5 K/UL (ref 1.8–7.7)
NEUTROPHILS NFR BLD: 74.9 % (ref 38–73)
NRBC BLD-RTO: 0 /100 WBC
PHOSPHATE SERPL-MCNC: 2.8 MG/DL (ref 2.7–4.5)
PLATELET # BLD AUTO: 459 K/UL (ref 150–350)
PMV BLD AUTO: 9.6 FL (ref 9.2–12.9)
POTASSIUM SERPL-SCNC: 3.4 MMOL/L (ref 3.5–5.1)
PROT SERPL-MCNC: 5.2 G/DL (ref 6–8.4)
RBC # BLD AUTO: 3.03 M/UL (ref 4.6–6.2)
SODIUM SERPL-SCNC: 133 MMOL/L (ref 136–145)
WBC # BLD AUTO: 9.97 K/UL (ref 3.9–12.7)

## 2019-07-19 PROCEDURE — 85025 COMPLETE CBC W/AUTO DIFF WBC: CPT

## 2019-07-19 PROCEDURE — 84100 ASSAY OF PHOSPHORUS: CPT

## 2019-07-19 PROCEDURE — 99232 PR SUBSEQUENT HOSPITAL CARE,LEVL II: ICD-10-PCS | Mod: ,,, | Performed by: INTERNAL MEDICINE

## 2019-07-19 PROCEDURE — 25000003 PHARM REV CODE 250: Performed by: STUDENT IN AN ORGANIZED HEALTH CARE EDUCATION/TRAINING PROGRAM

## 2019-07-19 PROCEDURE — 99232 SBSQ HOSP IP/OBS MODERATE 35: CPT | Mod: ,,, | Performed by: INTERNAL MEDICINE

## 2019-07-19 PROCEDURE — 25000003 PHARM REV CODE 250: Performed by: INTERNAL MEDICINE

## 2019-07-19 PROCEDURE — C9113 INJ PANTOPRAZOLE SODIUM, VIA: HCPCS | Performed by: HOSPITALIST

## 2019-07-19 PROCEDURE — 63600175 PHARM REV CODE 636 W HCPCS: Performed by: HOSPITALIST

## 2019-07-19 PROCEDURE — 20600001 HC STEP DOWN PRIVATE ROOM

## 2019-07-19 PROCEDURE — 83735 ASSAY OF MAGNESIUM: CPT

## 2019-07-19 PROCEDURE — 80053 COMPREHEN METABOLIC PANEL: CPT

## 2019-07-19 RX ORDER — HYDROCHLOROTHIAZIDE 25 MG/1
25 TABLET ORAL DAILY
Status: DISCONTINUED | OUTPATIENT
Start: 2019-07-19 | End: 2019-07-22 | Stop reason: HOSPADM

## 2019-07-19 RX ORDER — VALSARTAN 40 MG/1
40 TABLET ORAL 2 TIMES DAILY
Status: DISCONTINUED | OUTPATIENT
Start: 2019-07-19 | End: 2019-07-19

## 2019-07-19 RX ADMIN — SUCRALFATE 1 G: 1 SUSPENSION ORAL at 05:07

## 2019-07-19 RX ADMIN — PANTOPRAZOLE SODIUM 40 MG: 40 INJECTION, POWDER, FOR SOLUTION INTRAVENOUS at 07:07

## 2019-07-19 RX ADMIN — SUCRALFATE 1 G: 1 SUSPENSION ORAL at 11:07

## 2019-07-19 RX ADMIN — HYDROCHLOROTHIAZIDE 25 MG: 25 TABLET ORAL at 11:07

## 2019-07-19 RX ADMIN — CIPROFLOXACIN HYDROCHLORIDE 500 MG: 500 TABLET, FILM COATED ORAL at 07:07

## 2019-07-19 RX ADMIN — PANTOPRAZOLE SODIUM 40 MG: 40 INJECTION, POWDER, FOR SOLUTION INTRAVENOUS at 08:07

## 2019-07-19 NOTE — PLAN OF CARE
Problem: Adult Inpatient Plan of Care  Goal: Plan of Care Review  Outcome: Ongoing (interventions implemented as appropriate)    - AAOx4. VSS. Afebrile.  - R IJ TLC, CDI.  - C/o L knee pain. Ice pack given and relief reported.  - Home nasal spray used for nasal congestion.  - H/H stable at 8.7/27.1. CBCs drawn daily.  - IV protonix and sucralfate continued for recent GI bleed.  - Up with walker. Wife @ bedside.  - Bed low and locked, call bell within reach, side rails x2.  - In NAD. WCTM.

## 2019-07-19 NOTE — TREATMENT PLAN
GI Treatment Plan    Manpreet Hamilton is a 68 y.o. male admitted to hospital 7/10/2019 (Hospital Day: 10) due to GIB (gastrointestinal bleeding).     Interval History  Assessed at bedside this morning. No BM overnight. Denies BRBPR, hematemesis, nausea, vomiting, abdominal pain.     EGD done 7/18:  Impression:  - Normal esophagus.                        - 3 cm hiatal hernia.                        - An anti-reflux surgical site was found,                         characterized by healthy appearing mucosa.                        - Oozing gastric ulcer at APC treatment site. Clips                         were placed.                        - One non-bleeding duodenal ulcer with no stigmata                         of bleeding.                        - No specimens collected.    Plan  - Use sucralfate suspension 1 gram PO QID.  - Continue PPI  -trend hb  -watch for rebleed  - Plan of care was discussed with primary team.  - We will continue to follow.    Thank you for involving us in the care of Manpreet Hamilton. Please call with any additional questions, concerns or changes in the patient's clinical status.    Loyd Maria MD  Gastroenterology Fellow  Spectralink: [id number]

## 2019-07-19 NOTE — PLAN OF CARE
Problem: Adult Inpatient Plan of Care  Goal: Plan of Care Review  Recommendations     Recommendation/Intervention: 1.) Continue regular diet. 2.) If PO intake <50% of meals, suggest Boost Plus TID. 3.) Daily weights.   Goals: 1.) Pt to consume/tolerate >75% of meals by follow up.  Nutrition Goal Status: new  Communication of RD Recs: (POC)    Assessment and Plan  Nutrition Problem  Altered nutrition related labs     Related to (etiology):   Liver dysfunction     Signs and Symptoms (as evidenced by):   Elevated AST, ALT, ALP labs     Interventions/Recommendations (treatment strategy):  Collaboration with providers; Boost Plus for PO intake <50%.     Nutrition Diagnosis Status:   New

## 2019-07-19 NOTE — PLAN OF CARE
to the bedside at this time to see the patient. Patient's wife at the bedside at this time.  They are aware the  team continues to follow throughout this admission for discharge needs and/or recommendations. Per patient's wife, home health had already been set up with [facility] in [place], [place].     07/19/19 1045   Discharge Reassessment   Assessment Type Discharge Planning Reassessment   Anticipated Discharge Disposition HomeHealth  ([facility])   Provided patient/caregiver education on the expected discharge date and the discharge plan Yes   Do you have any problems affording any of your prescribed medications? No   Discharge Plan A Home;Home Health   Discharge Plan B Home with family   DME Needed Upon Discharge    (TBD)   Patient choice form signed by patient/caregiver Yes   Post-Acute Status   Post-Acute Authorization Home Health/Hospice      name and number remains on the board at the bedside for the patient or the family to call with discharge needs or questions.  Understanding verbalized.

## 2019-07-19 NOTE — ANESTHESIA PREPROCEDURE EVALUATION
07/19/2019  Manpreet Hamilton is a 68 y.o., male.    Anesthesia Evaluation    I have reviewed the Patient Summary Reports.     I have reviewed the Medications.     Review of Systems  Anesthesia Hx:  History of prior surgery of interest to airway management or planning:  Denies Personal Hx of Anesthesia complications.   Social:  Non-Smoker, No Alcohol Use    Hematology/Oncology:  Hematology Normal   Oncology Normal     EENT/Dental:EENT/Dental Normal   Cardiovascular:   Hypertension    Pulmonary:   ILD/pulm fibrosis, doing well previously   Renal/:  Renal/ Normal     Hepatic/GI:   Acute GIB   Musculoskeletal:  Musculoskeletal Normal    Neurological:  Neurology Normal    Dermatological:  Skin Normal    Psych:  Psychiatric Normal           Physical Exam  General:  Well nourished    Airway/Jaw/Neck:  Airway Findings: Mouth Opening: Normal Tongue: Normal  General Airway Assessment: Adult, Good  Mallampati: III  TM Distance: Normal, at least 6 cm     Eyes/Ears/Nose:  EYES/EARS/NOSE FINDINGS: Normal   Dental:  Dental Findings: In tact   Chest/Lungs:  Chest/Lungs Findings: Clear to auscultation, Normal Respiratory Rate     Heart/Vascular:  Heart Findings: Rate: Normal  Rhythm: Regular Rhythm  Sounds: Normal  Heart murmur: negative       Mental Status:  Mental Status Findings:  Cooperative, Alert and Oriented         Anesthesia Plan  Type of Anesthesia, risks & benefits discussed:  Anesthesia Type:  general  Patient's Preference:   Intra-op Monitoring Plan:   Intra-op Monitoring Plan Comments:   Post Op Pain Control Plan:   Post Op Pain Control Plan Comments:   Induction:   IV  Beta Blocker:  Patient is not currently on a Beta-Blocker (No further documentation required).       Informed Consent: Patient understands risks and agrees with Anesthesia plan.  Questions answered. Anesthesia consent signed with  patient.  ASA Score: 3     Day of Surgery Review of History & Physical:    H&P update referred to the provider.     Anesthesia Plan Notes:   68M HTN, compensated pulm fibrosis, acute GiB for EGD under GA NC TIVA        Ready For Surgery From Anesthesia Perspective.

## 2019-07-19 NOTE — PLAN OF CARE
Problem: Adult Inpatient Plan of Care  Goal: Plan of Care Review  Outcome: Ongoing (interventions implemented as appropriate)  Pt has call bell in reach, non slip socks on, and bedrails up x2. Pt encouraged to wash hands. Pt has walker and works with PT/OT.

## 2019-07-19 NOTE — PROGRESS NOTES
Hospital Medicine  Progress note    Team: [facility] B Jordan Price MD  Admit Date: 7/10/2019  MARISSA 7/22/2019  Length of Stay:  LOS: 9 days   Code status: Full Code    Principal Problem:  GIB (gastrointestinal bleeding)    HPI / Hospital Course     Interval hx: Still seeing blood in stool. EGD with 1 oozing site. Sucralfate added. Tolerating PO. Will have PT see him.    ROS     Respiratory: neg for cough neg for shortness of breath  Cardiovascular: neg for chest pain neg for palpitations  Gastrointestinal: neg for nausea neg for vomiting, neg for abdominal pain neg for diarrhea neg for constipation   Behavioral/Psych: neg for depression neg for anxiety    PEx  Temp:  [98 °F (36.7 °C)-99.4 °F (37.4 °C)]   Pulse:  []   Resp:  [17-34]   BP: (112-142)/(66-80)   SpO2:  [87 %-95 %]     Intake/Output Summary (Last 24 hours) at 7/19/2019 1135  Last data filed at 7/19/2019 0830  Gross per 24 hour   Intake 980 ml   Output 500 ml   Net 480 ml       General Appearance: no acute distress   Heart: regular rate and rhythm  Respiratory: Normal respiratory effort, no crackles   Abdomen: Soft, non-tender; bowel sounds active  Skin: intact.Skin intact  Neurologic:  No focal numbness or weakness  Mental status: Alert, oriented x 4, affect appropriate     Recent Labs   Lab 07/17/19  1137 07/18/19  0500 07/19/19  0530   WBC 8.71 10.47 9.97   HGB 8.5* 9.6* 8.7*   HCT 25.9* 28.7* 27.1*    370* 459*     Recent Labs   Lab 07/17/19  0258 07/18/19  0500 07/19/19  0530    135* 133*   K 3.6 3.6 3.4*    98 99   CO2 28 26 25   BUN 11 12 11   CREATININE 0.6 0.7 0.7   * 111* 109   CALCIUM 8.4* 9.0 8.5*   MG 1.9 1.9 1.8   PHOS 2.0* 3.0 2.8     Recent Labs   Lab 07/17/19  0258 07/18/19  0500 07/19/19  0530   ALKPHOS 149* 219* 199*   ALT 26 68* 50*   AST 43* 92* 47*   ALBUMIN 1.8* 2.2* 2.1*   PROT 4.5* 5.3* 5.2*   BILITOT 0.4 0.4 0.4        No results for input(s): POCTGLUCOSE in the last 168 hours.    Scheduled  Meds:   ciprofloxacin HCl  500 mg Oral Q12H    hydroCHLOROthiazide  25 mg Oral Daily    pantoprazole  40 mg Intravenous BID    sucralfate  1 g Oral Q6H     Continuous Infusions:  As Needed:  acetaminophen, dextromethorphan-guaifenesin  mg, diphenhydrAMINE, diphenhydrAMINE-zinc acetate 2-0.1%, sodium chloride 0.9%, sodium chloride 0.9%    Active Hospital Problems    Diagnosis  POA    *GIB (gastrointestinal bleeding) [K92.2]  Yes    Acute hypoxemic respiratory failure [J96.01]  Unknown    Acute blood loss anemia [D62]  Yes    Pulmonary fibrosis [J84.10]  Yes      Resolved Hospital Problems    Diagnosis Date Resolved POA    Shock [R57.9] 07/16/2019 Yes       Assessment and Plan        Acute hypoxemic respiratory failure  - previously intubated for airway protection  - remains on 4L NC, normally on RA  - Lasix 40mg IV once and monitor response, rales BL on examination  - follow UOP and re-dose as appropriate     Pulmonary fibrosis  - Dx 1 year ago, managed by Dr. Pradhan  - Per Dr. Pradhan:  hold ofev, goal SpO2 95%        Acute blood loss anemia  - Hemoglobin 7 on arrival   - Has received 19 units PRBC, 5 units FFP, 2 plt between facility and [facility] and [facility]  - CBC q12h  - See GIB        * GIB (gastrointestinal bleeding)  - Initial EGD obscured due to large amount of blood in stomach, repeat with numerous ulcers in stomach and duodenum with no active bleeding (7/10)  - CTA following scope (7/10) with no active bleeding visualized. Some enhancement noted at site of Nissen; however, read at that time felt mucosal enhancement more likely than active bleed  - Patient began actively bleeding again overnight 7/11, transfused 4 additional units, repeat scope again with large amount of blood in stomach, possible active bleed see in cardia along lesser curvature. Source unable to be visualized, clip placed to meeta location for IR. Left gastric embolized.   - CBC q8h  -H/H 8.8/25.6, stable, no melanotic stools  overnight        Shock  Hemorrhagic vs septic  - Tranfusing for Hg above 7, as no active signs of hemorrhage at this time  - Developed fevers 7/12, treating broadly with vanc, cefepime and flagyl  - off of pressors, normotensive    Discharge plan:    Time (minutes) spent in care of the patient (Greater than 1/2 spent in direct face-to-face contact)  45 minutes    Jordan Price MD

## 2019-07-19 NOTE — CODE/ RAPID DOCUMENTATION
Rapid Response Nurse Chart Check     Chart check completed, abnormal VS noted, bedside RN,  contacted, no concerns   verbalized at this time, instructed to call [phone] for further concerns or assistance.

## 2019-07-19 NOTE — PROGRESS NOTES
"[facility]  Adult Nutrition  Progress Note    SUMMARY       Recommendations    Recommendation/Intervention: 1.) Continue regular diet. 2.) If PO intake <50% of meals, suggest Boost Plus TID. 3.) Daily weights.   Goals: 1.) Pt to consume/tolerate >75% of meals by follow up.  Nutrition Goal Status: new  Communication of RD Recs: (POC)    Reason for Assessment    Reason For Assessment: length of stay  Diagnosis: gastrointestinal disease(GIB)  Relevant Medical History: HTN, HLD, pulmonary fibrosis  Interdisciplinary Rounds: did not attend  General Information Comments: Pt sitting up in bed reports feeling much better. Pt reports consuming ~100% of bkfst. He denies GI distress at this time. S/p EGD with small blood in stool noted. Pt denies an recent wt changes PTA. NFPE not indicated at this time. Pt appears well nourished.   Nutrition Discharge Planning: Adequate intake to meet nutritional needs.     Nutrition Risk Screen    Nutrition Risk Screen: no indicators present    Nutrition/Diet History    Spiritual, Cultural Beliefs, Temple Practices, Values that Affect Care: no    Anthropometrics    Temp: 98 °F (36.7 °C)  Height Method: Stated  Height: 5' 8" (172.7 cm)  Height (inches): 68 in  Weight Method: Bed Scale  Weight: 88.1 kg (194 lb 3.6 oz)  Weight (lb): 194.23 lb  Ideal Body Weight (IBW), Male: 154 lb  % Ideal Body Weight, Male (lb): 132.85 lb  BMI (Calculated): 31.2       Lab/Procedures/Meds    Pertinent Labs Reviewed: reviewed  Pertinent Labs Comments: Na 133, K 3.4, Ca 8.5, , AST 47, ALT 50, HbA1C 5.3  Pertinent Medications Reviewed: reviewed  Pertinent Medications Comments: protonix      Estimated/Assessed Needs    Weight Used For Calorie Calculations: 88.1 kg (194 lb 3.6 oz)  Energy Calorie Requirements (kcal): 2019  Energy Need Method: Garland-St Jeor(x 1.25 PAL)  Protein Requirements: (g/day)  Weight Used For Protein Calculations: 88.1 kg (194 lb 3.6 oz)(1.0-1.2 g/kg)   "   Estimated Fluid Requirement Method: RDA Method(or per MD)  RDA Method (mL): 2019         Nutrition Prescription Ordered    Current Diet Order: regular    Evaluation of Received Nutrient/Fluid Intake    I/O: +1.1L since admit  Comments: LBM 7/17  % Intake of Estimated Energy Needs: 75 - 100 %  % Meal Intake: 75 - 100 %    Nutrition Risk    Level of Risk/Frequency of Follow-up: low     Assessment and Plan  Nutrition Problem  Altered nutrition related labs    Related to (etiology):   Liver dysfunction    Signs and Symptoms (as evidenced by):   Elevated AST, ALT, ALP labs    Interventions/Recommendations (treatment strategy):  Collaboration with providers; Boost Plus for PO intake <50%.    Nutrition Diagnosis Status:   New           Monitor and Evaluation    Food and Nutrient Intake: energy intake, food and beverage intake  Food and Nutrient Adminstration: diet order  Knowledge/Beliefs/Attitudes: food and nutrition knowledge/skill  Physical Activity and Function: nutrition-related ADLs and IADLs  Anthropometric Measurements: weight, weight change, body mass index  Biochemical Data, Medical Tests and Procedures: electrolyte and renal panel, gastrointestinal profile, glucose/endocrine profile, lipid profile  Nutrition-Focused Physical Findings: overall appearance     Malnutrition Assessment  Not indicated at this time. Pt appears well nourished.     Nutrition Follow-Up    RD Follow-up?: Yes

## 2019-07-19 NOTE — ANESTHESIA POSTPROCEDURE EVALUATION
Anesthesia Post Evaluation    Patient: Manpreet Hamilton    Procedure(s) Performed: Procedure(s) (LRB):  EGD (ESOPHAGOGASTRODUODENOSCOPY) (N/A)    Final Anesthesia Type: general  Patient location during evaluation: med/surg floor  Patient participation: Yes- Able to Participate  Level of consciousness: awake and alert  Pain management: adequate  Airway patency: patent  PONV status at discharge: No PONV  Anesthetic complications: no      Cardiovascular status: blood pressure returned to baseline  Respiratory status: unassisted, spontaneous ventilation and room air  Hydration status: euvolemic  Follow-up not needed.          Vitals Value Taken Time   /75 7/19/2019 12:00 PM   Temp 36.7 °C (98 °F) 7/19/2019 11:13 AM   Pulse 87 7/19/2019 12:00 PM   Resp 29 7/19/2019 12:00 PM   SpO2 93 % 7/19/2019 12:00 PM   Vitals shown include unvalidated device data.      No case tracking events are documented in the log.      Pain/Dannielle Score: Dannielle Score: 9 (7/18/2019 10:30 AM)

## 2019-07-20 LAB
ALBUMIN SERPL BCP-MCNC: 2.2 G/DL (ref 3.5–5.2)
ALP SERPL-CCNC: 228 U/L (ref 55–135)
ALT SERPL W/O P-5'-P-CCNC: 46 U/L (ref 10–44)
ANION GAP SERPL CALC-SCNC: 10 MMOL/L (ref 8–16)
AST SERPL-CCNC: 36 U/L (ref 10–40)
BASOPHILS # BLD AUTO: 0.07 K/UL (ref 0–0.2)
BASOPHILS NFR BLD: 0.6 % (ref 0–1.9)
BILIRUB SERPL-MCNC: 0.6 MG/DL (ref 0.1–1)
BUN SERPL-MCNC: 8 MG/DL (ref 8–23)
CALCIUM SERPL-MCNC: 8.8 MG/DL (ref 8.7–10.5)
CHLORIDE SERPL-SCNC: 94 MMOL/L (ref 95–110)
CO2 SERPL-SCNC: 27 MMOL/L (ref 23–29)
CREAT SERPL-MCNC: 0.7 MG/DL (ref 0.5–1.4)
DIFFERENTIAL METHOD: ABNORMAL
EOSINOPHIL # BLD AUTO: 0.3 K/UL (ref 0–0.5)
EOSINOPHIL NFR BLD: 2.2 % (ref 0–8)
ERYTHROCYTE [DISTWIDTH] IN BLOOD BY AUTOMATED COUNT: 13.9 % (ref 11.5–14.5)
EST. GFR  (AFRICAN AMERICAN): >60 ML/MIN/1.73 M^2
EST. GFR  (NON AFRICAN AMERICAN): >60 ML/MIN/1.73 M^2
GLUCOSE SERPL-MCNC: 129 MG/DL (ref 70–110)
HCT VFR BLD AUTO: 27.9 % (ref 40–54)
HGB BLD-MCNC: 9.1 G/DL (ref 14–18)
IMM GRANULOCYTES # BLD AUTO: 0.03 K/UL (ref 0–0.04)
IMM GRANULOCYTES NFR BLD AUTO: 0.3 % (ref 0–0.5)
LYMPHOCYTES # BLD AUTO: 1.4 K/UL (ref 1–4.8)
LYMPHOCYTES NFR BLD: 12.3 % (ref 18–48)
MAGNESIUM SERPL-MCNC: 1.8 MG/DL (ref 1.6–2.6)
MCH RBC QN AUTO: 29.1 PG (ref 27–31)
MCHC RBC AUTO-ENTMCNC: 32.6 G/DL (ref 32–36)
MCV RBC AUTO: 89 FL (ref 82–98)
MONOCYTES # BLD AUTO: 1.2 K/UL (ref 0.3–1)
MONOCYTES NFR BLD: 10.9 % (ref 4–15)
NEUTROPHILS # BLD AUTO: 8.4 K/UL (ref 1.8–7.7)
NEUTROPHILS NFR BLD: 73.7 % (ref 38–73)
NRBC BLD-RTO: 0 /100 WBC
PHOSPHATE SERPL-MCNC: 2.8 MG/DL (ref 2.7–4.5)
PLATELET # BLD AUTO: 555 K/UL (ref 150–350)
PMV BLD AUTO: 9.4 FL (ref 9.2–12.9)
POTASSIUM SERPL-SCNC: 2.9 MMOL/L (ref 3.5–5.1)
PROT SERPL-MCNC: 5.6 G/DL (ref 6–8.4)
RBC # BLD AUTO: 3.13 M/UL (ref 4.6–6.2)
SODIUM SERPL-SCNC: 131 MMOL/L (ref 136–145)
WBC # BLD AUTO: 11.32 K/UL (ref 3.9–12.7)

## 2019-07-20 PROCEDURE — 25000003 PHARM REV CODE 250: Performed by: INTERNAL MEDICINE

## 2019-07-20 PROCEDURE — 20600001 HC STEP DOWN PRIVATE ROOM

## 2019-07-20 PROCEDURE — 84100 ASSAY OF PHOSPHORUS: CPT

## 2019-07-20 PROCEDURE — 99232 SBSQ HOSP IP/OBS MODERATE 35: CPT | Mod: ,,, | Performed by: INTERNAL MEDICINE

## 2019-07-20 PROCEDURE — 83735 ASSAY OF MAGNESIUM: CPT

## 2019-07-20 PROCEDURE — C9113 INJ PANTOPRAZOLE SODIUM, VIA: HCPCS | Performed by: HOSPITALIST

## 2019-07-20 PROCEDURE — 80053 COMPREHEN METABOLIC PANEL: CPT

## 2019-07-20 PROCEDURE — 25000003 PHARM REV CODE 250: Performed by: STUDENT IN AN ORGANIZED HEALTH CARE EDUCATION/TRAINING PROGRAM

## 2019-07-20 PROCEDURE — 63600175 PHARM REV CODE 636 W HCPCS: Performed by: HOSPITALIST

## 2019-07-20 PROCEDURE — 99232 PR SUBSEQUENT HOSPITAL CARE,LEVL II: ICD-10-PCS | Mod: ,,, | Performed by: INTERNAL MEDICINE

## 2019-07-20 PROCEDURE — 85025 COMPLETE CBC W/AUTO DIFF WBC: CPT

## 2019-07-20 RX ADMIN — SUCRALFATE 1 G: 1 SUSPENSION ORAL at 11:07

## 2019-07-20 RX ADMIN — PANTOPRAZOLE SODIUM 40 MG: 40 INJECTION, POWDER, FOR SOLUTION INTRAVENOUS at 08:07

## 2019-07-20 RX ADMIN — GUAIFENESIN AND DEXTROMETHORPHAN HYDROBROMIDE 1 TABLET: 600; 30 TABLET, EXTENDED RELEASE ORAL at 08:07

## 2019-07-20 RX ADMIN — SUCRALFATE 1 G: 1 SUSPENSION ORAL at 05:07

## 2019-07-20 NOTE — PLAN OF CARE
Problem: Adult Inpatient Plan of Care  Goal: Plan of Care Review  Outcome: Ongoing (interventions implemented as appropriate)    - AAOx4. VSS. Afebrile.  - R IJ TLC, CDI.  - C/o L knee pain. Ice pack given and relief reported.  - Home nasal spray used for nasal congestion.  - H/H stable at 89.1/27.9. CBCs drawn daily.  - IV protonix and sucralfate continued for recent GI bleed.  - Up with walker. Wife @ bedside.  - Bed low and locked, call bell within reach, side rails x2.  - In NAD. WCTM.

## 2019-07-20 NOTE — PROGRESS NOTES
Hospital Medicine  Progress note    Team: [facility] B Jordan Price MD  Admit Date: 7/10/2019  MARISSA 7/22/2019  Length of Stay:  LOS: 10 days   Code status: Full Code    Principal Problem:  GIB (gastrointestinal bleeding)    HPI / Hospital Course     Interval hx:EGD with 1 oozing site. Sucralfate added. Tolerating PO. Will have PT see him.    ROS     Respiratory: neg for cough neg for shortness of breath  Cardiovascular: neg for chest pain neg for palpitations  Gastrointestinal: neg for nausea neg for vomiting, neg for abdominal pain neg for diarrhea neg for constipation   Behavioral/Psych: neg for depression neg for anxiety    PEx  Temp:  [98.5 °F (36.9 °C)-99 °F (37.2 °C)]   Pulse:  [80-98]   Resp:  [20-30]   BP: (115-136)/(69-78)   SpO2:  [90 %-94 %]     Intake/Output Summary (Last 24 hours) at 7/20/2019 1204  Last data filed at 7/20/2019 0759  Gross per 24 hour   Intake 1260 ml   Output 1300 ml   Net -40 ml       General Appearance: no acute distress   Heart: regular rate and rhythm  Respiratory: Normal respiratory effort, no crackles   Abdomen: Soft, non-tender; bowel sounds active  Skin: intact.Skin intact  Neurologic:  No focal numbness or weakness  Mental status: Alert, oriented x 4, affect appropriate     Recent Labs   Lab 07/18/19  0500 07/19/19  0530 07/20/19  0530   WBC 10.47 9.97 11.32   HGB 9.6* 8.7* 9.1*   HCT 28.7* 27.1* 27.9*   * 459* 555*     Recent Labs   Lab 07/18/19  0500 07/19/19  0530 07/20/19  0530   * 133* 131*   K 3.6 3.4* 2.9*   CL 98 99 94*   CO2 26 25 27   BUN 12 11 8   CREATININE 0.7 0.7 0.7   * 109 129*   CALCIUM 9.0 8.5* 8.8   MG 1.9 1.8 1.8   PHOS 3.0 2.8 2.8     Recent Labs   Lab 07/18/19  0500 07/19/19  0530 07/20/19  0530   ALKPHOS 219* 199* 228*   ALT 68* 50* 46*   AST 92* 47* 36   ALBUMIN 2.2* 2.1* 2.2*   PROT 5.3* 5.2* 5.6*   BILITOT 0.4 0.4 0.6        No results for input(s): POCTGLUCOSE in the last 168 hours.    Scheduled Meds:   hydroCHLOROthiazide   25 mg Oral Daily    pantoprazole  40 mg Intravenous BID    sucralfate  1 g Oral Q6H     Continuous Infusions:  As Needed:  acetaminophen, dextromethorphan-guaifenesin  mg, diphenhydrAMINE, diphenhydrAMINE-zinc acetate 2-0.1%, sodium chloride 0.9%, sodium chloride 0.9%    Active Hospital Problems    Diagnosis  POA    *GIB (gastrointestinal bleeding) [K92.2]  Yes    Acute hypoxemic respiratory failure [J96.01]  Unknown    Acute blood loss anemia [D62]  Yes    Pulmonary fibrosis [J84.10]  Yes      Resolved Hospital Problems    Diagnosis Date Resolved POA    Shock [R57.9] 07/16/2019 Yes       Assessment and Plan        Acute hypoxemic respiratory failure  - previously intubated for airway protection  - remains on 4L NC, normally on RA  - Lasix 40mg IV once and monitor response, rales BL on examination  - follow UOP and re-dose as appropriate     Pulmonary fibrosis  - Dx 1 year ago, managed by Dr. Pradhan  - Per Dr. Pradhan:  hold ofev, goal SpO2 95%        Acute blood loss anemia  - Hemoglobin 7 on arrival   - Has received 19 units PRBC, 5 units FFP, 2 plt between facility and [facility] and [facility]  - CBC q12h  - See GIB        * GIB (gastrointestinal bleeding)  - Initial EGD obscured due to large amount of blood in stomach, repeat with numerous ulcers in stomach and duodenum with no active bleeding (7/10)  - CTA following scope (7/10) with no active bleeding visualized. Some enhancement noted at site of Nissen; however, read at that time felt mucosal enhancement more likely than active bleed  - Patient began actively bleeding again overnight 7/11, transfused 4 additional units, repeat scope again with large amount of blood in stomach, possible active bleed see in cardia along lesser curvature. Source unable to be visualized, clip placed to meeta location for IR. Left gastric embolized.   - CBC q8h  -H/H 8.8/25.6, stable, no melanotic stools overnight        Shock  Hemorrhagic vs septic  - Tranfusing for Hg above  7, as no active signs of hemorrhage at this time  - Developed fevers 7/12, treating broadly with vanc, cefepime and flagyl  - off of pressors, normotensive    Discharge plan:    Time (minutes) spent in care of the patient (Greater than 1/2 spent in direct face-to-face contact)  45 minutes    Jordan Price MD

## 2019-07-20 NOTE — PLAN OF CARE
Problem: Adult Inpatient Plan of Care  Goal: Plan of Care Review  Outcome: Ongoing (interventions implemented as appropriate)  Pt has call bell in reach, non slip socks on, and bedrails up x2. Pt encouraged to wash hands. Pt has walker and works with PT/OT. Pt on prn nasal cannula.

## 2019-07-20 NOTE — TREATMENT PLAN
GI Treatment Plan    Manpreet Hamilton is a 68 y.o. male admitted to hospital 7/10/2019 (Hospital Day: 11) due to GIB (gastrointestinal bleeding).     Interval History  Assessed at bedside this morning. No BM overnight. Denies BRBPR, hematemesis, nausea, vomiting, abdominal pain.     EGD done 7/18:  Impression:  - Normal esophagus.                        - 3 cm hiatal hernia.                        - An anti-reflux surgical site was found,                         characterized by healthy appearing mucosa.                        - Oozing gastric ulcer at APC treatment site. Clips                         were placed.                        - One non-bleeding duodenal ulcer with no stigmata                         of bleeding.                        - No specimens collected.    Plan  - Use sucralfate suspension 1 gram PO QID.  - Continue PPI  -trend hb  -watch for rebleed  - We will continue to follow.    Thank you for involving us in the care of Manpreet Hamilton. Please call with any additional questions, concerns or changes in the patient's clinical status.    Loyd Maria MD  Gastroenterology Fellow  Spectralink: [id number]

## 2019-07-21 DIAGNOSIS — J84.9 INTERSTITIAL LUNG DISEASE: ICD-10-CM

## 2019-07-21 DIAGNOSIS — K92.2 GASTROINTESTINAL HEMORRHAGE, UNSPECIFIED GASTROINTESTINAL HEMORRHAGE TYPE: Primary | ICD-10-CM

## 2019-07-21 LAB
ALBUMIN SERPL BCP-MCNC: 2 G/DL (ref 3.5–5.2)
ALP SERPL-CCNC: 252 U/L (ref 55–135)
ALT SERPL W/O P-5'-P-CCNC: 59 U/L (ref 10–44)
ANION GAP SERPL CALC-SCNC: 10 MMOL/L (ref 8–16)
AST SERPL-CCNC: 51 U/L (ref 10–40)
BASOPHILS # BLD AUTO: 0.05 K/UL (ref 0–0.2)
BASOPHILS NFR BLD: 0.6 % (ref 0–1.9)
BILIRUB SERPL-MCNC: 0.6 MG/DL (ref 0.1–1)
BUN SERPL-MCNC: 8 MG/DL (ref 8–23)
CALCIUM SERPL-MCNC: 8.4 MG/DL (ref 8.7–10.5)
CHLORIDE SERPL-SCNC: 96 MMOL/L (ref 95–110)
CO2 SERPL-SCNC: 28 MMOL/L (ref 23–29)
CREAT SERPL-MCNC: 0.6 MG/DL (ref 0.5–1.4)
DIFFERENTIAL METHOD: ABNORMAL
EOSINOPHIL # BLD AUTO: 0.2 K/UL (ref 0–0.5)
EOSINOPHIL NFR BLD: 2.6 % (ref 0–8)
ERYTHROCYTE [DISTWIDTH] IN BLOOD BY AUTOMATED COUNT: 13.8 % (ref 11.5–14.5)
EST. GFR  (AFRICAN AMERICAN): >60 ML/MIN/1.73 M^2
EST. GFR  (NON AFRICAN AMERICAN): >60 ML/MIN/1.73 M^2
GLUCOSE SERPL-MCNC: 122 MG/DL (ref 70–110)
HCT VFR BLD AUTO: 26.2 % (ref 40–54)
HGB BLD-MCNC: 8.3 G/DL (ref 14–18)
IMM GRANULOCYTES # BLD AUTO: 0.02 K/UL (ref 0–0.04)
IMM GRANULOCYTES NFR BLD AUTO: 0.2 % (ref 0–0.5)
LYMPHOCYTES # BLD AUTO: 0.6 K/UL (ref 1–4.8)
LYMPHOCYTES NFR BLD: 7.4 % (ref 18–48)
MAGNESIUM SERPL-MCNC: 1.9 MG/DL (ref 1.6–2.6)
MCH RBC QN AUTO: 28.3 PG (ref 27–31)
MCHC RBC AUTO-ENTMCNC: 31.7 G/DL (ref 32–36)
MCV RBC AUTO: 89 FL (ref 82–98)
MONOCYTES # BLD AUTO: 1 K/UL (ref 0.3–1)
MONOCYTES NFR BLD: 12.5 % (ref 4–15)
NEUTROPHILS # BLD AUTO: 6.2 K/UL (ref 1.8–7.7)
NEUTROPHILS NFR BLD: 76.7 % (ref 38–73)
NRBC BLD-RTO: 0 /100 WBC
PHOSPHATE SERPL-MCNC: 3.3 MG/DL (ref 2.7–4.5)
PLATELET # BLD AUTO: 569 K/UL (ref 150–350)
PMV BLD AUTO: 9.5 FL (ref 9.2–12.9)
POTASSIUM SERPL-SCNC: 2.9 MMOL/L (ref 3.5–5.1)
PROT SERPL-MCNC: 5.1 G/DL (ref 6–8.4)
RBC # BLD AUTO: 2.93 M/UL (ref 4.6–6.2)
SODIUM SERPL-SCNC: 134 MMOL/L (ref 136–145)
WBC # BLD AUTO: 8.1 K/UL (ref 3.9–12.7)

## 2019-07-21 PROCEDURE — 85025 COMPLETE CBC W/AUTO DIFF WBC: CPT

## 2019-07-21 PROCEDURE — 97110 THERAPEUTIC EXERCISES: CPT

## 2019-07-21 PROCEDURE — 20600001 HC STEP DOWN PRIVATE ROOM

## 2019-07-21 PROCEDURE — 99232 SBSQ HOSP IP/OBS MODERATE 35: CPT | Mod: ,,, | Performed by: INTERNAL MEDICINE

## 2019-07-21 PROCEDURE — 83735 ASSAY OF MAGNESIUM: CPT

## 2019-07-21 PROCEDURE — 25000003 PHARM REV CODE 250: Performed by: INTERNAL MEDICINE

## 2019-07-21 PROCEDURE — 84100 ASSAY OF PHOSPHORUS: CPT

## 2019-07-21 PROCEDURE — 80053 COMPREHEN METABOLIC PANEL: CPT

## 2019-07-21 PROCEDURE — C9113 INJ PANTOPRAZOLE SODIUM, VIA: HCPCS | Performed by: HOSPITALIST

## 2019-07-21 PROCEDURE — 97116 GAIT TRAINING THERAPY: CPT

## 2019-07-21 PROCEDURE — 25000003 PHARM REV CODE 250: Performed by: STUDENT IN AN ORGANIZED HEALTH CARE EDUCATION/TRAINING PROGRAM

## 2019-07-21 PROCEDURE — 99232 PR SUBSEQUENT HOSPITAL CARE,LEVL II: ICD-10-PCS | Mod: ,,, | Performed by: INTERNAL MEDICINE

## 2019-07-21 PROCEDURE — 63600175 PHARM REV CODE 636 W HCPCS: Performed by: HOSPITALIST

## 2019-07-21 RX ADMIN — POTASSIUM BICARBONATE 50 MEQ: 978 TABLET, EFFERVESCENT ORAL at 04:07

## 2019-07-21 RX ADMIN — PANTOPRAZOLE SODIUM 40 MG: 40 INJECTION, POWDER, FOR SOLUTION INTRAVENOUS at 07:07

## 2019-07-21 RX ADMIN — SUCRALFATE 1 G: 1 SUSPENSION ORAL at 06:07

## 2019-07-21 RX ADMIN — ACETAMINOPHEN 650 MG: 650 SUPPOSITORY RECTAL at 07:07

## 2019-07-21 RX ADMIN — PANTOPRAZOLE SODIUM 40 MG: 40 INJECTION, POWDER, FOR SOLUTION INTRAVENOUS at 09:07

## 2019-07-21 RX ADMIN — SUCRALFATE 1 G: 1 SUSPENSION ORAL at 12:07

## 2019-07-21 RX ADMIN — SUCRALFATE 1 G: 1 SUSPENSION ORAL at 05:07

## 2019-07-21 RX ADMIN — ACETAMINOPHEN 650 MG: 650 SUPPOSITORY RECTAL at 02:07

## 2019-07-21 NOTE — PLAN OF CARE
Problem: Physical Therapy Goal  Goal: Physical Therapy Goal  Goals to be met by: 2019    Patient will increase functional independence with mobility by performin. Supine to sit with Supervision - not met  2. Sit to stand transfer with Supervision using LRAD - not met   3. Gait  x 200 feet with Supervision using LRAD - not met  4. Ascend/descend 1 flight of stairs with right Handrails Contact Guard Assistance - not met     Pt progressing towards goals. continue with PT POC.Goals remain appropriate.  Demond Galvez PTA  2019

## 2019-07-21 NOTE — PLAN OF CARE
Problem: Adult Inpatient Plan of Care  Goal: Plan of Care Review  Outcome: Ongoing (interventions implemented as appropriate)  Pt is AAOx4, stand by assist, and VSS-2L NC applied to pt. Pt ambulated in hallway with assistance. K level of 2.9- PO potassium given. Pt's wife at bedside. VISI monitoring in place. Pt's bed in lowest position, call bell within reach, nonskid socks on, and RN encouraged pt to call for any assistance. Will continue to monitor.

## 2019-07-21 NOTE — PROGRESS NOTES
Hospital Medicine  Progress note    Team: [facility] B Jordan Price MD  Admit Date: 7/10/2019  MARISSA 7/22/2019  Length of Stay:  LOS: 11 days   Code status: Full Code    Principal Problem:  GIB (gastrointestinal bleeding)    HPI / Hospital Course     Interval hx:EGD with 1 oozing site. Sucralfate added. Tolerating PO. Will have PT see him. K is low will replace. Will need home 02.    ROS     Respiratory: neg for cough neg for shortness of breath  Cardiovascular: neg for chest pain neg for palpitations  Gastrointestinal: neg for nausea neg for vomiting, neg for abdominal pain neg for diarrhea neg for constipation   Behavioral/Psych: neg for depression neg for anxiety    PEx  Temp:  [98 °F (36.7 °C)-100.3 °F (37.9 °C)]   Pulse:  [80-97]   Resp:  [16-31]   BP: (108-134)/(64-87)   SpO2:  [76 %-99 %]     Intake/Output Summary (Last 24 hours) at 7/21/2019 1425  Last data filed at 7/21/2019 0242  Gross per 24 hour   Intake 240 ml   Output 450 ml   Net -210 ml       General Appearance: no acute distress   Heart: regular rate and rhythm  Respiratory: Normal respiratory effort, no crackles   Abdomen: Soft, non-tender; bowel sounds active  Skin: intact.Skin intact  Neurologic:  No focal numbness or weakness  Mental status: Alert, oriented x 4, affect appropriate     Recent Labs   Lab 07/19/19  0530 07/20/19  0530 07/21/19  0500   WBC 9.97 11.32 8.10   HGB 8.7* 9.1* 8.3*   HCT 27.1* 27.9* 26.2*   * 555* 569*     Recent Labs   Lab 07/19/19  0530 07/20/19  0530 07/21/19  0500   * 131* 134*   K 3.4* 2.9* 2.9*   CL 99 94* 96   CO2 25 27 28   BUN 11 8 8   CREATININE 0.7 0.7 0.6    129* 122*   CALCIUM 8.5* 8.8 8.4*   MG 1.8 1.8 1.9   PHOS 2.8 2.8 3.3     Recent Labs   Lab 07/19/19  0530 07/20/19  0530 07/21/19  0500   ALKPHOS 199* 228* 252*   ALT 50* 46* 59*   AST 47* 36 51*   ALBUMIN 2.1* 2.2* 2.0*   PROT 5.2* 5.6* 5.1*   BILITOT 0.4 0.6 0.6        No results for input(s): POCTGLUCOSE in the last 168  hours.    Scheduled Meds:   hydroCHLOROthiazide  25 mg Oral Daily    pantoprazole  40 mg Intravenous BID    potassium bicarbonate  50 mEq Oral Once    sucralfate  1 g Oral Q6H     Continuous Infusions:  As Needed:  acetaminophen, dextromethorphan-guaifenesin  mg, diphenhydrAMINE, diphenhydrAMINE-zinc acetate 2-0.1%, sodium chloride 0.9%, sodium chloride 0.9%    Active Hospital Problems    Diagnosis  POA    *GIB (gastrointestinal bleeding) [K92.2]  Yes    Acute hypoxemic respiratory failure [J96.01]  Unknown    Acute blood loss anemia [D62]  Yes    Pulmonary fibrosis [J84.10]  Yes      Resolved Hospital Problems    Diagnosis Date Resolved POA    Shock [R57.9] 07/16/2019 Yes       Assessment and Plan        Acute hypoxemic respiratory failure  - previously intubated for airway protection  - remains on 4L NC, normally on RA  - Lasix 40mg IV once and monitor response, rales BL on examination  - follow UOP and re-dose as appropriate     Pulmonary fibrosis  - Dx 1 year ago, managed by Dr. Pradhan  - Per Dr. Pradhan:  hold ofev, goal SpO2 95%        Acute blood loss anemia  - Hemoglobin 7 on arrival   - Has received 19 units PRBC, 5 units FFP, 2 plt between facility and [facility]  - CBC q12h  - See GIB        * GIB (gastrointestinal bleeding)  - Initial EGD obscured due to large amount of blood in stomach, repeat with numerous ulcers in stomach and duodenum with no active bleeding (7/10)  - CTA following scope (7/10) with no active bleeding visualized. Some enhancement noted at site of Nissen; however, read at that time felt mucosal enhancement more likely than active bleed  - Patient began actively bleeding again overnight 7/11, transfused 4 additional units, repeat scope again with large amount of blood in stomach, possible active bleed see in cardia along lesser curvature. Source unable to be visualized, clip placed to meeta location for IR. Left gastric embolized.   - CBC q8h  -H/H 8.8/25.6, stable,  no melanotic stools overnight        Shock  Hemorrhagic vs septic  - Tranfusing for Hg above 7, as no active signs of hemorrhage at this time  - Developed fevers 7/12, treating broadly with vanc, cefepime and flagyl  - off of pressors, normotensive    Discharge plan:    Time (minutes) spent in care of the patient (Greater than 1/2 spent in direct face-to-face contact)  45 minutes    Jordan Price MD

## 2019-07-21 NOTE — TREATMENT PLAN
GI Treatment Plan    Manpreet Hamilton is a 68 y.o. male admitted to hospital 7/10/2019 (Hospital Day: 12) due to GIB (gastrointestinal bleeding).     Interval History  Assessed at bedside this morning. No BM overnight. Denies BRBPR, hematemesis, nausea, vomiting, abdominal pain.     EGD done 7/18:  Impression:  - Normal esophagus.                        - 3 cm hiatal hernia.                        - An anti-reflux surgical site was found,                         characterized by healthy appearing mucosa.                        - Oozing gastric ulcer at APC treatment site. Clips                         were placed.                        - One non-bleeding duodenal ulcer with no stigmata                         of bleeding.                        - No specimens collected.    Plan  - Use sucralfate suspension 1 gram PO QID.  - Continue PPI  -we will follow up outpatient in 4-6 weeks  - We will sign off    Thank you for involving us in the care of Manpreet Hamilton. Please call with any additional questions, concerns or changes in the patient's clinical status.    Loyd Maria MD  Gastroenterology Fellow  Spectralink: [phone]

## 2019-07-21 NOTE — PT/OT/SLP PROGRESS
Physical Therapy Treatment    Patient Name:  Manpreet Hamilton   MRN:  [id number]    Recommendations:     Discharge Recommendations:  home health PT   Discharge Equipment Recommendations: walker, rolling   Barriers to discharge: None    Assessment:     Manpreet Hamilton is a 68 y.o. male admitted with a medical diagnosis of GIB (gastrointestinal bleeding).  He presents with the following impairments/functional limitations:   . Pt requires seated rest breaks during gait due to fatigue and SOB. PT O2 sats decreased during gait on RA, nsg notified. Pt Progressing with PT Intervention.Pt would continue to benefit from skilled PT to address overall functional mobility and goals. Goals remain appropriate.      Rehab Prognosis: Good; patient would benefit from acute skilled PT services to address these deficits and reach maximum level of function.    Recent Surgery: Procedure(s) (LRB):  EGD (ESOPHAGOGASTRODUODENOSCOPY) (N/A) 3 Days Post-Op    Plan:     During this hospitalization, patient to be seen 4 x/week to address the identified rehab impairments via gait training, therapeutic activities, therapeutic exercises, neuromuscular re-education and progress toward the following goals:    · Plan of Care Expires:  08/14/19    Subjective     Chief Complaint: fatigue  Patient/Family Comments/goals: I might leave tomorrow  Pain/Comfort:  · Pain Rating 1: (L knee pain, not rated)  · Pain Addressed 1: Reposition, Distraction  · Pain Rating Post-Intervention 1: (not rated)      Objective:     Communicated with RN prior to session.  Patient found supine with oxygen, telemetry, pulse ox (continuous) upon PT entry to room.     General Precautions: Standard, fall   Orthopedic Precautions:N/A   Braces: N/A     Functional Mobility:  · Bed Mobility:     · Scooting: stand by assistance  · Supine to Sit: stand by assistance  · Transfers:     · Sit to Stand:  stand by assistance with rolling walker.pt requires cues for proper hand placement for  improved safety during transfers  · Gait: pt amb with RW x 60 ft with SBA on RA, Pt O2 sats at 82% on RA, vcs for proper breathing O2 sats went to 87%, donned 2L O2 , Pt O2 sats went 92%. Pt then ambulated with RW x 50 FT, 25 ft and  80 ft with 2L Pt O2 sats from 89%-92%. Pt then ambulated on 2L O2 with no AD x 40 ft with CGA with mild unsteadiness with no LOB, pt reaching on wall rail and furniture at times, O2 sats at 93% on 2 L following.  · Stairs:  Pt ascended/descended 4 stair(s) with No Assistive Device with bilateral handrails with Stand-by Assistance and Contact Guard Assistance.       AM-PAC 6 CLICK MOBILITY  Turning over in bed (including adjusting bedclothes, sheets and blankets)?: 4  Sitting down on and standing up from a chair with arms (e.g., wheelchair, bedside commode, etc.): 4  Moving from lying on back to sitting on the side of the bed?: 4  Moving to and from a bed to a chair (including a wheelchair)?: 3  Need to walk in hospital room?: 3  Climbing 3-5 steps with a railing?: 3  Basic Mobility Total Score: 21       Therapeutic Activities and Exercises:   educated patient on progress, safety,d/c,PT POC, on the effects of prolonged immobility and the importance of performing OOB activity and exercises to promote healing and reduce recovery time   Patient performed therex X 10 reps seated in bedside chair B LE AROM AP, LAQ, Hip Flexion, Hip Abd/Add   Updated white board with appropriate PT mobility information for medical team notification  Donned pants and sandles  Pt encouraged to ambulate in hallways 3x/day with nursing or family assistance to improve endurance with RW with O2.   Pt safe to ambulate in hallway with RN or PCT assistance   Bedside table in front of patient and area set up for function, convenience, and safety. RN aware of patient's mobility needs and status. Questions/concerns addressed within PTA scope of practice; patient with no further questions. Time was provided for active  listening, discussion of health disposition, and discussion of safe discharge. Pt?verbalized?agreement .      Patient left up in chair with all lines intact, call button in reach, nsg notified and spouse present..    GOALS:   Multidisciplinary Problems     Physical Therapy Goals        Problem: Physical Therapy Goal    Goal Priority Disciplines Outcome Goal Variances Interventions   Physical Therapy Goal     PT, PT/OT Ongoing (interventions implemented as appropriate)     Description:  Goals to be met by: 2019    Patient will increase functional independence with mobility by performin. Supine to sit with Supervision - not met  2. Sit to stand transfer with Supervision using LRAD - not met   3. Gait  x 200 feet with Supervision using LRAD - not met  4. Ascend/descend 1 flight of stairs with right Handrails Contact Guard Assistance - not met                      Time Tracking:     PT Received On: 19  PT Start Time: 901     PT Stop Time: 940  PT Total Time (min): 39 min     Billable Minutes: Gait Training 30 and Therapeutic Exercise 9    Treatment Type: Treatment  PT/PTA: PTA     PTA Visit Number: 1     Demond Galvez, PTA  2019

## 2019-07-21 NOTE — PLAN OF CARE
Problem: Adult Inpatient Plan of Care  Goal: Plan of Care Review  Pt AAOx4, bed low locked, call light within reach, wearing nonskid socks. Pt instructed to use call light for assistance, pt verbalizes understanding.   Pt c/o knee pain, Tylenol given, no further complaints. Pt on 2L NC, O2 sat dropped  76% on RA while using the urinal at the bedside. RN at bedside, 2L NC put back on, took approximately 20 mins to recover. Pt remains free from injury/harm at this time. Tmax 100.3. See flowsheet for full assessment/VS.

## 2019-07-22 VITALS
OXYGEN SATURATION: 88 % | HEART RATE: 94 BPM | RESPIRATION RATE: 18 BRPM | BODY MASS INDEX: 29.54 KG/M2 | TEMPERATURE: 99 F | DIASTOLIC BLOOD PRESSURE: 70 MMHG | HEIGHT: 68 IN | SYSTOLIC BLOOD PRESSURE: 149 MMHG | WEIGHT: 194.88 LBS

## 2019-07-22 LAB
ALBUMIN SERPL BCP-MCNC: 1.9 G/DL (ref 3.5–5.2)
ALP SERPL-CCNC: 283 U/L (ref 55–135)
ALT SERPL W/O P-5'-P-CCNC: 77 U/L (ref 10–44)
ANION GAP SERPL CALC-SCNC: 10 MMOL/L (ref 8–16)
AST SERPL-CCNC: 65 U/L (ref 10–40)
BASOPHILS # BLD AUTO: 0.05 K/UL (ref 0–0.2)
BASOPHILS NFR BLD: 0.7 % (ref 0–1.9)
BILIRUB SERPL-MCNC: 0.6 MG/DL (ref 0.1–1)
BUN SERPL-MCNC: 8 MG/DL (ref 8–23)
CALCIUM SERPL-MCNC: 8.6 MG/DL (ref 8.7–10.5)
CHLORIDE SERPL-SCNC: 98 MMOL/L (ref 95–110)
CO2 SERPL-SCNC: 28 MMOL/L (ref 23–29)
CREAT SERPL-MCNC: 0.6 MG/DL (ref 0.5–1.4)
DIFFERENTIAL METHOD: ABNORMAL
EOSINOPHIL # BLD AUTO: 0.4 K/UL (ref 0–0.5)
EOSINOPHIL NFR BLD: 5.2 % (ref 0–8)
ERYTHROCYTE [DISTWIDTH] IN BLOOD BY AUTOMATED COUNT: 13.9 % (ref 11.5–14.5)
EST. GFR  (AFRICAN AMERICAN): >60 ML/MIN/1.73 M^2
EST. GFR  (NON AFRICAN AMERICAN): >60 ML/MIN/1.73 M^2
GLUCOSE SERPL-MCNC: 125 MG/DL (ref 70–110)
HCT VFR BLD AUTO: 26.4 % (ref 40–54)
HGB BLD-MCNC: 8.4 G/DL (ref 14–18)
IMM GRANULOCYTES # BLD AUTO: 0.02 K/UL (ref 0–0.04)
IMM GRANULOCYTES NFR BLD AUTO: 0.3 % (ref 0–0.5)
LYMPHOCYTES # BLD AUTO: 0.7 K/UL (ref 1–4.8)
LYMPHOCYTES NFR BLD: 10.3 % (ref 18–48)
MAGNESIUM SERPL-MCNC: 1.9 MG/DL (ref 1.6–2.6)
MCH RBC QN AUTO: 28.4 PG (ref 27–31)
MCHC RBC AUTO-ENTMCNC: 31.8 G/DL (ref 32–36)
MCV RBC AUTO: 89 FL (ref 82–98)
MONOCYTES # BLD AUTO: 1 K/UL (ref 0.3–1)
MONOCYTES NFR BLD: 13.4 % (ref 4–15)
NEUTROPHILS # BLD AUTO: 5 K/UL (ref 1.8–7.7)
NEUTROPHILS NFR BLD: 70.1 % (ref 38–73)
NRBC BLD-RTO: 0 /100 WBC
PHOSPHATE SERPL-MCNC: 2.8 MG/DL (ref 2.7–4.5)
PLATELET # BLD AUTO: 573 K/UL (ref 150–350)
PMV BLD AUTO: 9.3 FL (ref 9.2–12.9)
POTASSIUM SERPL-SCNC: 3 MMOL/L (ref 3.5–5.1)
PROT SERPL-MCNC: 5.2 G/DL (ref 6–8.4)
RBC # BLD AUTO: 2.96 M/UL (ref 4.6–6.2)
SODIUM SERPL-SCNC: 136 MMOL/L (ref 136–145)
WBC # BLD AUTO: 7.1 K/UL (ref 3.9–12.7)

## 2019-07-22 PROCEDURE — A0435 FIXED WING AIR MILEAGE: HCPCS

## 2019-07-22 PROCEDURE — 99238 HOSP IP/OBS DSCHRG MGMT 30/<: CPT | Mod: ,,, | Performed by: INTERNAL MEDICINE

## 2019-07-22 PROCEDURE — 80053 COMPREHEN METABOLIC PANEL: CPT

## 2019-07-22 PROCEDURE — 63600175 PHARM REV CODE 636 W HCPCS: Performed by: HOSPITALIST

## 2019-07-22 PROCEDURE — 99238 PR HOSPITAL DISCHARGE DAY,<30 MIN: ICD-10-PCS | Mod: ,,, | Performed by: INTERNAL MEDICINE

## 2019-07-22 PROCEDURE — 85025 COMPLETE CBC W/AUTO DIFF WBC: CPT

## 2019-07-22 PROCEDURE — 84100 ASSAY OF PHOSPHORUS: CPT

## 2019-07-22 PROCEDURE — 83735 ASSAY OF MAGNESIUM: CPT

## 2019-07-22 PROCEDURE — 25000003 PHARM REV CODE 250: Performed by: INTERNAL MEDICINE

## 2019-07-22 PROCEDURE — C9113 INJ PANTOPRAZOLE SODIUM, VIA: HCPCS | Performed by: HOSPITALIST

## 2019-07-22 RX ORDER — PANTOPRAZOLE SODIUM 40 MG/1
40 TABLET, DELAYED RELEASE ORAL
Status: DISCONTINUED | OUTPATIENT
Start: 2019-07-22 | End: 2019-07-22 | Stop reason: HOSPADM

## 2019-07-22 RX ORDER — SUCRALFATE 1 G/10ML
1 SUSPENSION ORAL
Qty: 2400 ML | Refills: 0 | Status: SHIPPED | OUTPATIENT
Start: 2019-07-22 | End: 2019-09-20

## 2019-07-22 RX ORDER — HYDROCHLOROTHIAZIDE 25 MG/1
25 TABLET ORAL DAILY
Qty: 30 TABLET | Refills: 11 | Status: SHIPPED | OUTPATIENT
Start: 2019-07-23 | End: 2020-07-22

## 2019-07-22 RX ADMIN — HYDROCHLOROTHIAZIDE 25 MG: 25 TABLET ORAL at 08:07

## 2019-07-22 RX ADMIN — PANTOPRAZOLE SODIUM 40 MG: 40 INJECTION, POWDER, FOR SOLUTION INTRAVENOUS at 08:07

## 2019-07-22 NOTE — PLAN OF CARE
[facility]    HOME HEALTH ORDERS  FACE TO FACE ENCOUNTER    Patient Name: Manpreet Hamilton  YOB: 1950    PCP: Radames Pradhan MD   PCP Address: [address] / [place] [address]  PCP Phone Number: [phone]  PCP Fax: [phone]    Encounter Date: 07/22/2019    Admit to Home Health    Diagnoses:  Active Hospital Problems    Diagnosis  POA    *GIB (gastrointestinal bleeding) [K92.2]  Yes    Acute hypoxemic respiratory failure [J96.01]  Unknown    Acute blood loss anemia [D62]  Yes    Pulmonary fibrosis [J84.10]  Yes      Resolved Hospital Problems    Diagnosis Date Resolved POA    Shock [R57.9] 07/16/2019 Yes       Future Appointments   Date Time Provider Department Center   8/6/2019  8:30 AM Matias Pradhan MD [facility] PULMSVC [facility]   8/6/2019  9:30 AM LAB, APPOINTMENT [facility] LAB VNP [facility]   8/23/2019 11:00 AM Jimmy Saldana MD [facility] GASTRO [facility]           I have seen and examined this patient face to face today. My clinical findings that support the need for the home health skilled services and home bound status are the following:  Weakness/numbness causing balance and gait disturbance due to Weakness/Debility making it taxing to leave home.    Allergies:  Review of patient's allergies indicates:   Allergen Reactions    Penicillins     Erythromycin Rash       Diet: regular diet    Activities: activity as tolerated    Nursing:   SN to complete comprehensive assessment including routine vital signs. Instruct on disease process and s/s of complications to report to MD. Review/verify medication list sent home with the patient at time of discharge  and instruct patient/caregiver as needed. Frequency may be adjusted depending on start of care date.    Notify MD if SBP > 160 or < 90; DBP > 90 or < 50; HR > 120 or < 50; Temp > 101; Other:         CONSULTS:    Physical Therapy to evaluate and treat. Evaluate for home safety and equipment needs;  Establish/upgrade home exercise program. Perform / instruct on therapeutic exercises, gait training, transfer training, and Range of Motion.          Medications: Review discharge medications with patient and family and provide education.      Current Discharge Medication List      START taking these medications    Details   hydroCHLOROthiazide (HYDRODIURIL) 25 MG tablet Take 1 tablet (25 mg total) by mouth once daily.  Qty: 30 tablet, Refills: 11      sucralfate (CARAFATE) 100 mg/mL suspension Take 10 mLs (1 g total) by mouth 4 (four) times daily with meals and nightly.  Qty: 2400 mL, Refills: 0         CONTINUE these medications which have NOT CHANGED    Details   atorvastatin (LIPITOR) 20 MG tablet TAKE ONE TABLET BY MOUTH EVERY EVENING  Qty: 90 tablet, Refills: 4      desonide (DESOWEN) 0.05 % lotion 1 application 2 (two) times daily.  Refills: 3      doxycycline (VIBRAMYCIN) 100 MG Cap TAKE ONE CAPSULE BY MOUTH EVERY DAY FOR THE FIRST 5 DAYS OF EACH MONTH  Qty: 15 capsule, Refills: 6      ferrous sulfate (FEOSOL) 325 mg (65 mg iron) Tab tablet Take 1 tablet (325 mg total) by mouth every 12 (twelve) hours.  Qty: 60 tablet, Refills: 4    Associated Diagnoses: PUD (peptic ulcer disease); Pulmonary fibrosis      loperamide (IMODIUM) 2 mg capsule 1 capsule 3 (three) times daily as needed.  Refills: 0      nintedanib (OFEV) 150 mg Cap Take 1 tablet by mouth 2 (two) times daily.       pantoprazole (PROTONIX) 40 MG tablet Take 1 tablet (40 mg total) by mouth every 12 (twelve) hours. Take one pill every morning 45 minutes before breakfast in the morning.  Qty: 60 tablet, Refills: 5    Associated Diagnoses: PUD (peptic ulcer disease); Pulmonary fibrosis      vitamin D3-vitamin K2 1000-90 unit-mcg TbDL 1 tablet once daily.         STOP taking these medications       valsartan-hydrochlorothiazide (DIOVAN-HCT) 320-25 mg per tablet Comments:   Reason for Stopping:               I certify that this patient is confined to his  home and needs physical therapy.

## 2019-07-22 NOTE — NURSING
Home Oxygen Evaluation    Date Performed: 7/22/2019    1) Patient's Home O2 Sat on room air, while at rest: 85        If O2 sats on room air at rest are 88% or below, patient qualifies. No additional testing needed. Document N/A in steps 2 and 3. If 89% or above, complete steps 2.

## 2019-07-22 NOTE — PT/OT/SLP PROGRESS
Physical Therapy      Patient Name:  Manpreet Hamilton   MRN:  [id number]    Patient not seen today secondary to pt away at chest xray and then with hospital discharge   . Will follow-up next scheduled treatment per PT POC    Demond Galvez, PTA  7/22/2019

## 2019-07-22 NOTE — NURSING
Ashly Alberto-  informed this RN Dr. Price-VICKYB pt is allowed to be DC without oxygen for ride home to [place]. Pt's oxygen will be delivered to pt's home. Will continue to monitor.

## 2019-07-22 NOTE — NURSING
RN informed pt and pt's wife the o2 sat was <88% and home oxygen requirements are met. Home oxygen has been ordered by MD.

## 2019-07-22 NOTE — NURSING
Informed pt and pt's wife home oxygen is ordered and in route. Oxygen order has been ordered ASAP (confirmed by Dr. Lorne Bernard's nurse), couple informed. This RN will provide any updates when available.

## 2019-07-22 NOTE — PLAN OF CARE
Problem: Adult Inpatient Plan of Care  Goal: Plan of Care Review  Pt AAOx4, bed low locked, call light within reach, wearing nonskid socks. Pt instructed to use call light for assistance, pt verbalizes understanding. Pt denies any pain or discomfort at this time. . Pt remains free from injury/harm at this time. Tmax 100.4. See flowsheet for full assessment/VS.

## 2019-07-22 NOTE — NURSING
Informed pt and pt's wife Ashly Alberto- will call and update couple with any oxygen location and updates when available. Will continue to monitor.

## 2019-07-22 NOTE — PLAN OF CARE
07/22/19 1218   Post-Acute Status   Post-Acute Authorization Placement   Post-Acute Placement Status Set-up Complete       Patient discharged home with home health with [facility] and patient will receive home oxygen with [facility] in [place]. AVA will follow up.    Ashly Alberto LMSW  [facility]   [phone]

## 2019-07-22 NOTE — PT/OT/SLP PROGRESS
Occupational Therapy      Patient Name:  Manpreet Hamilton   MRN:  [id number]    Patient not seen today secondary to pt away at chest x-ray upon arrival and pt with hospital discharge. Will follow-up at next scheduled visit per OT POC if pt still admitted.    Ninfa Francois, OT  7/22/2019

## 2019-07-22 NOTE — PLAN OF CARE
07/22/19 1049   Post-Acute Status   Post-Acute Authorization Placement   Other Status See Comments     SW faxed orders to [facility] for Home Oxygen. AVA will follow up.    Ashly Alberto LMSW  [facility]   [phone]

## 2019-07-22 NOTE — DISCHARGE SUMMARY
Discharge Summary  Hospital Medicine    Attending Provider on Discharge: Jordan Price MD  Hospital Medicine Team: [facility] HOSP MED B  Date of Admission:  7/10/2019     Date of Discharge:    Length of Stay:  LOS: 12 days   Code status: Full Code    Active Hospital Problems    Diagnosis  POA    *GIB (gastrointestinal bleeding) [K92.2]  Yes    Acute hypoxemic respiratory failure [J96.01]  Yes    Acute blood loss anemia [D62]  Yes    Pulmonary fibrosis [J84.10]  Yes      Resolved Hospital Problems    Diagnosis Date Resolved POA    Shock [R57.9] 07/16/2019 Yes        HPI The patient is a 68M with a PMH of HTN, HLD, UIP (dx 1 year ago, followed by Dr. Pradhan, on ofev) presenting as transfer from [place] for GI bleed. The patient was on a cruise with his family in [place] when he suddenly began having bright red bowel movements that progressed to melena. This was on Saturday the 8th. On board the ship his BP was 88/52, pulse 119. Labs with hemoglobin of 9.7 (baseline 15). He was transferred to [place] where he became progressively more hypotensive (to systolic of 70's) and tachycardic to 120's. His Hg dropped to 7. He was admitted to [place] for 1 night prior to transfer to [facility]. While at [place], he received 8 units total of PRBC, 3 units FFP. He was placed on PPI drip and had an endoscopy which showed a small superficial gastric ulcer and duodenal bulb ulcers with no active bleeding.      The patient takes a baby aspirin daily and drinks approximately 4 alcoholic beverages nightly. Has never before had GI bleed.     Procedure Date: Emobilization left gastric 07/12     Hospital Course Patient arrived to ICU and was urgently taken to endoscopy. Stomach full of blood, impairing visualization of source. Due to high risk of aspiration and necessity of repeat EGD, patient was intubated for airway protection.      Required transfusion of additional 5 U PRBC, 1 plt, and 1 FFP. Erythromycin given to clear patient's stomach  of blood. Developed erythematous rash, and benadryl given. No wheezing appreciated on exam at that time.     Repeat EGD performed overnight (7/10) which showed numerous ulcers in the stomach and duodenum with no active bleeding. Patient brought back to ICU, and placed on PPI gtt per GI recs.     Patient was successfully extubated on 7/11; however, hemoglobin began trending downward again later in the day. Active bleeding overnight requiring 3 units PRBCs. GI repeated EGD the morning of 7/12 again with numerous clean-based gastric and duodenal ulcers. Possible active bleeding seen in the cardia of the stomach along the lesser curvature. Clip placed. Patient taken to IR for angio, left gastric embolized. Left intubated due to concern for aspiration again due to large amount of blood in stomach.     Overnight, patient's hemoglobin did trend down slightly, but stayed overall stable (8->~7.5). 3 melanotic stools, which is expected given volume of blood in stomach. Patient also spiked fever of 102.1. Cultures sent, CXR repeated. No focal consolidation, but some edema superimposed on chronic lung changes seen. Started on vanc, cefepime and flagyl given recent procedures and risk of aspiration prior to initial intubation. Lower suspicion for transfusion reaction as it had been an number of hours since most recent transfusion. Also given dose of lasix due to peripheral edema on exam and pulmonary edema on CXR.      On the evening of 7/13, patient did require 1 unit PRBC for hg of 6.7, which increased to 8.7 following transfusion. May represent oozing from site of embolization or AVMs seen on EGD. Again trending down slowly today. No melanotic bowel movements. Extubated to nasal cannula on 7/14. Melanotic bowel movements continue but hemoglobin remains stable, cough continued productive of clear sputum. 07/15 Hgb stable at 8.8, plan for step down.    Underwent repeat EGD on 7/19 with mild oozing noted. Will dc home on  Protonix and Sucralfate. BP meds held at this time, until it rises with usual recovery                Recent Labs   Lab 07/20/19  0530 07/21/19  0500 07/22/19  0446   WBC 11.32 8.10 7.10   HGB 9.1* 8.3* 8.4*   HCT 27.9* 26.2* 26.4*   * 569* 573*     Recent Labs   Lab 07/20/19  0530 07/21/19  0500 07/22/19  0446   * 134* 136   K 2.9* 2.9* 3.0*   CL 94* 96 98   CO2 27 28 28   BUN 8 8 8   CREATININE 0.7 0.6 0.6   * 122* 125*   CALCIUM 8.8 8.4* 8.6*   MG 1.8 1.9 1.9   PHOS 2.8 3.3 2.8     Recent Labs   Lab 07/20/19 0530 07/21/19  0500 07/22/19  0446   ALKPHOS 228* 252* 283*   ALT 46* 59* 77*   AST 36 51* 65*   ALBUMIN 2.2* 2.0* 1.9*   PROT 5.6* 5.1* 5.2*   BILITOT 0.6 0.6 0.6      No results for input(s): CPK, CPKMB, MB, TROPONINI in the last 72 hours.  No results for input(s): LACTATE in the last 72 hours.    No results for input(s): POCTGLUCOSE in the last 168 hours.   Hemoglobin A1C   Date Value Ref Range Status   11/15/2018 5.3 4.0 - 5.6 % Final     Comment:     ADA Screening Guidelines:  5.7-6.4%  Consistent with prediabetes  >or=6.5%  Consistent with diabetes  High levels of fetal hemoglobin interfere with the HbA1C  assay. Heterozygous hemoglobin variants (HbS, HgC, etc)do  not significantly interfere with this assay.   However, presence of multiple variants may affect accuracy.     10/13/2017 5.3 4.0 - 5.6 % Final     Comment:     According to ADA guidelines, hemoglobin A1c <7.0% represents  optimal control in non-pregnant diabetic patients. Different  metrics may apply to specific patient populations.   Standards of Medical Care in Diabetes-2016.  For the purpose of screening for the presence of diabetes:  <5.7%     Consistent with the absence of diabetes  5.7-6.4%  Consistent with increasing risk for diabetes   (prediabetes)  >or=6.5%  Consistent with diabetes  Currently, no consensus exists for use of hemoglobin A1c  for diagnosis of diabetes for children.  This Hemoglobin A1c assay has  significant interference with fetal   hemoglobin   (HbF). The results are invalid for patients with abnormal amounts of   HbF,   including those with known Hereditary Persistence   of Fetal Hemoglobin. Heterozygous hemoglobin variants (HbAS, HbAC,   HbAD, HbAE, HbA2) do not significantly interfere with this assay;   however, presence of multiple variants in a sample may impact the %   interference.     08/05/2016 5.5 4.5 - 6.2 % Final     Comment:     According to ADA guidelines, hemoglobin A1C <7.0% represents  optimal control in non-pregnant diabetic patients.  Different  metrics may apply to specific populations.   Standards of Medical Care in Diabetes - 2016.  For the purpose of screening for the presence of diabetes:  <5.7%     Consistent with the absence of diabetes  5.7-6.4%  Consistent with increasing risk for diabetes   (prediabetes)  >or=6.5%  Consistent with diabetes  Currently no consensus exists for use of hemoglobin A1C  for diagnosis of diabetes for children.         Procedures:EGD, embolization    Consultants:GI, ICU    Current Discharge Medication List      START taking these medications    Details   hydroCHLOROthiazide (HYDRODIURIL) 25 MG tablet Take 1 tablet (25 mg total) by mouth once daily.  Qty: 30 tablet, Refills: 11      sucralfate (CARAFATE) 100 mg/mL suspension Take 10 mLs (1 g total) by mouth 4 (four) times daily with meals and nightly.  Qty: 2400 mL, Refills: 0         CONTINUE these medications which have NOT CHANGED    Details   atorvastatin (LIPITOR) 20 MG tablet TAKE ONE TABLET BY MOUTH EVERY EVENING  Qty: 90 tablet, Refills: 4      desonide (DESOWEN) 0.05 % lotion 1 application 2 (two) times daily.  Refills: 3      doxycycline (VIBRAMYCIN) 100 MG Cap TAKE ONE CAPSULE BY MOUTH EVERY DAY FOR THE FIRST 5 DAYS OF EACH MONTH  Qty: 15 capsule, Refills: 6      ferrous sulfate (FEOSOL) 325 mg (65 mg iron) Tab tablet Take 1 tablet (325 mg total) by mouth every 12 (twelve) hours.  Qty: 60  tablet, Refills: 4    Associated Diagnoses: PUD (peptic ulcer disease); Pulmonary fibrosis      loperamide (IMODIUM) 2 mg capsule 1 capsule 3 (three) times daily as needed.  Refills: 0      nintedanib (OFEV) 150 mg Cap Take 1 tablet by mouth 2 (two) times daily.       pantoprazole (PROTONIX) 40 MG tablet Take 1 tablet (40 mg total) by mouth every 12 (twelve) hours. Take one pill every morning 45 minutes before breakfast in the morning.  Qty: 60 tablet, Refills: 5    Associated Diagnoses: PUD (peptic ulcer disease); Pulmonary fibrosis      vitamin D3-vitamin K2 1000-90 unit-mcg TbDL 1 tablet once daily.         STOP taking these medications       valsartan-hydrochlorothiazide (DIOVAN-HCT) 320-25 mg per tablet Comments:   Reason for Stopping:               Discharge Diet:regular diet with     Activity: activity as tolerated    Discharge Condition: Good    Disposition: Home-Health Care Svc    Tests pending at the time of discharge: none      Time spent  on the discharge of the patient including review of hospital course with the patient. reviewing discharge medications and arranging follow-up care 45 minutes    Discharge examination Patient was seen and examined on the date of discharge and determined to be suitable for discharge.    Discharge plan     Future Appointments   Date Time Provider Department Center   8/6/2019  8:30 AM Matias Pradhan MD [facility] PULMSVC [facility]   8/6/2019  9:30 AM LAB, APPOINTMENT [facility] LAB [facility]   8/23/2019 11:00 AM Jimmy Saldana MD [facility] GASTRO [place] MD

## 2019-07-22 NOTE — NURSING
This RN informed pt to keep pressure dressing from Central line removal dry and intact for 24 hrs. Pt verbalized understanding.

## 2019-07-22 NOTE — PLAN OF CARE
07/22/19 1027   Post-Acute Status   Post-Acute Authorization Placement;Other   Other Status See Comments     AVA met with patient and patient spouse to follow up on the discharge plan. Patient expected to discharge home with home health and will needs oxygen as well. AVA spoke with Dr. Reynolds at bedside patient physician reporting that his office will take care of the oxygen and the walk test. AVA spoke with Dr Rodolfo Bernard nurse and she reports that she will contact [employer] for the oxygen. AVA will fax the orders for the oxygen when available. [phone].    Ashly Alberto LMSW  [facility]   [phone]

## 2019-07-22 NOTE — PROGRESS NOTES
"Patient stated in a raised voice that "the nurses won't let me leave. My pulmonologist says I don't need the oxygen to travel home." Patient informed that the medical team ordered his oxygen and the nurse has to wait for it to be delivered. Patient stated he will "leave against medical advice." Dr. Albert soriano. Nursing supervisor notified.  "

## 2019-07-22 NOTE — PROGRESS NOTES
Escorted pt off of floor via wheelchair with wife at bedside and no oxygen. Pt denies distress at this time. Pt is discharged.

## 2019-07-23 ENCOUNTER — TELEPHONE (OUTPATIENT)
Dept: PULMONOLOGY | Facility: CLINIC | Age: 69
End: 2019-07-23

## 2019-07-23 NOTE — PLAN OF CARE
Patient discharged home with [facility].  Patient's transportation home provided by his wife via personal vehicle.    Future Appointments   Date Time Provider Department Center   8/6/2019  8:30 AM Matias Pradhan MD [facility] PUL[facility]   8/6/2019  9:30 AM LAB, APPOINTMENT [place]   8/23/2019 11:00 AM Jimmy Saldana MD [facility] GASTRO [facility]        07/23/19 0847   Final Note   Assessment Type Final Discharge Note   Anticipated Discharge Disposition Home-Health   What phone number can be called within the next 1-3 days to see how you are doing after discharge? [phone]   Hospital Follow Up  Appt(s) scheduled? Yes   Discharge plans and expectations educations in teach back method with documentation complete? Yes

## 2019-07-23 NOTE — TELEPHONE ENCOUNTER
Dr Pradhan called and instructed me to call [facility] specialty pharmacy and order a 30 day supply of Ofev 100mg bid to be delivered to Mr Hamilton's home. He will take this lower dose for one month then switch back to his regular 150mg bid if he has no further GI bleeding. Simone received the order 7-22-19. Joana Guzman LPN.

## 2019-07-24 ENCOUNTER — PATIENT OUTREACH (OUTPATIENT)
Dept: ADMINISTRATIVE | Facility: CLINIC | Age: 69
End: 2019-07-24

## 2019-07-24 NOTE — PATIENT INSTRUCTIONS
GI Bleeding (Upper), Stable  There are signs that you have bled from your upper intestinal tract (esophagus, stomach or upper intestine). This may be due to:  Repeated vomiting which may cause a small tear in the lining of the esophagus,  An ulcer in the stomach or duodenum (upper intestine)  Severe gastritis (from use of alcohol, aspirin or anti-inflammatory drugs)  Esophageal varices (enlarged veins in the esophagus) may also cause bleeding like this.  Your exam today showed that you have not lost a large amount of blood and your condition is stable. Bleeding from the upper GI tract causes the stool to turn black.  Home Care:  1) If your bleeding is due to an ulcer or gastritis, an acid-blocking medicine will help. Unless an acid blocker was prescribed (or if you cannot afford one that was prescribed), you may use over-the-counter drugs such as Pepcid AC (famotidine), Tagamet (cimetidine) or Zantac (ranitidine). These begin to work within a few hours. Prilosec OTC (omeprazole) is a new type of acid blocker which may be more effective. It takes up to four days for its full effect. You may get additional short-term relief by taking antacids (Mylanta or Maalox). It should be taken one hour after meals and at bedtime. Do not take Tagamet (cimetidine), Zantac (ranitidine) or Carafate (sucralfate) within one hour of an antacid.  2) Avoid factors which increase stomach acid. These include cigarettes, caffeine (coffee, jose miguel, teas) and stress.  3) Avoid substances that irritate your stomach. These include aspirin and anti-inflammatory drugs (such as ibuprofen, Advil, Motrin, naproxen, Aleve, Naprosyn), alcohol and spicy foods. Prednisone and related prescription drugs can cause an ulcer. Discuss with your doctor if you are taking these.  4) Take any prescribed medicine as directed to promote healing.  5) If alcohol is a possible cause of your GI bleeding, it is urgent that you talk with your doctor about ways to help you  quit.  Follow Up  with your doctor as advised.  Get Prompt Medical Attention  if any of the following occur:  -- Stomach pain worsens  -- Pain appears, worsens or spreads to the neck, back, shoulder or arm  -- You vomit blood (red or black color)  -- You feel weak or dizzy, or you faint  -- You have fever or abdominal swelling  -- Red blood in the stool  © 7174-6025 [employer] [facility], [address] [place] [address]. All rights reserved. This information is not intended as a substitute for professional medical care. Always follow your healthcare professional's instructions.     Sepsis   Sepsis occurs when your body responds to bacteremia - the presence of bacteria in your bloodstream. Sepsis can be deadly. Blood pressure may drop and the lungs and kidneys may start to fail. Emergency care for sepsis is crucial   When to Go to the Emergency Department (ED)   Sepsis is a medical emergency. Go to the nearest emergency department if a fever is present with any of these symptoms:   Chills and shaking   Fever or low body temperature (hypothermia)   Rapid heartbeat and breathing; shortness of breath   Nausea or vomiting   Confusion, dizziness   Skin rash   Decreased urination  © 8988-1536 [employer] [facility], [address] [place] [address]. All rights reserved. This information is not intended as a substitute for professional medical care. Always follow your healthcare professional's instructions

## 2019-07-28 ENCOUNTER — PATIENT MESSAGE (OUTPATIENT)
Dept: GASTROENTEROLOGY | Facility: CLINIC | Age: 69
End: 2019-07-28

## 2019-07-29 NOTE — TELEPHONE ENCOUNTER
Dr. Saldana,  Please see pt email and advise   Looks like he wants a procedure on 8/22 and see you on 8/23

## 2019-07-31 ENCOUNTER — HOSPITAL ENCOUNTER (INPATIENT)
Facility: HOSPITAL | Age: 69
LOS: 9 days | Discharge: NURSING, REHAB OR LONG-TERM CARE | DRG: 189 | End: 2019-08-09
Attending: EMERGENCY MEDICINE | Admitting: EMERGENCY MEDICINE
Payer: COMMERCIAL

## 2019-07-31 DIAGNOSIS — J84.10 PULMONARY FIBROSIS: ICD-10-CM

## 2019-07-31 DIAGNOSIS — J84.112 IDIOPATHIC PULMONARY FIBROSIS: ICD-10-CM

## 2019-07-31 DIAGNOSIS — J96.21 ACUTE ON CHRONIC RESPIRATORY FAILURE WITH HYPOXEMIA: ICD-10-CM

## 2019-07-31 DIAGNOSIS — R06.02 SOB (SHORTNESS OF BREATH): ICD-10-CM

## 2019-07-31 DIAGNOSIS — R53.81 DEBILITY: ICD-10-CM

## 2019-07-31 LAB
APTT BLDCRRT: 24.1 SEC (ref 21–32)
BASOPHILS # BLD AUTO: 0.02 K/UL (ref 0–0.2)
BASOPHILS NFR BLD: 0.2 % (ref 0–1.9)
BNP SERPL-MCNC: 69 PG/ML (ref 0–99)
CK MB SERPL-MCNC: 1.9 NG/ML (ref 0.1–6.5)
CK MB SERPL-RTO: 8.3 % (ref 0–5)
CK SERPL-CCNC: 23 U/L (ref 20–200)
DIFFERENTIAL METHOD: ABNORMAL
EOSINOPHIL # BLD AUTO: 0 K/UL (ref 0–0.5)
EOSINOPHIL NFR BLD: 0.3 % (ref 0–8)
ERYTHROCYTE [DISTWIDTH] IN BLOOD BY AUTOMATED COUNT: 13.9 % (ref 11.5–14.5)
HCT VFR BLD AUTO: 35.1 % (ref 40–54)
HGB BLD-MCNC: 11.3 G/DL (ref 14–18)
IMM GRANULOCYTES # BLD AUTO: 0.03 K/UL (ref 0–0.04)
IMM GRANULOCYTES NFR BLD AUTO: 0.3 % (ref 0–0.5)
INR PPP: 1 (ref 0.8–1.2)
LYMPHOCYTES # BLD AUTO: 1.2 K/UL (ref 1–4.8)
LYMPHOCYTES NFR BLD: 10.5 % (ref 18–48)
MCH RBC QN AUTO: 27 PG (ref 27–31)
MCHC RBC AUTO-ENTMCNC: 32.2 G/DL (ref 32–36)
MCV RBC AUTO: 84 FL (ref 82–98)
MONOCYTES # BLD AUTO: 1 K/UL (ref 0.3–1)
MONOCYTES NFR BLD: 8.7 % (ref 4–15)
NEUTROPHILS # BLD AUTO: 8.8 K/UL (ref 1.8–7.7)
NEUTROPHILS NFR BLD: 80 % (ref 38–73)
NRBC BLD-RTO: 0 /100 WBC
PLATELET # BLD AUTO: 563 K/UL (ref 150–350)
PMV BLD AUTO: 8.5 FL (ref 9.2–12.9)
PROTHROMBIN TIME: 10.7 SEC (ref 9–12.5)
RBC # BLD AUTO: 4.19 M/UL (ref 4.6–6.2)
TROPONIN I SERPL DL<=0.01 NG/ML-MCNC: <0.006 NG/ML (ref 0–0.03)
WBC # BLD AUTO: 10.96 K/UL (ref 3.9–12.7)

## 2019-07-31 PROCEDURE — 63600175 PHARM REV CODE 636 W HCPCS: Performed by: STUDENT IN AN ORGANIZED HEALTH CARE EDUCATION/TRAINING PROGRAM

## 2019-07-31 PROCEDURE — 87798 DETECT AGENT NOS DNA AMP: CPT

## 2019-07-31 PROCEDURE — 84484 ASSAY OF TROPONIN QUANT: CPT

## 2019-07-31 PROCEDURE — 27100171 HC OXYGEN HIGH FLOW UP TO 24 HOURS

## 2019-07-31 PROCEDURE — 87070 CULTURE OTHR SPECIMN AEROBIC: CPT

## 2019-07-31 PROCEDURE — 87205 SMEAR GRAM STAIN: CPT

## 2019-07-31 PROCEDURE — 85610 PROTHROMBIN TIME: CPT

## 2019-07-31 PROCEDURE — 85025 COMPLETE CBC W/AUTO DIFF WBC: CPT

## 2019-07-31 PROCEDURE — 82553 CREATINE MB FRACTION: CPT

## 2019-07-31 PROCEDURE — 87486 CHLMYD PNEUM DNA AMP PROBE: CPT | Mod: 59

## 2019-07-31 PROCEDURE — 20000000 HC ICU ROOM

## 2019-07-31 PROCEDURE — 93010 EKG 12-LEAD: ICD-10-PCS | Mod: ,,, | Performed by: INTERNAL MEDICINE

## 2019-07-31 PROCEDURE — 83880 ASSAY OF NATRIURETIC PEPTIDE: CPT

## 2019-07-31 PROCEDURE — 82550 ASSAY OF CK (CPK): CPT

## 2019-07-31 PROCEDURE — 85730 THROMBOPLASTIN TIME PARTIAL: CPT

## 2019-07-31 PROCEDURE — 25000003 PHARM REV CODE 250: Performed by: STUDENT IN AN ORGANIZED HEALTH CARE EDUCATION/TRAINING PROGRAM

## 2019-07-31 PROCEDURE — 93005 ELECTROCARDIOGRAM TRACING: CPT

## 2019-07-31 PROCEDURE — 93010 ELECTROCARDIOGRAM REPORT: CPT | Mod: ,,, | Performed by: INTERNAL MEDICINE

## 2019-07-31 PROCEDURE — 27100092 HC HIGH FLOW DELIVERY CANNULA

## 2019-07-31 RX ORDER — METHYLPREDNISOLONE SOD SUCC 125 MG
125 VIAL (EA) INJECTION EVERY 6 HOURS
Status: DISCONTINUED | OUTPATIENT
Start: 2019-08-01 | End: 2019-08-02

## 2019-07-31 RX ORDER — SODIUM CHLORIDE 0.9 % (FLUSH) 0.9 %
10 SYRINGE (ML) INJECTION
Status: DISCONTINUED | OUTPATIENT
Start: 2019-07-31 | End: 2019-08-09 | Stop reason: HOSPADM

## 2019-07-31 RX ORDER — ENOXAPARIN SODIUM 100 MG/ML
40 INJECTION SUBCUTANEOUS EVERY 24 HOURS
Status: DISCONTINUED | OUTPATIENT
Start: 2019-07-31 | End: 2019-08-01

## 2019-07-31 RX ORDER — VALACYCLOVIR HYDROCHLORIDE 500 MG/1
1000 TABLET, FILM COATED ORAL 2 TIMES DAILY
Status: DISCONTINUED | OUTPATIENT
Start: 2019-07-31 | End: 2019-08-02

## 2019-07-31 RX ORDER — PANTOPRAZOLE SODIUM 40 MG/1
40 TABLET, DELAYED RELEASE ORAL DAILY
Status: DISCONTINUED | OUTPATIENT
Start: 2019-08-01 | End: 2019-08-01

## 2019-07-31 RX ADMIN — AZITHROMYCIN MONOHYDRATE 500 MG: 500 INJECTION, POWDER, LYOPHILIZED, FOR SOLUTION INTRAVENOUS at 09:07

## 2019-07-31 RX ADMIN — VALACYCLOVIR HYDROCHLORIDE 1000 MG: 500 TABLET, FILM COATED ORAL at 10:07

## 2019-07-31 RX ADMIN — METHYLPREDNISOLONE SODIUM SUCCINATE 125 MG: 125 INJECTION, POWDER, FOR SOLUTION INTRAMUSCULAR; INTRAVENOUS at 11:07

## 2019-07-31 RX ADMIN — ENOXAPARIN SODIUM 40 MG: 100 INJECTION SUBCUTANEOUS at 10:07

## 2019-08-01 ENCOUNTER — TELEPHONE (OUTPATIENT)
Dept: PULMONOLOGY | Facility: CLINIC | Age: 69
End: 2019-08-01

## 2019-08-01 DIAGNOSIS — J84.9 ILD (INTERSTITIAL LUNG DISEASE): Primary | ICD-10-CM

## 2019-08-01 LAB
ADENOVIRUS: NOT DETECTED
ALBUMIN SERPL BCP-MCNC: 2.4 G/DL (ref 3.5–5.2)
ALP SERPL-CCNC: 231 U/L (ref 55–135)
ALT SERPL W/O P-5'-P-CCNC: 41 U/L (ref 10–44)
ANION GAP SERPL CALC-SCNC: 10 MMOL/L (ref 8–16)
AST SERPL-CCNC: 28 U/L (ref 10–40)
AV INDEX (PROSTH): 1.25
AV MEAN GRADIENT: 2 MMHG
AV PEAK GRADIENT: 3 MMHG
AV VALVE AREA: 3.92 CM2
AV VELOCITY RATIO: 1.08
BILIRUB SERPL-MCNC: 0.5 MG/DL (ref 0.1–1)
BORDETELLA PARAPERTUSSIS (IS1001): NOT DETECTED
BORDETELLA PERTUSSIS (PTXP): NOT DETECTED
BSA FOR ECHO PROCEDURE: 1.92 M2
BUN SERPL-MCNC: 15 MG/DL (ref 8–23)
CALCIUM SERPL-MCNC: 9.5 MG/DL (ref 8.7–10.5)
CHLAMYDIA PNEUMONIAE: NOT DETECTED
CHLORIDE SERPL-SCNC: 93 MMOL/L (ref 95–110)
CO2 SERPL-SCNC: 30 MMOL/L (ref 23–29)
CORONAVIRUS 229E, COMMON COLD VIRUS: NOT DETECTED
CORONAVIRUS HKU1, COMMON COLD VIRUS: NOT DETECTED
CORONAVIRUS NL63, COMMON COLD VIRUS: NOT DETECTED
CORONAVIRUS OC43, COMMON COLD VIRUS: NOT DETECTED
CREAT SERPL-MCNC: 0.7 MG/DL (ref 0.5–1.4)
CREAT SERPL-MCNC: 0.7 MG/DL (ref 0.5–1.4)
CRP SERPL-MCNC: 59.3 MG/L (ref 0–8.2)
CV ECHO LV RWT: 0.4 CM
DOP CALC AO PEAK VEL: 0.86 M/S
DOP CALC AO VTI: 10.95 CM
DOP CALC LVOT AREA: 3.1 CM2
DOP CALC LVOT DIAMETER: 2 CM
DOP CALC LVOT PEAK VEL: 0.93 M/S
DOP CALC LVOT STROKE VOLUME: 42.96 CM3
DOP CALCLVOT PEAK VEL VTI: 13.68 CM
ECHO LV POSTERIOR WALL: 0.85 CM (ref 0.6–1.1)
ERYTHROCYTE [SEDIMENTATION RATE] IN BLOOD BY WESTERGREN METHOD: 110 MM/HR (ref 0–23)
EST. GFR  (AFRICAN AMERICAN): >60 ML/MIN/1.73 M^2
EST. GFR  (AFRICAN AMERICAN): >60 ML/MIN/1.73 M^2
EST. GFR  (NON AFRICAN AMERICAN): >60 ML/MIN/1.73 M^2
EST. GFR  (NON AFRICAN AMERICAN): >60 ML/MIN/1.73 M^2
FLUBV RNA NPH QL NAA+NON-PROBE: NOT DETECTED
FRACTIONAL SHORTENING: 28 % (ref 28–44)
GLUCOSE SERPL-MCNC: 134 MG/DL (ref 70–110)
HPIV1 RNA NPH QL NAA+NON-PROBE: NOT DETECTED
HPIV2 RNA NPH QL NAA+NON-PROBE: NOT DETECTED
HPIV3 RNA NPH QL NAA+NON-PROBE: NOT DETECTED
HPIV4 RNA NPH QL NAA+NON-PROBE: NOT DETECTED
HUMAN METAPNEUMOVIRUS: NOT DETECTED
INFLUENZA A (SUBTYPES H1,H1-2009,H3): NOT DETECTED
INTERVENTRICULAR SEPTUM: 0.8 CM (ref 0.6–1.1)
LA MAJOR: 4.28 CM
LA MINOR: 3.65 CM
LA WIDTH: 3.45 CM
LEFT ATRIUM SIZE: 4.05 CM
LEFT ATRIUM VOLUME INDEX: 24.5 ML/M2
LEFT ATRIUM VOLUME: 46.79 CM3
LEFT INTERNAL DIMENSION IN SYSTOLE: 3.07 CM (ref 2.1–4)
LEFT VENTRICLE DIASTOLIC VOLUME INDEX: 42.3 ML/M2
LEFT VENTRICLE DIASTOLIC VOLUME: 80.93 ML
LEFT VENTRICLE MASS INDEX: 56 G/M2
LEFT VENTRICLE SYSTOLIC VOLUME INDEX: 19.4 ML/M2
LEFT VENTRICLE SYSTOLIC VOLUME: 37.12 ML
LEFT VENTRICULAR INTERNAL DIMENSION IN DIASTOLE: 4.25 CM (ref 3.5–6)
LEFT VENTRICULAR MASS: 107.61 G
MYCOPLASMA PNEUMONIAE: NOT DETECTED
POTASSIUM SERPL-SCNC: 3.6 MMOL/L (ref 3.5–5.1)
PROCALCITONIN SERPL IA-MCNC: 0.08 NG/ML
PROT SERPL-MCNC: 6.2 G/DL (ref 6–8.4)
RA MAJOR: 3.91 CM
RA PRESSURE: 3 MMHG
RA WIDTH: 2.88 CM
RESPIRATORY INFECTION PANEL SOURCE: NORMAL
RSV RNA NPH QL NAA+NON-PROBE: NOT DETECTED
RV+EV RNA NPH QL NAA+NON-PROBE: NOT DETECTED
SODIUM SERPL-SCNC: 133 MMOL/L (ref 136–145)
TDI LATERAL: 0.1 M/S
TDI SEPTAL: 0.06 M/S
TDI: 0.08 M/S
TRICUSPID ANNULAR PLANE SYSTOLIC EXCURSION: 1.67 CM

## 2019-08-01 PROCEDURE — 80053 COMPREHEN METABOLIC PANEL: CPT

## 2019-08-01 PROCEDURE — 25000003 PHARM REV CODE 250: Performed by: STUDENT IN AN ORGANIZED HEALTH CARE EDUCATION/TRAINING PROGRAM

## 2019-08-01 PROCEDURE — 27100092 HC HIGH FLOW DELIVERY CANNULA

## 2019-08-01 PROCEDURE — 63600175 PHARM REV CODE 636 W HCPCS: Performed by: STUDENT IN AN ORGANIZED HEALTH CARE EDUCATION/TRAINING PROGRAM

## 2019-08-01 PROCEDURE — 84145 PROCALCITONIN (PCT): CPT

## 2019-08-01 PROCEDURE — 87040 BLOOD CULTURE FOR BACTERIA: CPT

## 2019-08-01 PROCEDURE — 63600175 PHARM REV CODE 636 W HCPCS: Performed by: INTERNAL MEDICINE

## 2019-08-01 PROCEDURE — 20000000 HC ICU ROOM

## 2019-08-01 PROCEDURE — 86140 C-REACTIVE PROTEIN: CPT

## 2019-08-01 PROCEDURE — 27100171 HC OXYGEN HIGH FLOW UP TO 24 HOURS

## 2019-08-01 PROCEDURE — 85652 RBC SED RATE AUTOMATED: CPT

## 2019-08-01 PROCEDURE — 99291 PR CRITICAL CARE, E/M 30-74 MINUTES: ICD-10-PCS | Mod: ,,, | Performed by: EMERGENCY MEDICINE

## 2019-08-01 PROCEDURE — 99291 CRITICAL CARE FIRST HOUR: CPT | Mod: ,,, | Performed by: EMERGENCY MEDICINE

## 2019-08-01 PROCEDURE — 94761 N-INVAS EAR/PLS OXIMETRY MLT: CPT

## 2019-08-01 RX ORDER — SUCRALFATE 1 G/10ML
1 SUSPENSION ORAL
Status: DISCONTINUED | OUTPATIENT
Start: 2019-08-01 | End: 2019-08-09 | Stop reason: HOSPADM

## 2019-08-01 RX ORDER — POTASSIUM CHLORIDE 20 MEQ/15ML
40 SOLUTION ORAL ONCE
Status: COMPLETED | OUTPATIENT
Start: 2019-08-01 | End: 2019-08-01

## 2019-08-01 RX ORDER — LOPERAMIDE HYDROCHLORIDE 2 MG/1
2 CAPSULE ORAL 3 TIMES DAILY PRN
Status: DISCONTINUED | OUTPATIENT
Start: 2019-08-01 | End: 2019-08-09 | Stop reason: HOSPADM

## 2019-08-01 RX ORDER — SUCRALFATE 1 G/1
1 TABLET ORAL
Status: DISCONTINUED | OUTPATIENT
Start: 2019-08-01 | End: 2019-08-01

## 2019-08-01 RX ORDER — RAMELTEON 8 MG/1
8 TABLET ORAL NIGHTLY PRN
Status: DISCONTINUED | OUTPATIENT
Start: 2019-08-01 | End: 2019-08-09 | Stop reason: HOSPADM

## 2019-08-01 RX ORDER — FUROSEMIDE 10 MG/ML
40 INJECTION INTRAMUSCULAR; INTRAVENOUS ONCE
Status: COMPLETED | OUTPATIENT
Start: 2019-08-01 | End: 2019-08-01

## 2019-08-01 RX ORDER — FERROUS SULFATE 325(65) MG
325 TABLET, DELAYED RELEASE (ENTERIC COATED) ORAL DAILY
Status: DISCONTINUED | OUTPATIENT
Start: 2019-08-01 | End: 2019-08-09 | Stop reason: HOSPADM

## 2019-08-01 RX ORDER — ATORVASTATIN CALCIUM 20 MG/1
20 TABLET, FILM COATED ORAL NIGHTLY
Status: DISCONTINUED | OUTPATIENT
Start: 2019-08-01 | End: 2019-08-09 | Stop reason: HOSPADM

## 2019-08-01 RX ORDER — FUROSEMIDE 10 MG/ML
80 INJECTION INTRAMUSCULAR; INTRAVENOUS ONCE
Status: COMPLETED | OUTPATIENT
Start: 2019-08-01 | End: 2019-08-01

## 2019-08-01 RX ORDER — LEVOFLOXACIN 5 MG/ML
750 INJECTION, SOLUTION INTRAVENOUS
Status: DISCONTINUED | OUTPATIENT
Start: 2019-08-01 | End: 2019-08-01

## 2019-08-01 RX ORDER — PANTOPRAZOLE SODIUM 40 MG/1
40 TABLET, DELAYED RELEASE ORAL EVERY 12 HOURS
Status: DISCONTINUED | OUTPATIENT
Start: 2019-08-01 | End: 2019-08-09 | Stop reason: HOSPADM

## 2019-08-01 RX ADMIN — VALACYCLOVIR HYDROCHLORIDE 1000 MG: 500 TABLET, FILM COATED ORAL at 09:08

## 2019-08-01 RX ADMIN — AZITHROMYCIN MONOHYDRATE 500 MG: 500 INJECTION, POWDER, LYOPHILIZED, FOR SOLUTION INTRAVENOUS at 09:08

## 2019-08-01 RX ADMIN — SUCRALFATE 1 G: 1 SUSPENSION ORAL at 08:08

## 2019-08-01 RX ADMIN — METHYLPREDNISOLONE SODIUM SUCCINATE 125 MG: 125 INJECTION, POWDER, FOR SOLUTION INTRAMUSCULAR; INTRAVENOUS at 05:08

## 2019-08-01 RX ADMIN — RAMELTEON 8 MG: 8 TABLET, FILM COATED ORAL at 08:08

## 2019-08-01 RX ADMIN — VALACYCLOVIR HYDROCHLORIDE 1000 MG: 500 TABLET, FILM COATED ORAL at 08:08

## 2019-08-01 RX ADMIN — FERROUS SULFATE TAB EC 325 MG (65 MG FE EQUIVALENT) 325 MG: 325 (65 FE) TABLET DELAYED RESPONSE at 11:08

## 2019-08-01 RX ADMIN — METHYLPREDNISOLONE SODIUM SUCCINATE 125 MG: 125 INJECTION, POWDER, FOR SOLUTION INTRAMUSCULAR; INTRAVENOUS at 11:08

## 2019-08-01 RX ADMIN — FUROSEMIDE 40 MG: 10 INJECTION, SOLUTION INTRAMUSCULAR; INTRAVENOUS at 04:08

## 2019-08-01 RX ADMIN — FUROSEMIDE 80 MG: 10 INJECTION, SOLUTION INTRAMUSCULAR; INTRAVENOUS at 10:08

## 2019-08-01 RX ADMIN — SUCRALFATE 1 G: 1 SUSPENSION ORAL at 11:08

## 2019-08-01 RX ADMIN — POTASSIUM CHLORIDE 40 MEQ: 20 SOLUTION ORAL at 05:08

## 2019-08-01 RX ADMIN — PANTOPRAZOLE SODIUM 40 MG: 40 TABLET, DELAYED RELEASE ORAL at 09:08

## 2019-08-01 RX ADMIN — LEVOFLOXACIN 750 MG: 750 INJECTION, SOLUTION INTRAVENOUS at 03:08

## 2019-08-01 RX ADMIN — METHYLPREDNISOLONE SODIUM SUCCINATE 125 MG: 125 INJECTION, POWDER, FOR SOLUTION INTRAMUSCULAR; INTRAVENOUS at 06:08

## 2019-08-01 RX ADMIN — ATORVASTATIN CALCIUM 20 MG: 20 TABLET, FILM COATED ORAL at 08:08

## 2019-08-01 NOTE — H&P
[facility]  Critical Care Medicine  History & Physical    Patient Name: Manpreet Hamilton  MRN: [id number]  Admission Date: 7/31/2019  Hospital Length of Stay: 0 days  Code Status: Full Code  Attending Physician: Gareth Asif MD   Primary Care Provider: Radames Pradhan MD   Principal Problem: Acute on chronic respiratory failure with hypoxemia    Subjective:     HPI:  69 yo  male PMH of HTN, HLD, UIP (dx 1 year ago, follow by Dr. Pradhan, on ofev) presenting as a direct admit for increaased oxygen requirement. He was recent discharged from [facility] on July 22, after GI bleed, requiring massive transfusion 20 units over 9-10 days. Couple days after getting home, he had increased oxygen requirement, was on 2 L, now requiring 5 L nasal canula. He had imaging done while at [facility] which showed an infiltrate that may have been PNA, he was started on 30 mg prednisone daily, azithromycin 500 mg BID, and Valacyclovir 1 gram BID. Dr. Reynolds reported his 02 saturation had been worsening all week, but more so today, unknown cause, then transferred to [facility] to be put under his care. On arrival he reports shortness of breath with ambulation, associated with cough for 6 days, productive of clear sputum. He denies fevers, chills, nausea, vomiting, chest pain, leg swelling, hemoptysis, hematemesis, change in bowel or urinary habits.     Hospital/ICU Course:  No notes on file     Past Medical History:   Diagnosis Date    Hyperlipemia     Hypertension     Nasal congestion     Pulmonary fibrosis        Past Surgical History:   Procedure Laterality Date    CATARACT EXTRACTION Left     COLONOSCOPY N/A 8/5/2016    Performed by Leon Padilla MD at [facility] (4TH FLR)    EGD (ESOPHAGOGASTRODUODENOSCOPY) N/A 7/18/2019    Performed by Fer Fox MD at [facility] END[facility] (2ND FLR)    EGD (ESOPHAGOGASTRODUODENOSCOPY) N/A 7/12/2019    Performed by Jimmy Saldana MD at [facility] END[facility] (2ND FLR)    EGD  (ESOPHAGOGASTRODUODENOSCOPY) N/A 2019    Performed by Jimmy [facility] MD at [facility] END[facility] (2ND FLR)    EGD (ESOPHAGOGASTRODUODENOSCOPY) N/A 7/10/2019    Performed by Jimmy Saldana MD at [facility] ENDO (2ND FLR)    HAND SURGERY      left    HIATAL HERNIA REPAIR      MICRODISCECTOMY, SPINE MIS Right L5- S1 Right 2018    Performed by Angel Pendleton MD at [facility] OR 2ND FLR    TONSILLECTOMY, ADENOIDECTOMY      VASECTOMY         Review of patient's allergies indicates:   Allergen Reactions    Penicillins     Erythromycin Rash       Family History     Problem Relation (Age of Onset)    Coronary artery disease Mother, Sister    Dementia Father    Multiple sclerosis Sister    Parkinsonism Father        Tobacco Use    Smoking status: Former Smoker     Packs/day: 1.50     Years: 20.00     Pack years: 30.00     Types: Cigarettes     Last attempt to quit: 3/17/1985     Years since quittin.3    Smokeless tobacco: Former User     Quit date: 1986   Substance and Sexual Activity    Alcohol use: Yes     Alcohol/week: 16.8 oz     Types: 28 Glasses of wine per week     Comment: 6-7 bottles per week    Drug use: No    Sexual activity: Yes     Partners: Female      Review of Systems   Constitutional: Positive for fatigue. Negative for chills, diaphoresis and fever.   HENT: Negative for congestion, dental problem and trouble swallowing.    Eyes: Negative for photophobia, redness and visual disturbance.   Respiratory: Positive for cough and shortness of breath. Negative for chest tightness, wheezing and stridor.         Productive of clear sputum   Cardiovascular: Negative for chest pain, palpitations and leg swelling.   Gastrointestinal: Negative for abdominal distention, abdominal pain, anal bleeding, constipation, nausea and vomiting.   Endocrine: Negative for polydipsia, polyphagia and polyuria.   Genitourinary: Negative for difficulty urinating and flank pain.   Musculoskeletal: Negative for back pain and  joint swelling.   Skin: Negative for color change, pallor, rash and wound.   Neurological: Negative for tremors, speech difficulty and numbness.   Hematological: Negative for adenopathy. Does not bruise/bleed easily.     Objective:     Vital Signs (Most Recent):  Temp: 97.7 °F (36.5 °C) (07/31/19 2000)  Pulse: 104 (07/31/19 2000)  Resp: (!) 24 (07/31/19 2000)  BP: 122/75 (07/31/19 2000)  SpO2: (!) 92 % (07/31/19 2000) Vital Signs (24h Range):  Temp:  [97.7 °F (36.5 °C)-98.5 °F (36.9 °C)] 97.7 °F (36.5 °C)  Pulse:  [104-105] 104  Resp:  [24-34] 24  SpO2:  [92 %-98 %] 92 %  BP: (122-127)/(73-75) 122/75   Weight: 76.2 kg (167 lb 15.9 oz)  Body mass index is 24.81 kg/m².    No intake or output data in the 24 hours ending 07/31/19 2111    Physical Exam   Constitutional: He is oriented to person, place, and time. He appears well-developed and well-nourished.    gentleman, resting comfortably in bed, non toxic appearing, nasal canula in place   HENT:   Head: Normocephalic and atraumatic.   Eyes: Pupils are equal, round, and reactive to light. EOM are normal.   Neck: Normal range of motion. Neck supple.   Cardiovascular: Normal rate, regular rhythm, normal heart sounds and intact distal pulses. Exam reveals no friction rub.   No murmur heard.  Pulmonary/Chest: No stridor. No respiratory distress. He has no rales. He exhibits no tenderness.   Tachypnic  Mild expiratory wheezing b/l lower lung fields    Abdominal: Soft. Bowel sounds are normal. He exhibits no distension and no mass. There is no tenderness. There is no rebound and no guarding. No hernia.   Musculoskeletal: Normal range of motion. He exhibits deformity. He exhibits no edema or tenderness.   Neurological: He is alert and oriented to person, place, and time. He displays normal reflexes. No cranial nerve deficit. Coordination normal.   Skin: Skin is warm and dry. Capillary refill takes less than 2 seconds.       Vents:  Oxygen Concentration (%): 50  (07/31/19 2000)  Lines/Drains/Airways          None        Significant Labs:    CBC/Anemia Profile:  No results for input(s): WBC, HGB, HCT, PLT, MCV, RDW, IRON, FERRITIN, RETIC, FOLATE, [id number], OCCULTBLOOD in the last 48 hours.     Chemistries:  No results for input(s): NA, K, CL, CO2, BUN, CREATININE, CALCIUM, ALBUMIN, PROT, BILITOT, ALKPHOS, ALT, AST, GLUCOSE, MG, PHOS in the last 48 hours.    Significant Imaging: I have reviewed all pertinent imaging results/findings within the past 24 hours.    Assessment/Plan:     Pulmonary  * Acute on chronic respiratory failure with hypoxemia  Hx of UIP (on OFEV)  -Increased oxygen requirement 5L  -CT PE-showed ILD and superimposed ground glass opacity  -Xray shows diffuse hazy reticular lung disease, more prominent on left, unchanged from previous  -Follow with Dr. Reynolds, recs below  -Respiratory pathogens panel f/u  -Respiratory viral panel  -Respiratory infections panel  -Sputum culture  -Solumedrol 125 mg QID  -Azithromycin 500 mg daily  -Valacyclovir 1 gram 2xdaily  -Levaquin 750 mg  -ESR  -CRP      Critical Care Daily Checklist:    A: Awake: RASS Goal/Actual Goal:    Actual:     B: Spontaneous Breathing Trial Performed?  NA   C: SAT & SBT Coordinated?  NA   D: Delirium: CAM-ICU NA   E: Early Mobility Performed? Yes   F: Feeding Goal:    Status:     Current Diet Order   Procedures    Diet Adult Regular (IDDSI Level 7)      AS: Analgesia/Sedation NA   T: Thromboembolic Prophylaxis Lovenox   H: HOB > 300 Yes   U: Stress Ulcer Prophylaxis (if needed) Protonix 40 mg daily   G: Glucose Control NA   B: Bowel Function     I: Indwelling Catheter (Lines & Ribeiro) Necessity peripheral   D: De-escalation of Antimicrobials/Pharmacotherapies Day 2 valacyclovir and azithromcyin    Plan for the day/ETD     Code Status:  Family/Goals of Care: Full Code         Critical secondary to Patient has a condition that poses threat to life and bodily function: Severe Respiratory Distress      Critical care was time spent personally by me on the following activities: development of treatment plan with patient or surrogate and bedside caregivers, discussions with consultants, evaluation of patient's response to treatment, examination of patient, ordering and performing treatments and interventions, ordering and review of laboratory studies, ordering and review of radiographic studies, pulse oximetry, re-evaluation of patient's condition. This critical care time did not overlap with that of any other provider or involve time for any procedures.     Irwin Stone MD  Critical Care Medicine  [facility]

## 2019-08-01 NOTE — HPI
69 yo  male PMH of HTN, HLD, UIP (dx 1 year ago, follow by Dr. Pradhan, on ofev) presenting as a direct admit for increaased oxygen requirement. He was recent discharged from [facility] on July 22, after GI bleed, requiring massive transfusion 20 units over 9-10 days. Couple days after getting home, he had increased oxygen requirement, was on 2 L, now requiring 5 L nasal canula. He had imaging done while at [facility] which showed an infiltrate that may have been PNA, he was started on 30 mg prednisone daily, azithromycin 500 mg BID, and Valacyclovir 1 gram BID. Dr. Reynolds reported his 02 saturation had been worsening all week, but more so today, unknown cause, then transferred to [facility] to be put under his care. On arrival he reports shortness of breath with ambulation, associated with cough for 6 days, productive of clear sputum. He denies fevers, chills, nausea, vomiting, chest pain, leg swelling, hemoptysis, hematemesis, change in bowel or urinary habits.

## 2019-08-01 NOTE — TELEPHONE ENCOUNTER
Bassam from the [facility] called to confirm that Mr Hamilton is now on Ofev 100mg BID dose rather than 150mg BID.I told him that was correct and I let Bassam know that Mr Hamilton is here at [facility] in the ICU since last night. Joana Guzman LPN.

## 2019-08-01 NOTE — TELEPHONE ENCOUNTER
----- Message from Tasha Arroyo sent at 8/1/2019  9:42 AM CDT -----  Contact: Bassam  [phone]  Needs Advice  /   [facility]     Reason for call:   Pt medication         Communication Preference:   Phone     Additional Information:  Pt medicationnintedanib (OFEV) 150 mg Cap  was shipped on the 25th of this month..   Additional information is needed ..  Call  back to discuss

## 2019-08-01 NOTE — NURSING
Pt received per EMS placed on monitor condom cath in place on 5L NC O2 sats drops to low 80s when turning in bed [facility] notified of arrival to unit will cont to monitor

## 2019-08-01 NOTE — ASSESSMENT & PLAN NOTE
Hx of UIP (on OFEV)  -Increased oxygen requirement 5L  -CT PE-showed ILD and superimposed ground glass opacity  -Xray shows diffuse hazy reticular lung disease, more prominent on left, unchanged from previous  -Follow with Dr. Reynolds, recs below  -Respiratory pathogens panel f/u  -Respiratory viral panel  -Respiratory infections panel  -Sputum culture  -Solumedrol 125 mg QID  -Azithromycin 500 mg daily  -Valacyclovir 1 gram 2xdaily  -Levaquin 750 mg  -ESR  -CRP

## 2019-08-01 NOTE — PLAN OF CARE
Radames Pradhan MD  [address] / [place] [address]    [facility] [place] [place] [address]  [address]  [place] [address]  Phone: [phone] Fax: [phone]    Payor: BLUE CROSS BLUE SHIELD / Plan: BCBS ALL OUT OF STATE / Product Type: PPO /     Extended Emergency Contact Information  Primary Emergency Contact: Traci Hamilton  Address: [address]           [place] [address] [facility]  Home Phone: [phone]  Mobile Phone: [phone]  Relation: Spouse    Future Appointments   Date Time Provider Department Center   8/23/2019 11:00 AM Jimmy Saldana MD [facility] [place]     Patient is currently on service with [place], [place] ([phone])  Patient receives oxygen equipment and supplies through [place], [place].      08/01/19 1533   Discharge Assessment   Assessment Type Discharge Planning Assessment   Confirmed/corrected address and phone number on facesheet? Yes   Assessment information obtained from? Patient;Caregiver;Medical Record   Communicated expected length of stay with patient/caregiver no   Prior to hospitilization cognitive status: Alert/Oriented   Prior to hospitalization functional status: Needs Assistance;Assistive Equipment   Current cognitive status: Alert/Oriented   Current Functional Status: Needs Assistance;Assistive Equipment   Facility Arrived From: Direct Admit from Dr Pradhan's office    Lives With spouse   Able to Return to Prior Arrangements yes   Is patient able to care for self after discharge? Unable to determine at this time (comments)   Who are your caregiver(s) and their phone number(s)? Traci Hamilton (spouse) [phone], [phone]   Patient's perception of discharge disposition home health   Readmission Within the Last 30 Days current reason for admission unrelated to previous admission   If yes, most recent facility name:  [facility]   Patient currently being followed by outpatient case management? No   Patient currently receives any other outside agency services? Yes   Name and contact number of agency or person providing outside services [facility] [place] [facility]     Is it the patient/care giver preference to resume care with the current outside agency? Yes   Equipment Currently Used at Home walker, rolling;oxygen   Do you have any problems affording any of your prescribed medications? No   Is the patient taking medications as prescribed? yes   Does the patient have transportation home? Yes   Transportation Anticipated family or friend will provide   Does the patient receive services at the [facility]? No   Discharge Plan A Home Health;Home with family   Discharge Plan B Rehab   DME Needed Upon Discharge  other (see comments)  (Needs TBD)   Patient/Family in Agreement with Plan yes   Readmission Questionnaire   At the time of your discharge, did someone talk to you about what your health problems were? Yes   At the time of discharge, did someone talk to you about what to watch out for regarding worsening of your health problem? Yes   At the time of discharge, did someone talk to you about what to do if you experienced worsening of your health problem? Yes   At the time of discharge, did someone talk to you about which medication to take when you left the hospital and which ones to stop taking? Yes   At the time of discharge, did someone talk to you about when and where to follow up with a doctor after you left the hospital? Yes   What do you believe caused you to be sick enough to be re-admitted? shortness of breath    Do you have problems taking your medications as prescribed? No   Do you have any problems affording any of  your prescribed medications? No   Do you have problems obtaining/receiving your medications? No   Does the patient have transportation to [facility]  appointments? Yes   Living Arrangements house   Does the patient have family/friends to help with healtcare needs after discharge? yes       Kiana Aviles RN, [facility]  Case Management-Critical Care     [phone]

## 2019-08-01 NOTE — SUBJECTIVE & OBJECTIVE
Past Medical History:   Diagnosis Date    Hyperlipemia     Hypertension     Nasal congestion     Pulmonary fibrosis        Past Surgical History:   Procedure Laterality Date    CATARACT EXTRACTION Left     COLONOSCOPY N/A 2016    Performed by [facility]illa MD at [facility] END[facility] (4TH FLR)    EGD (ESOPHAGOGASTRODUODENOSCOPY) N/A 2019    Performed by Fer Fox MD at [facility] ENDO (2ND FLR)    EGD (ESOPHAGOGASTRODUODENOSCOPY) N/A 2019    Performed by Jimmy Sal[facility]ana MD at [facility] ENDO (2ND FLR)    EGD (ESOPHAGOGASTRODUODENOSCOPY) N/A 2019    Performed by Jimmy [facility] MD at [facility] ENDO (2ND FLR)    EGD (ESOPHAGOGASTRODUODENOSCOPY) N/A 7/10/2019    Performed by Jimmy Sal[facility]ana MD at [facility] ENDO (2ND FLR)    HAND SURGERY      left    HIATAL HERNIA REPAIR      MICRODISCECTOMY, SPINE MIS Right L5- S1 Right 2018    Performed by Angel Pendleton MD at [facility] OR 2ND FLR    TONSILLECTOMY, ADENOIDECTOMY      VASECTOMY         Review of patient's allergies indicates:   Allergen Reactions    Penicillins     Erythromycin Rash       Family History     Problem Relation (Age of Onset)    Coronary artery disease Mother, Sister    Dementia Father    Multiple sclerosis Sister    Parkinsonism Father        Tobacco Use    Smoking status: Former Smoker     Packs/day: 1.50     Years: 20.00     Pack years: 30.00     Types: Cigarettes     Last attempt to quit: 3/17/1985     Years since quittin.3    Smokeless tobacco: Former User     Quit date: 1986   Substance and Sexual Activity    Alcohol use: Yes     Alcohol/week: 16.8 oz     Types: 28 Glasses of wine per week     Comment: 6-7 bottles per week    Drug use: No    Sexual activity: Yes     Partners: Female      Review of Systems   Constitutional: Positive for fatigue. Negative for chills, diaphoresis and fever.   HENT: Negative for congestion, dental problem and trouble swallowing.    Eyes: Negative for photophobia, redness and visual  disturbance.   Respiratory: Positive for cough and shortness of breath. Negative for chest tightness, wheezing and stridor.         Productive of clear sputum   Cardiovascular: Negative for chest pain, palpitations and leg swelling.   Gastrointestinal: Negative for abdominal distention, abdominal pain, anal bleeding, constipation, nausea and vomiting.   Endocrine: Negative for polydipsia, polyphagia and polyuria.   Genitourinary: Negative for difficulty urinating and flank pain.   Musculoskeletal: Negative for back pain and joint swelling.   Skin: Negative for color change, pallor, rash and wound.   Neurological: Negative for tremors, speech difficulty and numbness.   Hematological: Negative for adenopathy. Does not bruise/bleed easily.     Objective:     Vital Signs (Most Recent):  Temp: 97.7 °F (36.5 °C) (07/31/19 2000)  Pulse: 104 (07/31/19 2000)  Resp: (!) 24 (07/31/19 2000)  BP: 122/75 (07/31/19 2000)  SpO2: (!) 92 % (07/31/19 2000) Vital Signs (24h Range):  Temp:  [97.7 °F (36.5 °C)-98.5 °F (36.9 °C)] 97.7 °F (36.5 °C)  Pulse:  [104-105] 104  Resp:  [24-34] 24  SpO2:  [92 %-98 %] 92 %  BP: (122-127)/(73-75) 122/75   Weight: 76.2 kg (167 lb 15.9 oz)  Body mass index is 24.81 kg/m².    No intake or output data in the 24 hours ending 07/31/19 2111    Physical Exam   Constitutional: He is oriented to person, place, and time. He appears well-developed and well-nourished.    gentleman, resting comfortably in bed, non toxic appearing, nasal canula in place   HENT:   Head: Normocephalic and atraumatic.   Eyes: Pupils are equal, round, and reactive to light. EOM are normal.   Neck: Normal range of motion. Neck supple.   Cardiovascular: Normal rate, regular rhythm, normal heart sounds and intact distal pulses. Exam reveals no friction rub.   No murmur heard.  Pulmonary/Chest: No stridor. No respiratory distress. He has no rales. He exhibits no tenderness.   Tachypnic  Mild expiratory wheezing b/l lower lung  fields    Abdominal: Soft. Bowel sounds are normal. He exhibits no distension and no mass. There is no tenderness. There is no rebound and no guarding. No hernia.   Musculoskeletal: Normal range of motion. He exhibits deformity. He exhibits no edema or tenderness.   Neurological: He is alert and oriented to person, place, and time. He displays normal reflexes. No cranial nerve deficit. Coordination normal.   Skin: Skin is warm and dry. Capillary refill takes less than 2 seconds.       Vents:  Oxygen Concentration (%): 50 (07/31/19 2000)  Lines/Drains/Airways          None        Significant Labs:    CBC/Anemia Profile:  No results for input(s): WBC, HGB, HCT, PLT, MCV, RDW, IRON, FERRITIN, RETIC, FOLATE, [id number], OCCULTBLOOD in the last 48 hours.     Chemistries:  No results for input(s): NA, K, CL, CO2, BUN, CREATININE, CALCIUM, ALBUMIN, PROT, BILITOT, ALKPHOS, ALT, AST, GLUCOSE, MG, PHOS in the last 48 hours.    Significant Imaging: I have reviewed all pertinent imaging results/findings within the past 24 hours.

## 2019-08-02 LAB
ALBUMIN SERPL BCP-MCNC: 2.6 G/DL (ref 3.5–5.2)
ALBUMIN SERPL BCP-MCNC: 2.6 G/DL (ref 3.5–5.2)
ALP SERPL-CCNC: 216 U/L (ref 55–135)
ALT SERPL W/O P-5'-P-CCNC: 57 U/L (ref 10–44)
ANION GAP SERPL CALC-SCNC: 12 MMOL/L (ref 8–16)
ANION GAP SERPL CALC-SCNC: 14 MMOL/L (ref 8–16)
ANISOCYTOSIS BLD QL SMEAR: SLIGHT
AST SERPL-CCNC: 34 U/L (ref 10–40)
BASOPHILS # BLD AUTO: 0.02 K/UL (ref 0–0.2)
BASOPHILS NFR BLD: 0.2 % (ref 0–1.9)
BILIRUB SERPL-MCNC: 0.5 MG/DL (ref 0.1–1)
BUN SERPL-MCNC: 28 MG/DL (ref 8–23)
BUN SERPL-MCNC: 33 MG/DL (ref 8–23)
CALCIUM SERPL-MCNC: 9.3 MG/DL (ref 8.7–10.5)
CALCIUM SERPL-MCNC: 9.9 MG/DL (ref 8.7–10.5)
CHLORIDE SERPL-SCNC: 91 MMOL/L (ref 95–110)
CHLORIDE SERPL-SCNC: 93 MMOL/L (ref 95–110)
CO2 SERPL-SCNC: 26 MMOL/L (ref 23–29)
CO2 SERPL-SCNC: 28 MMOL/L (ref 23–29)
CREAT SERPL-MCNC: 0.9 MG/DL (ref 0.5–1.4)
CREAT SERPL-MCNC: 0.9 MG/DL (ref 0.5–1.4)
CRP SERPL-MCNC: 22.7 MG/L (ref 0–8.2)
DIFFERENTIAL METHOD: ABNORMAL
ENTEROVIRUS: NOT DETECTED
EOSINOPHIL # BLD AUTO: 0 K/UL (ref 0–0.5)
EOSINOPHIL NFR BLD: 0.1 % (ref 0–8)
ERYTHROCYTE [DISTWIDTH] IN BLOOD BY AUTOMATED COUNT: 14 % (ref 11.5–14.5)
EST. GFR  (AFRICAN AMERICAN): >60 ML/MIN/1.73 M^2
EST. GFR  (AFRICAN AMERICAN): >60 ML/MIN/1.73 M^2
EST. GFR  (NON AFRICAN AMERICAN): >60 ML/MIN/1.73 M^2
EST. GFR  (NON AFRICAN AMERICAN): >60 ML/MIN/1.73 M^2
GLUCOSE SERPL-MCNC: 157 MG/DL (ref 70–110)
GLUCOSE SERPL-MCNC: 160 MG/DL (ref 70–110)
HCT VFR BLD AUTO: 35.9 % (ref 40–54)
HGB BLD-MCNC: 11.2 G/DL (ref 14–18)
HUMAN BOCAVIRUS: NOT DETECTED
HUMAN CORONAVIRUS, COMMON COLD VIRUS: NOT DETECTED
HYPOCHROMIA BLD QL SMEAR: ABNORMAL
IMM GRANULOCYTES # BLD AUTO: 0.08 K/UL (ref 0–0.04)
IMM GRANULOCYTES NFR BLD AUTO: 0.8 % (ref 0–0.5)
INFLUENZA A - H1N1-09: NOT DETECTED
LEGIONELLA PNEUMOPHILA: NOT DETECTED
LYMPHOCYTES # BLD AUTO: 0.5 K/UL (ref 1–4.8)
LYMPHOCYTES NFR BLD: 5.1 % (ref 18–48)
MCH RBC QN AUTO: 26.5 PG (ref 27–31)
MCHC RBC AUTO-ENTMCNC: 31.2 G/DL (ref 32–36)
MCV RBC AUTO: 85 FL (ref 82–98)
MONOCYTES # BLD AUTO: 0.4 K/UL (ref 0.3–1)
MONOCYTES NFR BLD: 3.9 % (ref 4–15)
MORAXELLA CATARRHALIS: NOT DETECTED
NEUTROPHILS # BLD AUTO: 9.5 K/UL (ref 1.8–7.7)
NEUTROPHILS NFR BLD: 89.9 % (ref 38–73)
NRBC BLD-RTO: 0 /100 WBC
OVALOCYTES BLD QL SMEAR: ABNORMAL
PARAINFLUENZA: NOT DETECTED
PHOSPHATE SERPL-MCNC: 4 MG/DL (ref 2.7–4.5)
PLATELET # BLD AUTO: 586 K/UL (ref 150–350)
PLATELET BLD QL SMEAR: ABNORMAL
PMV BLD AUTO: 9.4 FL (ref 9.2–12.9)
POLYCHROMASIA BLD QL SMEAR: ABNORMAL
POTASSIUM SERPL-SCNC: 3.8 MMOL/L (ref 3.5–5.1)
POTASSIUM SERPL-SCNC: 3.9 MMOL/L (ref 3.5–5.1)
PROT SERPL-MCNC: 6.5 G/DL (ref 6–8.4)
RBC # BLD AUTO: 4.23 M/UL (ref 4.6–6.2)
RVP - ADENOVIRUS: NOT DETECTED
RVP - HUMAN METAPNEUMOVIRUS (HMPV): NOT DETECTED
RVP - INFLUENZA A: NOT DETECTED
RVP - INFLUENZA B: NOT DETECTED
RVP - RESPIRATORY SYNCTIAL VIRUS (RSV) A: NOT DETECTED
RVP - RESPIRATORY VIRAL PANEL, SOURCE: NORMAL
RVP - RHINOVIRUS: NOT DETECTED
SODIUM SERPL-SCNC: 131 MMOL/L (ref 136–145)
SODIUM SERPL-SCNC: 133 MMOL/L (ref 136–145)
TEM - ACINETOBACTER BAUMANNII: NOT DETECTED
TEM - BORDETELLA PERTUSSIS: NOT DETECTED
TEM - CHLAMYDOPHILA PNEUMONIAE: NOT DETECTED
TEM - KLEBSIELLA PNEUMONIAE: NOT DETECTED
TEM - MRSA: NOT DETECTED
TEM - MYCOPLASMA PNEUMONIAE: NOT DETECTED
TEM - NEISSERIA MENINGITIDIS: NOT DETECTED
TEM - PANTON-VALENTINE: NOT DETECTED
TEM - PSEUDOMONAS AERUGINOSA: NOT DETECTED
TEM - STAPHYLOCOCCUS AUREUS: NOT DETECTED
TEM - STREPTOCOCCUS PNEUMONIAE: NOT DETECTED
TEM - STREPTOCOCCUS PYOGENES A: NOT DETECTED
TEM- HAEMOPHILUS INFLUENZAE B: NOT DETECTED
TEM- HAEMOPHILUS INFLUENZAE: NOT DETECTED
TROPONIN I SERPL DL<=0.01 NG/ML-MCNC: <0.006 NG/ML (ref 0–0.03)
WBC # BLD AUTO: 9.85 K/UL (ref 3.9–12.7)

## 2019-08-02 PROCEDURE — 99223 PR INITIAL HOSPITAL CARE,LEVL III: ICD-10-PCS | Mod: ,,, | Performed by: INTERNAL MEDICINE

## 2019-08-02 PROCEDURE — 99223 1ST HOSP IP/OBS HIGH 75: CPT | Mod: ,,, | Performed by: INTERNAL MEDICINE

## 2019-08-02 PROCEDURE — 99900035 HC TECH TIME PER 15 MIN (STAT)

## 2019-08-02 PROCEDURE — 63600175 PHARM REV CODE 636 W HCPCS: Performed by: STUDENT IN AN ORGANIZED HEALTH CARE EDUCATION/TRAINING PROGRAM

## 2019-08-02 PROCEDURE — 93005 ELECTROCARDIOGRAM TRACING: CPT

## 2019-08-02 PROCEDURE — 27100171 HC OXYGEN HIGH FLOW UP TO 24 HOURS

## 2019-08-02 PROCEDURE — 84484 ASSAY OF TROPONIN QUANT: CPT

## 2019-08-02 PROCEDURE — 86140 C-REACTIVE PROTEIN: CPT

## 2019-08-02 PROCEDURE — 94667 MNPJ CHEST WALL 1ST: CPT

## 2019-08-02 PROCEDURE — 25000003 PHARM REV CODE 250: Performed by: STUDENT IN AN ORGANIZED HEALTH CARE EDUCATION/TRAINING PROGRAM

## 2019-08-02 PROCEDURE — 85025 COMPLETE CBC W/AUTO DIFF WBC: CPT

## 2019-08-02 PROCEDURE — 63600175 PHARM REV CODE 636 W HCPCS: Performed by: INTERNAL MEDICINE

## 2019-08-02 PROCEDURE — 80069 RENAL FUNCTION PANEL: CPT

## 2019-08-02 PROCEDURE — 63600175 PHARM REV CODE 636 W HCPCS: Performed by: EMERGENCY MEDICINE

## 2019-08-02 PROCEDURE — 93010 ELECTROCARDIOGRAM REPORT: CPT | Mod: ,,, | Performed by: INTERNAL MEDICINE

## 2019-08-02 PROCEDURE — 94761 N-INVAS EAR/PLS OXIMETRY MLT: CPT

## 2019-08-02 PROCEDURE — 99291 CRITICAL CARE FIRST HOUR: CPT | Mod: ,,, | Performed by: EMERGENCY MEDICINE

## 2019-08-02 PROCEDURE — 80053 COMPREHEN METABOLIC PANEL: CPT

## 2019-08-02 PROCEDURE — 93010 EKG 12-LEAD: ICD-10-PCS | Mod: ,,, | Performed by: INTERNAL MEDICINE

## 2019-08-02 PROCEDURE — 99291 PR CRITICAL CARE, E/M 30-74 MINUTES: ICD-10-PCS | Mod: ,,, | Performed by: EMERGENCY MEDICINE

## 2019-08-02 PROCEDURE — 20000000 HC ICU ROOM

## 2019-08-02 RX ORDER — FUROSEMIDE 10 MG/ML
40 INJECTION INTRAMUSCULAR; INTRAVENOUS DAILY
Status: DISCONTINUED | OUTPATIENT
Start: 2019-08-03 | End: 2019-08-03

## 2019-08-02 RX ORDER — ASPIRIN 325 MG
325 TABLET ORAL DAILY
Status: DISCONTINUED | OUTPATIENT
Start: 2019-08-02 | End: 2019-08-02

## 2019-08-02 RX ORDER — METHYLPREDNISOLONE SOD SUCC 125 MG
80 VIAL (EA) INJECTION EVERY 8 HOURS
Status: DISCONTINUED | OUTPATIENT
Start: 2019-08-02 | End: 2019-08-02

## 2019-08-02 RX ORDER — FUROSEMIDE 10 MG/ML
40 INJECTION INTRAMUSCULAR; INTRAVENOUS ONCE
Status: COMPLETED | OUTPATIENT
Start: 2019-08-02 | End: 2019-08-02

## 2019-08-02 RX ORDER — METHYLPREDNISOLONE SOD SUCC 125 MG
125 VIAL (EA) INJECTION EVERY 6 HOURS
Status: DISCONTINUED | OUTPATIENT
Start: 2019-08-02 | End: 2019-08-03

## 2019-08-02 RX ORDER — CEFEPIME HYDROCHLORIDE 1 G/1
1 INJECTION, POWDER, FOR SOLUTION INTRAMUSCULAR; INTRAVENOUS
Status: DISCONTINUED | OUTPATIENT
Start: 2019-08-02 | End: 2019-08-04

## 2019-08-02 RX ORDER — ACETAMINOPHEN 325 MG/1
325 TABLET ORAL ONCE
Status: COMPLETED | OUTPATIENT
Start: 2019-08-02 | End: 2019-08-02

## 2019-08-02 RX ORDER — ASPIRIN 325 MG
325 TABLET ORAL ONCE
Status: DISCONTINUED | OUTPATIENT
Start: 2019-08-02 | End: 2019-08-02

## 2019-08-02 RX ADMIN — FUROSEMIDE 40 MG: 10 INJECTION, SOLUTION INTRAMUSCULAR; INTRAVENOUS at 08:08

## 2019-08-02 RX ADMIN — ATORVASTATIN CALCIUM 20 MG: 20 TABLET, FILM COATED ORAL at 08:08

## 2019-08-02 RX ADMIN — VALACYCLOVIR HYDROCHLORIDE 1000 MG: 500 TABLET, FILM COATED ORAL at 09:08

## 2019-08-02 RX ADMIN — CEFEPIME 1 G: 1 INJECTION, POWDER, FOR SOLUTION INTRAMUSCULAR; INTRAVENOUS at 11:08

## 2019-08-02 RX ADMIN — FUROSEMIDE 40 MG: 10 INJECTION, SOLUTION INTRAMUSCULAR; INTRAVENOUS at 09:08

## 2019-08-02 RX ADMIN — ACETAMINOPHEN 325 MG: 325 TABLET ORAL at 08:08

## 2019-08-02 RX ADMIN — PANTOPRAZOLE SODIUM 40 MG: 40 TABLET, DELAYED RELEASE ORAL at 08:08

## 2019-08-02 RX ADMIN — ACETAMINOPHEN 325 MG: 325 TABLET ORAL at 10:08

## 2019-08-02 RX ADMIN — VANCOMYCIN HYDROCHLORIDE 1250 MG: 1.25 INJECTION, POWDER, LYOPHILIZED, FOR SOLUTION INTRAVENOUS at 12:08

## 2019-08-02 RX ADMIN — METHYLPREDNISOLONE SODIUM SUCCINATE 125 MG: 125 INJECTION, POWDER, FOR SOLUTION INTRAMUSCULAR; INTRAVENOUS at 05:08

## 2019-08-02 RX ADMIN — SUCRALFATE 1 G: 1 SUSPENSION ORAL at 07:08

## 2019-08-02 RX ADMIN — AZITHROMYCIN MONOHYDRATE 500 MG: 500 INJECTION, POWDER, LYOPHILIZED, FOR SOLUTION INTRAVENOUS at 09:08

## 2019-08-02 RX ADMIN — PANTOPRAZOLE SODIUM 40 MG: 40 TABLET, DELAYED RELEASE ORAL at 09:08

## 2019-08-02 RX ADMIN — SUCRALFATE 1 G: 1 SUSPENSION ORAL at 08:08

## 2019-08-02 RX ADMIN — RAMELTEON 8 MG: 8 TABLET, FILM COATED ORAL at 08:08

## 2019-08-02 RX ADMIN — FERROUS SULFATE TAB EC 325 MG (65 MG FE EQUIVALENT) 325 MG: 325 (65 FE) TABLET DELAYED RESPONSE at 09:08

## 2019-08-02 RX ADMIN — METHYLPREDNISOLONE SODIUM SUCCINATE 125 MG: 125 INJECTION, POWDER, FOR SOLUTION INTRAMUSCULAR; INTRAVENOUS at 12:08

## 2019-08-02 RX ADMIN — SUCRALFATE 1 G: 1 SUSPENSION ORAL at 04:08

## 2019-08-02 RX ADMIN — SUCRALFATE 1 G: 1 SUSPENSION ORAL at 11:08

## 2019-08-02 RX ADMIN — CEFEPIME 1 G: 1 INJECTION, POWDER, FOR SOLUTION INTRAMUSCULAR; INTRAVENOUS at 08:08

## 2019-08-02 NOTE — ASSESSMENT & PLAN NOTE
Respiratory pathogens/viral panels all negative.  No leukocytosis.  On Cefepime, Vancomycin, and azithromycin.  Low suspicion of infectious process.  Has had intensive illness in the past month leading to deconditioning.  Currently with high oxygen requirements.  Desats with minimal exertion.  Currently on IV steroids.  Diuresing as needed. On HFNC.  Wean as tolerated.  May benefit from PT for deconditioning.  Further recs per Dr. Park

## 2019-08-02 NOTE — PLAN OF CARE
Problem: Adult Inpatient Plan of Care  Goal: Plan of Care Review  Outcome: Ongoing (interventions implemented as appropriate)    No acute events throughout day. See vital signs and assessments in flowsheets. See below for updates on today's progress.     Pulmonary: 20L/40% HF NC with SpO2 > 88%. Episodes of desaturation upon exertion. Frequent cough.     Cardiovascular: NST to ST 's. 12L EKG and CXray obtained this shift. Normotensive. Checked q4H per MD nursing communication.    Neurological: AAOx4. Afebrile. Follows commands. No c/o pain    Gastrointestinal: No BM last night. Regular diet.     Genitourinary: Condom cath intact with ~ 1L UO. Lasix 80mg IVP given x1.     Endocrine: WNL    Skin/Bath: Intact  Date of last CHG bath given: 8/1/19    Infusions: No gtts    Patient progressing towards goals as tolerated, plan of care communicated and reviewed with Manpreet Hamilton and family. All questions and concerns addressed. Bed in low, locked position. Call light within reach. Will continue to monitor.

## 2019-08-02 NOTE — PROGRESS NOTES
Pharmacokinetic Initial Assessment: IV Vancomycin    Assessment/Plan:    - Initiate vancomycin 1250 mg every 12 hours.   - Desired empiric serum trough concentration is 15 to 20 mcg/mL.  - Draw vancomycin trough level 30 min prior to fourth dose on 8/4 at approximately 12 am.     Pharmacy will continue to follow and monitor vancomycin.      Please contact pharmacy at extension [phone] with any questions regarding this assessment.     Thank you for the consult,   Deanna Sunshine, PharmD, BCCCP     Patient brief summary:  Manpreet Hamilton is a 68 y.o. male initiated on antimicrobial therapy with IV Vancomycin for treatment of suspected lower respiratory infection.     Drug Allergies:   Review of patient's allergies indicates:   Allergen Reactions    Penicillins     Erythromycin Rash       Actual Body Weight:   75.8 kg    Renal Function:   Estimated Creatinine Clearance: 76.4 mL/min (based on SCr of 0.9 mg/dL).      CBC (last 72 hours):  Recent Labs   Lab Result Units 07/31/19  2100 08/02/19  0807   WBC K/uL 10.96 9.85   Hemoglobin g/dL 11.3* 11.2*   Hematocrit % 35.1* 35.9*   Platelets K/uL 563* 586*   Gran% % 80.0* 89.9*   Lymph% % 10.5* 5.1*   Mono% % 8.7 3.9*   Eosinophil% % 0.3 0.1   Basophil% % 0.2 0.2   Differential Method  Automated Automated       Metabolic Panel (last 72 hours):  Recent Labs   Lab Result Units 08/01/19  0210 08/02/19  0611   Sodium mmol/L 133* 133*   Potassium mmol/L 3.6 3.9   Chloride mmol/L 93* 93*   CO2 mmol/L 30* 26   Glucose mg/dL 134* 157*   BUN, Bld mg/dL 15 28*   Creatinine mg/dL 0.7  0.7 0.9   Albumin g/dL 2.4* 2.6*   Total Bilirubin mg/dL 0.5 0.5   Alkaline Phosphatase U/L 231* 216*   AST U/L 28 34   ALT U/L 41 57*       Drug levels (last 3 results):  No results for input(s): VANCOMYCINRA, VANCOMYCINPE, VANCOMYCINTR in the last 72 hours.    Microbiologic Results:  Microbiology Results (last 7 days)     Procedure Component Value Units Date/Time    Culture, Respiratory with Gram  Stain [[id number]] Collected:  07/31/19 2128    Order Status:  Completed Specimen:  Respiratory from Sputum Updated:  08/02/19 1235     Respiratory Culture Normal respiratory kendy     Gram Stain (Respiratory) <10 epithelial cells per low power field.     Gram Stain (Respiratory) Few WBC's     Gram Stain (Respiratory) Few Gram positive rods     Gram Stain (Respiratory) Few Gram positive cocci     Gram Stain (Respiratory) Rare yeast    Blood culture [[id number]] Collected:  08/01/19 0247    Order Status:  Completed Specimen:  Blood from Peripheral, Hand, Right Updated:  08/02/19 0613     Blood Culture, Routine No Growth to date      No Growth to date    Blood culture [[id number]] Collected:  08/01/19 0248    Order Status:  Completed Specimen:  Blood from Peripheral, Forearm, Right Updated:  08/02/19 0613     Blood Culture, Routine No Growth to date      No Growth to date    Culture, Respiratory with Gram Stain [[id number]]     Order Status:  No result Specimen:  Respiratory     Respiratory Infection Panel, Nasopharyngeal [[id number]] Collected:  07/31/19 2135    Order Status:  Completed Specimen:  Nasopharyngeal Swab Updated:  08/01/19 0910     Respiratory Infection Panel Source NP Swab     Adenovirus Not Detected     Coronavirus 229E Not Detected     Coronavirus HKU1 Not Detected     Coronavirus NL63 Not Detected     Coronavirus OC43 Not Detected     Human Metapneumovirus Not Detected     Human Rhinovirus/Enterovirus Not Detected     Influenza A (subtypes H1, H1-2009,H3) Not Detected     Influenza B Not Detected     Parainfluenza Virus 1 Not Detected     Parainfluenza Virus 2 Not Detected     Parainfluenza Virus 3 Not Detected     Parainfluenza Virus 4 Not Detected     Respiratory Syncytial Virus Not Detected     Bordetella Parapertussis (MR5243) Not Detected     Bordetella pertussis (ptxP) Not Detected     Chlamydia pneumoniae Not Detected     Mycoplasma pneumoniae Not Detected    Narrative:       For all other  respiratory sources order [id number] Respiratory  Viral Panel by PCR (RVPCR)    Respiratory Infection Panel, Nasopharyngeal [[id number]] Resulted:  07/31/19 2356    Order Status:  No result Updated:  07/31/19 2356    Respiratory Viral Panel by PCR [facility]; Sputum [[id number]] Collected:  07/31/19 2128    Order Status:  Canceled Specimen:  Respiratory

## 2019-08-02 NOTE — PROGRESS NOTES
[facility]  Critical Care Medicine  Progress Note    Patient Name: Manpreet Hamilton  MRN: [id number]  Admission Date: 7/31/2019  Hospital Length of Stay: 2 days  Code Status: Full Code  Attending Provider: Gareth Asif MD  Primary Care Provider: Radames Pradhan MD   Principal Problem: Acute on chronic respiratory failure with hypoxemia    Subjective:     HPI:  67 yo  male PMH of HTN, HLD, UIP (dx 1 year ago, follow by Dr. Pradhan, on ofev) presenting as a direct admit for increaased oxygen requirement. He was recent discharged from [facility] on July 22, after GI bleed, requiring massive transfusion 20 units over 9-10 days. Couple days after getting home, he had increased oxygen requirement, was on 2 L, now requiring 5 L nasal canula. He had imaging done while at [facility] which showed an infiltrate that may have been PNA, he was started on 30 mg prednisone daily, azithromycin 500 mg BID, and Valacyclovir 1 gram BID. Dr. Reynolds reported his 02 saturation had been worsening all week, but more so today, unknown cause, then transferred to [facility] to be put under his care. On arrival he reports shortness of breath with ambulation, associated with cough for 6 days, productive of clear sputum. He denies fevers, chills, nausea, vomiting, chest pain, leg swelling, hemoptysis, hematemesis, change in bowel or urinary habits.     Hospital/ICU Course:  Patient improving with steroids and antibiotics on 08/02/2019, requiring less oxygen support.     Interval History/Significant Events: some chest pain overnight, unlikely cardiac in nature CXR, troponin, ECG not indicative of cardiac etiology.     Review of Systems   Constitutional: Negative for activity change, chills, fatigue and fever.   HENT: Negative.    Respiratory: Positive for cough, chest tightness and shortness of breath.    Cardiovascular: Negative for palpitations and leg swelling.   Gastrointestinal: Negative for abdominal distention, constipation,  diarrhea and nausea.   Genitourinary: Positive for difficulty urinating.   Musculoskeletal: Negative.    Skin: Positive for pallor.   Psychiatric/Behavioral: Negative.      Objective:     Vital Signs (Most Recent):  Temp: 97.6 °F (36.4 °C) (08/02/19 1100)  Pulse: 107 (08/02/19 1222)  Resp: (!) 41 (08/02/19 1222)  BP: 125/81 (08/02/19 1200)  SpO2: 95 % (08/02/19 1222) Vital Signs (24h Range):  Temp:  [96.8 °F (36 °C)-98.2 °F (36.8 °C)] 97.6 °F (36.4 °C)  Pulse:  [] 107  Resp:  [10-52] 41  SpO2:  [83 %-100 %] 95 %  BP: (107-138)/(57-83) 125/81   Weight: 75.8 kg (167 lb)  Body mass index is 24.66 kg/m².      Intake/Output Summary (Last 24 hours) at 8/2/2019 1251  Last data filed at 8/2/2019 1200  Gross per 24 hour   Intake 1205 ml   Output 1925 ml   Net -720 ml       Physical Exam   Constitutional: He appears well-developed. No distress.   HENT:   Head: Normocephalic and atraumatic.   Eyes: Pupils are equal, round, and reactive to light.   Cardiovascular: Normal rate and regular rhythm.   Pulmonary/Chest: Effort normal. No respiratory distress. He has wheezes (faint, bibasilar).   Abdominal: Soft. He exhibits no distension. There is no tenderness.   Musculoskeletal: He exhibits no edema.   Skin: He is not diaphoretic.   Nursing note and vitals reviewed.      Vents:  Oxygen Concentration (%): 35 (08/02/19 1222)  Lines/Drains/Airways     Drain            Male External Urinary Catheter 08/01/19 1020 Small 1 day          Peripheral Intravenous Line                 Peripheral IV - Single Lumen 07/31/19 20 G Right Antecubital 2 days         Peripheral IV - Single Lumen 08/01/19 0300 22 G Right Hand 1 day              Significant Labs:    CBC/Anemia Profile:  Recent Labs   Lab 07/31/19  2100 08/02/19  0807   WBC 10.96 9.85   HGB 11.3* 11.2*   HCT 35.1* 35.9*   * 586*   MCV 84 85   RDW 13.9 14.0        Chemistries:  Recent Labs   Lab 08/01/19  0210 08/02/19  0611   * 133*   K 3.6 3.9   CL 93* 93*   CO2 30*  26   BUN 15 28*   CREATININE 0.7  0.7 0.9   CALCIUM 9.5 9.9   ALBUMIN 2.4* 2.6*   PROT 6.2 6.5   BILITOT 0.5 0.5   ALKPHOS 231* 216*   ALT 41 57*   AST 28 34       All pertinent labs within the past 24 hours have been reviewed.    Significant Imaging:  I have reviewed all pertinent imaging results/findings within the past 24 hours.      ABG  No results for input(s): PH, PO2, PCO2, HCO3, BE in the last 168 hours.  Assessment/Plan:     Pulmonary  * Acute on chronic respiratory failure with hypoxemia  Hx of UIP (on OFEV)  -Increased oxygen requirement, on comfort flow, weaning as tolerated  -CT PE-showed ILD and superimposed ground glass opacity  -Xray shows diffuse hazy reticular lung disease, more prominent on left, unchanged from previous  -Follow with Dr. Reynolds, recs below  -Respiratory pathogens panel negative  -Respiratory viral panel negative  -Respiratory infections panel negative  -Sputum culture  -Solumedrol 125 mg QID, taper to 80mg TID  -Broad spectrum antibiotics: vancomycin, azithromycin, and cefepime  -Valacyclovir discontinued  -Will consider daily lasix for added diuresis    ILD (interstitial lung disease)  See acute on chronic resp failure.       Critical Care Daily Checklist:    A: Awake: RASS Goal/Actual Goal: RASS Goal: 0-->alert and calm  Actual: Shane Agitation Sedation Scale (RASS): Alert and calm   B: Spontaneous Breathing Trial Performed?     C: SAT & SBT Coordinated?  n/a                      D: Delirium: CAM-ICU Overall CAM-ICU: Negative   E: Early Mobility Performed? Yes   F: Feeding Goal:    Status:     Current Diet Order   Procedures    Diet Adult Regular (IDDSI Level 7)      AS: Analgesia/Sedation nil   T: Thromboembolic Prophylaxis Nil (hx of GI bleed)   H: HOB > 300 Yes   U: Stress Ulcer Prophylaxis (if needed) pantoprazole   G: Glucose Control nil   B: Bowel Function Stool Occurrence: 0   I: Indwelling Catheter (Lines & Ribeiro) Necessity pivc x 2   D: De-escalation of  Antimicrobials/Pharmacotherapies Vanc, cefepime, azithromycin    Plan for the day/ETD Broaden abx, continue weaning oxygen    Code Status:  Family/Goals of Care: Full Code  n/a       Critical secondary to Patient has a condition that poses threat to life and bodily function: Severe Respiratory Distress      Critical care was time spent personally by me on the following activities: development of treatment plan with patient or surrogate and bedside caregivers, discussions with consultants, evaluation of patient's response to treatment, examination of patient, ordering and performing treatments and interventions, ordering and review of laboratory studies, ordering and review of radiographic studies, pulse oximetry, re-evaluation of patient's condition. This critical care time did not overlap with that of any other provider or involve time for any procedures.     Sabrina Naranjo MD  Critical Care Medicine  [facility]

## 2019-08-02 NOTE — SUBJECTIVE & OBJECTIVE
Interval History/Significant Events: some chest pain overnight, unlikely cardiac in nature CXR, troponin, ECG not indicative of cardiac etiology.     Review of Systems   Constitutional: Negative for activity change, chills, fatigue and fever.   HENT: Negative.    Respiratory: Positive for cough, chest tightness and shortness of breath.    Cardiovascular: Negative for palpitations and leg swelling.   Gastrointestinal: Negative for abdominal distention, constipation, diarrhea and nausea.   Genitourinary: Positive for difficulty urinating.   Musculoskeletal: Negative.    Skin: Positive for pallor.   Psychiatric/Behavioral: Negative.      Objective:     Vital Signs (Most Recent):  Temp: 97.6 °F (36.4 °C) (08/02/19 1100)  Pulse: 107 (08/02/19 1222)  Resp: (!) 41 (08/02/19 1222)  BP: 125/81 (08/02/19 1200)  SpO2: 95 % (08/02/19 1222) Vital Signs (24h Range):  Temp:  [96.8 °F (36 °C)-98.2 °F (36.8 °C)] 97.6 °F (36.4 °C)  Pulse:  [] 107  Resp:  [10-52] 41  SpO2:  [83 %-100 %] 95 %  BP: (107-138)/(57-83) 125/81   Weight: 75.8 kg (167 lb)  Body mass index is 24.66 kg/m².      Intake/Output Summary (Last 24 hours) at 8/2/2019 1251  Last data filed at 8/2/2019 1200  Gross per 24 hour   Intake 1205 ml   Output 1925 ml   Net -720 ml       Physical Exam   Constitutional: He appears well-developed. No distress.   HENT:   Head: Normocephalic and atraumatic.   Eyes: Pupils are equal, round, and reactive to light.   Cardiovascular: Normal rate and regular rhythm.   Pulmonary/Chest: Effort normal. No respiratory distress. He has wheezes (faint, bibasilar).   Abdominal: Soft. He exhibits no distension. There is no tenderness.   Musculoskeletal: He exhibits no edema.   Skin: He is not diaphoretic.   Nursing note and vitals reviewed.      Vents:  Oxygen Concentration (%): 35 (08/02/19 1222)  Lines/Drains/Airways     Drain            Male External Urinary Catheter 08/01/19 1020 Small 1 day          Peripheral Intravenous Line                  Peripheral IV - Single Lumen 07/31/19 20 G Right Antecubital 2 days         Peripheral IV - Single Lumen 08/01/19 0300 22 G Right Hand 1 day              Significant Labs:    CBC/Anemia Profile:  Recent Labs   Lab 07/31/19  2100 08/02/19  0807   WBC 10.96 9.85   HGB 11.3* 11.2*   HCT 35.1* 35.9*   * 586*   MCV 84 85   RDW 13.9 14.0        Chemistries:  Recent Labs   Lab 08/01/19  0210 08/02/19  0611   * 133*   K 3.6 3.9   CL 93* 93*   CO2 30* 26   BUN 15 28*   CREATININE 0.7  0.7 0.9   CALCIUM 9.5 9.9   ALBUMIN 2.4* 2.6*   PROT 6.2 6.5   BILITOT 0.5 0.5   ALKPHOS 231* 216*   ALT 41 57*   AST 28 34       All pertinent labs within the past 24 hours have been reviewed.    Significant Imaging:  I have reviewed all pertinent imaging results/findings within the past 24 hours.

## 2019-08-02 NOTE — HPI
69 yo  male PMH of HTN, HLD, UIP (dx 1 year ago, follow by Dr. Pradhan, on ofev) presenting as a direct admit for increaased oxygen requirement. He was recent discharged from [facility] on July 22, after GI bleed, requiring massive transfusion 20 units over 9-10 days. Couple days after getting home, he had increased oxygen requirement, was on 2 L, now requiring 5 L nasal canula. He had imaging done while at [facility] which showed an infiltrate that may have been PNA, he was started on 30 mg prednisone daily, azithromycin 500 mg BID, and Valacyclovir 1 gram BID. Dr. Reynolds reported his 02 saturation had been worsening all week, but more so today, unknown cause, then transferred to [facility] to be put under his care. On arrival he reports shortness of breath with ambulation, associated with cough for 6 days, productive of clear sputum. He denies fevers, chills, nausea, vomiting, chest pain, leg swelling, hemoptysis, hematemesis, change in bowel or urinary habits.

## 2019-08-02 NOTE — PLAN OF CARE
Problem: Adult Inpatient Plan of Care  Goal: Plan of Care Review  Outcome: Ongoing (interventions implemented as appropriate)  POC reviewed with patient and spouse, concerns addressed, verbalized understanding.  Pt is AAO, hoarse voice, follows commands, ZAIDI with strong equal strength.  NSR to ST on telemonitor with stable BP.  All pulses palpable with no edema.  Pt remains on comfort flow and desats to 70s during activities and coughing; coughs independently with productive sputum.  Abdomen is round, soft and non-tender; BM x1 without any blood today.  Bladder scan and straight cath done today; condom catheter applied; lasix once IVP administered; pt c/o difficulty starting to urinate, states he takes flomax at home, MD made aware.  Pt on regular diet, but continues to have poor appetite.  SCDs applied, encouraged to get OOB and up to chair; refused enoxaparin, MD is aware.  Heel and sacral foams applied; overlay inflated to pt's preferrence.  Pt remains free of falls and injuries; will continue to monitor.

## 2019-08-02 NOTE — ASSESSMENT & PLAN NOTE
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 Reynolds, recs below  -Respiratory pathogens panel negative  -Respiratory viral panel negative  -Respiratory infections panel negative  -Sputum culture  -Solumedrol 125 mg QID, taper to 80mg TID  -Broad spectrum antibiotics: vancomycin, azithromycin, and cefepime  -Valacyclovir discontinued  -Will consider daily lasix for added diuresis

## 2019-08-02 NOTE — SUBJECTIVE & OBJECTIVE
Past Medical History:   Diagnosis Date    Hyperlipemia     Hypertension     Nasal congestion     Pulmonary fibrosis        Past Surgical History:   Procedure Laterality Date    CATARACT EXTRACTION Left     COLONOSCOPY N/A 8/5/2016    Performed by Leon Padilla MD at [facility] END[facility] (4TH FLR)    EGD (ESOPHAGOGASTRODUODENOSCOPY) N/A 7/18/2019    Performed by Fer Fox MD at [facility] END[facility] (2ND FLR)    EGD (ESOPHAGOGASTRODUODENOSCOPY) N/A 7/12/2019    Performed by Jimmy Saldana MD at [facility] END[facility] (2ND FLR)    EGD (ESOPHAGOGASTRODUODENOSCOPY) N/A 7/11/2019    Performed by Jimmy Saldana MD at [facility] (2ND FLR)    EGD (ESOPHAGOGASTRODUODENOSCOPY) N/A 7/10/2019    Performed by Jimmy Saldana MD at [facility] (2ND FLR)    HAND SURGERY      left    HIATAL HERNIA REPAIR      MICRODISCECTOMY, SPINE MIS Right L5- S1 Right 6/27/2018    Performed by Angel Pendleton MD at [facility] OR 2ND FLR    TONSILLECTOMY, ADENOIDECTOMY      VASECTOMY         Review of patient's allergies indicates:   Allergen Reactions    Penicillins     Erythromycin Rash       PTA Medications   Medication Sig    atorvastatin (LIPITOR) 20 MG tablet TAKE ONE TABLET BY MOUTH EVERY EVENING    desonide (DESOWEN) 0.05 % lotion 1 application 2 (two) times daily.    doxycycline (VIBRAMYCIN) 100 MG Cap TAKE ONE CAPSULE BY MOUTH EVERY DAY FOR THE FIRST 5 DAYS OF EACH MONTH    ferrous sulfate (FEOSOL) 325 mg (65 mg iron) Tab tablet Take 1 tablet (325 mg total) by mouth every 12 (twelve) hours.    hydroCHLOROthiazide (HYDRODIURIL) 25 MG tablet Take 1 tablet (25 mg total) by mouth once daily.    loperamide (IMODIUM) 2 mg capsule 1 capsule 3 (three) times daily as needed.    nintedanib (OFEV) 150 mg Cap Take 1 tablet by mouth 2 (two) times daily.     pantoprazole (PROTONIX) 40 MG tablet Take 1 tablet (40 mg total) by mouth every 12 (twelve) hours. Take one pill every morning 45 minutes before breakfast in the morning.    sucralfate (CARAFATE) 100  mg/mL suspension Take 10 mLs (1 g total) by mouth 4 (four) times daily with meals and nightly.    vitamin D3-vitamin K2 1000-90 unit-mcg TbDL 1 tablet once daily.     Family History     Problem Relation (Age of Onset)    Coronary artery disease Mother, Sister    Dementia Father    Multiple sclerosis Sister    Parkinsonism Father        Tobacco Use    Smoking status: Former Smoker     Packs/day: 1.50     Years: 20.00     Pack years: 30.00     Types: Cigarettes     Last attempt to quit: 3/17/1985     Years since quittin.4    Smokeless tobacco: Former User     Quit date: 1986   Substance and Sexual Activity    Alcohol use: Yes     Alcohol/week: 16.8 oz     Types: 28 Glasses of wine per week     Comment: 6-7 bottles per week    Drug use: No    Sexual activity: Yes     Partners: Female     Review of Systems   Constitutional: Positive for activity change. Negative for appetite change, chills, fatigue, fever and unexpected weight change.   HENT: Negative for congestion, ear pain, hearing loss, mouth sores and postnasal drip.    Eyes: Negative for photophobia, pain, discharge, redness, itching and visual disturbance.   Respiratory: Positive for cough, chest tightness, shortness of breath and wheezing.    Cardiovascular: Negative for chest pain, palpitations and leg swelling.   Gastrointestinal: Negative for abdominal distention, abdominal pain, blood in stool, constipation and diarrhea.   Endocrine: Negative for cold intolerance, heat intolerance, polydipsia, polyphagia and polyuria.   Genitourinary: Negative for decreased urine volume, dysuria, frequency, hematuria and urgency.   Musculoskeletal: Negative for arthralgias and joint swelling.   Allergic/Immunologic: Negative for environmental allergies, food allergies and immunocompromised state.   Neurological: Positive for weakness. Negative for dizziness, tremors, syncope, speech difficulty and numbness.   Hematological: Negative for adenopathy. Does not  bruise/bleed easily.   Psychiatric/Behavioral: Negative for agitation, confusion and hallucinations.     Objective:     Vital Signs (Most Recent):  Temp: 98.1 °F (36.7 °C) (08/02/19 1500)  Pulse: 91 (08/02/19 1617)  Resp: (!) 36 (08/02/19 1617)  BP: 118/75 (08/02/19 1500)  SpO2: (!) 93 % (08/02/19 1617) Vital Signs (24h Range):  Temp:  [96.8 °F (36 °C)-98.1 °F (36.7 °C)] 98.1 °F (36.7 °C)  Pulse:  [] 91  Resp:  [10-52] 36  SpO2:  [90 %-100 %] 93 %  BP: (114-138)/(66-83) 118/75     Weight: 75.8 kg (167 lb)  Body mass index is 24.66 kg/m².      Intake/Output Summary (Last 24 hours) at 8/2/2019 1628  Last data filed at 8/2/2019 1400  Gross per 24 hour   Intake 1690 ml   Output 1925 ml   Net -235 ml       Physical Exam   Constitutional: He is oriented to person, place, and time. He appears well-developed and well-nourished.   HENT:   Head: Normocephalic and atraumatic.   Eyes: Conjunctivae and EOM are normal.   Neck: Normal range of motion. Neck supple.   Cardiovascular: Normal rate and regular rhythm.   Pulmonary/Chest: No respiratory distress. He has decreased breath sounds. He has wheezes in the right lower field and the left lower field.   Abdominal: Soft. Bowel sounds are normal.   Musculoskeletal: Normal range of motion.   Neurological: He is alert and oriented to person, place, and time.   Skin: Skin is warm.   Psychiatric: He has a normal mood and affect.       Significant Labs:  CBC:  Recent Labs   Lab 08/02/19  0807   WBC 9.85   RBC 4.23*   HGB 11.2*   HCT 35.9*   *   MCV 85   MCH 26.5*   MCHC 31.2*     BMP:  Recent Labs   Lab 08/02/19  1457   *   K 3.8   CL 91*   CO2 28   BUN 33*   CREATININE 0.9   CALCIUM 9.3        I have reviewed all pertinent labs within the past 24 hours.    Diagnostic Results:  X-Ray: Reviewed  Diffuse hazy reticular lung disease

## 2019-08-02 NOTE — CONSULTS
[facility]  Lung Transplant  Consult Note    Patient Name: Manpreet Hamilton  MRN: [id number]  Admission Date: 7/31/2019  Hospital Length of Stay: 2 days  Attending Physician: Gareth Asif MD  Primary Care Provider: Radames Pradhan MD     Inpatient consult to Lung Transplant  Consult performed by: Pollo Saunders NP  Consult ordered by: Kenan De Luna MD        Subjective:     History of Present Illness:  67 yo  male PMH of HTN, HLD, UIP (dx 1 year ago, follow by Dr. Pradhan, on ofev) presenting as a direct admit for increaased oxygen requirement. He was recent discharged from [facility] on July 22, after GI bleed, requiring massive transfusion 20 units over 9-10 days. Couple days after getting home, he had increased oxygen requirement, was on 2 L, now requiring 5 L nasal canula. He had imaging done while at [facility] which showed an infiltrate that may have been PNA, he was started on 30 mg prednisone daily, azithromycin 500 mg BID, and Valacyclovir 1 gram BID. Dr. Reynolds reported his 02 saturation had been worsening all week, but more so today, unknown cause, then transferred to [facility] to be put under his care. On arrival he reports shortness of breath with ambulation, associated with cough for 6 days, productive of clear sputum. He denies fevers, chills, nausea, vomiting, chest pain, leg swelling, hemoptysis, hematemesis, change in bowel or urinary habits.      Past Medical History:   Diagnosis Date    Hyperlipemia     Hypertension     Nasal congestion     Pulmonary fibrosis        Past Surgical History:   Procedure Laterality Date    CATARACT EXTRACTION Left     COLONOSCOPY N/A 8/5/2016    Performed by Leon Padilla MD at [facility] ENDO (4TH FLR)    EGD (ESOPHAGOGASTRODUODENOSCOPY) N/A 7/18/2019    Performed by Fer Fox MD at [facility] ENDO (2ND FLR)    EGD (ESOPHAGOGASTRODUODENOSCOPY) N/A 7/12/2019    Performed by Jimmy Saldana MD at [facility] ENDO (2ND FLR)    EGD  (ESOPHAGOGASTRODUODENOSCOPY) N/A 7/11/2019    Performed by Jimmy Saldana MD at [facility] ENDO (2ND FLR)    EGD (ESOPHAGOGASTRODUODENOSCOPY) N/A 7/10/2019    Performed by Jimmy Saldana MD at [facility] ENDO (2ND FLR)    HAND SURGERY      left    HIATAL HERNIA REPAIR      MICRODISCECTOMY, SPINE MIS Right L5- S1 Right 6/27/2018    Performed by Angel Pendleton MD at [facility] OR 2ND FLR    TONSILLECTOMY, ADENOIDECTOMY      VASECTOMY         Review of patient's allergies indicates:   Allergen Reactions    Penicillins     Erythromycin Rash       PTA Medications   Medication Sig    atorvastatin (LIPITOR) 20 MG tablet TAKE ONE TABLET BY MOUTH EVERY EVENING    desonide (DESOWEN) 0.05 % lotion 1 application 2 (two) times daily.    doxycycline (VIBRAMYCIN) 100 MG Cap TAKE ONE CAPSULE BY MOUTH EVERY DAY FOR THE FIRST 5 DAYS OF EACH MONTH    ferrous sulfate (FEOSOL) 325 mg (65 mg iron) Tab tablet Take 1 tablet (325 mg total) by mouth every 12 (twelve) hours.    hydroCHLOROthiazide (HYDRODIURIL) 25 MG tablet Take 1 tablet (25 mg total) by mouth once daily.    loperamide (IMODIUM) 2 mg capsule 1 capsule 3 (three) times daily as needed.    nintedanib (OFEV) 150 mg Cap Take 1 tablet by mouth 2 (two) times daily.     pantoprazole (PROTONIX) 40 MG tablet Take 1 tablet (40 mg total) by mouth every 12 (twelve) hours. Take one pill every morning 45 minutes before breakfast in the morning.    sucralfate (CARAFATE) 100 mg/mL suspension Take 10 mLs (1 g total) by mouth 4 (four) times daily with meals and nightly.    vitamin D3-vitamin K2 1000-90 unit-mcg TbDL 1 tablet once daily.     Family History     Problem Relation (Age of Onset)    Coronary artery disease Mother, Sister    Dementia Father    Multiple sclerosis Sister    Parkinsonism Father        Tobacco Use    Smoking status: Former Smoker     Packs/day: 1.50     Years: 20.00     Pack years: 30.00     Types: Cigarettes     Last attempt to quit: 3/17/1985     Years since  quittin.4    Smokeless tobacco: Former User     Quit date: 1986   Substance and Sexual Activity    Alcohol use: Yes     Alcohol/week: 16.8 oz     Types: 28 Glasses of wine per week     Comment: 6-7 bottles per week    Drug use: No    Sexual activity: Yes     Partners: Female     Review of Systems   Constitutional: Positive for activity change. Negative for appetite change, chills, fatigue, fever and unexpected weight change.   HENT: Negative for congestion, ear pain, hearing loss, mouth sores and postnasal drip.    Eyes: Negative for photophobia, pain, discharge, redness, itching and visual disturbance.   Respiratory: Positive for cough, chest tightness, shortness of breath and wheezing.    Cardiovascular: Negative for chest pain, palpitations and leg swelling.   Gastrointestinal: Negative for abdominal distention, abdominal pain, blood in stool, constipation and diarrhea.   Endocrine: Negative for cold intolerance, heat intolerance, polydipsia, polyphagia and polyuria.   Genitourinary: Negative for decreased urine volume, dysuria, frequency, hematuria and urgency.   Musculoskeletal: Negative for arthralgias and joint swelling.   Allergic/Immunologic: Negative for environmental allergies, food allergies and immunocompromised state.   Neurological: Positive for weakness. Negative for dizziness, tremors, syncope, speech difficulty and numbness.   Hematological: Negative for adenopathy. Does not bruise/bleed easily.   Psychiatric/Behavioral: Negative for agitation, confusion and hallucinations.     Objective:     Vital Signs (Most Recent):  Temp: 98.1 °F (36.7 °C) (19 1500)  Pulse: 91 (19 161)  Resp: (!) 36 (19)  BP: 118/75 (19 1500)  SpO2: (!) 93 % (19 161) Vital Signs (24h Range):  Temp:  [96.8 °F (36 °C)-98.1 °F (36.7 °C)] 98.1 °F (36.7 °C)  Pulse:  [] 91  Resp:  [10-52] 36  SpO2:  [90 %-100 %] 93 %  BP: (114-138)/(66-83) 118/75     Weight: 75.8 kg (167  lb)  Body mass index is 24.66 kg/m².      Intake/Output Summary (Last 24 hours) at 8/2/2019 1628  Last data filed at 8/2/2019 1400  Gross per 24 hour   Intake 1690 ml   Output 1925 ml   Net -235 ml       Physical Exam   Constitutional: He is oriented to person, place, and time. He appears well-developed and well-nourished.   HENT:   Head: Normocephalic and atraumatic.   Eyes: Conjunctivae and EOM are normal.   Neck: Normal range of motion. Neck supple.   Cardiovascular: Normal rate and regular rhythm.   Pulmonary/Chest: No respiratory distress. He has decreased breath sounds. He has wheezes in the right lower field and the left lower field.   Abdominal: Soft. Bowel sounds are normal.   Musculoskeletal: Normal range of motion.   Neurological: He is alert and oriented to person, place, and time.   Skin: Skin is warm.   Psychiatric: He has a normal mood and affect.       Significant Labs:  CBC:  Recent Labs   Lab 08/02/19  0807   WBC 9.85   RBC 4.23*   HGB 11.2*   HCT 35.9*   *   MCV 85   MCH 26.5*   MCHC 31.2*     BMP:  Recent Labs   Lab 08/02/19  1457   *   K 3.8   CL 91*   CO2 28   BUN 33*   CREATININE 0.9   CALCIUM 9.3        I have reviewed all pertinent labs within the past 24 hours.    Diagnostic Results:  X-Ray: Reviewed  Diffuse hazy reticular lung disease     Assessment/Plan:     * Acute on chronic respiratory failure with hypoxemia  Respiratory pathogens/viral panels all negative.  No leukocytosis.  On Cefepime, Vancomycin, and azithromycin.  Low suspicion of infectious process.  Has had intensive illness in the past month leading to deconditioning.  Currently with high oxygen requirements.  Desats with minimal exertion.  Currently on IV steroids.  Diuresing as needed. On HFNC.  Wean as tolerated.  May benefit from PT for deconditioning.  Further recs per Dr. Park    ILD (interstitial lung disease)  On OFEV.  Receiving IV steroids.  Further recs per Dr. Park        Thank you for your  consult. I will follow-up with patient. Please contact us if you have any additional questions.    Pollo Saunders NP  Lung Transplant  [facility]

## 2019-08-02 NOTE — HOSPITAL COURSE
Patient improving with steroids and antibiotics on 08/02/2019, requiring less oxygen support. Intermittently increased use of oxygen supplementation, stopping Ofev due to concern for complication of clinical picture. Weaning steroids and patient being mobilized to chair. Intermittently desaturates when coughing or mobilizing, but otherwise stable/improving slowly.

## 2019-08-02 NOTE — PROGRESS NOTES
Patient c/o chest tightness @ 0400 after exerting self while turning in bed. Dr. Stone informed and at bedside. 12L EKG obtained, Troponin sent, CXRay obtained. Patient stated relief of symptom at 0400. Patient also denied aspirin dose because he stated that Aspirin was the cause of his previous GIB.    WCTM and update team appropriately

## 2019-08-03 LAB
ALBUMIN SERPL BCP-MCNC: 2.6 G/DL (ref 3.5–5.2)
ALP SERPL-CCNC: 190 U/L (ref 55–135)
ALT SERPL W/O P-5'-P-CCNC: 58 U/L (ref 10–44)
ANION GAP SERPL CALC-SCNC: 13 MMOL/L (ref 8–16)
AST SERPL-CCNC: 27 U/L (ref 10–40)
BACTERIA SPEC AEROBE CULT: NORMAL
BACTERIA SPEC AEROBE CULT: NORMAL
BASOPHILS # BLD AUTO: 0 K/UL (ref 0–0.2)
BASOPHILS NFR BLD: 0 % (ref 0–1.9)
BILIRUB SERPL-MCNC: 0.4 MG/DL (ref 0.1–1)
BUN SERPL-MCNC: 33 MG/DL (ref 8–23)
CALCIUM SERPL-MCNC: 9.2 MG/DL (ref 8.7–10.5)
CHLORIDE SERPL-SCNC: 88 MMOL/L (ref 95–110)
CO2 SERPL-SCNC: 27 MMOL/L (ref 23–29)
CREAT SERPL-MCNC: 0.9 MG/DL (ref 0.5–1.4)
DIFFERENTIAL METHOD: ABNORMAL
EOSINOPHIL # BLD AUTO: 0 K/UL (ref 0–0.5)
EOSINOPHIL NFR BLD: 0 % (ref 0–8)
ERYTHROCYTE [DISTWIDTH] IN BLOOD BY AUTOMATED COUNT: 13.9 % (ref 11.5–14.5)
EST. GFR  (AFRICAN AMERICAN): >60 ML/MIN/1.73 M^2
EST. GFR  (NON AFRICAN AMERICAN): >60 ML/MIN/1.73 M^2
GLUCOSE SERPL-MCNC: 165 MG/DL (ref 70–110)
GRAM STN SPEC: NORMAL
HCT VFR BLD AUTO: 32.4 % (ref 40–54)
HGB BLD-MCNC: 10.8 G/DL (ref 14–18)
IMM GRANULOCYTES # BLD AUTO: 0.07 K/UL (ref 0–0.04)
IMM GRANULOCYTES NFR BLD AUTO: 0.6 % (ref 0–0.5)
LYMPHOCYTES # BLD AUTO: 0.8 K/UL (ref 1–4.8)
LYMPHOCYTES NFR BLD: 6.3 % (ref 18–48)
MAGNESIUM SERPL-MCNC: 1.9 MG/DL (ref 1.6–2.6)
MCH RBC QN AUTO: 26.8 PG (ref 27–31)
MCHC RBC AUTO-ENTMCNC: 33.3 G/DL (ref 32–36)
MCV RBC AUTO: 80 FL (ref 82–98)
MONOCYTES # BLD AUTO: 0.5 K/UL (ref 0.3–1)
MONOCYTES NFR BLD: 4.3 % (ref 4–15)
NEUTROPHILS # BLD AUTO: 10.8 K/UL (ref 1.8–7.7)
NEUTROPHILS NFR BLD: 88.8 % (ref 38–73)
NRBC BLD-RTO: 0 /100 WBC
PLATELET # BLD AUTO: 539 K/UL (ref 150–350)
PMV BLD AUTO: 8.7 FL (ref 9.2–12.9)
POTASSIUM SERPL-SCNC: 3.5 MMOL/L (ref 3.5–5.1)
PROT SERPL-MCNC: 5.9 G/DL (ref 6–8.4)
RBC # BLD AUTO: 4.03 M/UL (ref 4.6–6.2)
SODIUM SERPL-SCNC: 128 MMOL/L (ref 136–145)
WBC # BLD AUTO: 12.11 K/UL (ref 3.9–12.7)

## 2019-08-03 PROCEDURE — 94761 N-INVAS EAR/PLS OXIMETRY MLT: CPT

## 2019-08-03 PROCEDURE — 27100171 HC OXYGEN HIGH FLOW UP TO 24 HOURS

## 2019-08-03 PROCEDURE — 85025 COMPLETE CBC W/AUTO DIFF WBC: CPT

## 2019-08-03 PROCEDURE — 20000000 HC ICU ROOM

## 2019-08-03 PROCEDURE — 63600175 PHARM REV CODE 636 W HCPCS: Performed by: EMERGENCY MEDICINE

## 2019-08-03 PROCEDURE — 94664 DEMO&/EVAL PT USE INHALER: CPT

## 2019-08-03 PROCEDURE — 99291 PR CRITICAL CARE, E/M 30-74 MINUTES: ICD-10-PCS | Mod: ,,, | Performed by: EMERGENCY MEDICINE

## 2019-08-03 PROCEDURE — 83735 ASSAY OF MAGNESIUM: CPT

## 2019-08-03 PROCEDURE — 63600175 PHARM REV CODE 636 W HCPCS: Performed by: STUDENT IN AN ORGANIZED HEALTH CARE EDUCATION/TRAINING PROGRAM

## 2019-08-03 PROCEDURE — 25000003 PHARM REV CODE 250: Performed by: STUDENT IN AN ORGANIZED HEALTH CARE EDUCATION/TRAINING PROGRAM

## 2019-08-03 PROCEDURE — 99291 CRITICAL CARE FIRST HOUR: CPT | Mod: ,,, | Performed by: EMERGENCY MEDICINE

## 2019-08-03 PROCEDURE — 94668 MNPJ CHEST WALL SBSQ: CPT

## 2019-08-03 PROCEDURE — 99900035 HC TECH TIME PER 15 MIN (STAT)

## 2019-08-03 PROCEDURE — 97530 THERAPEUTIC ACTIVITIES: CPT

## 2019-08-03 PROCEDURE — 27100092 HC HIGH FLOW DELIVERY CANNULA

## 2019-08-03 PROCEDURE — 97162 PT EVAL MOD COMPLEX 30 MIN: CPT

## 2019-08-03 PROCEDURE — 80053 COMPREHEN METABOLIC PANEL: CPT

## 2019-08-03 PROCEDURE — 27000646 HC AEROBIKA DEVICE

## 2019-08-03 PROCEDURE — 63600175 PHARM REV CODE 636 W HCPCS: Performed by: INTERNAL MEDICINE

## 2019-08-03 RX ORDER — METHYLPREDNISOLONE SOD SUCC 125 MG
125 VIAL (EA) INJECTION EVERY 6 HOURS
Status: DISCONTINUED | OUTPATIENT
Start: 2019-08-03 | End: 2019-08-04

## 2019-08-03 RX ORDER — LIDOCAINE 50 MG/G
2 PATCH TOPICAL
Status: DISCONTINUED | OUTPATIENT
Start: 2019-08-03 | End: 2019-08-07

## 2019-08-03 RX ORDER — POTASSIUM CHLORIDE 750 MG/1
10 CAPSULE, EXTENDED RELEASE ORAL ONCE
Status: COMPLETED | OUTPATIENT
Start: 2019-08-03 | End: 2019-08-03

## 2019-08-03 RX ADMIN — METHYLPREDNISOLONE SODIUM SUCCINATE 125 MG: 125 INJECTION, POWDER, FOR SOLUTION INTRAMUSCULAR; INTRAVENOUS at 12:08

## 2019-08-03 RX ADMIN — PANTOPRAZOLE SODIUM 40 MG: 40 TABLET, DELAYED RELEASE ORAL at 08:08

## 2019-08-03 RX ADMIN — METHYLPREDNISOLONE SODIUM SUCCINATE 125 MG: 125 INJECTION, POWDER, FOR SOLUTION INTRAMUSCULAR; INTRAVENOUS at 01:08

## 2019-08-03 RX ADMIN — CEFEPIME 1 G: 1 INJECTION, POWDER, FOR SOLUTION INTRAMUSCULAR; INTRAVENOUS at 02:08

## 2019-08-03 RX ADMIN — SUCRALFATE 1 G: 1 SUSPENSION ORAL at 08:08

## 2019-08-03 RX ADMIN — POTASSIUM CHLORIDE 10 MEQ: 750 CAPSULE, EXTENDED RELEASE ORAL at 05:08

## 2019-08-03 RX ADMIN — RAMELTEON 8 MG: 8 TABLET, FILM COATED ORAL at 08:08

## 2019-08-03 RX ADMIN — METHYLPREDNISOLONE SODIUM SUCCINATE 125 MG: 125 INJECTION, POWDER, FOR SOLUTION INTRAMUSCULAR; INTRAVENOUS at 05:08

## 2019-08-03 RX ADMIN — CEFEPIME 1 G: 1 INJECTION, POWDER, FOR SOLUTION INTRAMUSCULAR; INTRAVENOUS at 08:08

## 2019-08-03 RX ADMIN — LIDOCAINE 2 PATCH: 50 PATCH TOPICAL at 09:08

## 2019-08-03 RX ADMIN — ATORVASTATIN CALCIUM 20 MG: 20 TABLET, FILM COATED ORAL at 08:08

## 2019-08-03 RX ADMIN — FERROUS SULFATE TAB EC 325 MG (65 MG FE EQUIVALENT) 325 MG: 325 (65 FE) TABLET DELAYED RESPONSE at 08:08

## 2019-08-03 RX ADMIN — SUCRALFATE 1 G: 1 SUSPENSION ORAL at 11:08

## 2019-08-03 RX ADMIN — SUCRALFATE 1 G: 1 SUSPENSION ORAL at 05:08

## 2019-08-03 RX ADMIN — VANCOMYCIN HYDROCHLORIDE 1250 MG: 1.25 INJECTION, POWDER, LYOPHILIZED, FOR SOLUTION INTRAVENOUS at 01:08

## 2019-08-03 RX ADMIN — CEFEPIME 1 G: 1 INJECTION, POWDER, FOR SOLUTION INTRAMUSCULAR; INTRAVENOUS at 11:08

## 2019-08-03 NOTE — PROGRESS NOTES
[facility]  Critical Care Medicine  Progress Note    Patient Name: Manpreet Hamilton  MRN: [id number]  Admission Date: 7/31/2019  Hospital Length of Stay: 3 days  Code Status: Full Code  Attending Provider: Gareth Asif MD  Primary Care Provider: Radames Pradhan MD   Principal Problem: Acute on chronic respiratory failure with hypoxemia    Subjective:     HPI:  67 yo  male PMH of HTN, HLD, UIP (dx 1 year ago, follow by Dr. Pradhan, on ofev) presenting as a direct admit for increaased oxygen requirement. [place] was [place] [facility] [place] after GI bleed, requiring massive transfusion 20 units over 9-10 days. Couple days after getting home, he had increased oxygen requirement, was on 2 L, now requiring 5 L nasal canula. He had imaging done while at [facility] which showed an infiltrate that may have been PNA, he was started on 30 mg prednisone daily, azithromycin 500 mg BID, and Valacyclovir 1 gram BID. Dr. Reynolds reported his 02 saturation had been worsening all week, but more so today, unknown cause, then transferred to [facility] to be put under his care. On arrival he reports shortness of breath with ambulation, associated with cough for 6 days, productive of clear sputum. He denies fevers, chills, nausea, vomiting, chest pain, leg swelling, hemoptysis, hematemesis, change in bowel or urinary habits.     Hospital/ICU Course:  Patient improving with steroids and antibiotics on 08/02/2019, requiring less oxygen support. Intermittently increased use of oxygen supplementation, stopping Ofev due to concern for complication of clinical picture.     Interval History/Significant Events: some chest pain overnight, unlikely cardiac in nature CXR, troponin, ECG not indicative of cardiac etiology.     Review of Systems   Constitutional: Negative for activity change, chills, fatigue and fever.   HENT: Negative.    Respiratory: Positive for cough, chest tightness and shortness of breath.     Cardiovascular: Negative for palpitations and leg swelling.   Gastrointestinal: Negative for abdominal distention, constipation, diarrhea and nausea.   Genitourinary: Positive for difficulty urinating.   Musculoskeletal: Positive for back pain.   Skin: Positive for pallor.   Psychiatric/Behavioral: Negative.      Objective:     Vital Signs (Most Recent):  Temp: 97.9 °F (36.6 °C) (08/03/19 1100)  Pulse: 83 (08/03/19 1200)  Resp: (!) 37 (08/03/19 1200)  BP: 110/70 (08/03/19 1100)  SpO2: 99 % (08/03/19 1200) Vital Signs (24h Range):  Temp:  [97.6 °F (36.4 °C)-98.1 °F (36.7 °C)] 97.9 °F (36.6 °C)  Pulse:  [] 83  Resp:  [15-47] 37  SpO2:  [84 %-100 %] 99 %  BP: (100-118)/(62-75) 110/70   Weight: 75.8 kg (167 lb)  Body mass index is 24.66 kg/m².      Intake/Output Summary (Last 24 hours) at 8/3/2019 1239  Last data filed at 8/3/2019 1200  Gross per 24 hour   Intake 1960 ml   Output 1375 ml   Net 585 ml       Physical Exam   Constitutional: He is oriented to person, place, and time. He appears well-developed. No distress.   HENT:   Head: Normocephalic and atraumatic.   Eyes: Pupils are equal, round, and reactive to light.   Cardiovascular: Normal rate and regular rhythm.   Pulmonary/Chest: Effort normal. No respiratory distress. He has wheezes (faint, bibasilar).   Abdominal: Soft. He exhibits no distension. There is no tenderness.   Musculoskeletal: He exhibits no edema.   Neurological: He is alert and oriented to person, place, and time.   Skin: He is not diaphoretic.   Nursing note and vitals reviewed.      Vents:  Oxygen Concentration (%): 30 (08/03/19 1100)  Lines/Drains/Airways     Drain            Male External Urinary Catheter 08/01/19 1020 Small 2 days          Peripheral Intravenous Line                 Peripheral IV - Single Lumen 07/31/19 20 G Right Antecubital 3 days         Peripheral IV - Single Lumen 08/01/19 0300 22 G Right Hand 2 days              Significant Labs:    CBC/Anemia Profile:  Recent  Labs   Lab 08/02/19  0807 08/03/19  0238   WBC 9.85 12.11   HGB 11.2* 10.8*   HCT 35.9* 32.4*   * 539*   MCV 85 80*   RDW 14.0 13.9        Chemistries:  Recent Labs   Lab 08/02/19  0611 08/02/19  1457 08/03/19  0238   * 131* 128*   K 3.9 3.8 3.5   CL 93* 91* 88*   CO2 26 28 27   BUN 28* 33* 33*   CREATININE 0.9 0.9 0.9   CALCIUM 9.9 9.3 9.2   ALBUMIN 2.6* 2.6* 2.6*   PROT 6.5  --  5.9*   BILITOT 0.5  --  0.4   ALKPHOS 216*  --  190*   ALT 57*  --  58*   AST 34  --  27   MG  --   --  1.9   PHOS  --  4.0  --        All pertinent labs within the past 24 hours have been reviewed.    Significant Imaging:  I have reviewed all pertinent imaging results/findings within the past 24 hours.      ABG  No results for input(s): PH, PO2, PCO2, HCO3, BE in the last 168 hours.  Assessment/Plan:     Pulmonary  * Acute on chronic respiratory failure with hypoxemia  Hx of UIP (on OFEV)  -Increased oxygen requirement, on comfort flow, weaning as tolerated  -CT PE-showed ILD and superimposed ground glass opacity  -Xray shows diffuse hazy reticular lung disease, more prominent on left, unchanged from previous  -Follow with Dr. Reynolds, recs below  -Respiratory pathogens panel negative  -Respiratory viral panel negative  -Respiratory infections panel negative  -Sputum culture  -Solumedrol 125 mg QID for 3 days then taper to 80mg TID  -Broad spectrum antibiotics: vancomycin, azithromycin, and cefepime  -Valacyclovir discontinued  - daily lasix, will monitor metabolic panel daily    ILD (interstitial lung disease)  See acute on chronic resp failure.       Critical Care Daily Checklist:    A: Awake: RASS Goal/Actual Goal: RASS Goal: 0-->alert and calm  Actual: Shane Agitation Sedation Scale (RASS): Alert and calm   B: Spontaneous Breathing Trial Performed?     C: SAT & SBT Coordinated?  n/a                      D: Delirium: CAM-ICU Overall CAM-ICU: Negative   E: Early Mobility Performed? Yes   F: Feeding Goal:    Status:      Current Diet Order   Procedures    Diet Adult Regular (IDDSI Level 7) Fluid - 1500mL     Order Specific Question:   Fluid restriction:     Answer:   Fluid - 1500mL      AS: Analgesia/Sedation n/a   T: Thromboembolic Prophylaxis N/a    H: HOB > 300 Yes   U: Stress Ulcer Prophylaxis (if needed) Pantoprazole PO   G: Glucose Control nil   B: Bowel Function Stool Occurrence: 1   I: Indwelling Catheter (Lines & Ribeiro) Necessity PIVC   D: De-escalation of Antimicrobials/Pharmacotherapies Cefepime and azithromycin    Plan for the day/ETD Mobilize to chair, wean O2    Code Status:  Family/Goals of Care: Full Code  nil       Critical secondary to Patient has a condition that poses threat to life and bodily function: Severe Respiratory Distress.      Critical care was time spent personally by me on the following activities: development of treatment plan with patient or surrogate and bedside caregivers, discussions with consultants, evaluation of patient's response to treatment, examination of patient, ordering and performing treatments and interventions, ordering and review of laboratory studies, ordering and review of radiographic studies, pulse oximetry, re-evaluation of patient's condition. This critical care time did not overlap with that of any other provider or involve time for any procedures.     Sabrina Naranjo MD  Critical Care Medicine  [facility]

## 2019-08-03 NOTE — PLAN OF CARE
Problem: Adult Inpatient Plan of Care  Goal: Plan of Care Review  Outcome: Ongoing (interventions implemented as appropriate)  No acute events throughout shift, VS and assessment per flow sheet, patient progressing towards goals as tolerated, plan of care reviewed with Manpreet Hamilton and family, all concerns addressed, will continue to monitor. See flowsheets for vital signs and assessments. See below for updates on progress made.     Integumentary/other: HAPI prevention bundle in place     POC: continue antibiotics, monitor respiratory status    Patient progressing towards goals as tolerated, plan of care communicated and reviewed with Manpreet Hamilton. All concerns addressed. Will continue to monitor.

## 2019-08-03 NOTE — PLAN OF CARE
Problem: Adult Inpatient Plan of Care  Goal: Plan of Care Review  Outcome: Ongoing (interventions implemented as appropriate)  No acute events this shift. See vital signs and assessments in flowsheets. See below for updates on today's progress.      Pulmonary: 20L/40% HF NC with SpO2 > 88%. Episodes of desaturation upon exertion. Frequent cough. Lung sounds diminished     Cardiovascular: NSR 80-90's. Normotensive. Checked q4H per MD nursing communication.     Neurological: AAOx4. Afebrile. Follows commands. C/o positional back pain relieved by Tylenol     Gastrointestinal: 1 BM @ beginning of shift.. Regular diet.      Genitourinary: Condom cath intact with ~ 1L UO. Lasix 40mg IVP given x1.      Endocrine: WNL.     Skin/Bath: Intact Date of last CHG bath given: 8/3/19 @ 0600    Infusions: IVPB for Vancomycin and Zosyn. Potassium replaced.     Patient progressing towards goals as tolerated, plan of care communicated and reviewed with Manpreet Hamilton and family. All questions and concerns addressed. Bed in low, locked position. Call light within reach. Will continue to monitor.      A: Awake    RASS: Goal - 0  alert and calm Actual - 0  alert and calm   Restraint necessity: no  B: Breath   SBT: Not intubated  C: Coordinate A & B, analgesics/sedatives   Pain: managed   SAT: Not intubated  D: Delirium   CAM-ICU: negative  E: Early Mobility   MOVE Screen: Pass   Activity order: Adjusted per tolerance  FAS: Feeding/Nutrition   Diet order: Regular Fluid restriction: None  T: Thrombus   DVT prophylaxis: SCD  H: HOB Elevation   30 degrees: Yes   U: Ulcer Prophylaxis   GI: yes  G: Glucose control   managed  S: Skin   Bundle compliance: yes  B: Bowel Function   no issues  I: Indwelling Catheters   Ribeiro necessity: No   CVC necessity: No   IPAD offered: Yes  D: De-escalation Antibx   Yes  Plan for the day   Continue Antibiotics and wean oxygen requirements as tolerated  Family/Goals of care/Code Status   Code Status: Full  Code   GOC: Optimize respiratory function

## 2019-08-03 NOTE — PT/OT/SLP EVAL
Physical Therapy Evaluation    Patient Name:  Manpreet Hamilton   MRN:  [id number]    Recommendations:     Discharge Recommendations:  home health PT   Discharge Equipment Recommendations: none   Barriers to discharge: poor respiratory status     Assessment:     Manpreet Hamilton is a 68 y.o. male admitted with a medical diagnosis of Acute on chronic respiratory failure with hypoxemia.  He presents with the following impairments/functional limitations:  impaired endurance, impaired cardiopulmonary response to activity. Pt severely limited by respiratory status at this time. Pt required little/no assist with mobility. Pt desats easily and takes increased time to increase sats.  Pt transferred to the chair using stand pivot technique and desatted into the 70s. ELY Rosales notified and increased high flow nasal canula. RT also entered room to assess situation. Pt SpO2 at 87% when PT left. PT will continue to work with pt per respiratory tolerance.     Rehab Prognosis: Good; patient would benefit from acute skilled PT services to address these deficits and reach maximum level of function.    Recent Surgery: * No surgery found *      Plan:     During this hospitalization, patient to be seen 3 x/week to address the identified rehab impairments via gait training, therapeutic activities, therapeutic exercises, neuromuscular re-education and progress toward the following goals:    · Plan of Care Expires:  09/02/19    Subjective     Chief Complaint: SOB  Patient/Family Comments/goals: to get better   Pain/Comfort:  · Pain Rating 1: 0/10  · Pain Rating Post-Intervention 1: 0/10    Patients cultural, spiritual, Amish conflicts given the current situation: no    Living Environment:  Living Environment: Pt lives with wife in a 2SH with a flight of stairs to 2nd floor and B HRs and 3 ZORAIDA with no HRs  Previous level of function: mod indep with ambulation and mod A with ADLs  Equipment Used at home: SC, rollator   Assistance upon  Discharge: wife      Objective:     Communicated with RN prior to session and wife present in room.  Patient found supine with telemetry, pulse ox (continuous), blood pressure cuff, oxygen, Condom Catheter(high flow nasal canula )  upon PT entry to room.    General Precautions: Standard, fall   Orthopedic Precautions:N/A   Braces: N/A     Exams:  · Cognitive Exam:   · AAOx4  · Follows multistep commands  · Communication clear/fluent   · RLE ROM: WFL  · RLE Strength: WFL  · LLE ROM: WFL  · LLE Strength: WFL    Functional Mobility:  · Bed Mobility:     · Rolling Right: independence  · Scooting: independence  · Supine to Sit: independence  · Transfers:     · Sit to Stand:  supervision with no AD from EOB  · Bed to Chair: CGA using stand pivot  · Pt rushed transfer 2nd to SOB   · SpO2 dropped to 70s; RN notified and increased high flow nasal canula, RT present, SpO2 87% when PT left room; RN and RT still present in room  · Gait: not performed 2nd to poor activity tolerance       Therapeutic Activities and Exercises:  Educated pt on PT role/POC  Educated pt on importance of deep breathing  Pt verbalized understanding    T/f to chair to increase tolerance to OOB activity and to create optimal positioning for lung expansion       AM-PAC 6 CLICK MOBILITY  Total Score:22     Patient left up in chair with all lines intact, call button in reach and RN, RT, wife present.    GOALS:   Multidisciplinary Problems     Physical Therapy Goals        Problem: Physical Therapy Goal    Goal Priority Disciplines Outcome Goal Variances Interventions   Physical Therapy Goal     PT, PT/OT Ongoing (interventions implemented as appropriate)     Description:  Goals to be met by: 2019    Patient will increase functional independence with mobility by performin. Gait  x 10 feet with Supervision using  - not met  2. Ascend/descend 3 stair with Contact Guard Assistance  - not met  3. Lower extremity exercise program x15 reps per handout,  with independence to increase tolerance to activity - not met                      History:     Past Medical History:   Diagnosis Date    Hyperlipemia     Hypertension     Nasal congestion     Pulmonary fibrosis        Past Surgical History:   Procedure Laterality Date    CATARACT EXTRACTION Left     COLONOSCOPY N/A 8/5/2016    Performed by Leon Padilla MD at [facility] END[facility] (4TH FLR)    EGD (ESOPHAGOGASTRODUODENOSCOPY) N/A 7/18/2019    Performed by Fer Fox MD at [facility] ENDO (2ND FLR)    EGD (ESOPHAGOGASTRODUODENOSCOPY) N/A 7/12/2019    Performed by Jimmy Saldana MD at [facility] ENDO (2ND FLR)    EGD (ESOPHAGOGASTRODUODENOSCOPY) N/A 7/11/2019    Performed by Jimmy Saldana MD at [facility] ENDO (2ND FLR)    EGD (ESOPHAGOGASTRODUODENOSCOPY) N/A 7/10/2019    Performed by Jimmy Saldana MD at [facility] ENDO (2ND FLR)    HAND SURGERY      left    HIATAL HERNIA REPAIR      MICRODISCECTOMY, SPINE MIS Right L5- S1 Right 6/27/2018    Performed by Angel Pendleton MD at [facility] OR 2ND FLR    TONSILLECTOMY, ADENOIDECTOMY      VASECTOMY         Time Tracking:     PT Received On: 08/03/19  PT Start Time: 1334     PT Stop Time: 1352  PT Total Time (min): 18 min     Billable Minutes: Evaluation 10 and Therapeutic Activity 8    Xin Reyes, PT, DPT  8/3/2019  [phone]

## 2019-08-03 NOTE — ASSESSMENT & PLAN NOTE
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 Reynolds, recs below  -Respiratory pathogens panel negative  -Respiratory viral panel negative  -Respiratory infections panel negative  -Sputum culture  -Solumedrol 125 mg QID for 3 days then taper to 80mg TID  -Broad spectrum antibiotics: vancomycin, azithromycin, and cefepime  -Valacyclovir discontinued  - daily lasix, will monitor metabolic panel daily

## 2019-08-03 NOTE — PROGRESS NOTES
Pt desats to the low 80% after physical therapy,increased    bubble highflow to 15lpm, but oxgyen saturation between 78-83%. Placed back on comfort flow at 30lpm 70% fio2 to maintian spo2 at 90%. Will continue to monitor and wean O2 as tolerated.

## 2019-08-03 NOTE — CONSULTS
Vancomycin therapy discontinued by provider.  Pharmacy will sign off, please re-consult as needed.    Romina Calixto, PharmD, [facility]  Critical Care Clinical Pharmacist  Spectralink: [id number]

## 2019-08-03 NOTE — NURSING
Pt desat to low 80% and  following activity with PT. RT at bedside, unable to increase O2 sats with high flow max of 15 L. PT placed back on comfort flow. Team notified.

## 2019-08-03 NOTE — SUBJECTIVE & OBJECTIVE
Interval History/Significant Events: some chest pain overnight, unlikely cardiac in nature CXR, troponin, ECG not indicative of cardiac etiology.     Review of Systems   Constitutional: Negative for activity change, chills, fatigue and fever.   HENT: Negative.    Respiratory: Positive for cough, chest tightness and shortness of breath.    Cardiovascular: Negative for palpitations and leg swelling.   Gastrointestinal: Negative for abdominal distention, constipation, diarrhea and nausea.   Genitourinary: Positive for difficulty urinating.   Musculoskeletal: Positive for back pain.   Skin: Positive for pallor.   Psychiatric/Behavioral: Negative.      Objective:     Vital Signs (Most Recent):  Temp: 97.9 °F (36.6 °C) (08/03/19 1100)  Pulse: 83 (08/03/19 1200)  Resp: (!) 37 (08/03/19 1200)  BP: 110/70 (08/03/19 1100)  SpO2: 99 % (08/03/19 1200) Vital Signs (24h Range):  Temp:  [97.6 °F (36.4 °C)-98.1 °F (36.7 °C)] 97.9 °F (36.6 °C)  Pulse:  [] 83  Resp:  [15-47] 37  SpO2:  [84 %-100 %] 99 %  BP: (100-118)/(62-75) 110/70   Weight: 75.8 kg (167 lb)  Body mass index is 24.66 kg/m².      Intake/Output Summary (Last 24 hours) at 8/3/2019 1239  Last data filed at 8/3/2019 1200  Gross per 24 hour   Intake 1960 ml   Output 1375 ml   Net 585 ml       Physical Exam   Constitutional: He is oriented to person, place, and time. He appears well-developed. No distress.   HENT:   Head: Normocephalic and atraumatic.   Eyes: Pupils are equal, round, and reactive to light.   Cardiovascular: Normal rate and regular rhythm.   Pulmonary/Chest: Effort normal. No respiratory distress. He has wheezes (faint, bibasilar).   Abdominal: Soft. He exhibits no distension. There is no tenderness.   Musculoskeletal: He exhibits no edema.   Neurological: He is alert and oriented to person, place, and time.   Skin: He is not diaphoretic.   Nursing note and vitals reviewed.      Vents:  Oxygen Concentration (%): 30 (08/03/19  1100)  Lines/Drains/Airways     Drain            Male External Urinary Catheter 08/01/19 1020 Small 2 days          Peripheral Intravenous Line                 Peripheral IV - Single Lumen 07/31/19 20 G Right Antecubital 3 days         Peripheral IV - Single Lumen 08/01/19 0300 22 G Right Hand 2 days              Significant Labs:    CBC/Anemia Profile:  Recent Labs   Lab 08/02/19  0807 08/03/19  0238   WBC 9.85 12.11   HGB 11.2* 10.8*   HCT 35.9* 32.4*   * 539*   MCV 85 80*   RDW 14.0 13.9        Chemistries:  Recent Labs   Lab 08/02/19  0611 08/02/19  1457 08/03/19  0238   * 131* 128*   K 3.9 3.8 3.5   CL 93* 91* 88*   CO2 26 28 27   BUN 28* 33* 33*   CREATININE 0.9 0.9 0.9   CALCIUM 9.9 9.3 9.2   ALBUMIN 2.6* 2.6* 2.6*   PROT 6.5  --  5.9*   BILITOT 0.5  --  0.4   ALKPHOS 216*  --  190*   ALT 57*  --  58*   AST 34  --  27   MG  --   --  1.9   PHOS  --  4.0  --        All pertinent labs within the past 24 hours have been reviewed.    Significant Imaging:  I have reviewed all pertinent imaging results/findings within the past 24 hours.

## 2019-08-03 NOTE — PLAN OF CARE
Problem: Physical Therapy Goal  Goal: Physical Therapy Goal  Goals to be met by: 2019    Patient will increase functional independence with mobility by performin. Gait  x 10 feet with Supervision using  - not met  2. Ascend/descend 3 stair with Contact Guard Assistance  - not met  3. Lower extremity exercise program x15 reps per handout, with independence to increase tolerance to activity - not met    Outcome: Ongoing (interventions implemented as appropriate)  eval completed and goals appropriate

## 2019-08-03 NOTE — PLAN OF CARE
Problem: Adult Inpatient Plan of Care  Goal: Plan of Care Review  Outcome: Ongoing (interventions implemented as appropriate)  VS and assessment per flow sheet, patient progressing towards goals as tolerated, plan of care reviewed with Manpreet Hamilton and family, all concerns addressed, will continue to monitor. See flowsheets for vital signs and assessments. See below for updates on progress made.     Integumentary/other: HAPI prevention bundle in place; Last CHG bath: last night    POC: continue to monitor respiratory status, wean O2 as tolerated, continue with steroids    Patient progressing towards goals as tolerated, plan of care communicated and reviewed with Manpreet Hamilton. All concerns addressed. Will continue to monitor.

## 2019-08-04 PROBLEM — R53.81 DEBILITY: Status: ACTIVE | Noted: 2019-08-04

## 2019-08-04 LAB
ALBUMIN SERPL BCP-MCNC: 2.4 G/DL (ref 3.5–5.2)
ALBUMIN SERPL BCP-MCNC: 2.4 G/DL (ref 3.5–5.2)
ALP SERPL-CCNC: 154 U/L (ref 55–135)
ALP SERPL-CCNC: 154 U/L (ref 55–135)
ALT SERPL W/O P-5'-P-CCNC: 51 U/L (ref 10–44)
ALT SERPL W/O P-5'-P-CCNC: 51 U/L (ref 10–44)
ANION GAP SERPL CALC-SCNC: 9 MMOL/L (ref 8–16)
ANION GAP SERPL CALC-SCNC: 9 MMOL/L (ref 8–16)
AST SERPL-CCNC: 21 U/L (ref 10–40)
AST SERPL-CCNC: 21 U/L (ref 10–40)
BASOPHILS # BLD AUTO: 0 K/UL (ref 0–0.2)
BASOPHILS NFR BLD: 0 % (ref 0–1.9)
BILIRUB SERPL-MCNC: 0.4 MG/DL (ref 0.1–1)
BILIRUB SERPL-MCNC: 0.4 MG/DL (ref 0.1–1)
BUN SERPL-MCNC: 31 MG/DL (ref 8–23)
BUN SERPL-MCNC: 31 MG/DL (ref 8–23)
CALCIUM SERPL-MCNC: 9.4 MG/DL (ref 8.7–10.5)
CALCIUM SERPL-MCNC: 9.4 MG/DL (ref 8.7–10.5)
CHLORIDE SERPL-SCNC: 92 MMOL/L (ref 95–110)
CHLORIDE SERPL-SCNC: 92 MMOL/L (ref 95–110)
CO2 SERPL-SCNC: 31 MMOL/L (ref 23–29)
CO2 SERPL-SCNC: 31 MMOL/L (ref 23–29)
CREAT SERPL-MCNC: 0.8 MG/DL (ref 0.5–1.4)
CREAT SERPL-MCNC: 0.8 MG/DL (ref 0.5–1.4)
DIFFERENTIAL METHOD: ABNORMAL
EOSINOPHIL # BLD AUTO: 0 K/UL (ref 0–0.5)
EOSINOPHIL NFR BLD: 0 % (ref 0–8)
ERYTHROCYTE [DISTWIDTH] IN BLOOD BY AUTOMATED COUNT: 13.6 % (ref 11.5–14.5)
EST. GFR  (AFRICAN AMERICAN): >60 ML/MIN/1.73 M^2
EST. GFR  (AFRICAN AMERICAN): >60 ML/MIN/1.73 M^2
EST. GFR  (NON AFRICAN AMERICAN): >60 ML/MIN/1.73 M^2
EST. GFR  (NON AFRICAN AMERICAN): >60 ML/MIN/1.73 M^2
GLUCOSE SERPL-MCNC: 141 MG/DL (ref 70–110)
GLUCOSE SERPL-MCNC: 141 MG/DL (ref 70–110)
HCT VFR BLD AUTO: 31.9 % (ref 40–54)
HGB BLD-MCNC: 10.4 G/DL (ref 14–18)
IMM GRANULOCYTES # BLD AUTO: 0.04 K/UL (ref 0–0.04)
IMM GRANULOCYTES NFR BLD AUTO: 0.4 % (ref 0–0.5)
LYMPHOCYTES # BLD AUTO: 0.8 K/UL (ref 1–4.8)
LYMPHOCYTES NFR BLD: 7.7 % (ref 18–48)
MCH RBC QN AUTO: 26.4 PG (ref 27–31)
MCHC RBC AUTO-ENTMCNC: 32.6 G/DL (ref 32–36)
MCV RBC AUTO: 81 FL (ref 82–98)
MONOCYTES # BLD AUTO: 0.6 K/UL (ref 0.3–1)
MONOCYTES NFR BLD: 5.9 % (ref 4–15)
NEUTROPHILS # BLD AUTO: 8.4 K/UL (ref 1.8–7.7)
NEUTROPHILS NFR BLD: 86 % (ref 38–73)
NRBC BLD-RTO: 0 /100 WBC
PLATELET # BLD AUTO: 461 K/UL (ref 150–350)
PMV BLD AUTO: 8.9 FL (ref 9.2–12.9)
POTASSIUM SERPL-SCNC: 3.9 MMOL/L (ref 3.5–5.1)
POTASSIUM SERPL-SCNC: 3.9 MMOL/L (ref 3.5–5.1)
PROT SERPL-MCNC: 5.5 G/DL (ref 6–8.4)
PROT SERPL-MCNC: 5.5 G/DL (ref 6–8.4)
RBC # BLD AUTO: 3.94 M/UL (ref 4.6–6.2)
SODIUM SERPL-SCNC: 132 MMOL/L (ref 136–145)
SODIUM SERPL-SCNC: 132 MMOL/L (ref 136–145)
WBC # BLD AUTO: 9.78 K/UL (ref 3.9–12.7)

## 2019-08-04 PROCEDURE — 85025 COMPLETE CBC W/AUTO DIFF WBC: CPT

## 2019-08-04 PROCEDURE — 99232 PR SUBSEQUENT HOSPITAL CARE,LEVL II: ICD-10-PCS | Mod: ,,, | Performed by: INTERNAL MEDICINE

## 2019-08-04 PROCEDURE — 27100092 HC HIGH FLOW DELIVERY CANNULA

## 2019-08-04 PROCEDURE — 20600001 HC STEP DOWN PRIVATE ROOM

## 2019-08-04 PROCEDURE — 27000221 HC OXYGEN, UP TO 24 HOURS

## 2019-08-04 PROCEDURE — 99233 SBSQ HOSP IP/OBS HIGH 50: CPT | Mod: ,,, | Performed by: EMERGENCY MEDICINE

## 2019-08-04 PROCEDURE — 63600175 PHARM REV CODE 636 W HCPCS: Performed by: STUDENT IN AN ORGANIZED HEALTH CARE EDUCATION/TRAINING PROGRAM

## 2019-08-04 PROCEDURE — 27100171 HC OXYGEN HIGH FLOW UP TO 24 HOURS

## 2019-08-04 PROCEDURE — 80053 COMPREHEN METABOLIC PANEL: CPT

## 2019-08-04 PROCEDURE — 25000003 PHARM REV CODE 250: Performed by: STUDENT IN AN ORGANIZED HEALTH CARE EDUCATION/TRAINING PROGRAM

## 2019-08-04 PROCEDURE — 63600175 PHARM REV CODE 636 W HCPCS: Performed by: INTERNAL MEDICINE

## 2019-08-04 PROCEDURE — 99232 SBSQ HOSP IP/OBS MODERATE 35: CPT | Mod: ,,, | Performed by: INTERNAL MEDICINE

## 2019-08-04 PROCEDURE — 94761 N-INVAS EAR/PLS OXIMETRY MLT: CPT

## 2019-08-04 PROCEDURE — A4216 STERILE WATER/SALINE, 10 ML: HCPCS | Performed by: STUDENT IN AN ORGANIZED HEALTH CARE EDUCATION/TRAINING PROGRAM

## 2019-08-04 PROCEDURE — 99233 PR SUBSEQUENT HOSPITAL CARE,LEVL III: ICD-10-PCS | Mod: ,,, | Performed by: EMERGENCY MEDICINE

## 2019-08-04 RX ORDER — FUROSEMIDE 10 MG/ML
40 INJECTION INTRAMUSCULAR; INTRAVENOUS DAILY
Status: DISCONTINUED | OUTPATIENT
Start: 2019-08-04 | End: 2019-08-07

## 2019-08-04 RX ORDER — LEVOFLOXACIN 750 MG/1
750 TABLET ORAL DAILY
Status: DISCONTINUED | OUTPATIENT
Start: 2019-08-04 | End: 2019-08-09 | Stop reason: HOSPADM

## 2019-08-04 RX ORDER — METHYLPREDNISOLONE SOD SUCC 125 MG
80 VIAL (EA) INJECTION 2 TIMES DAILY
Status: DISCONTINUED | OUTPATIENT
Start: 2019-08-04 | End: 2019-08-04

## 2019-08-04 RX ORDER — METHYLPREDNISOLONE SOD SUCC 125 MG
40 VIAL (EA) INJECTION 2 TIMES DAILY
Status: DISCONTINUED | OUTPATIENT
Start: 2019-08-04 | End: 2019-08-05

## 2019-08-04 RX ADMIN — FUROSEMIDE 40 MG: 10 INJECTION, SOLUTION INTRAMUSCULAR; INTRAVENOUS at 11:08

## 2019-08-04 RX ADMIN — LEVOFLOXACIN 750 MG: 750 TABLET, FILM COATED ORAL at 03:08

## 2019-08-04 RX ADMIN — METHYLPREDNISOLONE SODIUM SUCCINATE 125 MG: 125 INJECTION, POWDER, FOR SOLUTION INTRAMUSCULAR; INTRAVENOUS at 01:08

## 2019-08-04 RX ADMIN — RAMELTEON 8 MG: 8 TABLET, FILM COATED ORAL at 06:08

## 2019-08-04 RX ADMIN — CEFEPIME 1 G: 1 INJECTION, POWDER, FOR SOLUTION INTRAMUSCULAR; INTRAVENOUS at 04:08

## 2019-08-04 RX ADMIN — PANTOPRAZOLE SODIUM 40 MG: 40 TABLET, DELAYED RELEASE ORAL at 08:08

## 2019-08-04 RX ADMIN — SUCRALFATE 1 G: 1 SUSPENSION ORAL at 08:08

## 2019-08-04 RX ADMIN — CEFEPIME 1 G: 1 INJECTION, POWDER, FOR SOLUTION INTRAMUSCULAR; INTRAVENOUS at 11:08

## 2019-08-04 RX ADMIN — SUCRALFATE 1 G: 1 SUSPENSION ORAL at 06:08

## 2019-08-04 RX ADMIN — SUCRALFATE 1 G: 1 SUSPENSION ORAL at 07:08

## 2019-08-04 RX ADMIN — SUCRALFATE 1 G: 1 SUSPENSION ORAL at 12:08

## 2019-08-04 RX ADMIN — FERROUS SULFATE TAB EC 325 MG (65 MG FE EQUIVALENT) 325 MG: 325 (65 FE) TABLET DELAYED RESPONSE at 09:08

## 2019-08-04 RX ADMIN — ATORVASTATIN CALCIUM 20 MG: 20 TABLET, FILM COATED ORAL at 08:08

## 2019-08-04 RX ADMIN — PANTOPRAZOLE SODIUM 40 MG: 40 TABLET, DELAYED RELEASE ORAL at 09:08

## 2019-08-04 RX ADMIN — METHYLPREDNISOLONE SODIUM SUCCINATE 40 MG: 125 INJECTION, POWDER, FOR SOLUTION INTRAMUSCULAR; INTRAVENOUS at 08:08

## 2019-08-04 RX ADMIN — Medication 10 ML: at 08:08

## 2019-08-04 RX ADMIN — METHYLPREDNISOLONE SODIUM SUCCINATE 125 MG: 125 INJECTION, POWDER, FOR SOLUTION INTRAMUSCULAR; INTRAVENOUS at 05:08

## 2019-08-04 RX ADMIN — LIDOCAINE 2 PATCH: 50 PATCH TOPICAL at 10:08

## 2019-08-04 NOTE — PROGRESS NOTES
Pt received on Comfort Flow 20L/50% with sat goal of >=88%.    Weaned flow to 15L and titrated FIO2 to 60%.    Pt sats >=90% on 15L/60%. Will continue to monitor and wean per sat goal.

## 2019-08-04 NOTE — ASSESSMENT & PLAN NOTE
Continue to wean oxygen. Currently on 7L HFNC and use NRBM for recovery.   Unclear if the etiology of his deterioration is a health care associated infection, non-cardiogenic pulmonary edema, or an exacerbation of his IPF. Will continue diuresis, change cefepime to levofloxacin and start to wean steroids.

## 2019-08-04 NOTE — PROGRESS NOTES
[facility]  Lung Transplant  Progress Note - Floor    Patient Name: Manpreet Hamilton  MRN: [id number]  Admission Date: 7/31/2019  Hospital Length of Stay: 4 days  Post-Operative Day:   Attending Physician: Jarret Park MD  Primary Care Provider: Radames Pradhan MD     Subjective:     Interval History: No acute events overnight. He was changed to a HFNC last night and was doing well but changed back to comfort flow when he had an episode of desaturation on exertion (which is expected).    Continuous Infusions:  Scheduled Meds:   atorvastatin  20 mg Oral QHS    ceFEPime (MAXIPIME) IVPB  1 g Intravenous Q8H    ferrous sulfate  325 mg Oral Daily    furosemide  40 mg Intravenous Daily    lidocaine  2 patch Transdermal Q24H    methylPREDNISolone sodium succinate  40 mg Intravenous BID    pantoprazole  40 mg Oral Q12H    sucralfate  1 g Oral QID (WM & HS)     PRN Meds:loperamide, ramelteon, sodium chloride 0.9%    Review of patient's allergies indicates:   Allergen Reactions    Penicillins     Erythromycin Rash       Review of Systems   Constitutional: Positive for activity change. Negative for appetite change, chills, fatigue, fever and unexpected weight change.   HENT: Negative for congestion, ear pain, hearing loss, mouth sores and postnasal drip.    Eyes: Negative for photophobia, pain, discharge, redness, itching and visual disturbance.   Respiratory: Positive for cough, chest tightness, shortness of breath and wheezing.    Cardiovascular: Negative for chest pain, palpitations and leg swelling.   Gastrointestinal: Negative for abdominal distention, abdominal pain, blood in stool, constipation and diarrhea.   Endocrine: Negative for cold intolerance, heat intolerance, polydipsia, polyphagia and polyuria.   Genitourinary: Negative for decreased urine volume, dysuria, frequency, hematuria and urgency.   Musculoskeletal: Negative for arthralgias and joint swelling.   Allergic/Immunologic:  Negative for environmental allergies, food allergies and immunocompromised state.   Neurological: Positive for weakness. Negative for dizziness, tremors, syncope, speech difficulty and numbness.   Hematological: Negative for adenopathy. Does not bruise/bleed easily.   Psychiatric/Behavioral: Negative for agitation, confusion and hallucinations.     Objective:   Physical Exam   Constitutional: He is oriented to person, place, and time. He appears well-developed and well-nourished.   HENT:   Head: Normocephalic and atraumatic.   Eyes: Conjunctivae and EOM are normal.   Neck: Normal range of motion. Neck supple.   Cardiovascular: Normal rate and regular rhythm.   Pulmonary/Chest: No respiratory distress. He has decreased breath sounds. He has no wheezes. He has rales.   Abdominal: Soft. Bowel sounds are normal.   Musculoskeletal: Normal range of motion.   Neurological: He is alert and oriented to person, place, and time.   Skin: Skin is warm.   Psychiatric: He has a normal mood and affect.         Vital Signs (Most Recent):  Temp: 98.1 °F (36.7 °C) (08/04/19 1100)  Pulse: 99 (08/04/19 1103)  Resp: (!) 28 (08/04/19 1103)  BP: 106/68 (08/04/19 1103)  SpO2: 100 % (08/04/19 1103) Vital Signs (24h Range):  Temp:  [97.6 °F (36.4 °C)-98.1 °F (36.7 °C)] 98.1 °F (36.7 °C)  Pulse:  [] 99  Resp:  [13-44] 28  SpO2:  [88 %-100 %] 100 %  BP: (106-125)/(63-75) 106/68     Weight: 75.8 kg (167 lb)  Body mass index is 24.66 kg/m².      Intake/Output Summary (Last 24 hours) at 8/4/2019 1247  Last data filed at 8/4/2019 1100  Gross per 24 hour   Intake 580 ml   Output 805 ml   Net -225 ml       Ventilator Data:     Oxygen Concentration (%):  [40-70] 60    Hemodynamic Parameters:       Lines/Drains:       Peripheral IV - Single Lumen 07/31/19 20 G Right Antecubital (Active)   Site Assessment Clean;Dry;Intact;No redness;No swelling 8/4/2019  7:01 AM   Line Status Flushed;Saline locked;Blood return noted 8/4/2019  7:01 AM   Dressing  Status Clean;Dry;Intact 8/4/2019  7:01 AM   Dressing Change Due 08/04/19 8/4/2019  7:01 AM   Site Change Due 08/04/19 8/4/2019  7:01 AM   Reason Not Rotated Poor venous access 8/4/2019  7:01 AM   Number of days: 4            Peripheral IV - Single Lumen 08/01/19 0300 22 G Right Hand (Active)   Site Assessment Clean;Dry;Intact;No redness;No swelling 8/4/2019  7:01 AM   Line Status Flushed;Saline locked 8/4/2019  7:01 AM   Dressing Status Clean;Dry;Intact 8/4/2019  7:01 AM   Dressing Intervention New dressing 8/1/2019  3:00 AM   Dressing Change Due 08/05/19 8/4/2019  7:01 AM   Site Change Due 08/05/19 8/4/2019  7:01 AM   Reason Not Rotated Not due 8/4/2019  7:01 AM   Number of days: 3       Male External Urinary Catheter 08/01/19 1020 Small (Active)   Collection Container Urimeter 8/4/2019  7:01 AM   Securement Method secured to top of thigh w/ adhesive device 8/4/2019  7:01 AM   Skin no redness;no breakdown 8/4/2019  7:01 AM   Tolerance no signs/symptoms of discomfort 8/4/2019  7:01 AM   Output (mL) 125 mL 8/4/2019 11:00 AM   Catheter Change Date 08/04/19 8/4/2019 11:00 AM   Catheter Change Time 1100 8/4/2019 11:00 AM   Number of days: 3       Significant Labs:  CBC:  Recent Labs   Lab 08/04/19 0316   WBC 9.78   RBC 3.94*   HGB 10.4*   HCT 31.9*   *   MCV 81*   MCH 26.4*   MCHC 32.6     BMP:  Recent Labs   Lab 08/04/19 0316   *  132*   K 3.9  3.9   CL 92*  92*   CO2 31*  31*   BUN 31*  31*   CREATININE 0.8  0.8   CALCIUM 9.4  9.4      Tacrolimus Levels:  No results for input(s): TACROLIMUS in the last 72 hours.  Microbiology:  Microbiology Results (last 7 days)     Procedure Component Value Units Date/Time    Blood culture [[id number]] Collected:  08/01/19 0247    Order Status:  Completed Specimen:  Blood from Peripheral, Hand, Right Updated:  08/04/19 0612     Blood Culture, Routine No Growth to date      No Growth to date      No Growth to date      No Growth to date    Blood culture  [[id number]] Collected:  08/01/19 0248    Order Status:  Completed Specimen:  Blood from Peripheral, Forearm, Right Updated:  08/04/19 0612     Blood Culture, Routine No Growth to date      No Growth to date      No Growth to date      No Growth to date    Culture, Respiratory with Gram Stain [[id number]] Collected:  07/31/19 2128    Order Status:  Completed Specimen:  Respiratory from Sputum Updated:  08/03/19 0907     Respiratory Culture Normal respiratory kendy      No S aureus or Pseudomonas isolated.     Gram Stain (Respiratory) <10 epithelial cells per low power field.     Gram Stain (Respiratory) Few WBC's     Gram Stain (Respiratory) Few Gram positive rods     Gram Stain (Respiratory) Few Gram positive cocci     Gram Stain (Respiratory) Rare yeast    Culture, Respiratory with Gram Stain [[id number]]     Order Status:  No result Specimen:  Respiratory     Respiratory Infection Panel, Nasopharyngeal [[id number]] Collected:  07/31/19 2135    Order Status:  Completed Specimen:  Nasopharyngeal Swab Updated:  08/01/19 0910     Respiratory Infection Panel Source NP Swab     Adenovirus Not Detected     Coronavirus 229E Not Detected     Coronavirus HKU1 Not Detected     Coronavirus NL63 Not Detected     Coronavirus OC43 Not Detected     Human Metapneumovirus Not Detected     Human Rhinovirus/Enterovirus Not Detected     Influenza A (subtypes H1, H1-2009,H3) Not Detected     Influenza B Not Detected     Parainfluenza Virus 1 Not Detected     Parainfluenza Virus 2 Not Detected     Parainfluenza Virus 3 Not Detected     Parainfluenza Virus 4 Not Detected     Respiratory Syncytial Virus Not Detected     Bordetella Parapertussis (AL9922) Not Detected     Bordetella pertussis (ptxP) Not Detected     Chlamydia pneumoniae Not Detected     Mycoplasma pneumoniae Not Detected    Narrative:       For all other respiratory sources order [id number] Respiratory  Viral Panel by PCR (RVPCR)    Respiratory Infection Panel, Nasopharyngeal  [[id number]] Resulted:  07/31/19 2356    Order Status:  No result Updated:  07/31/19 2356    Respiratory Viral Panel by PCR [facility]; Sputum [[id number]] Collected:  07/31/19 2128    Order Status:  Canceled Specimen:  Respiratory           I have reviewed all pertinent labs within the past 24 hours.        Assessment/Plan:     * Acute on chronic respiratory failure with hypoxemia  Continue to wean oxygen. Currently on 7L HFNC and use NRBM for recovery.   Unclear if the etiology of his deterioration is a health care associated infection, non-cardiogenic pulmonary edema, or an exacerbation of his IPF. Will continue diuresis, change cefepime to levofloxacin and start to wean steroids.    Idiopathic pulmonary fibrosis  Will discontinue nintedanib and switch to pirfenidone when his condition stabilizes. Will make the change after his episode of severe bleeding.     Debility  Continue physical therapy. Goal is to transfer him to inpatient rehab when his oxygen requirements have decreased.       Jarret Park MD  Lung Transplant  [facility]

## 2019-08-04 NOTE — ASSESSMENT & PLAN NOTE
Continue physical therapy. Goal is to transfer him to inpatient rehab when his oxygen requirements have decreased.

## 2019-08-04 NOTE — ASSESSMENT & PLAN NOTE
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 Reynolds, recs below  -Respiratory pathogens panel negative  -Respiratory viral panel negative  -Respiratory infections panel negative  -Sputum culture  -Solumedrol 125 mg QID for 3 days then taper to 80mg BID  -Broad spectrum antibiotics: vancomycin, azithromycin, and cefepime  -Valacyclovir discontinued  - daily lasix, will monitor metabolic panel daily

## 2019-08-04 NOTE — PLAN OF CARE
Problem: Adult Inpatient Plan of Care  Goal: Plan of Care Review  Outcome: Ongoing (interventions implemented as appropriate)  Pt AAOx4, VSS, in NAD throughout shift.  Pt currently at 7 LPM HFNC, tolerating well, RN weaning to maintain goal of >= 88%.  Pt utilizes bedpan for BMs and condom catheter, putting out clear yellow urine.  Pt's spouse remains at bedside.  RN maintaining fall risk precautions throughout shift.  Pt denies pain or other need at this time; RN will continue to monitor, assess, and alter plan of care as needed until report given to oncoming night shift nurse.

## 2019-08-04 NOTE — SUBJECTIVE & OBJECTIVE
Subjective:     Interval History: No acute events overnight. He was changed to a HFNC last night and was doing well but changed back to comfort flow when he had an episode of desaturation on exertion (which is expected).    Continuous Infusions:  Scheduled Meds:   atorvastatin  20 mg Oral QHS    ceFEPime (MAXIPIME) IVPB  1 g Intravenous Q8H    ferrous sulfate  325 mg Oral Daily    furosemide  40 mg Intravenous Daily    lidocaine  2 patch Transdermal Q24H    methylPREDNISolone sodium succinate  40 mg Intravenous BID    pantoprazole  40 mg Oral Q12H    sucralfate  1 g Oral QID (WM & HS)     PRN Meds:loperamide, ramelteon, sodium chloride 0.9%    Review of patient's allergies indicates:   Allergen Reactions    Penicillins     Erythromycin Rash       Review of Systems   Constitutional: Positive for activity change. Negative for appetite change, chills, fatigue, fever and unexpected weight change.   HENT: Negative for congestion, ear pain, hearing loss, mouth sores and postnasal drip.    Eyes: Negative for photophobia, pain, discharge, redness, itching and visual disturbance.   Respiratory: Positive for cough, chest tightness, shortness of breath and wheezing.    Cardiovascular: Negative for chest pain, palpitations and leg swelling.   Gastrointestinal: Negative for abdominal distention, abdominal pain, blood in stool, constipation and diarrhea.   Endocrine: Negative for cold intolerance, heat intolerance, polydipsia, polyphagia and polyuria.   Genitourinary: Negative for decreased urine volume, dysuria, frequency, hematuria and urgency.   Musculoskeletal: Negative for arthralgias and joint swelling.   Allergic/Immunologic: Negative for environmental allergies, food allergies and immunocompromised state.   Neurological: Positive for weakness. Negative for dizziness, tremors, syncope, speech difficulty and numbness.   Hematological: Negative for adenopathy. Does not bruise/bleed easily.   Psychiatric/Behavioral:  Negative for agitation, confusion and hallucinations.     Objective:   Physical Exam   Constitutional: He is oriented to person, place, and time. He appears well-developed and well-nourished.   HENT:   Head: Normocephalic and atraumatic.   Eyes: Conjunctivae and EOM are normal.   Neck: Normal range of motion. Neck supple.   Cardiovascular: Normal rate and regular rhythm.   Pulmonary/Chest: No respiratory distress. He has decreased breath sounds. He has no wheezes. He has rales.   Abdominal: Soft. Bowel sounds are normal.   Musculoskeletal: Normal range of motion.   Neurological: He is alert and oriented to person, place, and time.   Skin: Skin is warm.   Psychiatric: He has a normal mood and affect.         Vital Signs (Most Recent):  Temp: 98.1 °F (36.7 °C) (08/04/19 1100)  Pulse: 99 (08/04/19 1103)  Resp: (!) 28 (08/04/19 1103)  BP: 106/68 (08/04/19 1103)  SpO2: 100 % (08/04/19 1103) Vital Signs (24h Range):  Temp:  [97.6 °F (36.4 °C)-98.1 °F (36.7 °C)] 98.1 °F (36.7 °C)  Pulse:  [] 99  Resp:  [13-44] 28  SpO2:  [88 %-100 %] 100 %  BP: (106-125)/(63-75) 106/68     Weight: 75.8 kg (167 lb)  Body mass index is 24.66 kg/m².      Intake/Output Summary (Last 24 hours) at 8/4/2019 1247  Last data filed at 8/4/2019 1100  Gross per 24 hour   Intake 580 ml   Output 805 ml   Net -225 ml       Ventilator Data:     Oxygen Concentration (%):  [40-70] 60    Hemodynamic Parameters:       Lines/Drains:       Peripheral IV - Single Lumen 07/31/19 20 G Right Antecubital (Active)   Site Assessment Clean;Dry;Intact;No redness;No swelling 8/4/2019  7:01 AM   Line Status Flushed;Saline locked;Blood return noted 8/4/2019  7:01 AM   Dressing Status Clean;Dry;Intact 8/4/2019  7:01 AM   Dressing Change Due 08/04/19 8/4/2019  7:01 AM   Site Change Due 08/04/19 8/4/2019  7:01 AM   Reason Not Rotated Poor venous access 8/4/2019  7:01 AM   Number of days: 4            Peripheral IV - Single Lumen 08/01/19 0300 22 G Right Hand (Active)    Site Assessment Clean;Dry;Intact;No redness;No swelling 8/4/2019  7:01 AM   Line Status Flushed;Saline locked 8/4/2019  7:01 AM   Dressing Status Clean;Dry;Intact 8/4/2019  7:01 AM   Dressing Intervention New dressing 8/1/2019  3:00 AM   Dressing Change Due 08/05/19 8/4/2019  7:01 AM   Site Change Due 08/05/19 8/4/2019  7:01 AM   Reason Not Rotated Not due 8/4/2019  7:01 AM   Number of days: 3       Male External Urinary Catheter 08/01/19 1020 Small (Active)   Collection Container Urimeter 8/4/2019  7:01 AM   Securement Method secured to top of thigh w/ adhesive device 8/4/2019  7:01 AM   Skin no redness;no breakdown 8/4/2019  7:01 AM   Tolerance no signs/symptoms of discomfort 8/4/2019  7:01 AM   Output (mL) 125 mL 8/4/2019 11:00 AM   Catheter Change Date 08/04/19 8/4/2019 11:00 AM   Catheter Change Time 1100 8/4/2019 11:00 AM   Number of days: 3       Significant Labs:  CBC:  Recent Labs   Lab 08/04/19 0316   WBC 9.78   RBC 3.94*   HGB 10.4*   HCT 31.9*   *   MCV 81*   MCH 26.4*   MCHC 32.6     BMP:  Recent Labs   Lab 08/04/19 0316   *  132*   K 3.9  3.9   CL 92*  92*   CO2 31*  31*   BUN 31*  31*   CREATININE 0.8  0.8   CALCIUM 9.4  9.4      Tacrolimus Levels:  No results for input(s): TACROLIMUS in the last 72 hours.  Microbiology:  Microbiology Results (last 7 days)     Procedure Component Value Units Date/Time    Blood culture [[id number]] Collected:  08/01/19 0247    Order Status:  Completed Specimen:  Blood from Peripheral, Hand, Right Updated:  08/04/19 0612     Blood Culture, Routine No Growth to date      No Growth to date      No Growth to date      No Growth to date    Blood culture [[id number]] Collected:  08/01/19 0248    Order Status:  Completed Specimen:  Blood from Peripheral, Forearm, Right Updated:  08/04/19 0612     Blood Culture, Routine No Growth to date      No Growth to date      No Growth to date      No Growth to date    Culture, Respiratory with Gram Stain  [[id number]] Collected:  07/31/19 2128    Order Status:  Completed Specimen:  Respiratory from Sputum Updated:  08/03/19 0907     Respiratory Culture Normal respiratory kendy      No S aureus or Pseudomonas isolated.     Gram Stain (Respiratory) <10 epithelial cells per low power field.     Gram Stain (Respiratory) Few WBC's     Gram Stain (Respiratory) Few Gram positive rods     Gram Stain (Respiratory) Few Gram positive cocci     Gram Stain (Respiratory) Rare yeast    Culture, Respiratory with Gram Stain [[id number]]     Order Status:  No result Specimen:  Respiratory     Respiratory Infection Panel, Nasopharyngeal [[id number]] Collected:  07/31/19 2135    Order Status:  Completed Specimen:  Nasopharyngeal Swab Updated:  08/01/19 0910     Respiratory Infection Panel Source NP Swab     Adenovirus Not Detected     Coronavirus 229E Not Detected     Coronavirus HKU1 Not Detected     Coronavirus NL63 Not Detected     Coronavirus OC43 Not Detected     Human Metapneumovirus Not Detected     Human Rhinovirus/Enterovirus Not Detected     Influenza A (subtypes H1, H1-2009,H3) Not Detected     Influenza B Not Detected     Parainfluenza Virus 1 Not Detected     Parainfluenza Virus 2 Not Detected     Parainfluenza Virus 3 Not Detected     Parainfluenza Virus 4 Not Detected     Respiratory Syncytial Virus Not Detected     Bordetella Parapertussis (ZR4168) Not Detected     Bordetella pertussis (ptxP) Not Detected     Chlamydia pneumoniae Not Detected     Mycoplasma pneumoniae Not Detected    Narrative:       For all other respiratory sources order [id number] Respiratory  Viral Panel by PCR (RVPCR)    Respiratory Infection Panel, Nasopharyngeal [[id number]] Resulted:  07/31/19 2356    Order Status:  No result Updated:  07/31/19 2356    Respiratory Viral Panel by PCR [facility]; Sputum [[id number]] Collected:  07/31/19 2128    Order Status:  Canceled Specimen:  Respiratory           I have reviewed all pertinent labs within the past 24  hours.

## 2019-08-04 NOTE — NURSING TRANSFER
Nursing Transfer Note      8/4/2019     Transfer To: TSU [id number]    Transfer via bed    Transfer with 8 LPM high flow nasal cannula to O2, cardiac monitoring    Transported by RN    Medicines sent: yes    Chart send with patient: Yes    Notified: spouse    Patient reassessed 8/4/19 at 12:40    Upon arrival to floor: cardiac monitor applied, patient oriented to room, call bell in reach and bed in lowest position.  VisiMobile applied and calibrated.  VSS and in NAD.  All personal items within reach, call light within reach.  RN will continue to monitor.

## 2019-08-04 NOTE — PLAN OF CARE
Problem: Adult Inpatient Plan of Care  Goal: Plan of Care Review  Outcome: Ongoing (interventions implemented as appropriate)  A: Awake    RASS: Goal - 0  alert and calm Actual - 0  alert and calm   Restraint necessity: no  B: Breath   SBT: NA  C: Coordinate A & B, analgesics/sedatives   Pain: managed   SAT: NA  D: Delirium   CAM-ICU: negative  E: Early Mobility   MOVE Screen: Pass   Activity order: with assist  FAS: Feeding/Nutrition   Diet order: Cardiac Fluid restriction: 1500  T: Thrombus   DVT prophylaxis: SCD  H: HOB Elevation   30 degrees: No patient controls HOB  U: Ulcer Prophylaxis   GI: yes  G: Glucose control   NA  S: Skin   Bundle compliance: yes  B: Bowel Function   no issues  I: Indwelling Catheters   Ribeiro necessity: No   CVC necessity: No   IPAD offered: Yes  D: De-escalation Antibx   Yes  Plan for the day   Wean Oxygen as tolerated  Family/Goals of care/Code Status   Code Status: Full Code   GOC: reduce oxygen requirements    Patient remains NSR on continuous telemetry, O2 Sat 97% on Comfort Flow FiO2 50% Flow 20L, and afebrile. See flowsheets for details on VS and assessments.  No falls. No complaints of pain, nausea, or vomiting. Ramelteon given for sleep. See MAR for other meds given this shift. No gtts.     **This POC reviewed with patient, Pancho Hamilton, who verbalized understanding                Problem: Fall Injury Risk  Goal: Absence of Fall and Fall-Related Injury    Intervention: Promote Injury-Free Environment  Demonstrated use of call bell when assistance needed, unable to get OOB at this time due to dyspnea and desaturation with movement.

## 2019-08-04 NOTE — NURSING TRANSFER
Nursing Transfer Note      8/4/2019     Transfer To: [id number] from [id number]    Transfer via bed    Transfer with O2, cardiac monitoring    Transported by RN    Medicines sent: Yes    Chart send with patient: Yes    Notified: spouse    Patient reassessed at: 8/4/19, 12:35     Upon arrival to floor: patient oriented to room, call bell in reach and bed in lowest position

## 2019-08-04 NOTE — ASSESSMENT & PLAN NOTE
Will discontinue nintedanib and switch to pirfenidone when his condition stabilizes. Will make the change after his episode of severe bleeding.

## 2019-08-04 NOTE — SUBJECTIVE & OBJECTIVE
Interval History/Significant Events: NAEON. Mobilized to chair twice in 24 hours. Oxygen needs decreasing but intermittently requiring increased amounts when coughing or mobilizing.     Review of Systems   Constitutional: Negative for activity change, chills, fatigue and fever.   HENT: Negative.    Respiratory: Positive for cough, chest tightness and shortness of breath.    Cardiovascular: Negative for palpitations and leg swelling.   Gastrointestinal: Negative for abdominal distention, constipation, diarrhea and nausea.   Genitourinary: Positive for difficulty urinating.   Musculoskeletal: Positive for back pain.   Skin: Positive for pallor.   Psychiatric/Behavioral: Negative.      Objective:     Vital Signs (Most Recent):  Temp: 98.1 °F (36.7 °C) (08/04/19 1100)  Pulse: 99 (08/04/19 1103)  Resp: (!) 28 (08/04/19 1103)  BP: 106/68 (08/04/19 1103)  SpO2: 100 % (08/04/19 1103) Vital Signs (24h Range):  Temp:  [97.6 °F (36.4 °C)-98.1 °F (36.7 °C)] 98.1 °F (36.7 °C)  Pulse:  [] 99  Resp:  [13-44] 28  SpO2:  [88 %-100 %] 100 %  BP: (106-125)/(63-75) 106/68   Weight: 75.8 kg (167 lb)  Body mass index is 24.66 kg/m².      Intake/Output Summary (Last 24 hours) at 8/4/2019 1244  Last data filed at 8/4/2019 1100  Gross per 24 hour   Intake 580 ml   Output 805 ml   Net -225 ml       Physical Exam   Constitutional: He is oriented to person, place, and time. He appears well-developed. No distress.   HENT:   Head: Normocephalic and atraumatic.   Eyes: Pupils are equal, round, and reactive to light.   Cardiovascular: Normal rate and regular rhythm.   Pulmonary/Chest: Effort normal. No respiratory distress. He has wheezes (faint).   Abdominal: Soft. He exhibits no distension. There is no tenderness.   Musculoskeletal: He exhibits no edema.   Neurological: He is alert and oriented to person, place, and time.   Skin: He is not diaphoretic.   Nursing note and vitals reviewed.      Vents:  Oxygen Concentration (%): 60 (08/04/19  1103)  Lines/Drains/Airways     Drain            Male External Urinary Catheter 08/01/19 1020 Small 3 days          Peripheral Intravenous Line                 Peripheral IV - Single Lumen 07/31/19 20 G Right Antecubital 4 days         Peripheral IV - Single Lumen 08/01/19 0300 22 G Right Hand 3 days              Significant Labs:    CBC/Anemia Profile:  Recent Labs   Lab 08/03/19  0238 08/04/19  0316   WBC 12.11 9.78   HGB 10.8* 10.4*   HCT 32.4* 31.9*   * 461*   MCV 80* 81*   RDW 13.9 13.6        Chemistries:  Recent Labs   Lab 08/02/19  1457 08/03/19  0238 08/04/19  0316   * 128* 132*  132*   K 3.8 3.5 3.9  3.9   CL 91* 88* 92*  92*   CO2 28 27 31*  31*   BUN 33* 33* 31*  31*   CREATININE 0.9 0.9 0.8  0.8   CALCIUM 9.3 9.2 9.4  9.4   ALBUMIN 2.6* 2.6* 2.4*  2.4*   PROT  --  5.9* 5.5*  5.5*   BILITOT  --  0.4 0.4  0.4   ALKPHOS  --  190* 154*  154*   ALT  --  58* 51*  51*   AST  --  27 21  21   MG  --  1.9  --    PHOS 4.0  --   --        All pertinent labs within the past 24 hours have been reviewed.    Significant Imaging:  I have reviewed all pertinent imaging results/findings within the past 24 hours.

## 2019-08-04 NOTE — PROGRESS NOTES
[facility]  Critical Care Medicine  Progress Note    Patient Name: Manpreet Hamilton  MRN: [id number]  Admission Date: 7/31/2019  Hospital Length of Stay: 4 days  Code Status: Full Code  Attending Provider: Jarret Park MD  Primary Care Provider: Radames Pradhan MD   Principal Problem: Acute on chronic respiratory failure with hypoxemia    Subjective:     HPI:  67 yo  male PMH of HTN, HLD, UIP (dx 1 year ago, follow by Dr. Pradhan, on ofev) presenting as a direct admit for increaased oxygen requirement. He was recent discharged from [facility] on July 22, after GI bleed, requiring massive transfusion 20 units over 9-10 days. Couple days after getting home, he had increased oxygen requirement, was on 2 L, now requiring 5 L nasal canula. He had imaging done while at [facility] which showed an infiltrate that may have been PNA, he was started on 30 mg prednisone daily, azithromycin 500 mg BID, and Valacyclovir 1 gram BID. Dr. Reynolds reported his 02 saturation had been worsening all week, but more so today, unknown cause, then transferred to [facility] to be put under his care. On arrival he reports shortness of breath with ambulation, associated with cough for 6 days, productive of clear sputum. He denies fevers, chills, nausea, vomiting, chest pain, leg swelling, hemoptysis, hematemesis, change in bowel or urinary habits.     Hospital/ICU Course:  Patient improving with steroids and antibiotics on 08/02/2019, requiring less oxygen support. Intermittently increased use of oxygen supplementation, stopping Ofev due to concern for complication of clinical picture. Weaning steroids and patient being mobilized to chair. Intermittently desaturates when coughing or mobilizing, but otherwise stable/improving slowly.     Interval History/Significant Events: NAEON. Mobilized to chair twice in 24 hours. Oxygen needs decreasing but intermittently requiring increased amounts when coughing or mobilizing.     Review of  Systems   Constitutional: Negative for activity change, chills, fatigue and fever.   HENT: Negative.    Respiratory: Positive for cough, chest tightness and shortness of breath.    Cardiovascular: Negative for palpitations and leg swelling.   Gastrointestinal: Negative for abdominal distention, constipation, diarrhea and nausea.   Genitourinary: Positive for difficulty urinating.   Musculoskeletal: Positive for back pain.   Skin: Positive for pallor.   Psychiatric/Behavioral: Negative.      Objective:     Vital Signs (Most Recent):  Temp: 98.1 °F (36.7 °C) (08/04/19 1100)  Pulse: 99 (08/04/19 1103)  Resp: (!) 28 (08/04/19 1103)  BP: 106/68 (08/04/19 1103)  SpO2: 100 % (08/04/19 1103) Vital Signs (24h Range):  Temp:  [97.6 °F (36.4 °C)-98.1 °F (36.7 °C)] 98.1 °F (36.7 °C)  Pulse:  [] 99  Resp:  [13-44] 28  SpO2:  [88 %-100 %] 100 %  BP: (106-125)/(63-75) 106/68   Weight: 75.8 kg (167 lb)  Body mass index is 24.66 kg/m².      Intake/Output Summary (Last 24 hours) at 8/4/2019 1244  Last data filed at 8/4/2019 1100  Gross per 24 hour   Intake 580 ml   Output 805 ml   Net -225 ml       Physical Exam   Constitutional: He is oriented to person, place, and time. He appears well-developed. No distress.   HENT:   Head: Normocephalic and atraumatic.   Eyes: Pupils are equal, round, and reactive to light.   Cardiovascular: Normal rate and regular rhythm.   Pulmonary/Chest: Effort normal. No respiratory distress. He has wheezes (faint).   Abdominal: Soft. He exhibits no distension. There is no tenderness.   Musculoskeletal: He exhibits no edema.   Neurological: He is alert and oriented to person, place, and time.   Skin: He is not diaphoretic.   Nursing note and vitals reviewed.      Vents:  Oxygen Concentration (%): 60 (08/04/19 1103)  Lines/Drains/Airways     Drain            Male External Urinary Catheter 08/01/19 1020 Small 3 days          Peripheral Intravenous Line                 Peripheral IV - Single Lumen  07/31/19 20 G Right Antecubital 4 days         Peripheral IV - Single Lumen 08/01/19 0300 22 G Right Hand 3 days              Significant Labs:    CBC/Anemia Profile:  Recent Labs   Lab 08/03/19  0238 08/04/19  0316   WBC 12.11 9.78   HGB 10.8* 10.4*   HCT 32.4* 31.9*   * 461*   MCV 80* 81*   RDW 13.9 13.6        Chemistries:  Recent Labs   Lab 08/02/19  1457 08/03/19  0238 08/04/19 0316   * 128* 132*  132*   K 3.8 3.5 3.9  3.9   CL 91* 88* 92*  92*   CO2 28 27 31*  31*   BUN 33* 33* 31*  31*   CREATININE 0.9 0.9 0.8  0.8   CALCIUM 9.3 9.2 9.4  9.4   ALBUMIN 2.6* 2.6* 2.4*  2.4*   PROT  --  5.9* 5.5*  5.5*   BILITOT  --  0.4 0.4  0.4   ALKPHOS  --  190* 154*  154*   ALT  --  58* 51*  51*   AST  --  27 21  21   MG  --  1.9  --    PHOS 4.0  --   --        All pertinent labs within the past 24 hours have been reviewed.    Significant Imaging:  I have reviewed all pertinent imaging results/findings within the past 24 hours.      ABG  No results for input(s): PH, PO2, PCO2, HCO3, BE in the last 168 hours.  Assessment/Plan:     Pulmonary  * Acute on chronic respiratory failure with hypoxemia  Hx of UIP (on OFEV)  -Increased oxygen requirement, on comfort flow, weaning as tolerated  -CT PE-showed ILD and superimposed ground glass opacity  -Xray shows diffuse hazy reticular lung disease, more prominent on left, unchanged from previous  -Follow with Dr. Reynolds, recs below  -Respiratory pathogens panel negative  -Respiratory viral panel negative  -Respiratory infections panel negative  -Sputum culture  -Solumedrol 125 mg QID for 3 days then taper to 80mg BID  -Broad spectrum antibiotics: vancomycin, azithromycin, and cefepime  -Valacyclovir discontinued  - daily lasix, will monitor metabolic panel daily    ILD (interstitial lung disease)  See acute on chronic resp failure.       Critical Care Daily Checklist:    A: Awake: RASS Goal/Actual Goal: RASS Goal: 0-->alert and calm  Actual: [place]  Agitation Sedation Scale (RASS): Alert and calm   B: Spontaneous Breathing Trial Performed?     C: SAT & SBT Coordinated?  n/a                      D: Delirium: CAM-ICU Overall CAM-ICU: Negative   E: Early Mobility Performed? Yes   F: Feeding Goal:    Status:     Current Diet Order   Procedures    Diet Adult Regular (IDDSI Level 7) Fluid - 1500mL     Order Specific Question:   Fluid restriction:     Answer:   Fluid - 1500mL      AS: Analgesia/Sedation n/a   T: Thromboembolic Prophylaxis nil   H: HOB > 300 Yes   U: Stress Ulcer Prophylaxis (if needed) pantoprazole   G: Glucose Control nil   B: Bowel Function Stool Occurrence: 1   I: Indwelling Catheter (Lines & Ribeiro) Necessity PIVC   D: De-escalation of Antimicrobials/Pharmacotherapies Cefepime only. Azithromycin course complete.     Plan for the day/ETD Step down to TSU    Code Status:  Family/Goals of Care: Full Code  nil       Critical secondary to Patient has a condition that poses threat to life and bodily function: Severe Respiratory Distress      Critical care was time spent personally by me on the following activities: development of treatment plan with patient or surrogate and bedside caregivers, discussions with consultants, evaluation of patient's response to treatment, examination of patient, ordering and performing treatments and interventions, ordering and review of laboratory studies, ordering and review of radiographic studies, pulse oximetry, re-evaluation of patient's condition. This critical care time did not overlap with that of any other provider or involve time for any procedures.     Sabrina Naranjo MD  Critical Care Medicine  [facility]

## 2019-08-04 NOTE — PROGRESS NOTES
Pt weaned from comfort flow 15L/40% to HFNC 10L. Pt sat 99%.    Will continue to monitor and wean per sat goal.

## 2019-08-05 LAB
ALBUMIN SERPL BCP-MCNC: 2.6 G/DL (ref 3.5–5.2)
ALP SERPL-CCNC: 151 U/L (ref 55–135)
ALT SERPL W/O P-5'-P-CCNC: 47 U/L (ref 10–44)
ANION GAP SERPL CALC-SCNC: 11 MMOL/L (ref 8–16)
AST SERPL-CCNC: 17 U/L (ref 10–40)
BASOPHILS # BLD AUTO: 0.01 K/UL (ref 0–0.2)
BASOPHILS NFR BLD: 0.1 % (ref 0–1.9)
BILIRUB SERPL-MCNC: 0.4 MG/DL (ref 0.1–1)
BUN SERPL-MCNC: 29 MG/DL (ref 8–23)
CALCIUM SERPL-MCNC: 9.2 MG/DL (ref 8.7–10.5)
CHLORIDE SERPL-SCNC: 91 MMOL/L (ref 95–110)
CO2 SERPL-SCNC: 30 MMOL/L (ref 23–29)
CREAT SERPL-MCNC: 0.8 MG/DL (ref 0.5–1.4)
DIFFERENTIAL METHOD: ABNORMAL
EOSINOPHIL # BLD AUTO: 0 K/UL (ref 0–0.5)
EOSINOPHIL NFR BLD: 0.1 % (ref 0–8)
ERYTHROCYTE [DISTWIDTH] IN BLOOD BY AUTOMATED COUNT: 13.7 % (ref 11.5–14.5)
EST. GFR  (AFRICAN AMERICAN): >60 ML/MIN/1.73 M^2
EST. GFR  (NON AFRICAN AMERICAN): >60 ML/MIN/1.73 M^2
GLUCOSE SERPL-MCNC: 125 MG/DL (ref 70–110)
HCT VFR BLD AUTO: 36.7 % (ref 40–54)
HGB BLD-MCNC: 11.4 G/DL (ref 14–18)
IMM GRANULOCYTES # BLD AUTO: 0.05 K/UL (ref 0–0.04)
IMM GRANULOCYTES NFR BLD AUTO: 0.5 % (ref 0–0.5)
LYMPHOCYTES # BLD AUTO: 1 K/UL (ref 1–4.8)
LYMPHOCYTES NFR BLD: 9.3 % (ref 18–48)
MCH RBC QN AUTO: 26.3 PG (ref 27–31)
MCHC RBC AUTO-ENTMCNC: 31.1 G/DL (ref 32–36)
MCV RBC AUTO: 85 FL (ref 82–98)
MONOCYTES # BLD AUTO: 0.6 K/UL (ref 0.3–1)
MONOCYTES NFR BLD: 6 % (ref 4–15)
NEUTROPHILS # BLD AUTO: 9 K/UL (ref 1.8–7.7)
NEUTROPHILS NFR BLD: 84 % (ref 38–73)
NRBC BLD-RTO: 0 /100 WBC
PLATELET # BLD AUTO: 418 K/UL (ref 150–350)
PMV BLD AUTO: 8.9 FL (ref 9.2–12.9)
POTASSIUM SERPL-SCNC: 3.9 MMOL/L (ref 3.5–5.1)
PROT SERPL-MCNC: 5.4 G/DL (ref 6–8.4)
RBC # BLD AUTO: 4.33 M/UL (ref 4.6–6.2)
SODIUM SERPL-SCNC: 132 MMOL/L (ref 136–145)
WBC # BLD AUTO: 10.71 K/UL (ref 3.9–12.7)

## 2019-08-05 PROCEDURE — 25000003 PHARM REV CODE 250: Performed by: STUDENT IN AN ORGANIZED HEALTH CARE EDUCATION/TRAINING PROGRAM

## 2019-08-05 PROCEDURE — 85025 COMPLETE CBC W/AUTO DIFF WBC: CPT

## 2019-08-05 PROCEDURE — 99232 PR SUBSEQUENT HOSPITAL CARE,LEVL II: ICD-10-PCS | Mod: ,,, | Performed by: INTERNAL MEDICINE

## 2019-08-05 PROCEDURE — 27000221 HC OXYGEN, UP TO 24 HOURS

## 2019-08-05 PROCEDURE — 97110 THERAPEUTIC EXERCISES: CPT

## 2019-08-05 PROCEDURE — 80053 COMPREHEN METABOLIC PANEL: CPT

## 2019-08-05 PROCEDURE — 94761 N-INVAS EAR/PLS OXIMETRY MLT: CPT

## 2019-08-05 PROCEDURE — 36415 COLL VENOUS BLD VENIPUNCTURE: CPT

## 2019-08-05 PROCEDURE — 99900035 HC TECH TIME PER 15 MIN (STAT)

## 2019-08-05 PROCEDURE — 20600001 HC STEP DOWN PRIVATE ROOM

## 2019-08-05 PROCEDURE — 63600175 PHARM REV CODE 636 W HCPCS: Performed by: INTERNAL MEDICINE

## 2019-08-05 PROCEDURE — 63600175 PHARM REV CODE 636 W HCPCS: Performed by: STUDENT IN AN ORGANIZED HEALTH CARE EDUCATION/TRAINING PROGRAM

## 2019-08-05 PROCEDURE — 99232 SBSQ HOSP IP/OBS MODERATE 35: CPT | Mod: ,,, | Performed by: INTERNAL MEDICINE

## 2019-08-05 RX ADMIN — FERROUS SULFATE TAB EC 325 MG (65 MG FE EQUIVALENT) 325 MG: 325 (65 FE) TABLET DELAYED RESPONSE at 08:08

## 2019-08-05 RX ADMIN — SUCRALFATE 1 G: 1 SUSPENSION ORAL at 08:08

## 2019-08-05 RX ADMIN — ATORVASTATIN CALCIUM 20 MG: 20 TABLET, FILM COATED ORAL at 08:08

## 2019-08-05 RX ADMIN — PANTOPRAZOLE SODIUM 40 MG: 40 TABLET, DELAYED RELEASE ORAL at 08:08

## 2019-08-05 RX ADMIN — RAMELTEON 8 MG: 8 TABLET, FILM COATED ORAL at 08:08

## 2019-08-05 RX ADMIN — FUROSEMIDE 40 MG: 10 INJECTION, SOLUTION INTRAMUSCULAR; INTRAVENOUS at 08:08

## 2019-08-05 RX ADMIN — SUCRALFATE 1 G: 1 SUSPENSION ORAL at 12:08

## 2019-08-05 RX ADMIN — METHYLPREDNISOLONE SODIUM SUCCINATE 40 MG: 125 INJECTION, POWDER, FOR SOLUTION INTRAMUSCULAR; INTRAVENOUS at 08:08

## 2019-08-05 RX ADMIN — LEVOFLOXACIN 750 MG: 750 TABLET, FILM COATED ORAL at 08:08

## 2019-08-05 RX ADMIN — SUCRALFATE 1 G: 1 SUSPENSION ORAL at 04:08

## 2019-08-05 RX ADMIN — LIDOCAINE 2 PATCH: 50 PATCH TOPICAL at 08:08

## 2019-08-05 NOTE — CARE UPDATE
"RAPID RESPONSE NURSE PROACTIVE ROUNDING NOTE     Time of Visit: 1106    Admit Date: 2019  LOS: 5  Code Status: Full Code   Date of Visit: 2019  : 1950  Age: 68 y.o.  Sex: male  Race: White  Bed: 90357/68796 A:   MRN: [id number]  Was the patient discharged from an ICU this admission? yes   Was the patient discharged from a PACU within last 24 hours?  no  Did the patient receive conscious sedation/general anesthesia in last 24 hours?  no  Was the patient in the ED within the past 24 hours?  no  Was the patient started on NIPPV within the past 24 hours?  no  Attending Physician: Jarret Park MD  Primary Service: [facility]    ASSESSMENT     Diagnosis: Acute on chronic respiratory failure with hypoxemia    Abnormal Vital Signs: /79   Pulse (!) 117   Temp 97.7 °F (36.5 °C) (Oral)   Resp (!) 35   Ht 5' 9" (1.753 m)   Wt 77 kg (169 lb 12.8 oz)   SpO2 (!) 91%   BMI 25.08 kg/m²      Clinical Issues: Respiratory    Patient  has a past medical history of Hyperlipemia, Hypertension, Nasal congestion, and Pulmonary fibrosis.     Patient OOB, vital signs stable, O2 saturation 94% on 6L.  Patient states he feels fine.  Will follow up with patient.  Continue plan of care.      INTERVENTIONS/ RECOMMENDATIONS       Discussed plan of care with Beckie GRAVES    PHYSICIAN ESCALATION     Yes/No  no    Orders received and case discussed with NA.    Disposition: Remain in room 04763.    FOLLOW-UP     Call back the Rapid Response Nurse, Florence Glez RN at [phone] for additional questions or concerns.        "

## 2019-08-05 NOTE — PT/OT/SLP PROGRESS
Physical Therapy Treatment    Patient Name:  Manpreet Hamilton   MRN:  [id number]    Recommendations:     Discharge Recommendations:  home health PT   Discharge Equipment Recommendations: none   Barriers to discharge: poor respiratory status    Assessment:     Manpreet Hamilton is a 68 y.o. male admitted with a medical diagnosis of Acute on chronic respiratory failure with hypoxemia.  He presents with the following impairments/functional limitations:  impaired cardiopulmonary response to activity, impaired endurance Pt tolerated treatment fairly well and is progressing slowly with mobility. Pt declined functional mobility this date due to reporting he mainly wants to work on his sitting tolerance this date and standing up will make him too tired. Pt would continue to benefit from skilled PT to address overall functional mobility and goals. Goals remain appropriate      Rehab Prognosis: Good; patient would benefit from acute skilled PT services to address these deficits and reach maximum level of function.    Recent Surgery: * No surgery found *      Plan:     During this hospitalization, patient to be seen 3 x/week to address the identified rehab impairments via gait training, therapeutic activities, therapeutic exercises, neuromuscular re-education and progress toward the following goals:    · Plan of Care Expires:  09/02/19    Subjective     Chief Complaint: SOB and fatigue  Pain/Comfort:  · Pain Rating 1: 0/10  · Pain Rating Post-Intervention 1: 0/10      Objective:     Communicated with RN prior to session.  Patient found up in chair with telemetry, pulse ox (continuous), Condom Catheter, oxygen upon PT entry to room.     General Precautions: Standard, fall   Orthopedic Precautions:N/A   Braces: N/A     Functional Mobility:  · N/T due to pt refusal      AM-PAC 6 CLICK MOBILITY  Turning over in bed (including adjusting bedclothes, sheets and blankets)?: 4  Sitting down on and standing up from a chair with arms (e.g.,  wheelchair, bedside commode, etc.): 4  Moving from lying on back to sitting on the side of the bed?: 4  Moving to and from a bed to a chair (including a wheelchair)?: 4  Need to walk in hospital room?: 3       Therapeutic Activities and Exercises:  educated patient on progress, safety,d/c,PT POC, on the effects of prolonged immobility and the importance of performing OOB activity and exercises to promote healing and reduce recovery time   Patient performed therex X 10 reps seated in bedside chair B LE AROM AP,HS LAQ, Hip Flexion, Hip Abd/Add , reclined situps with facilitation for correct performance and sequencing. Exercises performed to develop and maintain pt's strength, endurance and flexibility. Pt required rest breaks prn between ex  Updated white board with appropriate PT mobility information for medical team notification  Bedside table in front of patient and area set up for function, convenience, and safety. RN aware of patient's mobility needs and status. Questions/concerns addressed within PTA scope of practice; patient with no further questions. Time was provided for active listening, discussion of health disposition, and discussion of safe discharge. Pt?verbalized?agreement .    Patient left up in chair with all lines intact, call button in reach, nsg notified and family present..    GOALS:   Multidisciplinary Problems     Physical Therapy Goals        Problem: Physical Therapy Goal    Goal Priority Disciplines Outcome Goal Variances Interventions   Physical Therapy Goal     PT, PT/OT Ongoing (interventions implemented as appropriate)     Description:  Goals to be met by: 2019    Patient will increase functional independence with mobility by performin. Gait  x 10 feet with Supervision using  - not met  2. Ascend/descend 3 stair with Contact Guard Assistance  - not met  3. Lower extremity exercise program x15 reps per handout, with independence to increase tolerance to activity - not met                       Time Tracking:     PT Received On: 08/05/19  PT Start Time: 1117     PT Stop Time: 1141  PT Total Time (min): 24 min     Billable Minutes: Therapeutic Exercise 15    Treatment Type: Treatment  PT/PTA: PTA     PTA Visit Number: 1     Demond Galvez PTA  08/05/2019

## 2019-08-05 NOTE — ASSESSMENT & PLAN NOTE
Continue to wean oxygen. Currently on 4.5L HFNC and use NRBM for recovery.   Unclear if the etiology of his deterioration is a health care associated infection, non-cardiogenic pulmonary edema, or an exacerbation of his IPF. Will continue levofloxacin, continue diuresis, and change steroids to prednisone.

## 2019-08-05 NOTE — CARE UPDATE
Rapid Response Respiratory Therapy Proactive Rounding Note      Time of visit: 821    Code Status: Full Code   : 1950  Age: 68 y.o.  Weight:   Wt Readings from Last 1 Encounters:   19 77 kg (169 lb 12.8 oz)     Sex: male  Race: White   Bed: 76284/57547 A:   MRN: [id number]    SITUATION     Evaluated patient for: Patient has HFNC ordered and patient charted 5L nasal cannula.    BACKGROUND     Patient has a past medical history of Hyperlipemia, Hypertension, Nasal congestion, and Pulmonary fibrosis.  Pulmonary Hx: Pulmonary Fibrosis  Clinically Significant Surgical Hx: None    ASSESSMENT     Pulse: Pulse: 90 Respiratory rate: Resp: 18 Temperature: Temp: 97.7 °F (36.5 °C) BP: BP: 125/83 SpO2:SpO2: (!) 94 %   Level of Consciousness: Level of Consciousness (AVPU): alert  Respiratory Effort: Respiratory Effort: Normal, Unlabored  Expansion/Accessory Muscle Usage: Expansion/Accessory Muscles/Retractions: no use of accessory muscles, no retractions, expansion symmetric  All Lung Field Breath Sounds: All Lung Fields Breath Sounds: Anterior:, Lateral:, clear  ELIZ Breath Sounds: diminished  LLL Breath Sounds: diminished  RUL Breath Sounds: clear  RML Breath Sounds: clear  RLL Breath Sounds: clear  Cough Type: Cough Type: good, nonproductive  Mobility at time of assessment: General Mobility: generalized weakness  O2 Device/Concentration: O2 Device (Oxygen Therapy): nasal cannula w/ humidification   Flow (L/min): 5 Oxygen Concentration (%): 60  Most recent blood gas: No results for input(s): PH, PCO2, PO2, HCO3, POCSATURATED, BE in the last 72 hours.  PF Ratio Calculator    Current Respiratory Care Orders:   19 0829  Oxygen Continuous Continuous     References: Oxygen Titration Protocol   Question Answer Comment   Device type: Low flow    Device: Nasal Cannula (1- 5 Liters)    LPM: 5    Titrate O2 per Oxygen Titration Protocol: Yes    To maintain SpO2 goal of: >= 88%    Notify MD of: Inability to achieve  desired SpO2; Sudden change in patient status and requires 20% increase in FiO2; Patient requires >60% FiO2              NIPPV: No  Surgical airway: No  ETCO2 monitored:    Ambu at bedside: Ambu bag with the patient?: Yes, Adult Ambu    INTERVENTIONS/RECOMMENDATIONS   Patient has HFNC ordered, patient currently on 5L nasal cannula. Patient assessed and vitals charted. Patient eating breakfast at this time and states his breathing feels good. Spoke with team and updated oxygen orders to match. Spoke with Mookie KURTZ about changes. Will continue to monitor.    Discussed plan of care with primary RTMookie.     FOLLOW-UP     Please call back the Rapid Response RT, Miroslava Green, RRT at x [phone] for any questions or concerns.

## 2019-08-05 NOTE — PLAN OF CARE
Problem: Physical Therapy Goal  Goal: Physical Therapy Goal  Goals to be met by: 2019    Patient will increase functional independence with mobility by performin. Gait  x 10 feet with Supervision using  - not met  2. Ascend/descend 3 stair with Contact Guard Assistance  - not met  3. Lower extremity exercise program x15 reps per handout, with independence to increase tolerance to activity - not met      continue with PT POC.Goals remain appropriate.  Demond Galvez PTA  2019

## 2019-08-05 NOTE — PROGRESS NOTES
[facility]  Lung Transplant  Progress Note - Floor    Patient Name: Manpreet Hamilton  MRN: [id number]  Admission Date: 7/31/2019  Hospital Length of Stay: 5 days  Post-Operative Day:   Attending Physician: Jarret Park MD  Primary Care Provider: Radames Pradhan MD     Subjective:     Interval History: No acute events overnight.  Doing well on NC.      Continuous Infusions:  Scheduled Meds:   atorvastatin  20 mg Oral QHS    ferrous sulfate  325 mg Oral Daily    furosemide  40 mg Intravenous Daily    levoFLOXacin  750 mg Oral Daily    lidocaine  2 patch Transdermal Q24H    pantoprazole  40 mg Oral Q12H    [START ON 8/6/2019] predniSONE  25 mg Oral Daily    sucralfate  1 g Oral QID (WM & HS)     PRN Meds:loperamide, ramelteon, sodium chloride 0.9%    Review of patient's allergies indicates:   Allergen Reactions    Penicillins     Erythromycin Rash       Review of Systems   Constitutional: Positive for activity change. Negative for appetite change, chills, fatigue, fever and unexpected weight change.   HENT: Negative for congestion, ear pain, hearing loss, mouth sores and postnasal drip.    Eyes: Negative for photophobia, pain, discharge, redness, itching and visual disturbance.   Respiratory: Positive for cough, chest tightness, shortness of breath and wheezing.    Cardiovascular: Negative for chest pain, palpitations and leg swelling.   Gastrointestinal: Negative for abdominal distention, abdominal pain, blood in stool, constipation and diarrhea.   Endocrine: Negative for cold intolerance, heat intolerance, polydipsia, polyphagia and polyuria.   Genitourinary: Negative for decreased urine volume, dysuria, frequency, hematuria and urgency.   Musculoskeletal: Negative for arthralgias and joint swelling.   Allergic/Immunologic: Negative for environmental allergies, food allergies and immunocompromised state.   Neurological: Positive for weakness. Negative for dizziness, tremors, syncope,  speech difficulty and numbness.   Hematological: Negative for adenopathy. Does not bruise/bleed easily.   Psychiatric/Behavioral: Negative for agitation, confusion and hallucinations.     Objective:   Physical Exam   Constitutional: He is oriented to person, place, and time. He appears well-developed and well-nourished.   HENT:   Head: Normocephalic and atraumatic.   Eyes: Conjunctivae and EOM are normal.   Neck: Normal range of motion. Neck supple.   Cardiovascular: Normal rate and regular rhythm.   Pulmonary/Chest: No respiratory distress. He has decreased breath sounds. He has no wheezes. He has rales.   Abdominal: Soft. Bowel sounds are normal.   Musculoskeletal: Normal range of motion.   Neurological: He is alert and oriented to person, place, and time.   Skin: Skin is warm.   Psychiatric: He has a normal mood and affect.         Vital Signs (Most Recent):  Temp: 98.5 °F (36.9 °C) (08/05/19 1140)  Pulse: 107 (08/05/19 1135)  Resp: (!) 32 (08/05/19 1135)  BP: 108/72 (08/05/19 1135)  SpO2: 97 % (08/05/19 1135) Vital Signs (24h Range):  Temp:  [97.7 °F (36.5 °C)-99.3 °F (37.4 °C)] 98.5 °F (36.9 °C)  Pulse:  [] 107  Resp:  [16-36] 32  SpO2:  [80 %-100 %] 97 %  BP: ()/(67-88) 108/72     Weight: 77 kg (169 lb 12.8 oz)  Body mass index is 25.08 kg/m².      Intake/Output Summary (Last 24 hours) at 8/5/2019 1306  Last data filed at 8/5/2019 1100  Gross per 24 hour   Intake 770 ml   Output 1650 ml   Net -880 ml       Significant Labs:  CBC:  Recent Labs   Lab 08/05/19  0639   WBC 10.71   RBC 4.33*   HGB 11.4*   HCT 36.7*   *   MCV 85   MCH 26.3*   MCHC 31.1*     BMP:  Recent Labs   Lab 08/05/19  0639   *   K 3.9   CL 91*   CO2 30*   BUN 29*   CREATININE 0.8   CALCIUM 9.2      Tacrolimus Levels:  No results for input(s): TACROLIMUS in the last 72 hours.  Microbiology:  Microbiology Results (last 7 days)     Procedure Component Value Units Date/Time    Blood culture [[id number]] Collected:  08/01/19  0247    Order Status:  Completed Specimen:  Blood from Peripheral, Hand, Right Updated:  08/05/19 0612     Blood Culture, Routine No Growth to date      No Growth to date      No Growth to date      No Growth to date      No Growth to date    Blood culture [[id number]] Collected:  08/01/19 0248    Order Status:  Completed Specimen:  Blood from Peripheral, Forearm, Right Updated:  08/05/19 0612     Blood Culture, Routine No Growth to date      No Growth to date      No Growth to date      No Growth to date      No Growth to date    Culture, Respiratory with Gram Stain [[id number]] Collected:  07/31/19 2128    Order Status:  Completed Specimen:  Respiratory from Sputum Updated:  08/03/19 0907     Respiratory Culture Normal respiratory knedy      No S aureus or Pseudomonas isolated.     Gram Stain (Respiratory) <10 epithelial cells per low power field.     Gram Stain (Respiratory) Few WBC's     Gram Stain (Respiratory) Few Gram positive rods     Gram Stain (Respiratory) Few Gram positive cocci     Gram Stain (Respiratory) Rare yeast    Culture, Respiratory with Gram Stain [[id number]]     Order Status:  No result Specimen:  Respiratory     Respiratory Infection Panel, Nasopharyngeal [[id number]] Collected:  07/31/19 2135    Order Status:  Completed Specimen:  Nasopharyngeal Swab Updated:  08/01/19 0910     Respiratory Infection Panel Source NP Swab     Adenovirus Not Detected     Coronavirus 229E Not Detected     Coronavirus HKU1 Not Detected     Coronavirus NL63 Not Detected     Coronavirus OC43 Not Detected     Human Metapneumovirus Not Detected     Human Rhinovirus/Enterovirus Not Detected     Influenza A (subtypes H1, H1-2009,H3) Not Detected     Influenza B Not Detected     Parainfluenza Virus 1 Not Detected     Parainfluenza Virus 2 Not Detected     Parainfluenza Virus 3 Not Detected     Parainfluenza Virus 4 Not Detected     Respiratory Syncytial Virus Not Detected     Bordetella Parapertussis ([id number]) Not Detected      Bordetella pertussis (ptxP) Not Detected     Chlamydia pneumoniae Not Detected     Mycoplasma pneumoniae Not Detected    Narrative:       For all other respiratory sources order [id number] Respiratory  Viral Panel by PCR (RVPCR)    Respiratory Infection Panel, Nasopharyngeal [[id number]] Resulted:  07/31/19 2356    Order Status:  No result Updated:  07/31/19 2356    Respiratory Viral Panel by PCR [facility]; Sputum [[id number]] Collected:  07/31/19 2128    Order Status:  Canceled Specimen:  Respiratory           I have reviewed all pertinent labs within the past 24 hours.      Assessment/Plan:     * Acute on chronic respiratory failure with hypoxemia  Continue to wean oxygen. Currently on 4.5L HFNC and use NRBM for recovery.   Unclear if the etiology of his deterioration is a health care associated infection, non-cardiogenic pulmonary edema, or an exacerbation of his IPF. Will continue levofloxacin, continue diuresis, and change steroids to prednisone.      Idiopathic pulmonary fibrosis  Will start pirfenidone soon.  Will make the change after his episode of severe bleeding.     Debility  Continue physical therapy. Goal is to transfer him to inpatient rehab when his oxygen requirements have decreased. Rehab consulted.        Pollo Saunders NP  Lung Transplant  [facility]

## 2019-08-05 NOTE — PLAN OF CARE
Problem: Adult Inpatient Plan of Care  Goal: Plan of Care Review  Outcome: Ongoing (interventions implemented as appropriate)  - Patient admitted 7/31/19 with acute-on-chronic hypoxemic respiratory failure. Pt has a diagnosis of idiopathic pulmonary fibrosis.  - At home, patient was on 2 L NC. His oxygen requirements had increased to 5 L and he was admitted from the clinic directly to CMICU. While on the ICU, pt was on Comfort Flow with FiO2 as high as 70%.  - Pt was stepped down to TSU yesterday. Pt is currently on high flow nasal cannula at 4.5 L. SpO2 at rest is well above ordered goal of >=88%. Did not wean oxygen much overnight as patient requested minimal interruptions as he stated his sleep was constantly being interrupted in the ICU setting. Pt is on telemetry monitoring with continuous pulse oximetry.  - Non-rebreather mask kept at bedside.  - Per provider notes, pt's symptoms are believed caused by either IPF exacerbation or an atypical infectious process. Pt is on PO levofloxacin.  - Pt c/o pleuritic chest/back pain from frequent coughing. Lidocaine patch x 2 ordered daily for 12 hours. Pt's cough is currently non-productive. Pt has an outstanding sputum specimen order.  - Pt is on IV Lasix and IV Solu-Medrol. New PIV placed overnight by this RN.  - Pt has a condom catheter in place due a history of urinary hesitancy/urgency and high oxygen requirements while toileting. Condom cath is working well for pt.  - Pt has a history of bleeding gastric ulcers with hospital admission in July requiring blood admin of over 20 units. Blood counts stable currently. Sucralfate ordered QID.  - Plan: Start Esbriet for IPF at some point in the future. Possible repeat imaging soon. Work with PT (currently seeing 3x/week) to maintain activity level, transfer to inpatient rehab when O2 requirements improve.

## 2019-08-05 NOTE — PLAN OF CARE
Problem: Adult Inpatient Plan of Care  Goal: Plan of Care Review  Outcome: Ongoing (interventions implemented as appropriate)  Pt remains AAO x 4 with VSS throughout shift  Chief complaint of SOB upon exertion; relief with deep breathing  Pleural chest pain noted; relief with lidocaine patches to anterior and posterior chest  Pt on 5 L NC; sats >88%  External catheter in place; pt refusing to use urinal at bedside; will continue to reinforce using urinal  Xray PA/Lateral performed today; results pending  I/O documented in flow sheets; 1.5 L FR   Plan for pt to be discharged to inpatient rehab once stable  Tele and visi monitor remain in place  Pt able to sit up in chair during shift; x 1 assist  Family remains at bedside  Pt remains free from falls and injuries  Will continue to monitor

## 2019-08-05 NOTE — SUBJECTIVE & OBJECTIVE
Subjective:     Interval History: No acute events overnight.  Doing well on NC.      Continuous Infusions:  Scheduled Meds:   atorvastatin  20 mg Oral QHS    ferrous sulfate  325 mg Oral Daily    furosemide  40 mg Intravenous Daily    levoFLOXacin  750 mg Oral Daily    lidocaine  2 patch Transdermal Q24H    pantoprazole  40 mg Oral Q12H    [START ON 8/6/2019] predniSONE  25 mg Oral Daily    sucralfate  1 g Oral QID (WM & HS)     PRN Meds:loperamide, ramelteon, sodium chloride 0.9%    Review of patient's allergies indicates:   Allergen Reactions    Penicillins     Erythromycin Rash       Review of Systems   Constitutional: Positive for activity change. Negative for appetite change, chills, fatigue, fever and unexpected weight change.   HENT: Negative for congestion, ear pain, hearing loss, mouth sores and postnasal drip.    Eyes: Negative for photophobia, pain, discharge, redness, itching and visual disturbance.   Respiratory: Positive for cough, chest tightness, shortness of breath and wheezing.    Cardiovascular: Negative for chest pain, palpitations and leg swelling.   Gastrointestinal: Negative for abdominal distention, abdominal pain, blood in stool, constipation and diarrhea.   Endocrine: Negative for cold intolerance, heat intolerance, polydipsia, polyphagia and polyuria.   Genitourinary: Negative for decreased urine volume, dysuria, frequency, hematuria and urgency.   Musculoskeletal: Negative for arthralgias and joint swelling.   Allergic/Immunologic: Negative for environmental allergies, food allergies and immunocompromised state.   Neurological: Positive for weakness. Negative for dizziness, tremors, syncope, speech difficulty and numbness.   Hematological: Negative for adenopathy. Does not bruise/bleed easily.   Psychiatric/Behavioral: Negative for agitation, confusion and hallucinations.     Objective:   Physical Exam   Constitutional: He is oriented to person, place, and time. He appears  well-developed and well-nourished.   HENT:   Head: Normocephalic and atraumatic.   Eyes: Conjunctivae and EOM are normal.   Neck: Normal range of motion. Neck supple.   Cardiovascular: Normal rate and regular rhythm.   Pulmonary/Chest: No respiratory distress. He has decreased breath sounds. He has no wheezes. He has rales.   Abdominal: Soft. Bowel sounds are normal.   Musculoskeletal: Normal range of motion.   Neurological: He is alert and oriented to person, place, and time.   Skin: Skin is warm.   Psychiatric: He has a normal mood and affect.         Vital Signs (Most Recent):  Temp: 98.5 °F (36.9 °C) (08/05/19 1140)  Pulse: 107 (08/05/19 1135)  Resp: (!) 32 (08/05/19 1135)  BP: 108/72 (08/05/19 1135)  SpO2: 97 % (08/05/19 1135) Vital Signs (24h Range):  Temp:  [97.7 °F (36.5 °C)-99.3 °F (37.4 °C)] 98.5 °F (36.9 °C)  Pulse:  [] 107  Resp:  [16-36] 32  SpO2:  [80 %-100 %] 97 %  BP: ()/(67-88) 108/72     Weight: 77 kg (169 lb 12.8 oz)  Body mass index is 25.08 kg/m².      Intake/Output Summary (Last 24 hours) at 8/5/2019 1306  Last data filed at 8/5/2019 1100  Gross per 24 hour   Intake 770 ml   Output 1650 ml   Net -880 ml       Significant Labs:  CBC:  Recent Labs   Lab 08/05/19  0639   WBC 10.71   RBC 4.33*   HGB 11.4*   HCT 36.7*   *   MCV 85   MCH 26.3*   MCHC 31.1*     BMP:  Recent Labs   Lab 08/05/19  0639   *   K 3.9   CL 91*   CO2 30*   BUN 29*   CREATININE 0.8   CALCIUM 9.2      Tacrolimus Levels:  No results for input(s): TACROLIMUS in the last 72 hours.  Microbiology:  Microbiology Results (last 7 days)     Procedure Component Value Units Date/Time    Blood culture [[id number]] Collected:  08/01/19 0247    Order Status:  Completed Specimen:  Blood from Peripheral, Hand, Right Updated:  08/05/19 0612     Blood Culture, Routine No Growth to date      No Growth to date      No Growth to date      No Growth to date      No Growth to date    Blood culture [[id number]] Collected:   08/01/19 0248    Order Status:  Completed Specimen:  Blood from Peripheral, Forearm, Right Updated:  08/05/19 0612     Blood Culture, Routine No Growth to date      No Growth to date      No Growth to date      No Growth to date      No Growth to date    Culture, Respiratory with Gram Stain [[id number]] Collected:  07/31/19 2128    Order Status:  Completed Specimen:  Respiratory from Sputum Updated:  08/03/19 0907     Respiratory Culture Normal respiratory kendy      No S aureus or Pseudomonas isolated.     Gram Stain (Respiratory) <10 epithelial cells per low power field.     Gram Stain (Respiratory) Few WBC's     Gram Stain (Respiratory) Few Gram positive rods     Gram Stain (Respiratory) Few Gram positive cocci     Gram Stain (Respiratory) Rare yeast    Culture, Respiratory with Gram Stain [[id number]]     Order Status:  No result Specimen:  Respiratory     Respiratory Infection Panel, Nasopharyngeal [[id number]] Collected:  07/31/19 2135    Order Status:  Completed Specimen:  Nasopharyngeal Swab Updated:  08/01/19 0910     Respiratory Infection Panel Source NP Swab     Adenovirus Not Detected     Coronavirus 229E Not Detected     Coronavirus HKU1 Not Detected     Coronavirus NL63 Not Detected     Coronavirus OC43 Not Detected     Human Metapneumovirus Not Detected     Human Rhinovirus/Enterovirus Not Detected     Influenza A (subtypes H1, H1-2009,H3) Not Detected     Influenza B Not Detected     Parainfluenza Virus 1 Not Detected     Parainfluenza Virus 2 Not Detected     Parainfluenza Virus 3 Not Detected     Parainfluenza Virus 4 Not Detected     Respiratory Syncytial Virus Not Detected     Bordetella Parapertussis ([id number]) Not Detected     Bordetella pertussis (ptxP) Not Detected     Chlamydia pneumoniae Not Detected     Mycoplasma pneumoniae Not Detected    Narrative:       For all other respiratory sources order [id number] Respiratory  Viral Panel by PCR (RVPCR)    Respiratory Infection Panel, Nasopharyngeal  [[id number]] Resulted:  07/31/19 2356    Order Status:  No result Updated:  07/31/19 2356    Respiratory Viral Panel by PCR [facility]; Sputum [[id number]] Collected:  07/31/19 2128    Order Status:  Canceled Specimen:  Respiratory           I have reviewed all pertinent labs within the past 24 hours.

## 2019-08-05 NOTE — ASSESSMENT & PLAN NOTE
Continue physical therapy. Goal is to transfer him to inpatient rehab when his oxygen requirements have decreased. Rehab consulted.

## 2019-08-05 NOTE — PROGRESS NOTES
Admit Note     Met with patient and and two daughters to assess needs. Pt's spouse has also been in attendance,howeve she was out taking a break.    Patient is a 68 y.o.  male, admitted for acute on chronic resp failure with hypoxemia and idiopathic pulmonary fibrosis.     NOTE: Pt reports when discharged he will go to [facility] in pt rehab.      Patient admitted to [facility] on 7/31/2019 .  At this time, patient presents as alert and oriented x 4, good eye contact, calm and communicative.  At this time, patients caregiver presents as alert and oriented x 4, good eye contact, calm and communicative.    Household/Family Systems     Patient resides with patient's spouse, at     [address] [address].   [facility] hours from [facility]       Support system includes spouse, two adult daughters and extended family.    Patient does not have dependents that are need of being cared for.     Patients primary caregiver is Traci Hamilton, patients spouse, phone number [phone].      Pt's cell:  [phone]    Emergency contacts:  Brielle Hudson (daughter, lives in [place] and drives) [phone]  Erica Lagos (daughter, lives in [place] and [facility]) [phone]    During admission, patient's caregiver plans to stay in patient's room.  Confirmed patient and patients caregivers do have access to reliable transportation.    Cognitive Status/Learning     Patient reports reading ability as college and states patient does not have difficulty with reading, writing, seeing, hearing, comprehension and learning. Pt reports he does have some difficulty with short term memory possibly due to 02 and medications.    Patient reports patient learns best by one on one.     Needed: No.   Highest education level: Attended College/Technical School    Vocation/Disability   .  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   Pt's spouse is not currently employed.        Adherence     Patient reports a high level of adherence to patients health care regimen.  Adherence counseling and education provided. Patient verbalizes understanding.    Substance Use    Patient reports the following substance usage.    Tobacco: none.  Pt quit smoking 1985.  Pt used to smoke 1ppd for 20 years.  Alcohol: Pt reports he has not had any alcohol since 2019.  The pt was drinking 3-4 glasses of wine per day.   Illicit Drugs/Non-prescribed Medications: none, patient denies any use.  Patient states clear understanding of the potential impact of substance use.  Substance abstinence/cessation counseling, education and resources provided and reviewed.     Services Utilizing/ADLS    Infusion Service: Prior to admission, patient utilizing? no  Home Health: Prior to admission, patient utilizing? yes Luana MIN saw pt three times prior to pt's current admission  DME: Prior to admission, yes , walker with seat, shower chair, BP cuff, pulse ox and home 02 set pt with concentrator and portables at 4LPM.  Pt and family were not sure which company 02 came from.  Medical record shows [facility] in [place].   Pulmonary/Cardiac Rehab: Prior to admission, no  Dialysis:  Prior to admission, no  Transplant Specialty Pharmacy:  Prior to admission, no.    Prior to admission, patient reports patient was not independent with ADLS.  Pt reports prior to 2019 he was very independent and was driving. Pt reports he started to become less independent in July and currently needs assistance with ADL's and is experiencing increased fatigue.  Pt's spouse and family drive.  Patient reports patient is not able to care for self at this time due to compromised medical condition (as documented in medical record) and physical weakness..  Patient indicates a willingness to care for self once medically cleared to do so.    Insurance/Medications    Insured by   Payor/Plan Subscr  Sex Relation Sub. Ins. ID Effective  Group Num   1. Lyford CROSS * DANIELE DE LA CRUZ 1950 Male  [id number]* 1/1/14                                    [address] [address]      Primary Insurance (for UNOS reporting): Private Insurance  Secondary Insurance (for UNOS reporting): None    Patient reports patient is able to obtain and afford medications at this time and at time of discharge.    Living Will/Healthcare Power of     Patient states patient has a LW and/or HCPA.  Pt reports this paperwork is at home. Worker encouraged family to bring same so it can be scanned into pt's medical record.  provided education regarding LW and HCPA and the completion of forms.    Coping/Mental Health    Patient is coping adequately with the aid of  family members. Patient denies mental health difficulties.   Pt reports no issues with anxiety or depression.  Pt is hopeful rehab will allow him to become more independent.  Pt reports he is having difficulty with sleeping. Worker provided support.   Pt reports he has a supportive family.     Discharge Planning    At time of discharge, patient plans to go to [facility].  Once discharged from rehab the pt plans to return home with spouse.   Patients family will transport patient.  Per rounds today, expected discharge date has not been medically determined at this time. Patient and patients caregiver  verbalize understanding and are involved in treatment planning and discharge process.    Additional Concerns    Patient is being followed for needs, education, resources, information, emotional support, supportive counseling, and for supportive and skilled discharge plan of care.  providing ongoing psychosocial support, education, resources and d/c planning as needed.  SW remains available. Patient's caregiver verbalizes understanding and agreement with information reviewed,  availability and how to access available resources as needed. Patient verbalizes understanding and  agreement with information reviewed, social work availability, and how to access available resources as needed.

## 2019-08-06 LAB
ALBUMIN SERPL BCP-MCNC: 2.7 G/DL (ref 3.5–5.2)
ALP SERPL-CCNC: 150 U/L (ref 55–135)
ALT SERPL W/O P-5'-P-CCNC: 39 U/L (ref 10–44)
ANION GAP SERPL CALC-SCNC: 10 MMOL/L (ref 8–16)
AST SERPL-CCNC: 16 U/L (ref 10–40)
BACTERIA BLD CULT: NORMAL
BACTERIA BLD CULT: NORMAL
BASOPHILS # BLD AUTO: 0.01 K/UL (ref 0–0.2)
BASOPHILS NFR BLD: 0.1 % (ref 0–1.9)
BILIRUB SERPL-MCNC: 0.4 MG/DL (ref 0.1–1)
BUN SERPL-MCNC: 30 MG/DL (ref 8–23)
CALCIUM SERPL-MCNC: 9 MG/DL (ref 8.7–10.5)
CHLORIDE SERPL-SCNC: 90 MMOL/L (ref 95–110)
CO2 SERPL-SCNC: 33 MMOL/L (ref 23–29)
CREAT SERPL-MCNC: 0.8 MG/DL (ref 0.5–1.4)
DIFFERENTIAL METHOD: ABNORMAL
EOSINOPHIL # BLD AUTO: 0.2 K/UL (ref 0–0.5)
EOSINOPHIL NFR BLD: 1.8 % (ref 0–8)
ERYTHROCYTE [DISTWIDTH] IN BLOOD BY AUTOMATED COUNT: 14 % (ref 11.5–14.5)
EST. GFR  (AFRICAN AMERICAN): >60 ML/MIN/1.73 M^2
EST. GFR  (NON AFRICAN AMERICAN): >60 ML/MIN/1.73 M^2
GLUCOSE SERPL-MCNC: 98 MG/DL (ref 70–110)
HCT VFR BLD AUTO: 39.1 % (ref 40–54)
HGB BLD-MCNC: 12 G/DL (ref 14–18)
IMM GRANULOCYTES # BLD AUTO: 0.06 K/UL (ref 0–0.04)
IMM GRANULOCYTES NFR BLD AUTO: 0.5 % (ref 0–0.5)
LYMPHOCYTES # BLD AUTO: 1.3 K/UL (ref 1–4.8)
LYMPHOCYTES NFR BLD: 10 % (ref 18–48)
MCH RBC QN AUTO: 26.4 PG (ref 27–31)
MCHC RBC AUTO-ENTMCNC: 30.7 G/DL (ref 32–36)
MCV RBC AUTO: 86 FL (ref 82–98)
MONOCYTES # BLD AUTO: 0.8 K/UL (ref 0.3–1)
MONOCYTES NFR BLD: 6.6 % (ref 4–15)
NEUTROPHILS # BLD AUTO: 10.3 K/UL (ref 1.8–7.7)
NEUTROPHILS NFR BLD: 81 % (ref 38–73)
NRBC BLD-RTO: 0 /100 WBC
PLATELET # BLD AUTO: 433 K/UL (ref 150–350)
PMV BLD AUTO: 8.9 FL (ref 9.2–12.9)
POTASSIUM SERPL-SCNC: 3.6 MMOL/L (ref 3.5–5.1)
PROT SERPL-MCNC: 5.6 G/DL (ref 6–8.4)
RBC # BLD AUTO: 4.55 M/UL (ref 4.6–6.2)
SODIUM SERPL-SCNC: 133 MMOL/L (ref 136–145)
WBC # BLD AUTO: 12.65 K/UL (ref 3.9–12.7)

## 2019-08-06 PROCEDURE — 99232 SBSQ HOSP IP/OBS MODERATE 35: CPT | Mod: ,,, | Performed by: NURSE PRACTITIONER

## 2019-08-06 PROCEDURE — 63600175 PHARM REV CODE 636 W HCPCS: Performed by: INTERNAL MEDICINE

## 2019-08-06 PROCEDURE — 99232 PR SUBSEQUENT HOSPITAL CARE,LEVL II: ICD-10-PCS | Mod: ,,, | Performed by: NURSE PRACTITIONER

## 2019-08-06 PROCEDURE — 27000221 HC OXYGEN, UP TO 24 HOURS

## 2019-08-06 PROCEDURE — 97165 OT EVAL LOW COMPLEX 30 MIN: CPT

## 2019-08-06 PROCEDURE — 25000003 PHARM REV CODE 250: Performed by: STUDENT IN AN ORGANIZED HEALTH CARE EDUCATION/TRAINING PROGRAM

## 2019-08-06 PROCEDURE — 20600001 HC STEP DOWN PRIVATE ROOM

## 2019-08-06 PROCEDURE — 36415 COLL VENOUS BLD VENIPUNCTURE: CPT

## 2019-08-06 PROCEDURE — 99252 PR INITIAL INPATIENT CONSULT,LEVL II: ICD-10-PCS | Mod: ,,, | Performed by: NURSE PRACTITIONER

## 2019-08-06 PROCEDURE — 94761 N-INVAS EAR/PLS OXIMETRY MLT: CPT

## 2019-08-06 PROCEDURE — 80053 COMPREHEN METABOLIC PANEL: CPT

## 2019-08-06 PROCEDURE — 63600175 PHARM REV CODE 636 W HCPCS: Performed by: STUDENT IN AN ORGANIZED HEALTH CARE EDUCATION/TRAINING PROGRAM

## 2019-08-06 PROCEDURE — 85025 COMPLETE CBC W/AUTO DIFF WBC: CPT

## 2019-08-06 PROCEDURE — 99252 IP/OBS CONSLTJ NEW/EST SF 35: CPT | Mod: ,,, | Performed by: NURSE PRACTITIONER

## 2019-08-06 RX ADMIN — PREDNISONE 25 MG: 5 TABLET ORAL at 08:08

## 2019-08-06 RX ADMIN — PANTOPRAZOLE SODIUM 40 MG: 40 TABLET, DELAYED RELEASE ORAL at 08:08

## 2019-08-06 RX ADMIN — RAMELTEON 8 MG: 8 TABLET, FILM COATED ORAL at 08:08

## 2019-08-06 RX ADMIN — SUCRALFATE 1 G: 1 SUSPENSION ORAL at 08:08

## 2019-08-06 RX ADMIN — SUCRALFATE 1 G: 1 SUSPENSION ORAL at 05:08

## 2019-08-06 RX ADMIN — LEVOFLOXACIN 750 MG: 750 TABLET, FILM COATED ORAL at 08:08

## 2019-08-06 RX ADMIN — FUROSEMIDE 40 MG: 10 INJECTION, SOLUTION INTRAMUSCULAR; INTRAVENOUS at 08:08

## 2019-08-06 RX ADMIN — SUCRALFATE 1 G: 1 SUSPENSION ORAL at 12:08

## 2019-08-06 RX ADMIN — FERROUS SULFATE TAB EC 325 MG (65 MG FE EQUIVALENT) 325 MG: 325 (65 FE) TABLET DELAYED RESPONSE at 08:08

## 2019-08-06 RX ADMIN — LIDOCAINE 2 PATCH: 50 PATCH TOPICAL at 06:08

## 2019-08-06 RX ADMIN — ATORVASTATIN CALCIUM 20 MG: 20 TABLET, FILM COATED ORAL at 08:08

## 2019-08-06 NOTE — SUBJECTIVE & OBJECTIVE
Past Medical History:   Diagnosis Date    Hyperlipemia     Hypertension     Nasal congestion     Pulmonary fibrosis      Past Surgical History:   Procedure Laterality Date    CATARACT EXTRACTION Left     COLONOSCOPY N/A 2016    Performed by Leon [facility]illa [facility] at [facility] (4TH FLR)    EGD (ESOPHAGOGASTRODUODENOSCOPY) N/A 2019    Performed by Fer Fox MD at [facility] END[facility] (2ND FLR)    EGD (ESOPHAGOGASTRODUODENOSCOPY) N/A 2019    Performed by Jimmy Saldana MD at [facility] (2ND FLR)    EGD (ESOPHAGOGASTRODUODENOSCOPY) N/A 2019    Performed by [facility] at [facility] (2ND FLR)    EGD (ESOPHAGOGASTRODUODENOSCOPY) N/A 7/10/2019    Performed by Jimmy [facility]ana MD at [facility] (2ND FLR)    HAND SURGERY      left    HIATAL HERNIA REPAIR      MICRODISCECTOMY, SPINE MIS Right L5- S1 Right 2018    Performed by Angel Pendleton MD at [facility] OR 2ND FLR    TONSILLECTOMY, ADENOIDECTOMY      VASECTOMY       Review of patient's allergies indicates:   Allergen Reactions    Penicillins     Erythromycin Rash       Scheduled Medications:    atorvastatin  20 mg Oral QHS    ferrous sulfate  325 mg Oral Daily    furosemide  40 mg Intravenous Daily    levoFLOXacin  750 mg Oral Daily    lidocaine  2 patch Transdermal Q24H    pantoprazole  40 mg Oral Q12H    predniSONE  25 mg Oral Daily    sucralfate  1 g Oral QID (WM & HS)       PRN Medications: loperamide, ramelteon, sodium chloride 0.9%    Family History     Problem Relation (Age of Onset)    Coronary artery disease Mother, Sister    Dementia Father    Multiple sclerosis Sister    Parkinsonism Father        Tobacco Use    Smoking status: Former Smoker     Packs/day: 1.50     Years: 20.00     Pack years: 30.00     Types: Cigarettes     Last attempt to quit: 3/17/1985     Years since quittin.4    Smokeless tobacco: Former User     Quit date: 1986   Substance and Sexual Activity    Alcohol use: Yes     Alcohol/week:  16.8 oz     Types: 28 Glasses of wine per week     Comment: 6-7 bottles per week    Drug use: No    Sexual activity: Yes     Partners: Female     Review of Systems   Constitutional: Positive for activity change and fatigue. Negative for chills and fever.   HENT: Negative for drooling, hearing loss, trouble swallowing and voice change.    Eyes: Negative for pain and visual disturbance.   Respiratory: Positive for shortness of breath. Negative for cough and wheezing.    Cardiovascular: Negative for chest pain and palpitations.   Gastrointestinal: Negative for abdominal pain, nausea and vomiting.   Genitourinary: Negative for difficulty urinating and flank pain.   Musculoskeletal: Negative for back pain, myalgias and neck pain.   Skin: Negative for rash and wound.   Neurological: Positive for weakness (general). Negative for dizziness, numbness and headaches.   Psychiatric/Behavioral: Negative for agitation and hallucinations. The patient is not nervous/anxious.      Objective:     Vital Signs (Most Recent):  Temp: 98.6 °F (37 °C) (08/06/19 1107)  Pulse: (!) 113 (08/06/19 1130)  Resp: (!) 35 (08/06/19 1130)  BP: 103/69 (08/06/19 1130)  SpO2: 99 % (08/06/19 1130)    Vital Signs (24h Range):  Temp:  [97.6 °F (36.4 °C)-98.7 °F (37.1 °C)] 98.6 °F (37 °C)  Pulse:  [] 113  Resp:  [17-36] 35  SpO2:  [88 %-99 %] 99 %  BP: ()/(61-93) 103/69     Body mass index is 23.98 kg/m².    Physical Exam   Constitutional: He is oriented to person, place, and time. He appears well-developed and well-nourished. No distress.   HENT:   Head: Normocephalic and atraumatic.   Right Ear: External ear normal.   Left Ear: External ear normal.   Nose: Nose normal.   Eyes: Right eye exhibits no discharge. Left eye exhibits no discharge. No scleral icterus.   Neck: Normal range of motion.   Cardiovascular: Normal rate and intact distal pulses.   Pulmonary/Chest: Effort normal. No respiratory distress.   Tolerating O2 via NC   Abdominal:  Soft. He exhibits no distension. There is no tenderness.   Musculoskeletal: Normal range of motion. He exhibits no edema or tenderness.   General deconditioning/debility    Neurological: He is alert and oriented to person, place, and time. No sensory deficit. He exhibits normal muscle tone.   Skin: Skin is warm and dry. No rash noted.   Psychiatric: He has a normal mood and affect. His behavior is normal. Thought content normal.   Vitals reviewed.    NEUROLOGICAL EXAMINATION:     MENTAL STATUS   Oriented to person, place, and time.       Diagnostic Results:   Labs: Reviewed  X-Ray: Reviewed

## 2019-08-06 NOTE — CONSULTS
Inpatient consult to Physical Medicine Rehab  Consult performed by: BAO Moreland  Consult ordered by: Pollo Saunders NP  Reason for consult: rehab evaluation      Consult received.  Reviewed patient history and current admission.  Rehab team following.  Full consult to follow.    VARSHA Lee, NEYDAP-C  Physical Medicine & Rehabilitation   08/06/2019  Spectralink: [id number]

## 2019-08-06 NOTE — CARE UPDATE
Rapid Response Respiratory Therapy Proactive Rounding Note      Time of visit: 911    Code Status: Full Code   : 1950  Age: 68 y.o.  Weight:   Wt Readings from Last 1 Encounters:   19 73.7 kg (162 lb 6.4 oz)     Sex: male  Race: White   Bed: 48232/08788 A:   MRN: [id number]    SITUATION     Evaluated patient for: MEWS Score    BACKGROUND     Patient has a past medical history of Hyperlipemia, Hypertension, Nasal congestion, and Pulmonary fibrosis.  Pulmonary Hx: Pulmonary fibrosis  Clinically Significant Surgical Hx: None    ASSESSMENT     Pulse: Pulse: 106 Respiratory rate: Resp: (!) 31 Temperature: Temp: 98.7 °F (37.1 °C) BP: BP: 118/78 SpO2:SpO2: (!) 88 %   Level of Consciousness: Level of Consciousness (AVPU): alert  Respiratory Effort: Respiratory Effort: Mild, Labored  Expansion/Accessory Muscle Usage: Expansion/Accessory Muscles/Retractions: abdominal muscle use  All Lung Field Breath Sounds: All Lung Fields Breath Sounds: Anterior:, Lateral:, diminished  Cough Type: Cough Type: good, nonproductive  Mobility at time of assessment: General Mobility: generalized weakness  O2 Device/Concentration: O2 Device (Oxygen Therapy): nasal cannula w/ humidification   Flow (L/min): 4  Most recent blood gas: No results for input(s): PH, PCO2, PO2, HCO3, POCSATURATED, BE in the last 72 hours.  Current Respiratory Care Orders:   19 0829  Oxygen Continuous Continuous     References: Oxygen Titration Protocol   Question Answer Comment   Device type: Low flow    Device: Nasal Cannula (1- 5 Liters)    LPM: 5    Titrate O2 per Oxygen Titration Protocol: Yes    To maintain SpO2 goal of: >= 88%    Notify MD of: Inability to achieve desired SpO2; Sudden change in patient status and requires 20% increase in FiO2; Patient requires >60% FiO2        19 0829   Orders     All Active Orders       NIPPV: No  Surgical airway: No  Ambu at bedside: Ambu bag with the patient?: Yes, Adult  Ambu    INTERVENTIONS/RECOMMENDATIONS   ?  Pt assessed due to MEWS Score. Pt found on 4L nasal cannula with an SpO2 of 88%. Pt just moved from bed to chair. Pt has slight increased work of breathing at this time. Pt does complain of some shortness of breath at this time. Pt instructed to take slow deep breaths. RNTereza, at bedside. RN stated pt tends to desat after moving around. After a few minutes, pts SpO2 increased to 96% on 4L nasal cannula and pt has less labored breathing. Will continue to monitor.    Discussed plan of care with primary RTScott.     FOLLOW-UP     Please call back the Rapid Response RTVi, RRT at x [phone] for any questions or concerns.

## 2019-08-06 NOTE — HPI
"Manpreet"Brandon Hamilton is a 68-year-old male with PMHx of HTN, HLD, idiopathic pulmonary fibrosis (followed by Dr. Pradhan, on OFEV), and recent prolonged/acute admission 7/6/19-7/22/19 for extensive UGIB with acute blood loss anemia requiring massive transfusion, endoscopic clipping, and IR embolization further complicated by acute hypoxemic respiratory failure requiring intubation with discharge home on supplemental O2 and home health therapy.  Following discharge, patient with increased oxygen requirements, dyspnea, and progressively worsening weakness and activity intolerance.  He was then admitted to [facility] on 7/31/19 for acute on chronic hypoxemic respiratory failure.  Treated with antibiotics, steroids, and diuresis.  Therapy following.  Stepped down to floor on 8/5/19.  Etiology unclear, possibly health care associated infection vs non-cardiogenic pulmonary edema vs IPF exacerbation.      Functional History: Patient lives in [place] with his wife in a 2 story home with 3 steps to enter and flight of stairs to 2nd floor.  Prior to admission in July, he was active and(I) with ADLs and mobility; working, driving, and walking 2 miles per day.  Since discharge on 7/22/19, he required assistance with some ADLs and walked short distances with rollator.  Difficulty tolerating home health therapy during this time.  Did not require home oxygen until after recent admission.  DME: [facility] bowen.  "

## 2019-08-06 NOTE — PLAN OF CARE
Problem: Occupational Therapy Goal  Goal: Occupational Therapy Goal  Goals to be met by: 8/13/19     Patient will increase functional independence with ADLs by performing:    Grooming while standing at sink with Set-up Assistance.  Toileting from toilet with Set-up Assistance for hygiene and clothing management.   Supine to sit with [place].  Toilet transfer to toilet with Supervision.    Outcome: Ongoing (interventions implemented as appropriate)  Evaluation completed and POC established.    NADEEM Barakat

## 2019-08-06 NOTE — ASSESSMENT & PLAN NOTE
Continue to wean oxygen. Currently on 4 HFNC and use NRBM for recovery.   Unclear if the etiology of his deterioration is a health care associated infection, non-cardiogenic pulmonary edema, or an exacerbation of his IPF. Will continue levofloxacin, lasix, and prednisone.

## 2019-08-06 NOTE — PROGRESS NOTES
[facility]  Lung Transplant  Progress Note - Floor    Patient Name: Manpreet Hamilton  MRN: [id number]  Admission Date: 7/31/2019  Hospital Length of Stay: 6 days  Post-Operative Day:   Attending Physician: Shauna Booker DO  Primary Care Provider: Radames Pradhan MD     Subjective:     Interval History: No acute events overnight.  Awaiting rehab to evaluate patient    Continuous Infusions:  Scheduled Meds:   atorvastatin  20 mg Oral QHS    ferrous sulfate  325 mg Oral Daily    furosemide  40 mg Intravenous Daily    levoFLOXacin  750 mg Oral Daily    lidocaine  2 patch Transdermal Q24H    pantoprazole  40 mg Oral Q12H    predniSONE  25 mg Oral Daily    sucralfate  1 g Oral QID (WM & HS)     PRN Meds:loperamide, ramelteon, sodium chloride 0.9%    Review of patient's allergies indicates:   Allergen Reactions    Penicillins     Erythromycin Rash       Review of Systems   Constitutional: Positive for activity change. Negative for appetite change, chills, fatigue, fever and unexpected weight change.   HENT: Negative for congestion, ear pain, hearing loss, mouth sores and postnasal drip.    Eyes: Negative for photophobia, pain, discharge, redness, itching and visual disturbance.   Respiratory: Positive for shortness of breath. Negative for cough, chest tightness and wheezing.    Cardiovascular: Negative for chest pain, palpitations and leg swelling.   Gastrointestinal: Negative for abdominal distention, abdominal pain, blood in stool, constipation and diarrhea.   Endocrine: Negative for cold intolerance, heat intolerance, polydipsia, polyphagia and polyuria.   Genitourinary: Negative for decreased urine volume, dysuria, frequency, hematuria and urgency.   Musculoskeletal: Negative for arthralgias and joint swelling.   Allergic/Immunologic: Negative for environmental allergies, food allergies and immunocompromised state.   Neurological: Positive for weakness. Negative for dizziness, tremors,  syncope, speech difficulty and numbness.   Hematological: Negative for adenopathy. Does not bruise/bleed easily.   Psychiatric/Behavioral: Negative for agitation, confusion and hallucinations.     Objective:   Physical Exam   Constitutional: He is oriented to person, place, and time. He appears well-developed and well-nourished.   HENT:   Head: Normocephalic and atraumatic.   Eyes: Conjunctivae and EOM are normal.   Neck: Normal range of motion. Neck supple.   Cardiovascular: Normal rate and regular rhythm.   Pulmonary/Chest: No respiratory distress. He has decreased breath sounds. He has no wheezes. He has rales.   Abdominal: Soft. Bowel sounds are normal.   Musculoskeletal: Normal range of motion.   Neurological: He is alert and oriented to person, place, and time.   Skin: Skin is warm.   Psychiatric: He has a normal mood and affect.         Vital Signs (Most Recent):  Temp: 98.7 °F (37.1 °C) (08/06/19 0800)  Pulse: 110 (08/06/19 1030)  Resp: (!) 29 (08/06/19 1030)  BP: 118/78 (08/06/19 1030)  SpO2: (!) 92 % (08/06/19 1030) Vital Signs (24h Range):  Temp:  [97.6 °F (36.4 °C)-98.7 °F (37.1 °C)] 98.7 °F (37.1 °C)  Pulse:  [] 110  Resp:  [17-36] 29  SpO2:  [88 %-99 %] 92 %  BP: ()/(61-93) 118/78     Weight: 73.7 kg (162 lb 6.4 oz)  Body mass index is 23.98 kg/m².      Intake/Output Summary (Last 24 hours) at 8/6/2019 1058  Last data filed at 8/6/2019 0630  Gross per 24 hour   Intake 855 ml   Output 1305 ml   Net -450 ml       Significant Labs:  CBC:  Recent Labs   Lab 08/06/19  0632   WBC 12.65   RBC 4.55*   HGB 12.0*   HCT 39.1*   *   MCV 86   MCH 26.4*   MCHC 30.7*     BMP:  Recent Labs   Lab 08/06/19  0632   *   K 3.6   CL 90*   CO2 33*   BUN 30*   CREATININE 0.8   CALCIUM 9.0      Tacrolimus Levels:  No results for input(s): TACROLIMUS in the last 72 hours.  Microbiology:  Microbiology Results (last 7 days)     Procedure Component Value Units Date/Time    Blood culture [[id number]]  Collected:  08/01/19 0247    Order Status:  Completed Specimen:  Blood from Peripheral, Hand, Right Updated:  08/06/19 0612     Blood Culture, Routine No growth after 5 days.    Blood culture [[id number]] Collected:  08/01/19 0248    Order Status:  Completed Specimen:  Blood from Peripheral, Forearm, Right Updated:  08/06/19 0612     Blood Culture, Routine No growth after 5 days.    Culture, Respiratory with Gram Stain [[id number]] Collected:  07/31/19 2128    Order Status:  Completed Specimen:  Respiratory from Sputum Updated:  08/03/19 0907     Respiratory Culture Normal respiratory kendy      No S aureus or Pseudomonas isolated.     Gram Stain (Respiratory) <10 epithelial cells per low power field.     Gram Stain (Respiratory) Few WBC's     Gram Stain (Respiratory) Few Gram positive rods     Gram Stain (Respiratory) Few Gram positive cocci     Gram Stain (Respiratory) Rare yeast    Culture, Respiratory with Gram Stain [[id number]]     Order Status:  No result Specimen:  Respiratory     Respiratory Infection Panel, Nasopharyngeal [[id number]] Collected:  07/31/19 2135    Order Status:  Completed Specimen:  Nasopharyngeal Swab Updated:  08/01/19 0910     Respiratory Infection Panel Source NP Swab     Adenovirus Not Detected     Coronavirus 229E Not Detected     Coronavirus HKU1 Not Detected     Coronavirus NL63 Not Detected     Coronavirus OC43 Not Detected     Human Metapneumovirus Not Detected     Human Rhinovirus/Enterovirus Not Detected     Influenza A (subtypes H1, H1-2009,H3) Not Detected     Influenza B Not Detected     Parainfluenza Virus 1 Not Detected     Parainfluenza Virus 2 Not Detected     Parainfluenza Virus 3 Not Detected     Parainfluenza Virus 4 Not Detected     Respiratory Syncytial Virus Not Detected     Bordetella Parapertussis (HG8048) Not Detected     Bordetella pertussis (ptxP) Not Detected     Chlamydia pneumoniae Not Detected     Mycoplasma pneumoniae Not Detected    Narrative:       For all  other respiratory sources order [id number] Respiratory  Viral Panel by PCR (RVPCR)    Respiratory Infection Panel, Nasopharyngeal [[id number]] Resulted:  07/31/19 2356    Order Status:  No result Updated:  07/31/19 2356    Respiratory Viral Panel by PCR Dorindastephanie; Sputum [[id number]] Collected:  07/31/19 2128    Order Status:  Canceled Specimen:  Respiratory           I have reviewed all pertinent labs within the past 24 hours.      Assessment/Plan:     * Acute on chronic respiratory failure with hypoxemia  Continue to wean oxygen. Currently on 4 HFNC and use NRBM for recovery.   Unclear if the etiology of his deterioration is a health care associated infection, non-cardiogenic pulmonary edema, or an exacerbation of his IPF. Will continue levofloxacin, lasix, and prednisone.    Idiopathic pulmonary fibrosis  Will start pirfenidone soon.  Will make the change after his episode of severe bleeding.     Debility  Continue physical therapy. Goal is to transfer him to inpatient rehab when his oxygen requirements have decreased. Rehab consulted.        Pollo Saunders NP  Lung Transplant  [facility]

## 2019-08-06 NOTE — PT/OT/SLP EVAL
"Occupational Therapy   Evaluation    Name: Manpreet Hamilton  MRN: [id number]  Admitting Diagnosis:  Acute on chronic respiratory failure with hypoxemia      Recommendations:     Discharge Recommendations: rehabilitation facility  Discharge Equipment Recommendations:  none  Barriers to discharge:  None    Assessment:     Manpreet Hamilton is a 68 y.o. male with a medical diagnosis of Acute on chronic respiratory failure with hypoxemia. Pt. currently demonstrates decreased (I) with ADLs, functional mobility & t/fs as well as decreased overall endurance and balance. Pt is greatly limited at this time from SOB during activity which he becomes very anxious and rushes to sit back down. Additionally, this is pt's 2nd admit within a month and he feels that he is much less (I) as compared to his functional ability in June. Pt would benefit from skilled OT services as well as Rehab placement to address these deficits and to facilitate improving (I) with daily tasks. Performance deficits affecting function: weakness, impaired cardiopulmonary response to activity, impaired endurance, impaired functional mobilty, impaired self care skills, gait instability, impaired balance, decreased safety awareness.      Rehab Prognosis: Good; patient would benefit from acute skilled OT services to address these deficits and reach maximum level of function.       Plan:     Patient to be seen 3 x/week to address the above listed problems via self-care/home management, therapeutic activities, therapeutic exercises  · Plan of Care Expires: 09/05/19  · Plan of Care Reviewed with: patient, daughter    Subjective     "HH therapy was a joke - he really needs to go to Rehab." (daughter)    Occupational Profile:  Living Environment: Patient lives in [place] with his wife in a 2 story home with 3 steps to enter and flight of stairs to 2nd floor.  Prior to admission in July, he was active and (I) with ADLs and mobility; working, driving, and walking 2 " miles per day.  Since discharge on 7/22/19, he required assistance with some ADLs and walked short distances with rollator.  Difficulty tolerating home health therapy during this time.  Did not require home oxygen until after recent admission.    Equipment Used at Home:  hospital bed, rollator, shower chair, oxygen  Assistance upon Discharge: Wife as able.    Pain/Comfort:  · Pain Rating 1: 0/10    Patients cultural, spiritual, Yazidism conflicts given the current situation:      Objective:     Communicated with: rn prior to session.  Patient found up in chair with oxygen upon OT entry to room.    General Precautions: Standard, fall   Orthopedic Precautions:    Braces:       Occupational Performance:    Bed Mobility:    · Up in chair.    Functional Mobility/Transfers:  · Patient completed Sit <> Stand Transfer with contact guard assistance  with  rolling walker   · Functional Mobility: Pt took 3 forward steps then became anxious due to SOB and rushed back to the chair.    Activities of Daily Living:  · Grooming: independence seated  · Upper Body Dressing: supervision   · Lower Body Dressing: supervision for socks from chair level.    Cognitive/Visual Perceptual:  Cognitive/Psychosocial Skills:     -       Oriented to: Person, Place, Time and Situation   -       Safety awareness/insight to disability: intact     Physical Exam:  BUE AROM/MMT: WNL    AMPAC 6 Click ADL:  AMPAC Total Score: 21    Treatment & Education:  UE ROM/MMT  Bed mobility training / assessment  Functional mobility assessment  Sit/standing balance assessment  Educated on importance of sitting OOB in bedside chair to promote increased strength, endurance & breathing.  Discussed OT POC / Post-acute plan  Education:    Patient left up in chair with all lines intact and call button in reach    GOALS:   Multidisciplinary Problems     Occupational Therapy Goals        Problem: Occupational Therapy Goal    Goal Priority Disciplines Outcome Interventions    Occupational Therapy Goal     OT, PT/OT Ongoing (interventions implemented as appropriate)    Description:  Goals to be met by: 8/13/19     Patient will increase functional independence with ADLs by performing:    Grooming while standing at sink with Set-up Assistance.  Toileting from toilet with Set-up Assistance for hygiene and clothing management.   Supine to sit with [place].  Toilet transfer to toilet with Supervision.                      History:     Past Medical History:   Diagnosis Date    Hyperlipemia     Hypertension     Nasal congestion     Pulmonary fibrosis        Past Surgical History:   Procedure Laterality Date    CATARACT EXTRACTION Left     COLONOSCOPY N/A 8/5/2016    Performed by Leon Padilla MD at [facility] (4TH FLR)    EGD (ESOPHAGOGASTRODUODENOSCOPY) N/A 7/18/2019    Performed by Fer Fox MD at [facility] ENDO (2ND FLR)    EGD (ESOPHAGOGASTRODUODENOSCOPY) N/A 7/12/2019    Performed by Jimmy Saldana MD at [facility] ENDO (2ND FLR)    EGD (ESOPHAGOGASTRODUODENOSCOPY) N/A 7/11/2019    Performed by Jimmy Saldana MD at [facility] END[facility] (2ND FLR)    EGD (ESOPHAGOGASTRODUODENOSCOPY) N/A 7/10/2019    Performed by Jimmy Saldana MD at [facility] END[facility] (2ND FLR)    HAND SURGERY      left    HIATAL HERNIA REPAIR      MICRODISCECTOMY, SPINE MIS Right L5- S1 Right 6/27/2018    Performed by Angel Pendleton MD at [facility] OR 2ND FLR    TONSILLECTOMY, ADENOIDECTOMY      VASECTOMY         Time Tracking:     OT Date of Treatment: 08/06/19  OT Start Time: 1303  OT Stop Time: 1327  OT Total Time (min): 24 min    Billable Minutes:Evaluation 24    NADEEM Burton  8/6/2019

## 2019-08-06 NOTE — PLAN OF CARE
Pt is AAOx4; afebrile; vital signs stable. He is on 4L O2 per nasal cannula, with a nonrebreather to use for recovery after activity. Without the use of the NRB, O2 sats dropped in the 70's from getting onto the bedside commode. Appetite is good. Wife and daugther at bedside, attentive to pt. Plan is for him to d/c to inpatient rehab.

## 2019-08-06 NOTE — ASSESSMENT & PLAN NOTE
-  Recent admission 7/10/19-7/22/19 for extensive UGIB s/p massive transfusion and IR embolization with subsequent increased oxygen demand requiring increased O2 supplementation from 2 L to 5 L  -  Etiology unclear--> possibly health care associated infection vs non-cardiogenic pulmonary edema vs IPF exacerbation  -  Management per primary team

## 2019-08-06 NOTE — ASSESSMENT & PLAN NOTE
-  Prior to admission in 7/2019--> active and (I) with ADLs and mobility- working, driving, walking 2 miles per day  -  Since discharge on 7/22/19--> required assistance with some ADLs and walked short distances with rollator- difficulty tolerating home health therapy during this time  -  Related to acute/prolonged hospital course and increased O2 demands resulting in impaired functional mobility, endurance, and cardiopulmonary response to activity  See hospital course for functional status.    Recommendations  -  Encourage mobility, OOB in chair, and early ambulation as appropriate  -  PT/OT evaluate and treat  -  Pain management  -  DVT prophylaxis  -  Monitor for and prevent skin breakdown and pressure ulcers  · Early mobility, repositioning/weight shifting every 20-30 minutes when sitting, turn patient every 2 hours, proper mattress/overlay and chair cushioning, pressure relief/heel protector boots

## 2019-08-06 NOTE — HOSPITAL COURSE
8/3/19:  Evaluated by PT.  Bed mobility (I).  Sit to stand SV and transfers CGA.  No gait 2/2 poor activity tolerance.    8/5/19:  Participated with PT at bedside chair.  Refused functional mobility with PT.  OT evaluation pending.    8/6/19:  Evaluated by OT.  Sit to stand CGA with RW.  Ambulated 3 forward steps; however, rushed back to chair 2/2 SOB.  Grooming (I).  UBD and LBD SV.  8/7/19:  No PT or OT.  8/8/19:  Participated with PT and OT.  Bed mobility mod(I)-(I).  Sit to stand SV-SBA.  Ambulated 6 ft forward and backwards CGA with RW and non-rebreather with PT and 6 ft x 2 SBA with non-rebreather with OT.  Ribeiro placed for urinary retention.

## 2019-08-06 NOTE — SUBJECTIVE & OBJECTIVE
Subjective:     Interval History: No acute events overnight.  Awaiting rehab to evaluate patient    Continuous Infusions:  Scheduled Meds:   atorvastatin  20 mg Oral QHS    ferrous sulfate  325 mg Oral Daily    furosemide  40 mg Intravenous Daily    levoFLOXacin  750 mg Oral Daily    lidocaine  2 patch Transdermal Q24H    pantoprazole  40 mg Oral Q12H    predniSONE  25 mg Oral Daily    sucralfate  1 g Oral QID (WM & HS)     PRN Meds:loperamide, ramelteon, sodium chloride 0.9%    Review of patient's allergies indicates:   Allergen Reactions    Penicillins     Erythromycin Rash       Review of Systems   Constitutional: Positive for activity change. Negative for appetite change, chills, fatigue, fever and unexpected weight change.   HENT: Negative for congestion, ear pain, hearing loss, mouth sores and postnasal drip.    Eyes: Negative for photophobia, pain, discharge, redness, itching and visual disturbance.   Respiratory: Positive for shortness of breath. Negative for cough, chest tightness and wheezing.    Cardiovascular: Negative for chest pain, palpitations and leg swelling.   Gastrointestinal: Negative for abdominal distention, abdominal pain, blood in stool, constipation and diarrhea.   Endocrine: Negative for cold intolerance, heat intolerance, polydipsia, polyphagia and polyuria.   Genitourinary: Negative for decreased urine volume, dysuria, frequency, hematuria and urgency.   Musculoskeletal: Negative for arthralgias and joint swelling.   Allergic/Immunologic: Negative for environmental allergies, food allergies and immunocompromised state.   Neurological: Positive for weakness. Negative for dizziness, tremors, syncope, speech difficulty and numbness.   Hematological: Negative for adenopathy. Does not bruise/bleed easily.   Psychiatric/Behavioral: Negative for agitation, confusion and hallucinations.     Objective:   Physical Exam   Constitutional: He is oriented to person, place, and time. He  appears well-developed and well-nourished.   HENT:   Head: Normocephalic and atraumatic.   Eyes: Conjunctivae and EOM are normal.   Neck: Normal range of motion. Neck supple.   Cardiovascular: Normal rate and regular rhythm.   Pulmonary/Chest: No respiratory distress. He has decreased breath sounds. He has no wheezes. He has rales.   Abdominal: Soft. Bowel sounds are normal.   Musculoskeletal: Normal range of motion.   Neurological: He is alert and oriented to person, place, and time.   Skin: Skin is warm.   Psychiatric: He has a normal mood and affect.         Vital Signs (Most Recent):  Temp: 98.7 °F (37.1 °C) (08/06/19 0800)  Pulse: 110 (08/06/19 1030)  Resp: (!) 29 (08/06/19 1030)  BP: 118/78 (08/06/19 1030)  SpO2: (!) 92 % (08/06/19 1030) Vital Signs (24h Range):  Temp:  [97.6 °F (36.4 °C)-98.7 °F (37.1 °C)] 98.7 °F (37.1 °C)  Pulse:  [] 110  Resp:  [17-36] 29  SpO2:  [88 %-99 %] 92 %  BP: ()/(61-93) 118/78     Weight: 73.7 kg (162 lb 6.4 oz)  Body mass index is 23.98 kg/m².      Intake/Output Summary (Last 24 hours) at 8/6/2019 1058  Last data filed at 8/6/2019 0630  Gross per 24 hour   Intake 855 ml   Output 1305 ml   Net -450 ml       Significant Labs:  CBC:  Recent Labs   Lab 08/06/19  0632   WBC 12.65   RBC 4.55*   HGB 12.0*   HCT 39.1*   *   MCV 86   MCH 26.4*   MCHC 30.7*     BMP:  Recent Labs   Lab 08/06/19  0632   *   K 3.6   CL 90*   CO2 33*   BUN 30*   CREATININE 0.8   CALCIUM 9.0      Tacrolimus Levels:  No results for input(s): TACROLIMUS in the last 72 hours.  Microbiology:  Microbiology Results (last 7 days)     Procedure Component Value Units Date/Time    Blood culture [[id number]] Collected:  08/01/19 0247    Order Status:  Completed Specimen:  Blood from Peripheral, Hand, Right Updated:  08/06/19 0612     Blood Culture, Routine No growth after 5 days.    Blood culture [[id number]] Collected:  08/01/19 0248    Order Status:  Completed Specimen:  Blood from Peripheral,  Forearm, Right Updated:  08/06/19 0612     Blood Culture, Routine No growth after 5 days.    Culture, Respiratory with Gram Stain [[id number]] Collected:  07/31/19 2128    Order Status:  Completed Specimen:  Respiratory from Sputum Updated:  08/03/19 0907     Respiratory Culture Normal respiratory kendy      No S aureus or Pseudomonas isolated.     Gram Stain (Respiratory) <10 epithelial cells per low power field.     Gram Stain (Respiratory) Few WBC's     Gram Stain (Respiratory) Few Gram positive rods     Gram Stain (Respiratory) Few Gram positive cocci     Gram Stain (Respiratory) Rare yeast    Culture, Respiratory with Gram Stain [[id number]]     Order Status:  No result Specimen:  Respiratory     Respiratory Infection Panel, Nasopharyngeal [[id number]] Collected:  07/31/19 2135    Order Status:  Completed Specimen:  Nasopharyngeal Swab Updated:  08/01/19 0910     Respiratory Infection Panel Source NP Swab     Adenovirus Not Detected     Coronavirus 229E Not Detected     Coronavirus HKU1 Not Detected     Coronavirus NL63 Not Detected     Coronavirus OC43 Not Detected     Human Metapneumovirus Not Detected     Human Rhinovirus/Enterovirus Not Detected     Influenza A (subtypes H1, H1-2009,H3) Not Detected     Influenza B Not Detected     Parainfluenza Virus 1 Not Detected     Parainfluenza Virus 2 Not Detected     Parainfluenza Virus 3 Not Detected     Parainfluenza Virus 4 Not Detected     Respiratory Syncytial Virus Not Detected     Bordetella Parapertussis (UI9181) Not Detected     Bordetella pertussis (ptxP) Not Detected     Chlamydia pneumoniae Not Detected     Mycoplasma pneumoniae Not Detected    Narrative:       For all other respiratory sources order [id number] Respiratory  Viral Panel by PCR (RVPCR)    Respiratory Infection Panel, Nasopharyngeal [[id number]] Resulted:  07/31/19 2356    Order Status:  No result Updated:  07/31/19 2356    Respiratory Viral Panel by PCR [facility]; Sputum [[id number]] Collected:   07/31/19 2128    Order Status:  Canceled Specimen:  Respiratory           I have reviewed all pertinent labs within the past 24 hours.

## 2019-08-06 NOTE — CARE UPDATE
RAPID RESPONSE NURSE PROACTIVE ROUNDING NOTE     Time of Visit:     Admit Date: 2019  LOS: 5  Code Status: Full Code   Date of Visit: 2019  : 1950  Age: 68 y.o.  Sex: male  Race: White  Bed: 25586/92602 A:   MRN: [id number]  Was the patient discharged from an ICU this admission? yes   Was the patient discharged from a PACU within last 24 hours?  no  Did the patient receive conscious sedation/general anesthesia in last 24 hours?  no  Was the patient in the ED within the past 24 hours?  no  Was the patient started on NIPPV within the past 24 hours?  no  Attending Physician: Jarret Park MD  Primary Service: [facility] [place] [facility]    ASSESSMENT     Diagnosis: Acute on chronic respiratory failure with hypoxemia    Abnormal Vital Signs: Currently VSS     Clinical Issues: Respiratory    Patient  has a past medical history of Hyperlipemia, Hypertension, Nasal congestion, and Pulmonary fibrosis.     Pt with recent complicated admission for GI bleed / intubated / significant blood administration. This admission pt with worsening respiratory status (pulmonary fibrosis, suspicion of PNA prior to admission). Pt was on comfort flow but now tolerating nasal cannula 5L.   On assessment, pt was sleeping, no distress noted. SpO2 96-97%    INTERVENTIONS/ RECOMMENDATIONS     Continue current POC    PHYSICIAN ESCALATION     Yes/No  no    Orders received and case discussed with NA.    Disposition: Remain in room 03830A.    FOLLOW-UP     Call back the Rapid Response Nurse, Ninfa Lewis RN at [phone] for additional questions or concerns.

## 2019-08-06 NOTE — CARE UPDATE
Rapid Response Nurse Follow-up Note     Followed up with patient for proactive rounding.   No acute issues at this time. Reviewed plan of care with primary RNTereza . Pt resting comfortably in bed, eating breakfast. Pt on 4L NC, SpO2 ~88%, sustaining. Will follow up with pt. Continue plan of care.   Please call Rapid Response RN, Maria L Saucedo RN with any questions or concerns at [phone].

## 2019-08-06 NOTE — CONSULTS
"[facility]  Physical Medicine & Rehab  Consult Note    Patient Name: Manpreet Hamilton  MRN: [id number]  Admission Date: 7/31/2019  Hospital Length of Stay: 6 days  Attending Physician: Shauna Booker DO     Inpatient consult to Physical Medicine & Rehabilitation  Consult performed by: Erin Fuentes NP  Consult requested by:  Shauna Booker DO    Collaborating Physician: Kinga Lynn MD  Reason for Consult:  Rehab evaluation     Consults  Subjective:     Principal Problem: Acute on chronic respiratory failure with hypoxemia    HPI: Manpreet Hamilton (Johnny) is a 68-year-old male with PMHx of HTN, HLD, idiopathic pulmonary fibrosis (followed by Dr. Pradhan, on OFEV), and recent prolonged/acute admission 7/6/19-7/22/19 for extensive UGIB with acute blood loss anemia requiring massive transfusion, endoscopic clipping, and IR embolization further complicated by acute hypoxemic respiratory failure requiring intubation with discharge home on supplemental O2 and home health therapy.  Following discharge, patient with increased oxygen requirements, dyspnea, and progressively worsening weakness and activity intolerance.  He was then admitted to [facility] on 7/31/19 for acute on chronic hypoxemic respiratory failure.  Treated with antibiotics, steroids, and diuresis.  Therapy following.  Stepped down to floor on 8/5/19.  Etiology unclear, possibly health care associated infection vs non-cardiogenic pulmonary edema vs IPF exacerbation.      Functional History: Patient lives in [place] with his wife in a 2 story home with 3 steps to enter and flight of stairs to 2nd floor.  Prior to admission in July, he was active and(I) with ADLs and mobility; working, driving, and walking 2 miles per day.  Since discharge on 7/22/19, he required assistance with some ADLs and walked short distances with rollator.  Difficulty tolerating home health therapy during this time.  Did not require home oxygen until after " recent admission.  DME: ELIE bowen    Hospital Course:   8/3/19:  Evaluated by PT.  Bed mobility (I).  Sit to stand SV and transfers CGA.  No gait 2/2 poor activity tolerance.    8/5/19:  Participated with PT at bedside chair.  Refused functional mobility with PT.  OT evaluation pending.      Past Medical History:   Diagnosis Date    Hyperlipemia     Hypertension     Nasal congestion     Pulmonary fibrosis      Past Surgical History:   Procedure Laterality Date    CATARACT EXTRACTION Left     COLONOSCOPY N/A 8/5/2016    Performed by Leon Padilla MD at [facility] ENDO (4TH FLR)    EGD (ESOPHAGOGASTRODUODENOSCOPY) N/A 7/18/2019    Performed by Fer Fox MD at [facility] ENDO (2ND FLR)    EGD (ESOPHAGOGASTRODUODENOSCOPY) N/A 7/12/2019    Performed by Jimmy Saldana MD at [facility] ENDO (2ND FLR)    EGD (ESOPHAGOGASTRODUODENOSCOPY) N/A 7/11/2019    Performed by Jimmy Saldana MD at [facility] ENDO (2ND FLR)    EGD (ESOPHAGOGASTRODUODENOSCOPY) N/A 7/10/2019    Performed by Jimmy Saldana MD at [facility] ENDO (2ND FLR)    HAND SURGERY      left    HIATAL HERNIA REPAIR      MICRODISCECTOMY, SPINE MIS Right L5- S1 Right 6/27/2018    Performed by Angel Pendleton MD at [facility] OR 2ND FLR    TONSILLECTOMY, ADENOIDECTOMY      VASECTOMY       Review of patient's allergies indicates:   Allergen Reactions    Penicillins     Erythromycin Rash       Scheduled Medications:    atorvastatin  20 mg Oral QHS    ferrous sulfate  325 mg Oral Daily    furosemide  40 mg Intravenous Daily    levoFLOXacin  750 mg Oral Daily    lidocaine  2 patch Transdermal Q24H    pantoprazole  40 mg Oral Q12H    predniSONE  25 mg Oral Daily    sucralfate  1 g Oral QID (WM & HS)       PRN Medications: loperamide, ramelteon, sodium chloride 0.9%    Family History     Problem Relation (Age of Onset)    Coronary artery disease Mother, Sister    Dementia Father    Multiple sclerosis Sister    Parkinsonism Father        Tobacco Use    Smoking status:  Former Smoker     Packs/day: 1.50     Years: 20.00     Pack years: 30.00     Types: Cigarettes     Last attempt to quit: 3/17/1985     Years since quittin.4    Smokeless tobacco: Former User     Quit date: 1986   Substance and Sexual Activity    Alcohol use: Yes     Alcohol/week: 16.8 oz     Types: 28 Glasses of wine per week     Comment: 6-7 bottles per week    Drug use: No    Sexual activity: Yes     Partners: Female     Review of Systems   Constitutional: Positive for activity change and fatigue. Negative for chills and fever.   HENT: Negative for drooling, hearing loss, trouble swallowing and voice change.    Eyes: Negative for pain and visual disturbance.   Respiratory: Positive for shortness of breath. Negative for cough and wheezing.    Cardiovascular: Negative for chest pain and palpitations.   Gastrointestinal: Negative for abdominal pain, nausea and vomiting.   Genitourinary: Negative for difficulty urinating and flank pain.   Musculoskeletal: Negative for back pain, myalgias and neck pain.   Skin: Negative for rash and wound.   Neurological: Positive for weakness (general). Negative for dizziness, numbness and headaches.   Psychiatric/Behavioral: Negative for agitation and hallucinations. The patient is not nervous/anxious.      Objective:     Vital Signs (Most Recent):  Temp: 98.6 °F (37 °C) (19 1107)  Pulse: (!) 113 (19 1130)  Resp: (!) 35 (19 1130)  BP: 103/69 (19 1130)  SpO2: 99 % (19 1130)    Vital Signs (24h Range):  Temp:  [97.6 °F (36.4 °C)-98.7 °F (37.1 °C)] 98.6 °F (37 °C)  Pulse:  [] 113  Resp:  [17-36] 35  SpO2:  [88 %-99 %] 99 %  BP: ()/(61-93) 103/69     Body mass index is 23.98 kg/m².    Physical Exam   Constitutional: He is oriented to person, place, and time. He appears well-developed and well-nourished. No distress.   HENT:   Head: Normocephalic and atraumatic.   Right Ear: External ear normal.   Left Ear: External ear normal.    Nose: Nose normal.   Eyes: Right eye exhibits no discharge. Left eye exhibits no discharge. No scleral icterus.   Neck: Normal range of motion.   Cardiovascular: Normal rate and intact distal pulses.   Pulmonary/Chest: Effort normal. No respiratory distress.   Tolerating O2 via NC   Abdominal: Soft. He exhibits no distension. There is no tenderness.   Musculoskeletal: Normal range of motion. He exhibits no edema or tenderness.   General deconditioning/debility    Neurological: He is alert and oriented to person, place, and time. No sensory deficit. He exhibits normal muscle tone.   Skin: Skin is warm and dry. No rash noted.   Psychiatric: He has a normal mood and affect. His behavior is normal. Thought content normal.   Vitals reviewed.    Diagnostic Results:   Labs: Reviewed  X-Ray: Reviewed    Assessment/Plan:     * Acute on chronic respiratory failure with hypoxemia  -  Recent admission 7/10/19-7/22/19 for extensive UGIB s/p massive transfusion and IR embolization with subsequent increased oxygen demand requiring increased O2 supplementation from 2 L to 5 L  -  Etiology unclear--> possibly health care associated infection vs non-cardiogenic pulmonary edema vs IPF exacerbation  -  Management per primary team    Idiopathic pulmonary fibrosis  -  Followed by Dr. Pradhan- on OFEV and home O2    Debility  -  Prior to admission in 7/2019--> active and (I) with ADLs and mobility- working, driving, walking 2 miles per day  -  Since discharge on 7/22/19--> required assistance with some ADLs and walked short distances with rollator- difficulty tolerating home health therapy during this time  -  Related to acute/prolonged hospital course and increased O2 demands resulting in impaired functional mobility, endurance, and cardiopulmonary response to activity  See hospital course for functional status.    Recommendations  -  Encourage mobility, OOB in chair, and early ambulation as appropriate  -  PT/OT evaluate and treat  -   Pain management  -  DVT prophylaxis  -  Monitor for and prevent skin breakdown and pressure ulcers  · Early mobility, repositioning/weight shifting every 20-30 minutes when sitting, turn patient every 2 hours, proper mattress/overlay and chair cushioning, pressure relief/heel protector boots    Patient with therapy/rehab needs.  Concern for tolerance of rehab program at this time.  Continue PT and OT.  Will follow for therapy progress/tolerance for final post-acute recommendation.     Thank you for your consult.     BAO Thao  Department of Physical Medicine & Rehab  [facility]

## 2019-08-07 LAB
ALBUMIN SERPL BCP-MCNC: 2.6 G/DL (ref 3.5–5.2)
ALP SERPL-CCNC: 147 U/L (ref 55–135)
ALT SERPL W/O P-5'-P-CCNC: 35 U/L (ref 10–44)
ANION GAP SERPL CALC-SCNC: 8 MMOL/L (ref 8–16)
AST SERPL-CCNC: 15 U/L (ref 10–40)
BASOPHILS # BLD AUTO: 0.01 K/UL (ref 0–0.2)
BASOPHILS NFR BLD: 0.1 % (ref 0–1.9)
BILIRUB SERPL-MCNC: 0.4 MG/DL (ref 0.1–1)
BUN SERPL-MCNC: 23 MG/DL (ref 8–23)
CALCIUM SERPL-MCNC: 9 MG/DL (ref 8.7–10.5)
CHLORIDE SERPL-SCNC: 91 MMOL/L (ref 95–110)
CO2 SERPL-SCNC: 35 MMOL/L (ref 23–29)
CREAT SERPL-MCNC: 0.8 MG/DL (ref 0.5–1.4)
DIFFERENTIAL METHOD: ABNORMAL
EOSINOPHIL # BLD AUTO: 0.4 K/UL (ref 0–0.5)
EOSINOPHIL NFR BLD: 3.1 % (ref 0–8)
ERYTHROCYTE [DISTWIDTH] IN BLOOD BY AUTOMATED COUNT: 14.1 % (ref 11.5–14.5)
EST. GFR  (AFRICAN AMERICAN): >60 ML/MIN/1.73 M^2
EST. GFR  (NON AFRICAN AMERICAN): >60 ML/MIN/1.73 M^2
GLUCOSE SERPL-MCNC: 112 MG/DL (ref 70–110)
HCT VFR BLD AUTO: 37.2 % (ref 40–54)
HGB BLD-MCNC: 11.9 G/DL (ref 14–18)
IMM GRANULOCYTES # BLD AUTO: 0.07 K/UL (ref 0–0.04)
IMM GRANULOCYTES NFR BLD AUTO: 0.5 % (ref 0–0.5)
LYMPHOCYTES # BLD AUTO: 1.2 K/UL (ref 1–4.8)
LYMPHOCYTES NFR BLD: 8.9 % (ref 18–48)
MAGNESIUM SERPL-MCNC: 2.1 MG/DL (ref 1.6–2.6)
MCH RBC QN AUTO: 26.6 PG (ref 27–31)
MCHC RBC AUTO-ENTMCNC: 32 G/DL (ref 32–36)
MCV RBC AUTO: 83 FL (ref 82–98)
MONOCYTES # BLD AUTO: 0.8 K/UL (ref 0.3–1)
MONOCYTES NFR BLD: 5.8 % (ref 4–15)
NEUTROPHILS # BLD AUTO: 10.6 K/UL (ref 1.8–7.7)
NEUTROPHILS NFR BLD: 81.6 % (ref 38–73)
NRBC BLD-RTO: 0 /100 WBC
PLATELET # BLD AUTO: 382 K/UL (ref 150–350)
PMV BLD AUTO: 9.1 FL (ref 9.2–12.9)
POTASSIUM SERPL-SCNC: 3.2 MMOL/L (ref 3.5–5.1)
PROT SERPL-MCNC: 5.5 G/DL (ref 6–8.4)
RBC # BLD AUTO: 4.48 M/UL (ref 4.6–6.2)
SODIUM SERPL-SCNC: 134 MMOL/L (ref 136–145)
WBC # BLD AUTO: 13 K/UL (ref 3.9–12.7)

## 2019-08-07 PROCEDURE — 85025 COMPLETE CBC W/AUTO DIFF WBC: CPT

## 2019-08-07 PROCEDURE — 99232 SBSQ HOSP IP/OBS MODERATE 35: CPT | Mod: ,,, | Performed by: PHYSICIAN ASSISTANT

## 2019-08-07 PROCEDURE — 63600175 PHARM REV CODE 636 W HCPCS: Performed by: STUDENT IN AN ORGANIZED HEALTH CARE EDUCATION/TRAINING PROGRAM

## 2019-08-07 PROCEDURE — 83735 ASSAY OF MAGNESIUM: CPT

## 2019-08-07 PROCEDURE — 25000003 PHARM REV CODE 250: Performed by: STUDENT IN AN ORGANIZED HEALTH CARE EDUCATION/TRAINING PROGRAM

## 2019-08-07 PROCEDURE — 99232 SBSQ HOSP IP/OBS MODERATE 35: CPT | Mod: ,,, | Performed by: NURSE PRACTITIONER

## 2019-08-07 PROCEDURE — 20600001 HC STEP DOWN PRIVATE ROOM

## 2019-08-07 PROCEDURE — 87205 SMEAR GRAM STAIN: CPT

## 2019-08-07 PROCEDURE — 99232 PR SUBSEQUENT HOSPITAL CARE,LEVL II: ICD-10-PCS | Mod: ,,, | Performed by: NURSE PRACTITIONER

## 2019-08-07 PROCEDURE — 99232 PR SUBSEQUENT HOSPITAL CARE,LEVL II: ICD-10-PCS | Mod: ,,, | Performed by: PHYSICIAN ASSISTANT

## 2019-08-07 PROCEDURE — 63600175 PHARM REV CODE 636 W HCPCS: Performed by: INTERNAL MEDICINE

## 2019-08-07 PROCEDURE — 25000003 PHARM REV CODE 250: Performed by: INTERNAL MEDICINE

## 2019-08-07 PROCEDURE — 87070 CULTURE OTHR SPECIMN AEROBIC: CPT

## 2019-08-07 PROCEDURE — 80053 COMPREHEN METABOLIC PANEL: CPT

## 2019-08-07 RX ORDER — DIPHENHYDRAMINE HCL 25 MG
25 CAPSULE ORAL NIGHTLY PRN
Status: DISCONTINUED | OUTPATIENT
Start: 2019-08-07 | End: 2019-08-09 | Stop reason: HOSPADM

## 2019-08-07 RX ORDER — LIDOCAINE 50 MG/G
2 PATCH TOPICAL
Status: DISCONTINUED | OUTPATIENT
Start: 2019-08-07 | End: 2019-08-09 | Stop reason: HOSPADM

## 2019-08-07 RX ORDER — FUROSEMIDE 40 MG/1
40 TABLET ORAL DAILY
Status: DISCONTINUED | OUTPATIENT
Start: 2019-08-08 | End: 2019-08-09 | Stop reason: HOSPADM

## 2019-08-07 RX ADMIN — PREDNISONE 25 MG: 5 TABLET ORAL at 08:08

## 2019-08-07 RX ADMIN — LIDOCAINE 2 PATCH: 50 PATCH TOPICAL at 08:08

## 2019-08-07 RX ADMIN — FUROSEMIDE 40 MG: 10 INJECTION, SOLUTION INTRAMUSCULAR; INTRAVENOUS at 08:08

## 2019-08-07 RX ADMIN — PANTOPRAZOLE SODIUM 40 MG: 40 TABLET, DELAYED RELEASE ORAL at 08:08

## 2019-08-07 RX ADMIN — DIPHENHYDRAMINE HYDROCHLORIDE 25 MG: 25 CAPSULE ORAL at 08:08

## 2019-08-07 RX ADMIN — RAMELTEON 8 MG: 8 TABLET, FILM COATED ORAL at 08:08

## 2019-08-07 RX ADMIN — LEVOFLOXACIN 750 MG: 750 TABLET, FILM COATED ORAL at 08:08

## 2019-08-07 RX ADMIN — SUCRALFATE 1 G: 1 SUSPENSION ORAL at 05:08

## 2019-08-07 RX ADMIN — FERROUS SULFATE TAB EC 325 MG (65 MG FE EQUIVALENT) 325 MG: 325 (65 FE) TABLET DELAYED RESPONSE at 08:08

## 2019-08-07 RX ADMIN — SUCRALFATE 1 G: 1 SUSPENSION ORAL at 11:08

## 2019-08-07 RX ADMIN — ATORVASTATIN CALCIUM 20 MG: 20 TABLET, FILM COATED ORAL at 08:08

## 2019-08-07 RX ADMIN — SUCRALFATE 1 G: 1 SUSPENSION ORAL at 08:08

## 2019-08-07 NOTE — PLAN OF CARE
Pt weaned down to 2L, with use of the non-rebreather for activity. He got up to the commode once with only the nasal cannula, but desatted to the 70s and had to recover with the NRB mask. He is on telemetry and has been tachycardic in the 100's-110s today, with occasional PVCs. Notified LANETTE Claros, who advised to continue to monitor and will repeat EKG if needed. Other VS are stable and he is afebrile. He is exercising in the chair, as well as frequently standing and taking approximately 5 steps and walking back to the chair. Plan is to d/c to rehab when he is more stable. Home oxygen evaluation done and note written. Patient stated that rozerem is not working well for sleep at night. Benadryl added per Dr. Booker's order; patient stated that this is what he took at home for sleep.

## 2019-08-07 NOTE — PLAN OF CARE
Home Oxygen Evaluation    Date Performed: 8/7/2019    1) Patient's Home O2 Sat on room air, while at rest: Patient's O2 sats on 3L O2 96%. Patient was placed on room air, from 3L, and his O2 sats dropped to 85%.         If O2 sats on room air at rest are 88% or below, patient qualifies. No additional testing needed. Document N/A in steps 2 and 3. If 89% or above, complete steps 2.      2) Patient's O2 Sat on room air while exercising: n/a        If O2 sats on room air while exercising remain 89% or above patient does not qualify, no further testing needed Document N/A in step 3. If O2 sats on room air while exercising are 88% or below, continue to step 3.      3) Patient's O2 Sat while exercising on O2: n/a at n/a LPM         (Must show improvement from #2 for patients to qualify)    If O2 sats improve on oxygen, patient qualifies for portable oxygen. If not, the patient does not qualify.

## 2019-08-07 NOTE — CARE UPDATE
"RAPID RESPONSE NURSE PROACTIVE ROUNDING NOTE     Time of Visit: 08    Admit Date: 2019  LOS: 7  Code Status: Full Code   Date of Visit: 2019  : 1950  Age: 68 y.o.  Sex: male  Race: White  Bed: 29375/32612 A:   MRN: [id number]  Was the patient discharged from an ICU this admission? yes   Was the patient discharged from a PACU within last 24 hours?  no  Did the patient receive conscious sedation/general anesthesia in last 24 hours?  no  Was the patient in the ED within the past 24 hours?  no  Was the patient started on NIPPV within the past 24 hours?  no  Attending Physician: Shauna Booker DO  Primary Service: [facility] LUNG TRANSPLANT MEDICINE    ASSESSMENT     Diagnosis: Acute on chronic respiratory failure with hypoxemia    Abnormal Vital Signs: BP (!) 89/53   Pulse (!) 139   Temp 97.9 °F (36.6 °C) (Oral)   Resp (!) 32   Ht 5' 9" (1.753 m)   Wt 74.3 kg (163 lb 12.8 oz)   SpO2 95%   BMI 24.19 kg/m²      Clinical Issues: Respiratory    Patient  has a past medical history of Hyperlipemia, Hypertension, Nasal congestion, and Pulmonary fibrosis.    Seen on proactive rounds for documented tachypnea.   Hx of pulmonary fibrosis.   RN weaned patient to 3.5L NC, sats 95%, NSR on tele, HR 98   Patient without distress or increased work of breathing.   Hemodynamically stable at this time.        INTERVENTIONS/ RECOMMENDATIONS     Pulse ox monitoring     Discussed plan of care with Tereza GRAVES    PHYSICIAN ESCALATION     Yes/No  no    Orders received and case discussed with NA.    Disposition: Remain in room 55371N.    FOLLOW-UP     Call back the Rapid Response Nurse, Lorena Carlin RN at [phone] for additional questions or concerns.          "

## 2019-08-07 NOTE — SUBJECTIVE & OBJECTIVE
Interval History 8/7/2019:  Patient is seen for follow-up rehab evaluation and recommendations: Continues to wean O2.  O2 requirement increase on exertion, which limits therapy.  Evaluated by OT yesterday.  Agreeable to rehab.     HPI, Past Medical, Family, and Social History remains the same as documented in the initial encounter.    Scheduled Medications:    atorvastatin  20 mg Oral QHS    ferrous sulfate  325 mg Oral Daily    furosemide  40 mg Intravenous Daily    levoFLOXacin  750 mg Oral Daily    lidocaine  2 patch Transdermal Q24H    pantoprazole  40 mg Oral Q12H    predniSONE  25 mg Oral Daily    sucralfate  1 g Oral QID (WM & HS)     PRN Medications: loperamide, ramelteon, sodium chloride 0.9%    Review of Systems   Constitutional: Positive for activity change and fatigue. Negative for fever.   HENT: Negative for trouble swallowing and voice change.    Eyes: Negative for pain and visual disturbance.   Respiratory: Positive for shortness of breath. Negative for cough.    Cardiovascular: Negative for chest pain and palpitations.   Gastrointestinal: Negative for abdominal pain, nausea and vomiting.   Genitourinary: Negative for difficulty urinating and flank pain.   Musculoskeletal: Negative for back pain, myalgias and neck pain.   Skin: Negative for rash and wound.   Neurological: Positive for weakness (general). Negative for numbness and headaches.   Psychiatric/Behavioral: Negative for agitation. The patient is not nervous/anxious.      Objective:     Vital Signs (Most Recent):  Temp: 97.9 °F (36.6 °C) (08/07/19 1128)  Pulse: (!) 139 (08/07/19 1100)  Resp: (!) 32 (08/07/19 1038)  BP: (!) 89/53 (08/07/19 1038)  SpO2: 95 % (08/07/19 1038)    Vital Signs (24h Range):  Temp:  [97.9 °F (36.6 °C)-98.3 °F (36.8 °C)] 97.9 °F (36.6 °C)  Pulse:  [] 139  Resp:  [17-38] 32  SpO2:  [89 %-99 %] 95 %  BP: ()/(53-84) 89/53     Physical Exam   Constitutional: He is oriented to person, place, and time. He  appears well-developed and well-nourished. No distress.   HENT:   Head: Normocephalic and atraumatic.   Right Ear: External ear normal.   Left Ear: External ear normal.   Nose: Nose normal.   Eyes: Right eye exhibits no discharge. Left eye exhibits no discharge. No scleral icterus.   Neck: Normal range of motion.   Cardiovascular: Normal rate and intact distal pulses.   Pulmonary/Chest: Tachypnea noted. No respiratory distress.   Abdominal: Soft. He exhibits no distension. There is no tenderness.   Musculoskeletal: Normal range of motion. He exhibits no edema or tenderness.   General deconditioning/debility    Neurological: He is alert and oriented to person, place, and time. No sensory deficit. He exhibits normal muscle tone.   Skin: Skin is warm and dry. No rash noted.   Psychiatric: He has a normal mood and affect. His behavior is normal. Thought content normal.   Vitals reviewed.    Diagnostic Results:   Labs: Reviewed  X-Ray: Reviewed    NEUROLOGICAL EXAMINATION:     MENTAL STATUS   Oriented to person, place, and time.

## 2019-08-07 NOTE — PROGRESS NOTES
- Patient had 10 beats of V-tach on telemetry monitor at 0347.  - Patient was asleep at the time with his oxygen in place (currently on 4 L NC). SpO2 and BP unchanged from earlier in the evening.  - Pt has been having occasional PVCs prior to this.  - Patient's magnesium level has not been checked on labs since 8/3/19.  - Notified Dr. Vazquez of V-tach. MD stated to monitor the patient since he will be having AM labs drawn soon and day team can follow-up on any abnormal results.

## 2019-08-07 NOTE — ASSESSMENT & PLAN NOTE
Continue physical therapy. Goal is to transfer him to inpatient rehab when his oxygen requirements have decreased. Rehab consulted, awaiting placement.

## 2019-08-07 NOTE — PROGRESS NOTES
[facility]  Lung Transplant  Progress Note - Floor    Patient Name: Manpreet Hamilton  MRN: [id number]  Admission Date: 7/31/2019  Hospital Length of Stay: 7 days  Post-Operative Day:   Attending Physician: Shauna Booker DO  Primary Care Provider: Radames Pradhan MD     Subjective:     Interval History: No acute events overnight.  Awaiting rehab placement. Otherwise feels well today.     Continuous Infusions:  Scheduled Meds:   atorvastatin  20 mg Oral QHS    ferrous sulfate  325 mg Oral Daily    [START ON 8/8/2019] furosemide  40 mg Oral Daily    levoFLOXacin  750 mg Oral Daily    lidocaine  2 patch Transdermal Q24H    pantoprazole  40 mg Oral Q12H    predniSONE  25 mg Oral Daily    sucralfate  1 g Oral QID (WM & HS)     PRN Meds:loperamide, ramelteon, sodium chloride 0.9%    Review of patient's allergies indicates:   Allergen Reactions    Penicillins     Erythromycin Rash       Review of Systems   Constitutional: Positive for activity change. Negative for appetite change, chills, fatigue, fever and unexpected weight change.   HENT: Negative for congestion, ear pain, hearing loss, mouth sores and postnasal drip.    Eyes: Negative for photophobia, pain, discharge, redness, itching and visual disturbance.   Respiratory: Positive for shortness of breath. Negative for cough, chest tightness and wheezing.    Cardiovascular: Negative for chest pain, palpitations and leg swelling.   Gastrointestinal: Negative for abdominal distention, abdominal pain, blood in stool, constipation and diarrhea.   Endocrine: Negative for cold intolerance, heat intolerance, polydipsia, polyphagia and polyuria.   Genitourinary: Negative for decreased urine volume, dysuria, frequency, hematuria and urgency.   Musculoskeletal: Negative for arthralgias and joint swelling.   Allergic/Immunologic: Negative for environmental allergies, food allergies and immunocompromised state.   Neurological: Positive for weakness.  Negative for dizziness, tremors, syncope, speech difficulty and numbness.   Hematological: Negative for adenopathy. Does not bruise/bleed easily.   Psychiatric/Behavioral: Negative for agitation, confusion and hallucinations.     Objective:   Physical Exam   Constitutional: He is oriented to person, place, and time. He appears well-developed and well-nourished.   HENT:   Head: Normocephalic and atraumatic.   Eyes: Conjunctivae and EOM are normal.   Neck: Normal range of motion. Neck supple.   Cardiovascular: Normal rate and regular rhythm.   Pulmonary/Chest: No respiratory distress. He has decreased breath sounds. He has no wheezes. He has rales.   Abdominal: Soft. Bowel sounds are normal.   Musculoskeletal: Normal range of motion.   Neurological: He is alert and oriented to person, place, and time.   Skin: Skin is warm.   Psychiatric: He has a normal mood and affect.   Nursing note and vitals reviewed.        Vital Signs (Most Recent):  Temp: 98.1 °F (36.7 °C) (08/07/19 1521)  Pulse: 97 (08/07/19 1500)  Resp: (!) 25 (08/07/19 1342)  BP: 110/65 (08/07/19 1342)  SpO2: 96 % (08/07/19 1342) Vital Signs (24h Range):  Temp:  [97.9 °F (36.6 °C)-98.3 °F (36.8 °C)] 98.1 °F (36.7 °C)  Pulse:  [] 97  Resp:  [17-38] 25  SpO2:  [86 %-99 %] 96 %  BP: ()/(53-84) 110/65     Weight: 74.3 kg (163 lb 12.8 oz)  Body mass index is 24.19 kg/m².      Intake/Output Summary (Last 24 hours) at 8/7/2019 1640  Last data filed at 8/7/2019 1500  Gross per 24 hour   Intake 1375 ml   Output 1750 ml   Net -375 ml       Significant Labs:  CBC:  Recent Labs   Lab 08/07/19 0622   WBC 13.00*   RBC 4.48*   HGB 11.9*   HCT 37.2*   *   MCV 83   MCH 26.6*   MCHC 32.0     BMP:  Recent Labs   Lab 08/07/19  0622   *   K 3.2*   CL 91*   CO2 35*   BUN 23   CREATININE 0.8   CALCIUM 9.0      Tacrolimus Levels:  No results for input(s): TACROLIMUS in the last 72 hours.  Microbiology:  Microbiology Results (last 7 days)     Procedure  Component Value Units Date/Time    Culture, Respiratory with Gram Stain [[id number]] Collected:  08/07/19 0310    Order Status:  Completed Specimen:  Respiratory from Sputum, Expectorated Updated:  08/07/19 0543     Gram Stain (Respiratory) <10 epithelial cells per low power field.     Gram Stain (Respiratory) Rare Gram positive cocci    Narrative:       If difficult to obtain, can try to induce    Blood culture [[id number]] Collected:  08/01/19 0247    Order Status:  Completed Specimen:  Blood from Peripheral, Hand, Right Updated:  08/06/19 0612     Blood Culture, Routine No growth after 5 days.    Blood culture [[id number]] Collected:  08/01/19 0248    Order Status:  Completed Specimen:  Blood from Peripheral, Forearm, Right Updated:  08/06/19 0612     Blood Culture, Routine No growth after 5 days.    Culture, Respiratory with Gram Stain [[id number]] Collected:  07/31/19 2128    Order Status:  Completed Specimen:  Respiratory from Sputum Updated:  08/03/19 0907     Respiratory Culture Normal respiratory kendy      No S aureus or Pseudomonas isolated.     Gram Stain (Respiratory) <10 epithelial cells per low power field.     Gram Stain (Respiratory) Few WBC's     Gram Stain (Respiratory) Few Gram positive rods     Gram Stain (Respiratory) Few Gram positive cocci     Gram Stain (Respiratory) Rare yeast    Respiratory Infection Panel, Nasopharyngeal [[id number]] Collected:  07/31/19 2135    Order Status:  Completed Specimen:  Nasopharyngeal Swab Updated:  08/01/19 0910     Respiratory Infection Panel Source NP Swab     Adenovirus Not Detected     Coronavirus 229E Not Detected     Coronavirus HKU1 Not Detected     Coronavirus NL63 Not Detected     Coronavirus OC43 Not Detected     Human Metapneumovirus Not Detected     Human Rhinovirus/Enterovirus Not Detected     Influenza A (subtypes H1, H1-2009,H3) Not Detected     Influenza B Not Detected     Parainfluenza Virus 1 Not Detected     Parainfluenza Virus 2 Not Detected      Parainfluenza Virus 3 Not Detected     Parainfluenza Virus 4 Not Detected     Respiratory Syncytial Virus Not Detected     Bordetella Parapertussis (QX3211) Not Detected     Bordetella pertussis (ptxP) Not Detected     Chlamydia pneumoniae Not Detected     Mycoplasma pneumoniae Not Detected    Narrative:       For all other respiratory sources order [id number] Respiratory  Viral Panel by PCR (RVPCR)    Respiratory Infection Panel, Nasopharyngeal [[id number]] Resulted:  07/31/19 2356    Order Status:  No result Updated:  07/31/19 2356    Respiratory Viral Panel by PCR [facility]; S[facility]um [[id number]] Collected:  07/31/19 2128    Order Status:  Canceled Specimen:  Respiratory           I have reviewed all pertinent labs within the past 24 hours.      Assessment/Plan:     * Acute on chronic respiratory failure with hypoxemia  Continue to wean oxygen. Currently on 4 HFNC and use NRBM for recovery. Unclear if the etiology of his deterioration is a health care associated infection, non-cardiogenic pulmonary edema, or an exacerbation of his IPF. Will continue levofloxacin, lasix, and prednisone.    Idiopathic pulmonary fibrosis  Will start pirfenidone soon.  Will make the change after his episode of severe bleeding.     Debility  Continue physical therapy. Goal is to transfer him to inpatient rehab when his oxygen requirements have decreased. Rehab consulted, awaiting placement.         Emilie Gonzales PA-C  Lung Transplant  [facility]

## 2019-08-07 NOTE — SUBJECTIVE & OBJECTIVE
Subjective:     Interval History: No acute events overnight.  Awaiting rehab placement. Otherwise feels well today.     Continuous Infusions:  Scheduled Meds:   atorvastatin  20 mg Oral QHS    ferrous sulfate  325 mg Oral Daily    [START ON 8/8/2019] furosemide  40 mg Oral Daily    levoFLOXacin  750 mg Oral Daily    lidocaine  2 patch Transdermal Q24H    pantoprazole  40 mg Oral Q12H    predniSONE  25 mg Oral Daily    sucralfate  1 g Oral QID (WM & HS)     PRN Meds:loperamide, ramelteon, sodium chloride 0.9%    Review of patient's allergies indicates:   Allergen Reactions    Penicillins     Erythromycin Rash       Review of Systems   Constitutional: Positive for activity change. Negative for appetite change, chills, fatigue, fever and unexpected weight change.   HENT: Negative for congestion, ear pain, hearing loss, mouth sores and postnasal drip.    Eyes: Negative for photophobia, pain, discharge, redness, itching and visual disturbance.   Respiratory: Positive for shortness of breath. Negative for cough, chest tightness and wheezing.    Cardiovascular: Negative for chest pain, palpitations and leg swelling.   Gastrointestinal: Negative for abdominal distention, abdominal pain, blood in stool, constipation and diarrhea.   Endocrine: Negative for cold intolerance, heat intolerance, polydipsia, polyphagia and polyuria.   Genitourinary: Negative for decreased urine volume, dysuria, frequency, hematuria and urgency.   Musculoskeletal: Negative for arthralgias and joint swelling.   Allergic/Immunologic: Negative for environmental allergies, food allergies and immunocompromised state.   Neurological: Positive for weakness. Negative for dizziness, tremors, syncope, speech difficulty and numbness.   Hematological: Negative for adenopathy. Does not bruise/bleed easily.   Psychiatric/Behavioral: Negative for agitation, confusion and hallucinations.     Objective:   Physical Exam   Constitutional: He is oriented to  person, place, and time. He appears well-developed and well-nourished.   HENT:   Head: Normocephalic and atraumatic.   Eyes: Conjunctivae and EOM are normal.   Neck: Normal range of motion. Neck supple.   Cardiovascular: Normal rate and regular rhythm.   Pulmonary/Chest: No respiratory distress. He has decreased breath sounds. He has no wheezes. He has rales.   Abdominal: Soft. Bowel sounds are normal.   Musculoskeletal: Normal range of motion.   Neurological: He is alert and oriented to person, place, and time.   Skin: Skin is warm.   Psychiatric: He has a normal mood and affect.   Nursing note and vitals reviewed.        Vital Signs (Most Recent):  Temp: 98.1 °F (36.7 °C) (08/07/19 1521)  Pulse: 97 (08/07/19 1500)  Resp: (!) 25 (08/07/19 1342)  BP: 110/65 (08/07/19 1342)  SpO2: 96 % (08/07/19 1342) Vital Signs (24h Range):  Temp:  [97.9 °F (36.6 °C)-98.3 °F (36.8 °C)] 98.1 °F (36.7 °C)  Pulse:  [] 97  Resp:  [17-38] 25  SpO2:  [86 %-99 %] 96 %  BP: ()/(53-84) 110/65     Weight: 74.3 kg (163 lb 12.8 oz)  Body mass index is 24.19 kg/m².      Intake/Output Summary (Last 24 hours) at 8/7/2019 1640  Last data filed at 8/7/2019 1500  Gross per 24 hour   Intake 1375 ml   Output 1750 ml   Net -375 ml       Significant Labs:  CBC:  Recent Labs   Lab 08/07/19 0622   WBC 13.00*   RBC 4.48*   HGB 11.9*   HCT 37.2*   *   MCV 83   MCH 26.6*   MCHC 32.0     BMP:  Recent Labs   Lab 08/07/19 0622   *   K 3.2*   CL 91*   CO2 35*   BUN 23   CREATININE 0.8   CALCIUM 9.0      Tacrolimus Levels:  No results for input(s): TACROLIMUS in the last 72 hours.  Microbiology:  Microbiology Results (last 7 days)     Procedure Component Value Units Date/Time    Culture, Respiratory with Gram Stain [[id number]] Collected:  08/07/19 0310    Order Status:  Completed Specimen:  Respiratory from Sputum, Expectorated Updated:  08/07/19 0543     Gram Stain (Respiratory) <10 epithelial cells per low power field.     Gram Stain  (Respiratory) Rare Gram positive cocci    Narrative:       If difficult to obtain, can try to induce    Blood culture [[id number]] Collected:  08/01/19 0247    Order Status:  Completed Specimen:  Blood from Peripheral, Hand, Right Updated:  08/06/19 0612     Blood Culture, Routine No growth after 5 days.    Blood culture [[id number]] Collected:  08/01/19 0248    Order Status:  Completed Specimen:  Blood from Peripheral, Forearm, Right Updated:  08/06/19 0612     Blood Culture, Routine No growth after 5 days.    Culture, Respiratory with Gram Stain [[id number]] Collected:  07/31/19 2128    Order Status:  Completed Specimen:  Respiratory from Sputum Updated:  08/03/19 0907     Respiratory Culture Normal respiratory kendy      No S aureus or Pseudomonas isolated.     Gram Stain (Respiratory) <10 epithelial cells per low power field.     Gram Stain (Respiratory) Few WBC's     Gram Stain (Respiratory) Few Gram positive rods     Gram Stain (Respiratory) Few Gram positive cocci     Gram Stain (Respiratory) Rare yeast    Respiratory Infection Panel, Nasopharyngeal [[id number]] Collected:  07/31/19 2135    Order Status:  Completed Specimen:  Nasopharyngeal Swab Updated:  08/01/19 0910     Respiratory Infection Panel Source NP Swab     Adenovirus Not Detected     Coronavirus 229E Not Detected     Coronavirus HKU1 Not Detected     Coronavirus NL63 Not Detected     Coronavirus OC43 Not Detected     Human Metapneumovirus Not Detected     Human Rhinovirus/Enterovirus Not Detected     Influenza A (subtypes H1, H1-2009,H3) Not Detected     Influenza B Not Detected     Parainfluenza Virus 1 Not Detected     Parainfluenza Virus 2 Not Detected     Parainfluenza Virus 3 Not Detected     Parainfluenza Virus 4 Not Detected     Respiratory Syncytial Virus Not Detected     Bordetella Parapertussis (RD9035) Not Detected     Bordetella pertussis (ptxP) Not Detected     Chlamydia pneumoniae Not Detected     Mycoplasma pneumoniae Not Detected     Narrative:       For all other respiratory sources order [id number] Respiratory  Viral Panel by PCR (RVPCR)    Respiratory Infection Panel, Nasopharyngeal [[id number]] Resulted:  07/31/19 2356    Order Status:  No result Updated:  07/31/19 2356    Respiratory Viral Panel by PCR Garysstephanie; Sputum [[id number]] Collected:  07/31/19 2128    Order Status:  Canceled Specimen:  Respiratory           I have reviewed all pertinent labs within the past 24 hours.

## 2019-08-07 NOTE — ASSESSMENT & PLAN NOTE
-  Recent admission 7/10/19-7/22/19 for extensive UGIB s/p massive transfusion and IR embolization with subsequent increased oxygen demand requiring increased O2 supplementation from 2 L to 5 L  -  Etiology unclear--> possibly health care associated infection vs non-cardiogenic pulmonary edema vs IPF exacerbation  -  Management per primary team--> weaning oxygen, on IV lasix

## 2019-08-07 NOTE — PROGRESS NOTES
[facility]  Physical Medicine & Rehab  Progress Note    Patient Name: Manpreet Hamilton  MRN: [id number]  Admission Date: 7/31/2019  Length of Stay: 7 days  Attending Physician: Shauna Booker DO    Subjective:     Principal Problem:Acute on chronic respiratory failure with hypoxemia    Hospital Course:   8/3/19:  Evaluated by PT.  Bed mobility (I).  Sit to stand SV and transfers CGA.  No gait 2/2 poor activity tolerance.    8/5/19:  Participated with PT at bedside chair.  Refused functional mobility with PT.  OT evaluation pending.    8/6/19:  Evaluated by OT.  Sit to stand CGA with RW.  Ambulated 3 forward steps; however, rushed back to chair 2/2 SOB.  Grooming (I).  UBD and LBD SV.    Interval History 8/7/2019:  Patient is seen for follow-up rehab evaluation and recommendations: Continues to wean O2.  O2 requirement increase on exertion, which limits therapy.  Evaluated by OT yesterday.  Agreeable to rehab.     HPI, Past Medical, Family, and Social History remains the same as documented in the initial encounter.    Scheduled Medications:    atorvastatin  20 mg Oral QHS    ferrous sulfate  325 mg Oral Daily    furosemide  40 mg Intravenous Daily    levoFLOXacin  750 mg Oral Daily    lidocaine  2 patch Transdermal Q24H    pantoprazole  40 mg Oral Q12H    predniSONE  25 mg Oral Daily    sucralfate  1 g Oral QID (WM & HS)     PRN Medications: loperamide, ramelteon, sodium chloride 0.9%    Review of Systems   Constitutional: Positive for activity change and fatigue. Negative for fever.   HENT: Negative for trouble swallowing and voice change.    Eyes: Negative for pain and visual disturbance.   Respiratory: Positive for shortness of breath. Negative for cough.    Cardiovascular: Negative for chest pain and palpitations.   Gastrointestinal: Negative for abdominal pain, nausea and vomiting.   Genitourinary: Negative for difficulty urinating and flank pain.   Musculoskeletal: Negative for  back pain, myalgias and neck pain.   Skin: Negative for rash and wound.   Neurological: Positive for weakness (general). Negative for numbness and headaches.   Psychiatric/Behavioral: Negative for agitation. The patient is not nervous/anxious.      Objective:     Vital Signs (Most Recent):  Temp: 97.9 °F (36.6 °C) (08/07/19 1128)  Pulse: (!) 139 (08/07/19 1100)  Resp: (!) 32 (08/07/19 1038)  BP: (!) 89/53 (08/07/19 1038)  SpO2: 95 % (08/07/19 1038)    Vital Signs (24h Range):  Temp:  [97.9 °F (36.6 °C)-98.3 °F (36.8 °C)] 97.9 °F (36.6 °C)  Pulse:  [] 139  Resp:  [17-38] 32  SpO2:  [89 %-99 %] 95 %  BP: ()/(53-84) 89/53     Physical Exam   Constitutional: He is oriented to person, place, and time. He appears well-developed and well-nourished. No distress.   HENT:   Head: Normocephalic and atraumatic.   Right Ear: External ear normal.   Left Ear: External ear normal.   Nose: Nose normal.   Eyes: Right eye exhibits no discharge. Left eye exhibits no discharge. No scleral icterus.   Neck: Normal range of motion.   Cardiovascular: Normal rate and intact distal pulses.   Pulmonary/Chest: Tachypnea noted. No respiratory distress.   Abdominal: Soft. He exhibits no distension. There is no tenderness.   Musculoskeletal: Normal range of motion. He exhibits no edema or tenderness.   General deconditioning/debility    Neurological: He is alert and oriented to person, place, and time. No sensory deficit. He exhibits normal muscle tone.   Skin: Skin is warm and dry. No rash noted.   Psychiatric: He has a normal mood and affect. His behavior is normal. Thought content normal.   Vitals reviewed.    Diagnostic Results:   Labs: Reviewed  X-Ray: Reviewed    Assessment/Plan:      * Acute on chronic respiratory failure with hypoxemia  -  Recent admission 7/10/19-7/22/19 for extensive UGIB s/p massive transfusion and IR embolization with subsequent increased oxygen demand requiring increased O2 supplementation from 2 L to 5  L  -  Etiology unclear--> possibly health care associated infection vs non-cardiogenic pulmonary edema vs IPF exacerbation  -  Management per primary team--> weaning oxygen, on IV lasix     Idiopathic pulmonary fibrosis  -  Followed by Dr. Pradhan- on OFEV  -  Management per primary team    Debility  -  Prior to admission in 7/2019--> active and (I) with ADLs and mobility- working, driving, walking 2 miles per day  -  Since discharge on 7/22/19--> required assistance with some ADLs and walked short distances with rollator- difficulty tolerating home health therapy during this time  -  Related to acute/prolonged hospital course and increased O2 demands resulting in impaired functional mobility, endurance, and cardiopulmonary response to activity  See hospital course for functional status.    Recommendations  -  Encourage mobility, OOB in chair, and early ambulation as appropriate  -  PT/OT evaluate and treat  -  Pain management  -  DVT prophylaxis  -  Monitor for and prevent skin breakdown and pressure ulcers  · Early mobility, repositioning/weight shifting every 20-30 minutes when sitting, turn patient every 2 hours, proper mattress/overlay and chair cushioning, pressure relief/heel protector boots    Recommend Inpatient Rehab.  Rehab once O2 demands improve.  Will need to transition Lasix from IV to PO.  Insurance approval pending.  Will follow.     BAO Thao  Department of Physical Medicine & Rehab   [facility]

## 2019-08-08 LAB
ALBUMIN SERPL BCP-MCNC: 2.5 G/DL (ref 3.5–5.2)
ALP SERPL-CCNC: 136 U/L (ref 55–135)
ALT SERPL W/O P-5'-P-CCNC: 33 U/L (ref 10–44)
ANION GAP SERPL CALC-SCNC: 9 MMOL/L (ref 8–16)
AST SERPL-CCNC: 15 U/L (ref 10–40)
BASOPHILS # BLD AUTO: 0.01 K/UL (ref 0–0.2)
BASOPHILS NFR BLD: 0.1 % (ref 0–1.9)
BILIRUB SERPL-MCNC: 0.5 MG/DL (ref 0.1–1)
BUN SERPL-MCNC: 21 MG/DL (ref 8–23)
CALCIUM SERPL-MCNC: 9.2 MG/DL (ref 8.7–10.5)
CHLORIDE SERPL-SCNC: 91 MMOL/L (ref 95–110)
CO2 SERPL-SCNC: 35 MMOL/L (ref 23–29)
CREAT SERPL-MCNC: 0.7 MG/DL (ref 0.5–1.4)
DIFFERENTIAL METHOD: ABNORMAL
EOSINOPHIL # BLD AUTO: 0.4 K/UL (ref 0–0.5)
EOSINOPHIL NFR BLD: 3.9 % (ref 0–8)
ERYTHROCYTE [DISTWIDTH] IN BLOOD BY AUTOMATED COUNT: 14.3 % (ref 11.5–14.5)
EST. GFR  (AFRICAN AMERICAN): >60 ML/MIN/1.73 M^2
EST. GFR  (NON AFRICAN AMERICAN): >60 ML/MIN/1.73 M^2
GLUCOSE SERPL-MCNC: 96 MG/DL (ref 70–110)
HCT VFR BLD AUTO: 37.8 % (ref 40–54)
HGB BLD-MCNC: 11.9 G/DL (ref 14–18)
IMM GRANULOCYTES # BLD AUTO: 0.04 K/UL (ref 0–0.04)
IMM GRANULOCYTES NFR BLD AUTO: 0.4 % (ref 0–0.5)
LYMPHOCYTES # BLD AUTO: 1.4 K/UL (ref 1–4.8)
LYMPHOCYTES NFR BLD: 14 % (ref 18–48)
MAGNESIUM SERPL-MCNC: 2.1 MG/DL (ref 1.6–2.6)
MCH RBC QN AUTO: 26.6 PG (ref 27–31)
MCHC RBC AUTO-ENTMCNC: 31.5 G/DL (ref 32–36)
MCV RBC AUTO: 84 FL (ref 82–98)
MONOCYTES # BLD AUTO: 0.7 K/UL (ref 0.3–1)
MONOCYTES NFR BLD: 6.8 % (ref 4–15)
NEUTROPHILS # BLD AUTO: 7.4 K/UL (ref 1.8–7.7)
NEUTROPHILS NFR BLD: 74.8 % (ref 38–73)
NRBC BLD-RTO: 0 /100 WBC
PLATELET # BLD AUTO: 372 K/UL (ref 150–350)
PMV BLD AUTO: 8.9 FL (ref 9.2–12.9)
POTASSIUM SERPL-SCNC: 3.3 MMOL/L (ref 3.5–5.1)
PROT SERPL-MCNC: 5.3 G/DL (ref 6–8.4)
RBC # BLD AUTO: 4.48 M/UL (ref 4.6–6.2)
SODIUM SERPL-SCNC: 135 MMOL/L (ref 136–145)
WBC # BLD AUTO: 9.83 K/UL (ref 3.9–12.7)

## 2019-08-08 PROCEDURE — 97116 GAIT TRAINING THERAPY: CPT

## 2019-08-08 PROCEDURE — 63600175 PHARM REV CODE 636 W HCPCS: Performed by: INTERNAL MEDICINE

## 2019-08-08 PROCEDURE — 99232 SBSQ HOSP IP/OBS MODERATE 35: CPT | Mod: ,,, | Performed by: PHYSICIAN ASSISTANT

## 2019-08-08 PROCEDURE — 51798 US URINE CAPACITY MEASURE: CPT

## 2019-08-08 PROCEDURE — 25000003 PHARM REV CODE 250: Performed by: STUDENT IN AN ORGANIZED HEALTH CARE EDUCATION/TRAINING PROGRAM

## 2019-08-08 PROCEDURE — 85025 COMPLETE CBC W/AUTO DIFF WBC: CPT

## 2019-08-08 PROCEDURE — 80053 COMPREHEN METABOLIC PANEL: CPT

## 2019-08-08 PROCEDURE — 83735 ASSAY OF MAGNESIUM: CPT

## 2019-08-08 PROCEDURE — 99232 PR SUBSEQUENT HOSPITAL CARE,LEVL II: ICD-10-PCS | Mod: ,,, | Performed by: PHYSICIAN ASSISTANT

## 2019-08-08 PROCEDURE — 97110 THERAPEUTIC EXERCISES: CPT

## 2019-08-08 PROCEDURE — 97530 THERAPEUTIC ACTIVITIES: CPT

## 2019-08-08 PROCEDURE — 20600001 HC STEP DOWN PRIVATE ROOM

## 2019-08-08 PROCEDURE — 99232 SBSQ HOSP IP/OBS MODERATE 35: CPT | Mod: ,,, | Performed by: NURSE PRACTITIONER

## 2019-08-08 PROCEDURE — 25000003 PHARM REV CODE 250: Performed by: INTERNAL MEDICINE

## 2019-08-08 PROCEDURE — 99232 PR SUBSEQUENT HOSPITAL CARE,LEVL II: ICD-10-PCS | Mod: ,,, | Performed by: NURSE PRACTITIONER

## 2019-08-08 PROCEDURE — 25000003 PHARM REV CODE 250: Performed by: PHYSICIAN ASSISTANT

## 2019-08-08 RX ORDER — TAMSULOSIN HYDROCHLORIDE 0.4 MG/1
0.4 CAPSULE ORAL DAILY
Status: DISCONTINUED | OUTPATIENT
Start: 2019-08-08 | End: 2019-08-09 | Stop reason: HOSPADM

## 2019-08-08 RX ADMIN — PREDNISONE 25 MG: 5 TABLET ORAL at 08:08

## 2019-08-08 RX ADMIN — LEVOFLOXACIN 750 MG: 750 TABLET, FILM COATED ORAL at 08:08

## 2019-08-08 RX ADMIN — ATORVASTATIN CALCIUM 20 MG: 20 TABLET, FILM COATED ORAL at 08:08

## 2019-08-08 RX ADMIN — FERROUS SULFATE TAB EC 325 MG (65 MG FE EQUIVALENT) 325 MG: 325 (65 FE) TABLET DELAYED RESPONSE at 08:08

## 2019-08-08 RX ADMIN — SUCRALFATE 1 G: 1 SUSPENSION ORAL at 11:08

## 2019-08-08 RX ADMIN — SUCRALFATE 1 G: 1 SUSPENSION ORAL at 07:08

## 2019-08-08 RX ADMIN — LIDOCAINE 2 PATCH: 50 PATCH TOPICAL at 08:08

## 2019-08-08 RX ADMIN — PANTOPRAZOLE SODIUM 40 MG: 40 TABLET, DELAYED RELEASE ORAL at 08:08

## 2019-08-08 RX ADMIN — FUROSEMIDE 40 MG: 40 TABLET ORAL at 08:08

## 2019-08-08 RX ADMIN — SUCRALFATE 1 G: 1 SUSPENSION ORAL at 08:08

## 2019-08-08 RX ADMIN — TAMSULOSIN HYDROCHLORIDE 0.4 MG: 0.4 CAPSULE ORAL at 09:08

## 2019-08-08 RX ADMIN — SUCRALFATE 1 G: 1 SUSPENSION ORAL at 05:08

## 2019-08-08 RX ADMIN — TRAZODONE HYDROCHLORIDE 25 MG: 50 TABLET ORAL at 10:08

## 2019-08-08 NOTE — NURSING
Pt reported unable to void since 4PM. Bladder scan showed approx. 330ml. George NEIL notified and straight cath performed with 300ml output.

## 2019-08-08 NOTE — PROGRESS NOTES
[facility]  Physical Medicine & Rehab  Progress Note    Patient Name: Manpreet Hamilton  MRN: [id number]  Admission Date: 7/31/2019  Length of Stay: 8 days  Attending Physician: Shauna Booker DO    Subjective:     Principal Problem:Acute on chronic respiratory failure with hypoxemia    Hospital Course:   8/3/19:  Evaluated by PT.  Bed mobility (I).  Sit to stand SV and transfers CGA.  No gait 2/2 poor activity tolerance.    8/5/19:  Participated with PT at bedside chair.  Refused functional mobility with PT.  OT evaluation pending.    8/6/19:  Evaluated by OT.  Sit to stand CGA with RW.  Ambulated 3 forward steps; however, rushed back to chair 2/2 SOB.  Grooming (I).  UBD and LBD SV.  8/7/19:  No PT or OT.    Interval History 8/8/2019:  Patient is seen for follow-up rehab evaluation and recommendations: Feeling better today, urinary retention last night/early morning.  Tolerating 2L O2.  Ambulated in room with nurse.  Insurance approval for rehab pending.     HPI, Past Medical, Family, and Social History remains the same as documented in the initial encounter.    Scheduled Medications:    atorvastatin  20 mg Oral QHS    ferrous sulfate  325 mg Oral Daily    furosemide  40 mg Oral Daily    levoFLOXacin  750 mg Oral Daily    lidocaine  2 patch Transdermal Q24H    pantoprazole  40 mg Oral Q12H    predniSONE  25 mg Oral Daily    sucralfate  1 g Oral QID (WM & HS)    tamsulosin  0.4 mg Oral Daily     PRN Medications: diphenhydrAMINE, loperamide, ramelteon, sodium chloride 0.9%    Review of Systems   Constitutional: Positive for activity change and fatigue. Negative for fever.   HENT: Negative for trouble swallowing and voice change.    Eyes: Negative for pain and visual disturbance.   Respiratory: Positive for shortness of breath. Negative for cough.    Cardiovascular: Negative for chest pain and palpitations.   Gastrointestinal: Negative for abdominal pain, nausea and vomiting.    Genitourinary: Positive for difficulty urinating. Negative for flank pain.   Musculoskeletal: Negative for back pain, myalgias and neck pain.   Skin: Negative for rash and wound.   Neurological: Positive for weakness (general). Negative for numbness and headaches.   Psychiatric/Behavioral: Negative for agitation. The patient is not nervous/anxious.      Objective:     Vital Signs (Most Recent):  Temp: 97.9 °F (36.6 °C) (08/08/19 1108)  Pulse: (!) 111 (08/08/19 1116)  Resp: (!) 35 (08/08/19 1116)  BP: 106/74 (08/08/19 1116)  SpO2: 95 % (08/08/19 1116)    Vital Signs (24h Range):  Temp:  [97.9 °F (36.6 °C)-98.6 °F (37 °C)] 97.9 °F (36.6 °C)  Pulse:  [] 111  Resp:  [18-40] 35  SpO2:  [91 %-98 %] 95 %  BP: (101-129)/(61-85) 106/74     Physical Exam   Constitutional: He is oriented to person, place, and time. He appears well-developed and well-nourished. No distress.   HENT:   Head: Normocephalic and atraumatic.   Right Ear: External ear normal.   Left Ear: External ear normal.   Nose: Nose normal.   Eyes: Right eye exhibits no discharge. Left eye exhibits no discharge. No scleral icterus.   Neck: Normal range of motion.   Cardiovascular: Normal rate and intact distal pulses.   Pulmonary/Chest: Effort normal. No respiratory distress.   Tolerating 2L O2 via NC   Abdominal: Soft. He exhibits no distension. There is no tenderness.   Musculoskeletal: Normal range of motion. He exhibits no edema or tenderness.   General deconditioning/debility    Neurological: He is alert and oriented to person, place, and time. No sensory deficit. He exhibits normal muscle tone.   Skin: Skin is warm and dry. No rash noted.   Psychiatric: He has a normal mood and affect. His behavior is normal. Thought content normal.   Vitals reviewed.    Diagnostic Results:   Labs: Reviewed  X-Ray: Reviewed    Assessment/Plan:      * Acute on chronic respiratory failure with hypoxemia  -  Recent admission 7/10/19-7/22/19 for extensive UGIB s/p  massive transfusion and IR embolization with subsequent increased oxygen demand requiring increased O2 supplementation from 2 L to 5 L  -  Etiology unclear--> possibly health care associated infection vs non-cardiogenic pulmonary edema vs IPF exacerbation  -  Management per primary team    Idiopathic pulmonary fibrosis  -  Followed by Dr. Pradhan- on OFEV  -  Management per primary team    Debility  -  Prior to admission in 7/2019--> active and (I) with ADLs and mobility- working, driving, walking 2 miles per day  -  Since discharge on 7/22/19--> required assistance with some ADLs and walked short distances with rollator- difficulty tolerating home health therapy during this time  -  Related to acute/prolonged hospital course and increased O2 demands resulting in impaired functional mobility, endurance, and cardiopulmonary response to activity  See hospital course for functional status.    Recommendations  -  Encourage mobility, OOB in chair, and early ambulation as appropriate  -  PT/OT evaluate and treat  -  Pain management  -  DVT prophylaxis  -  Monitor for and prevent skin breakdown and pressure ulcers  · Early mobility, repositioning/weight shifting every 20-30 minutes when sitting, turn patient every 2 hours, proper mattress/overlay and chair cushioning, pressure relief/heel protector boots    Continue to recommend Inpatient Rehab.  Insurance approval pending rehab admission.     BAO Thao  Department of Physical Medicine & Rehab   [facility]

## 2019-08-08 NOTE — PLAN OF CARE
Pt is AAOx4; afebrile; vital signs stable. O2 remained >88% for most of the day on 2L, using the non-rebreather mask for activity. While eating lunch, his sats did drop to the mid 80s and he was SOB, so O2 was increased to 4L for a short time. Flomax started this morning for urinary retention, and he was able to void. This afternoon, he felt the need to void again, but was unable to. He was scanned before shift change and had a volume of 190mL in bladder; will continue to monitor. He is up with standby assist and can take several steps in the room before becoming too weak and short of breath. He denies any pain or nausea.

## 2019-08-08 NOTE — PLAN OF CARE
Problem: Occupational Therapy Goal  Goal: Occupational Therapy Goal  Goals to be met by: 8/13/19     Patient will increase functional independence with ADLs by performing:    Grooming while standing at sink with Set-up Assistance.  Toileting from toilet with Set-up Assistance for hygiene and clothing management.   Supine to sit with Iron. Met 8/8  Toilet transfer to toilet with Supervision.     Outcome: Ongoing (interventions implemented as appropriate)  Con't POC.    NADEEM Burton

## 2019-08-08 NOTE — PLAN OF CARE
Problem: Physical Therapy Goal  Goal: Physical Therapy Goal  Goals to be met by: 2019    Patient will increase functional independence with mobility by performin. Gait  x 10 feet with Supervision using  - not met  2. Ascend/descend 3 stair with Contact Guard Assistance  - not met  3. Lower extremity exercise program x15 reps per handout, with independence to increase tolerance to activity - not met     Goals remain appropriate. Continue with Physical therapy Plan of Care. Martha Mccullough, PT 2019

## 2019-08-08 NOTE — PLAN OF CARE
Pt AAOx4, VSS, afebrile. Pt maintains SpO2> 94% on 2L NC. Telemetry monitoring continued, showing NSR. See previous note about UOP. Fall risk precautions initiated.  Pt in lowest bed position setting, lighting adjusted, pt to wear nonskid socks when ambulating, side rails up x2.  Pt remain free from falls during shift.  Call light within reach. Will continue to monitor.

## 2019-08-08 NOTE — SUBJECTIVE & OBJECTIVE
Interval History 8/8/2019:  Patient is seen for follow-up rehab evaluation and recommendations: Feeling better today, urinary retention last night/early morning.  Tolerating 2L O2.  Ambulated in room with nurse.  Insurance approval for rehab pending.     HPI, Past Medical, Family, and Social History remains the same as documented in the initial encounter.    Scheduled Medications:    atorvastatin  20 mg Oral QHS    ferrous sulfate  325 mg Oral Daily    furosemide  40 mg Oral Daily    levoFLOXacin  750 mg Oral Daily    lidocaine  2 patch Transdermal Q24H    pantoprazole  40 mg Oral Q12H    predniSONE  25 mg Oral Daily    sucralfate  1 g Oral QID (WM & HS)    tamsulosin  0.4 mg Oral Daily     PRN Medications: diphenhydrAMINE, loperamide, ramelteon, sodium chloride 0.9%    Review of Systems   Constitutional: Positive for activity change and fatigue. Negative for fever.   HENT: Negative for trouble swallowing and voice change.    Eyes: Negative for pain and visual disturbance.   Respiratory: Positive for shortness of breath. Negative for cough.    Cardiovascular: Negative for chest pain and palpitations.   Gastrointestinal: Negative for abdominal pain, nausea and vomiting.   Genitourinary: Positive for difficulty urinating. Negative for flank pain.   Musculoskeletal: Negative for back pain, myalgias and neck pain.   Skin: Negative for rash and wound.   Neurological: Positive for weakness (general). Negative for numbness and headaches.   Psychiatric/Behavioral: Negative for agitation. The patient is not nervous/anxious.      Objective:     Vital Signs (Most Recent):  Temp: 97.9 °F (36.6 °C) (08/08/19 1108)  Pulse: (!) 111 (08/08/19 1116)  Resp: (!) 35 (08/08/19 1116)  BP: 106/74 (08/08/19 1116)  SpO2: 95 % (08/08/19 1116)    Vital Signs (24h Range):  Temp:  [97.9 °F (36.6 °C)-98.6 °F (37 °C)] 97.9 °F (36.6 °C)  Pulse:  [] 111  Resp:  [18-40] 35  SpO2:  [91 %-98 %] 95 %  BP: (101-129)/(61-85) 106/74      Physical Exam   Constitutional: He is oriented to person, place, and time. He appears well-developed and well-nourished. No distress.   HENT:   Head: Normocephalic and atraumatic.   Right Ear: External ear normal.   Left Ear: External ear normal.   Nose: Nose normal.   Eyes: Right eye exhibits no discharge. Left eye exhibits no discharge. No scleral icterus.   Neck: Normal range of motion.   Cardiovascular: Normal rate and intact distal pulses.   Pulmonary/Chest: Effort normal. No respiratory distress.   Tolerating 2L O2 via NC   Abdominal: Soft. He exhibits no distension. There is no tenderness.   Musculoskeletal: Normal range of motion. He exhibits no edema or tenderness.   General deconditioning/debility    Neurological: He is alert and oriented to person, place, and time. No sensory deficit. He exhibits normal muscle tone.   Skin: Skin is warm and dry. No rash noted.   Psychiatric: He has a normal mood and affect. His behavior is normal. Thought content normal.   Vitals reviewed.    Diagnostic Results:   Labs: Reviewed  X-Ray: Reviewed    NEUROLOGICAL EXAMINATION:     MENTAL STATUS   Oriented to person, place, and time.

## 2019-08-08 NOTE — SUBJECTIVE & OBJECTIVE
Subjective:     Interval History: No acute events overnight. Complains of urinary retention. Awaiting approval for transfer to rehab.     Continuous Infusions:  Scheduled Meds:   atorvastatin  20 mg Oral QHS    ferrous sulfate  325 mg Oral Daily    furosemide  40 mg Oral Daily    levoFLOXacin  750 mg Oral Daily    lidocaine  2 patch Transdermal Q24H    pantoprazole  40 mg Oral Q12H    predniSONE  25 mg Oral Daily    sucralfate  1 g Oral QID (WM & HS)    tamsulosin  0.4 mg Oral Daily     PRN Meds:diphenhydrAMINE, loperamide, ramelteon, sodium chloride 0.9%    Review of patient's allergies indicates:   Allergen Reactions    Penicillins     Erythromycin Rash       Review of Systems   Constitutional: Positive for activity change. Negative for appetite change, chills, fatigue, fever and unexpected weight change.   HENT: Negative for congestion, ear pain, hearing loss, mouth sores and postnasal drip.    Eyes: Negative for photophobia, pain, discharge, redness, itching and visual disturbance.   Respiratory: Positive for shortness of breath. Negative for cough, chest tightness and wheezing.    Cardiovascular: Negative for chest pain, palpitations and leg swelling.   Gastrointestinal: Negative for abdominal distention, abdominal pain, blood in stool, constipation and diarrhea.   Endocrine: Negative for cold intolerance, heat intolerance, polydipsia, polyphagia and polyuria.   Genitourinary: Positive for difficulty urinating. Negative for decreased urine volume, dysuria, frequency, hematuria and urgency.   Musculoskeletal: Negative for arthralgias and joint swelling.   Allergic/Immunologic: Negative for environmental allergies, food allergies and immunocompromised state.   Neurological: Positive for weakness. Negative for dizziness, tremors, syncope, speech difficulty and numbness.   Hematological: Negative for adenopathy. Does not bruise/bleed easily.   Psychiatric/Behavioral: Negative for agitation, confusion and  hallucinations.     Objective:   Physical Exam   Constitutional: He is oriented to person, place, and time. He appears well-developed and well-nourished.   HENT:   Head: Normocephalic and atraumatic.   Eyes: Conjunctivae and EOM are normal.   Neck: Normal range of motion. Neck supple.   Cardiovascular: Normal rate and regular rhythm.   Pulmonary/Chest: No respiratory distress. He has decreased breath sounds. He has no wheezes. He has rales.   Abdominal: Soft. Bowel sounds are normal.   Musculoskeletal: Normal range of motion.   Neurological: He is alert and oriented to person, place, and time.   Skin: Skin is warm.   Psychiatric: He has a normal mood and affect.   Nursing note and vitals reviewed.        Vital Signs (Most Recent):  Temp: 97.9 °F (36.6 °C) (08/08/19 1108)  Pulse: (!) 111 (08/08/19 1116)  Resp: (!) 35 (08/08/19 1116)  BP: 106/74 (08/08/19 1116)  SpO2: 95 % (08/08/19 1116) Vital Signs (24h Range):  Temp:  [97.9 °F (36.6 °C)-98.6 °F (37 °C)] 97.9 °F (36.6 °C)  Pulse:  [] 111  Resp:  [18-40] 35  SpO2:  [91 %-98 %] 95 %  BP: (101-129)/(61-85) 106/74     Weight: 74.3 kg (163 lb 12.8 oz)  Body mass index is 24.19 kg/m².      Intake/Output Summary (Last 24 hours) at 8/8/2019 1245  Last data filed at 8/8/2019 0900  Gross per 24 hour   Intake 1175 ml   Output 520 ml   Net 655 ml       Significant Labs:  CBC:  Recent Labs   Lab 08/08/19  0641   WBC 9.83   RBC 4.48*   HGB 11.9*   HCT 37.8*   *   MCV 84   MCH 26.6*   MCHC 31.5*     BMP:  Recent Labs   Lab 08/08/19  0641   *   K 3.3*   CL 91*   CO2 35*   BUN 21   CREATININE 0.7   CALCIUM 9.2      Tacrolimus Levels:  No results for input(s): TACROLIMUS in the last 72 hours.  Microbiology:  Microbiology Results (last 7 days)     Procedure Component Value Units Date/Time    Culture, Respiratory with Gram Stain [[id number]] Collected:  08/07/19 0310    Order Status:  Completed Specimen:  Respiratory from Sputum, Expectorated Updated:  08/08/19 1029      Respiratory Culture Normal respiratory kendy     Gram Stain (Respiratory) <10 epithelial cells per low power field.     Gram Stain (Respiratory) Rare Gram positive cocci    Narrative:       If difficult to obtain, can try to induce    Blood culture [[id number]] Collected:  08/01/19 0247    Order Status:  Completed Specimen:  Blood from Peripheral, Hand, Right Updated:  08/06/19 0612     Blood Culture, Routine No growth after 5 days.    Blood culture [[id number]] Collected:  08/01/19 0248    Order Status:  Completed Specimen:  Blood from Peripheral, Forearm, Right Updated:  08/06/19 0612     Blood Culture, Routine No growth after 5 days.    Culture, Respiratory with Gram Stain [[id number]] Collected:  07/31/19 2128    Order Status:  Completed Specimen:  Respiratory from Sputum Updated:  08/03/19 0907     Respiratory Culture Normal respiratory kendy      No S aureus or Pseudomonas isolated.     Gram Stain (Respiratory) <10 epithelial cells per low power field.     Gram Stain (Respiratory) Few WBC's     Gram Stain (Respiratory) Few Gram positive rods     Gram Stain (Respiratory) Few Gram positive cocci     Gram Stain (Respiratory) Rare yeast          I have reviewed all pertinent labs within the past 24 hours.

## 2019-08-08 NOTE — PT/OT/SLP PROGRESS
Physical Therapy Treatment    Patient Name:  Manpreet Hamilton   MRN:  [id number]    Recommendations:     Discharge Recommendations:  rehabilitation facility   Discharge Equipment Recommendations: none   Barriers to discharge: Decreased caregiver support    Assessment:     Manpreet Hamilton is a 68 y.o. male admitted with a medical diagnosis of Acute on chronic respiratory failure with hypoxemia.  He presents with the following impairments/functional limitations:  weakness, impaired endurance, gait instability, impaired functional mobilty, impaired balance, impaired cardiopulmonary response to activity Patient ambulated forward and backward approx 6 feet w/ RW and CGA w/ non rebreather mask, 15 lpm..    Rehab Prognosis: Good; patient would benefit from acute skilled PT services to address these deficits and reach maximum level of function.    Recent Surgery: * No surgery found *      Plan:     During this hospitalization, patient to be seen 3 x/week to address the identified rehab impairments via gait training, therapeutic activities, therapeutic exercises, neuromuscular re-education and progress toward the following goals:    · Plan of Care Expires:  09/02/19    Subjective     Chief Complaint: did not sleep well last night  Patient/Family Comments/goals: agreeable to therapy  Pain/Comfort:  · Pain Rating 1: 0/10  · Pain Rating Post-Intervention 1: 0/10      Objective:     Communicated with nurse prior to session.  Patient found up in chair with oxygen(non rebreather mask for activity 15lpm; 2lpm nc at rest) upon PT entry to room.     General Precautions: Standard, fall   Orthopedic Precautions:N/A   Braces: N/A     Functional Mobility:  · Transfers:     · Sit to Stand:  stand by assistance with no AD  · Gait: forward and backward w/ RW and CGA approx 6 feet. non rebreather mask at 15 lpm during gait. remained SpO2 %.       AM-PAC 6 CLICK MOBILITY  Turning over in bed (including adjusting bedclothes, sheets and  blankets)?: 4  Sitting down on and standing up from a chair with arms (e.g., wheelchair, bedside commode, etc.): 4  Moving from lying on back to sitting on the side of the bed?: 4  Moving to and from a bed to a chair (including a wheelchair)?: 3  Need to walk in hospital room?: 3  Climbing 3-5 steps with a railing?: 2  Basic Mobility Total Score: 20       Therapeutic Activities and Exercises:   patient perfroms LAQ x20, qs x10 w/ mention of left knee discomfort and self reports tendonitis; APx20; hip abd x20.  Educated on PT POC; gait and trf technique.  Family present and had assisted patient to chair and assisted w/ changing from nasal cannula to mask, as they had been instructed by nursing.  Patient changed back to nasal cannula 2 LPM and SpO2 was 96% at end of session    Patient left up in chair with call button in reach, nurse notified and family present..    GOALS:   Multidisciplinary Problems     Physical Therapy Goals        Problem: Physical Therapy Goal    Goal Priority Disciplines Outcome Goal Variances Interventions   Physical Therapy Goal     PT, PT/OT Ongoing (interventions implemented as appropriate)     Description:  Goals to be met by: 2019    Patient will increase functional independence with mobility by performin. Gait  x 10 feet with Supervision using  - not met  2. Ascend/descend 3 stair with Contact Guard Assistance  - not met  3. Lower extremity exercise program x15 reps per handout, with independence to increase tolerance to activity - not met                      Time Tracking:     PT Received On: 19  PT Start Time: 1536     PT Stop Time: 1552  PT Total Time (min): 16 min     Billable Minutes: Gait Training 10 and Therapeutic Exercise 6    Treatment Type: Treatment  PT/PTA: PT     PTA Visit Number: 0     Martha Mccullough, PT  2019

## 2019-08-08 NOTE — PT/OT/SLP PROGRESS
Occupational Therapy   Treatment    Name: Manpreet Hamilton  MRN: [id number]  Admitting Diagnosis:  Acute on chronic respiratory failure with hypoxemia       Recommendations:     Discharge Recommendations: rehabilitation facility  Discharge Equipment Recommendations:  none  Barriers to discharge:  None    Assessment:     Manpreet Hamilton is a 68 y.o. male with a medical diagnosis of Acute on chronic respiratory failure with hypoxemia. Pt slowly improving with functional ability as well as his mobility tolerance/distance. Still limited by SOB and anxiety attached with that. Performance deficits affecting function are weakness, impaired self care skills, impaired cardiopulmonary response to activity, gait instability, impaired functional mobilty, impaired endurance, impaired balance, decreased coordination.     Rehab Prognosis:  Good; patient would benefit from acute skilled OT services to address these deficits and reach maximum level of function.       Plan:     Patient to be seen 3 x/week to address the above listed problems via self-care/home management, therapeutic activities, therapeutic exercises  · Plan of Care Expires: 09/05/19  · Plan of Care Reviewed with: patient    Subjective     Pain/Comfort:  · Pain Rating 1: 0/10    Objective:     Communicated with: rn prior to session.  Patient found supine with oxygen upon OT entry to room.    General Precautions: Standard, fall   Orthopedic Precautions:    Braces:       Occupational Performance:     Bed Mobility:    · Patient completed Rolling/Turning to Right with modified independence  · Patient completed Scooting/Bridging with independence  · Patient completed Supine to Sit with independence     Functional Mobility/Transfers:  · Patient completed Sit <> Stand Transfer with supervision  with  no assistive device   · Patient completed Bed <> Chair Transfer using Step Transfer technique with supervision with no assistive device  · Functional Mobility: Pt walked 6' x 2  SBA with rest break in-between and using non-rebreather to recover.    [facility] 6 Click ADL: 21    Treatment & Education:  Educated on POC and focus on improving mobility distance and activity tolerance each week as able.  Pt not as anxious during mobility but required non-rebreather mask to recover at 15L.  Completed 15 reps of shld presses and elbow flex/ext using a yellow theraband.    Patient left up in chair with all lines intact and call button in reachEducation:      GOALS:   Multidisciplinary Problems     Occupational Therapy Goals        Problem: Occupational Therapy Goal    Goal Priority Disciplines Outcome Interventions   Occupational Therapy Goal     OT, PT/OT Ongoing (interventions implemented as appropriate)    Description:  Goals to be met by: 8/13/19     Patient will increase functional independence with ADLs by performing:    Grooming while standing at sink with Set-up Assistance.  Toileting from toilet with Set-up Assistance for hygiene and clothing management.   Supine to sit with Meno. Met 8/8  Toilet transfer to toilet with Supervision.                       Time Tracking:     OT Date of Treatment: 08/08/19  OT Start Time: 0733  OT Stop Time: 0756  OT Total Time (min): 23 min    Billable Minutes:Therapeutic Activity 13  Therapeutic Exercise 10    NADEEM Burton  8/8/2019

## 2019-08-08 NOTE — PROGRESS NOTES
[facility]  Lung Transplant  Progress Note - Floor    Patient Name: Manpreet Hamilton  MRN: [id number]  Admission Date: 7/31/2019  Hospital Length of Stay: 8 days  Post-Operative Day:   Attending Physician: Shauna Booker DO  Primary Care Provider: Radames Pradhan MD     Subjective:     Interval History: No acute events overnight. Complains of urinary retention. Awaiting approval for transfer to rehab.     Continuous Infusions:  Scheduled Meds:   atorvastatin  20 mg Oral QHS    ferrous sulfate  325 mg Oral Daily    furosemide  40 mg Oral Daily    levoFLOXacin  750 mg Oral Daily    lidocaine  2 patch Transdermal Q24H    pantoprazole  40 mg Oral Q12H    predniSONE  25 mg Oral Daily    sucralfate  1 g Oral QID (WM & HS)    tamsulosin  0.4 mg Oral Daily     PRN Meds:diphenhydrAMINE, loperamide, ramelteon, sodium chloride 0.9%    Review of patient's allergies indicates:   Allergen Reactions    Penicillins     Erythromycin Rash       Review of Systems   Constitutional: Positive for activity change. Negative for appetite change, chills, fatigue, fever and unexpected weight change.   HENT: Negative for congestion, ear pain, hearing loss, mouth sores and postnasal drip.    Eyes: Negative for photophobia, pain, discharge, redness, itching and visual disturbance.   Respiratory: Positive for shortness of breath. Negative for cough, chest tightness and wheezing.    Cardiovascular: Negative for chest pain, palpitations and leg swelling.   Gastrointestinal: Negative for abdominal distention, abdominal pain, blood in stool, constipation and diarrhea.   Endocrine: Negative for cold intolerance, heat intolerance, polydipsia, polyphagia and polyuria.   Genitourinary: Positive for difficulty urinating. Negative for decreased urine volume, dysuria, frequency, hematuria and urgency.   Musculoskeletal: Negative for arthralgias and joint swelling.   Allergic/Immunologic: Negative for environmental allergies,  food allergies and immunocompromised state.   Neurological: Positive for weakness. Negative for dizziness, tremors, syncope, speech difficulty and numbness.   Hematological: Negative for adenopathy. Does not bruise/bleed easily.   Psychiatric/Behavioral: Negative for agitation, confusion and hallucinations.     Objective:   Physical Exam   Constitutional: He is oriented to person, place, and time. He appears well-developed and well-nourished.   HENT:   Head: Normocephalic and atraumatic.   Eyes: Conjunctivae and EOM are normal.   Neck: Normal range of motion. Neck supple.   Cardiovascular: Normal rate and regular rhythm.   Pulmonary/Chest: No respiratory distress. He has decreased breath sounds. He has no wheezes. He has rales.   Abdominal: Soft. Bowel sounds are normal.   Musculoskeletal: Normal range of motion.   Neurological: He is alert and oriented to person, place, and time.   Skin: Skin is warm.   Psychiatric: He has a normal mood and affect.   Nursing note and vitals reviewed.        Vital Signs (Most Recent):  Temp: 97.9 °F (36.6 °C) (08/08/19 1108)  Pulse: (!) 111 (08/08/19 1116)  Resp: (!) 35 (08/08/19 1116)  BP: 106/74 (08/08/19 1116)  SpO2: 95 % (08/08/19 1116) Vital Signs (24h Range):  Temp:  [97.9 °F (36.6 °C)-98.6 °F (37 °C)] 97.9 °F (36.6 °C)  Pulse:  [] 111  Resp:  [18-40] 35  SpO2:  [91 %-98 %] 95 %  BP: (101-129)/(61-85) 106/74     Weight: 74.3 kg (163 lb 12.8 oz)  Body mass index is 24.19 kg/m².      Intake/Output Summary (Last 24 hours) at 8/8/2019 1245  Last data filed at 8/8/2019 0900  Gross per 24 hour   Intake 1175 ml   Output 520 ml   Net 655 ml       Significant Labs:  CBC:  Recent Labs   Lab 08/08/19  0641   WBC 9.83   RBC 4.48*   HGB 11.9*   HCT 37.8*   *   MCV 84   MCH 26.6*   MCHC 31.5*     BMP:  Recent Labs   Lab 08/08/19  0641   *   K 3.3*   CL 91*   CO2 35*   BUN 21   CREATININE 0.7   CALCIUM 9.2      Tacrolimus Levels:  No results for input(s): TACROLIMUS in the  last 72 hours.  Microbiology:  Microbiology Results (last 7 days)     Procedure Component Value Units Date/Time    Culture, Respiratory with Gram Stain [[id number]] Collected:  08/07/19 0310    Order Status:  Completed Specimen:  Respiratory from Sputum, Expectorated Updated:  08/08/19 1029     Respiratory Culture Normal respiratory kendy     Gram Stain (Respiratory) <10 epithelial cells per low power field.     Gram Stain (Respiratory) Rare Gram positive cocci    Narrative:       If difficult to obtain, can try to induce    Blood culture [[id number]] Collected:  08/01/19 0247    Order Status:  Completed Specimen:  Blood from Peripheral, Hand, Right Updated:  08/06/19 0612     Blood Culture, Routine No growth after 5 days.    Blood culture [[id number]] Collected:  08/01/19 0248    Order Status:  Completed Specimen:  Blood from Peripheral, Forearm, Right Updated:  08/06/19 0612     Blood Culture, Routine No growth after 5 days.    Culture, Respiratory with Gram Stain [[id number]] Collected:  07/31/19 2128    Order Status:  Completed Specimen:  Respiratory from Sputum Updated:  08/03/19 0907     Respiratory Culture Normal respiratory kendy      No S aureus or Pseudomonas isolated.     Gram Stain (Respiratory) <10 epithelial cells per low power field.     Gram Stain (Respiratory) Few WBC's     Gram Stain (Respiratory) Few Gram positive rods     Gram Stain (Respiratory) Few Gram positive cocci     Gram Stain (Respiratory) Rare yeast          I have reviewed all pertinent labs within the past 24 hours.      Assessment/Plan:     * Acute on chronic respiratory failure with hypoxemia  Continue to wean oxygen. Currently on 4 HFNC and use NRBM for recovery. Unclear if the etiology of his deterioration is a health care associated infection, non-cardiogenic pulmonary edema, or an exacerbation of his IPF. Will continue levofloxacin, lasix, and prednisone.    Idiopathic pulmonary fibrosis  Will start pirfenidone soon.      Debility  Continue physical therapy. Rehab consulted, awaiting placement.         Emilie Gonzales PA-C  Lung Transplant  [facility]

## 2019-08-09 VITALS
BODY MASS INDEX: 23.68 KG/M2 | TEMPERATURE: 98 F | HEIGHT: 69 IN | HEART RATE: 118 BPM | SYSTOLIC BLOOD PRESSURE: 90 MMHG | DIASTOLIC BLOOD PRESSURE: 55 MMHG | WEIGHT: 159.88 LBS | OXYGEN SATURATION: 91 % | RESPIRATION RATE: 26 BRPM

## 2019-08-09 LAB
ALBUMIN SERPL BCP-MCNC: 2.5 G/DL (ref 3.5–5.2)
ALP SERPL-CCNC: 127 U/L (ref 55–135)
ALT SERPL W/O P-5'-P-CCNC: 28 U/L (ref 10–44)
ANION GAP SERPL CALC-SCNC: 9 MMOL/L (ref 8–16)
AST SERPL-CCNC: 15 U/L (ref 10–40)
BACTERIA SPEC AEROBE CULT: NORMAL
BACTERIA SPEC AEROBE CULT: NORMAL
BASOPHILS # BLD AUTO: 0.01 K/UL (ref 0–0.2)
BASOPHILS NFR BLD: 0.1 % (ref 0–1.9)
BILIRUB SERPL-MCNC: 0.4 MG/DL (ref 0.1–1)
BUN SERPL-MCNC: 21 MG/DL (ref 8–23)
CALCIUM SERPL-MCNC: 8.6 MG/DL (ref 8.7–10.5)
CHLORIDE SERPL-SCNC: 91 MMOL/L (ref 95–110)
CO2 SERPL-SCNC: 31 MMOL/L (ref 23–29)
CREAT SERPL-MCNC: 0.7 MG/DL (ref 0.5–1.4)
DIFFERENTIAL METHOD: ABNORMAL
EOSINOPHIL # BLD AUTO: 0.2 K/UL (ref 0–0.5)
EOSINOPHIL NFR BLD: 1.6 % (ref 0–8)
ERYTHROCYTE [DISTWIDTH] IN BLOOD BY AUTOMATED COUNT: 14.6 % (ref 11.5–14.5)
EST. GFR  (AFRICAN AMERICAN): >60 ML/MIN/1.73 M^2
EST. GFR  (NON AFRICAN AMERICAN): >60 ML/MIN/1.73 M^2
GLUCOSE SERPL-MCNC: 137 MG/DL (ref 70–110)
GRAM STN SPEC: NORMAL
GRAM STN SPEC: NORMAL
HCT VFR BLD AUTO: 37.8 % (ref 40–54)
HGB BLD-MCNC: 11.8 G/DL (ref 14–18)
IMM GRANULOCYTES # BLD AUTO: 0.06 K/UL (ref 0–0.04)
IMM GRANULOCYTES NFR BLD AUTO: 0.5 % (ref 0–0.5)
LYMPHOCYTES # BLD AUTO: 1.4 K/UL (ref 1–4.8)
LYMPHOCYTES NFR BLD: 12 % (ref 18–48)
MAGNESIUM SERPL-MCNC: 1.9 MG/DL (ref 1.6–2.6)
MCH RBC QN AUTO: 26.4 PG (ref 27–31)
MCHC RBC AUTO-ENTMCNC: 31.2 G/DL (ref 32–36)
MCV RBC AUTO: 85 FL (ref 82–98)
MONOCYTES # BLD AUTO: 0.7 K/UL (ref 0.3–1)
MONOCYTES NFR BLD: 6.3 % (ref 4–15)
NEUTROPHILS # BLD AUTO: 9.3 K/UL (ref 1.8–7.7)
NEUTROPHILS NFR BLD: 79.5 % (ref 38–73)
NRBC BLD-RTO: 0 /100 WBC
PLATELET # BLD AUTO: 344 K/UL (ref 150–350)
PMV BLD AUTO: 9 FL (ref 9.2–12.9)
POTASSIUM SERPL-SCNC: 3.1 MMOL/L (ref 3.5–5.1)
PROT SERPL-MCNC: 5.3 G/DL (ref 6–8.4)
RBC # BLD AUTO: 4.47 M/UL (ref 4.6–6.2)
SODIUM SERPL-SCNC: 131 MMOL/L (ref 136–145)
WBC # BLD AUTO: 11.64 K/UL (ref 3.9–12.7)

## 2019-08-09 PROCEDURE — 83735 ASSAY OF MAGNESIUM: CPT

## 2019-08-09 PROCEDURE — 99238 HOSP IP/OBS DSCHRG MGMT 30/<: CPT | Mod: ,,, | Performed by: NURSE PRACTITIONER

## 2019-08-09 PROCEDURE — 99223 PR INITIAL HOSPITAL CARE,LEVL III: ICD-10-PCS | Mod: ,,, | Performed by: PHYSICAL MEDICINE & REHABILITATION

## 2019-08-09 PROCEDURE — 63600175 PHARM REV CODE 636 W HCPCS: Performed by: INTERNAL MEDICINE

## 2019-08-09 PROCEDURE — 27000221 HC OXYGEN, UP TO 24 HOURS

## 2019-08-09 PROCEDURE — 99223 1ST HOSP IP/OBS HIGH 75: CPT | Mod: ,,, | Performed by: PHYSICAL MEDICINE & REHABILITATION

## 2019-08-09 PROCEDURE — 25000003 PHARM REV CODE 250: Performed by: STUDENT IN AN ORGANIZED HEALTH CARE EDUCATION/TRAINING PROGRAM

## 2019-08-09 PROCEDURE — 25000003 PHARM REV CODE 250: Performed by: PHYSICIAN ASSISTANT

## 2019-08-09 PROCEDURE — 99238 PR HOSPITAL DISCHARGE DAY,<30 MIN: ICD-10-PCS | Mod: ,,, | Performed by: NURSE PRACTITIONER

## 2019-08-09 PROCEDURE — 85025 COMPLETE CBC W/AUTO DIFF WBC: CPT

## 2019-08-09 PROCEDURE — 80053 COMPREHEN METABOLIC PANEL: CPT

## 2019-08-09 RX ORDER — RAMELTEON 8 MG/1
8 TABLET ORAL NIGHTLY PRN
Start: 2019-08-09

## 2019-08-09 RX ORDER — DIPHENHYDRAMINE HCL 25 MG
25 CAPSULE ORAL NIGHTLY PRN
Refills: 0 | Status: ON HOLD | COMMUNITY
Start: 2019-08-09 | End: 2019-08-28 | Stop reason: HOSPADM

## 2019-08-09 RX ORDER — TRAZODONE HYDROCHLORIDE 50 MG/1
25 TABLET ORAL NIGHTLY PRN
Status: ON HOLD
Start: 2019-08-09 | End: 2019-08-28 | Stop reason: HOSPADM

## 2019-08-09 RX ORDER — LIDOCAINE 50 MG/G
2 PATCH TOPICAL DAILY
Refills: 0
Start: 2019-08-09

## 2019-08-09 RX ORDER — FUROSEMIDE 40 MG/1
40 TABLET ORAL DAILY
Qty: 30 TABLET | Refills: 11 | Status: SHIPPED | OUTPATIENT
Start: 2019-08-09 | End: 2020-08-08

## 2019-08-09 RX ORDER — PREDNISONE 5 MG/1
25 TABLET ORAL DAILY
Qty: 50 TABLET | Refills: 0 | Status: SHIPPED | OUTPATIENT
Start: 2019-08-09 | End: 2019-08-19

## 2019-08-09 RX ORDER — TAMSULOSIN HYDROCHLORIDE 0.4 MG/1
0.4 CAPSULE ORAL DAILY
Qty: 30 CAPSULE | Refills: 11 | Status: SHIPPED | OUTPATIENT
Start: 2019-08-09 | End: 2020-08-08

## 2019-08-09 RX ADMIN — FERROUS SULFATE TAB EC 325 MG (65 MG FE EQUIVALENT) 325 MG: 325 (65 FE) TABLET DELAYED RESPONSE at 07:08

## 2019-08-09 RX ADMIN — SUCRALFATE 1 G: 1 SUSPENSION ORAL at 07:08

## 2019-08-09 RX ADMIN — PANTOPRAZOLE SODIUM 40 MG: 40 TABLET, DELAYED RELEASE ORAL at 07:08

## 2019-08-09 RX ADMIN — LEVOFLOXACIN 750 MG: 750 TABLET, FILM COATED ORAL at 07:08

## 2019-08-09 RX ADMIN — TAMSULOSIN HYDROCHLORIDE 0.4 MG: 0.4 CAPSULE ORAL at 07:08

## 2019-08-09 RX ADMIN — FUROSEMIDE 40 MG: 40 TABLET ORAL at 07:08

## 2019-08-09 RX ADMIN — SUCRALFATE 1 G: 1 SUSPENSION ORAL at 11:08

## 2019-08-09 RX ADMIN — PREDNISONE 25 MG: 5 TABLET ORAL at 07:08

## 2019-08-09 NOTE — CARE UPDATE
Rapid Response Nurse Chart Check     AI alert triggered related to patient's respiratory status and increasing oxygen demands Bedside RNElayne contacted, no concerns verbalized at this time. Patient to be discharged today. Instructed to call [phone] for further concerns or assistance.

## 2019-08-09 NOTE — PROGRESS NOTES
Discharge Note:    SW met with pt and spouse to assess needs for discharge. Pt scheduled to discharge to [facility] with no needs at this time. JAHAIRA Edwards arranged transport for the afternoon. SW reviewed discharge plan with pt. Pt aware of, and involved in discharge plan. Pt reports coping adequately with discharge at this time and was sitting up on bed alert and oriented x 4 with pleasant affect.  Pt verbalized no additional needs or concerns at this time.  Contact information provided. SW to remain available.

## 2019-08-09 NOTE — PLAN OF CARE
[facility]  Facility Transfer Orders        Admit to: REHAB    Diagnoses:   Active Hospital Problems    Diagnosis  POA    *Acute on chronic respiratory failure with hypoxemia [J96.21]  Yes     Priority: 1 - High    Idiopathic pulmonary fibrosis [J84.112]  Yes     Priority: 2     Debility [R53.81]  Yes     Priority: 3       Resolved Hospital Problems   No resolved problems to display.     Allergies:   Review of patient's allergies indicates:   Allergen Reactions    Penicillins     Erythromycin Rash       Code Status: full    Vitals: Routine       Diet: regular diet    Activity: Activity as tolerated    Nursing Precautions: Fall    Bed/Surface: Low Air Loss    Consults: PT to evaluate and treat- 7 times a week and OT to evaluate and treat- 7 times a week    Oxygen: 4 liters/min via nasal cannula   Patient needs non-rebreather for recovery after exertion.  Expect significant oxygen desaturations with any activity requiring prolonged recovery.      Dialysis: Patient is not on dialysis.     Labs: defer to facitlity provider    Pending Diagnostic Studies:     None          PLEASE REFER ALL QUESTIONS REGARDING PATIENT'S CARE TO DR. TERESITA SIMEON    Imaging: defer to facility provider    Miscellaneous Care:   Ribeiro Care: Empty Ribeiro bag every shift.  Change Ribeiro every month    IV Access: Peripheral     Medications: Discontinue all previous medication orders, if any. See new list below.  Current Discharge Medication List      START taking these medications    Details   diphenhydrAMINE (BENADRYL) 25 mg capsule Take 1 capsule (25 mg total) by mouth nightly as needed for Insomnia.  Refills: 0      furosemide (LASIX) 40 MG tablet Take 1 tablet (40 mg total) by mouth once daily.  Qty: 30 tablet, Refills: 11      lidocaine (LIDODERM) 5 % Place 2 patches onto the skin once daily. Remove & Discard patch within 12 hours or as directed by MD  Refills: 0      predniSONE (DELTASONE) 5 MG tablet Take 5 tablets  (25 mg total) by mouth once daily. for 10 days  Qty: 50 tablet, Refills: 0      ramelteon (ROZEREM) 8 mg tablet Take 1 tablet (8 mg total) by mouth nightly as needed for Insomnia.      tamsulosin (FLOMAX) 0.4 mg Cap Take 1 capsule (0.4 mg total) by mouth once daily.  Qty: 30 capsule, Refills: 11      traZODone (DESYREL) 50 MG tablet Take 0.5 tablets (25 mg total) by mouth nightly as needed for Insomnia.         CONTINUE these medications which have NOT CHANGED    Details   atorvastatin (LIPITOR) 20 MG tablet TAKE ONE TABLET BY MOUTH EVERY EVENING  Qty: 90 tablet, Refills: 4      desonide (DESOWEN) 0.05 % lotion 1 application 2 (two) times daily.  Refills: 3      doxycycline (VIBRAMYCIN) 100 MG Cap TAKE ONE CAPSULE BY MOUTH EVERY DAY FOR THE FIRST 5 DAYS OF EACH MONTH  Qty: 15 capsule, Refills: 6      ferrous sulfate (FEOSOL) 325 mg (65 mg iron) Tab tablet Take 1 tablet (325 mg total) by mouth every 12 (twelve) hours.  Qty: 60 tablet, Refills: 4    Associated Diagnoses: PUD (peptic ulcer disease); Pulmonary fibrosis      hydroCHLOROthiazide (HYDRODIURIL) 25 MG tablet Take 1 tablet (25 mg total) by mouth once daily.  Qty: 30 tablet, Refills: 11      loperamide (IMODIUM) 2 mg capsule 1 capsule 3 (three) times daily as needed.  Refills: 0      pantoprazole (PROTONIX) 40 MG tablet Take 1 tablet (40 mg total) by mouth every 12 (twelve) hours. Take one pill every morning 45 minutes before breakfast in the morning.  Qty: 60 tablet, Refills: 5    Associated Diagnoses: PUD (peptic ulcer disease); Pulmonary fibrosis      sucralfate (CARAFATE) 100 mg/mL suspension Take 10 mLs (1 g total) by mouth 4 (four) times daily with meals and nightly.  Qty: 2400 mL, Refills: 0      vitamin D3-vitamin K2 1000-90 unit-mcg TbDL 1 tablet once daily.         STOP taking these medications       nintedanib (OFEV) 150 mg Cap Comments:   Reason for Stopping:             Follow up: Dr. Radames Reynolds

## 2019-08-09 NOTE — NURSING
Pt's wife inquiring about pt's transport to Rehab. RN attempted to call number listed in CM's note for update. No answer. Left message. Await call back.

## 2019-08-09 NOTE — DISCHARGE SUMMARY
[facility]  Lung Transplant  Discharge Summary      Patient Name: Manpreet Hamilton  MRN: [id number]  Admission Date: 7/31/2019  Hospital Length of Stay: 9 days  Discharge Date and Time: 08/09/2019 10:36 AM  Attending Physician: Shauna Booker DO   Discharging Provider: Pollo Saunders NP  Primary Care Provider: Radames Pradhan MD     HPI: 67 yo  male PMH of HTN, HLD, UIP (dx 1 year ago, follow by Dr. Pradhan, on ofev) presenting as a direct admit for increaased oxygen requirement. He was recent discharged from [facility] on July 22, after GI bleed, requiring massive transfusion 20 units over 9-10 days. Couple days after getting home, he had increased oxygen requirement, was on 2 L, now requiring 5 L nasal canula. He had imaging done while at [facility] which showed an infiltrate that may have been PNA, he was started on 30 mg prednisone daily, azithromycin 500 mg BID, and Valacyclovir 1 gram BID. Dr. Reynolds reported his 02 saturation had been worsening all week, but more so today, unknown cause, then transferred to [facility] to be put under his care. On arrival he reports shortness of breath with ambulation, associated with cough for 6 days, productive of clear sputum. He denies fevers, chills, nausea, vomiting, chest pain, leg swelling, hemoptysis, hematemesis, change in bowel or urinary habits.     * No surgery found *     Hospital Course: Acute on chronic respiratory failure with hypoxemia  Continue to wean oxygen. Currently on 4 HFNC and use NRBM for recovery. Unclear if the etiology of his deterioration is a health care associated infection, non-cardiogenic pulmonary edema, or an exacerbation of his IPF. Will continue lasix and prednisone.     Idiopathic pulmonary fibrosis  Will start pirfenidone soon per Dr. Radames Reynolds     Debility  Continue physical therapy in rehab.     Consults (From admission, onward)        Status Ordering Provider     Inpatient consult to Lung Transplant  Once     Provider:   (Not yet assigned)    Completed JASON ALMEIDA     Inpatient consult to Physical Medicine Rehab  Once     Provider:  (Not yet assigned)    Completed KAMILA DAVIS     Pharmacy to dose Vancomycin consult  Once     Provider:  (Not yet assigned)    Completed ION MENDIETA          Significant Diagnostic Studies: Labs:   BMP:   Recent Labs   Lab 08/08/19  0636 08/08/19  0641 08/09/19  0646   GLU  --  96 137*   NA  --  135* 131*   K  --  3.3* 3.1*   CL  --  91* 91*   CO2  --  35* 31*   BUN  --  21 21   CREATININE  --  0.7 0.7   CALCIUM  --  9.2 8.6*   MG 2.1  --  1.9   , CMP   Recent Labs   Lab 08/08/19  0641 08/09/19  0646   * 131*   K 3.3* 3.1*   CL 91* 91*   CO2 35* 31*   GLU 96 137*   BUN 21 21   CREATININE 0.7 0.7   CALCIUM 9.2 8.6*   PROT 5.3* 5.3*   ALBUMIN 2.5* 2.5*   BILITOT 0.5 0.4   ALKPHOS 136* 127   AST 15 15   ALT 33 28   ANIONGAP 9 9   ESTGFRAFRICA >60.0 >60.0   EGFRNONAA >60.0 >60.0    and CBC   Recent Labs   Lab 08/08/19  0641 08/09/19  0646   WBC 9.83 11.64   HGB 11.9* 11.8*   HCT 37.8* 37.8*   * 344     Microbiology:   Blood Culture   Lab Results   Component Value Date    LABBLOO No growth after 5 days. 08/01/2019    and Sputum Culture   Lab Results   Component Value Date    GSRESP <10 epithelial cells per low power field. 08/07/2019    GSRESP Rare Gram positive cocci 08/07/2019    RESPIRATORYC Normal respiratory kendy 08/07/2019     Radiology: X-Ray: CXR: X-Ray Chest 1 View (CXR):   Results for orders placed or performed during the hospital encounter of 07/31/19   X-Ray Chest 1 View    Narrative    EXAMINATION:  XR CHEST 1 VIEW    CLINICAL HISTORY:  SOB;    TECHNIQUE:  Single frontal view of the chest was performed.    COMPARISON:  July 31, 2019.    FINDINGS:  Heart and lungs  appear unchanged when allowing for differences in technique and positioning.      Impression    No significant change from prior study.      Electronically signed by: Pete Sky  MD  Date:    08/02/2019  Time:    04:36    and X-Ray Chest PA and Lateral (CXR):   Results for orders placed or performed during the hospital encounter of 07/31/19   X-Ray Chest PA And Lateral    Narrative    EXAMINATION:  XR CHEST PA AND LATERAL    CLINICAL HISTORY:  ILD exacerbation;    FINDINGS:  There is increasing opacification on the left suggesting an acute on chronic process with interstitial lung disease.  Heart size is normal there is aortic plaque and DJD.      Impression    Acute superimposed on chronic changes on the left.      Electronically signed by: Mikel Roberts MD  Date:    08/05/2019  Time:    15:48       Pending Diagnostic Studies:     None        Final Active Diagnoses:    Diagnosis Date Noted POA    PRINCIPAL PROBLEM:  Acute on chronic respiratory failure with hypoxemia [J96.21] 07/31/2019 Yes    Idiopathic pulmonary fibrosis [J84.112]  Yes    Debility [R53.81] 08/04/2019 Yes      Problems Resolved During this Admission:      Discharged Condition: stable    Disposition:     Medications:  Reconciled Home Medications:      Medication List      START taking these medications    diphenhydrAMINE 25 mg capsule  Commonly known as:  BENADRYL  Take 1 capsule (25 mg total) by mouth nightly as needed for Insomnia.     furosemide 40 MG tablet  Commonly known as:  LASIX  Take 1 tablet (40 mg total) by mouth once daily.     lidocaine 5 %  Commonly known as:  LIDODERM  Place 2 patches onto the skin once daily. Remove & Discard patch within 12 hours or as directed by MD     predniSONE 5 MG tablet  Commonly known as:  DELTASONE  Take 5 tablets (25 mg total) by mouth once daily. for 10 days     ramelteon 8 mg tablet  Commonly known as:  ROZEREM  Take 1 tablet (8 mg total) by mouth nightly as needed for Insomnia.     tamsulosin 0.4 mg Cap  Commonly known as:  FLOMAX  Take 1 capsule (0.4 mg total) by mouth once daily.     traZODone 50 MG tablet  Commonly known as:  DESYREL  Take 0.5 tablets (25 mg total) by  mouth nightly as needed for Insomnia.        CONTINUE taking these medications    atorvastatin 20 MG tablet  Commonly known as:  LIPITOR  TAKE ONE TABLET BY MOUTH EVERY EVENING     desonide 0.05 % lotion  Commonly known as:  DESOWEN  1 application 2 (two) times daily.     doxycycline 100 MG Cap  Commonly known as:  VIBRAMYCIN  TAKE ONE CAPSULE BY MOUTH EVERY DAY FOR THE FIRST 5 DAYS OF EACH MONTH     ferrous sulfate 325 mg (65 mg iron) Tab tablet  Commonly known as:  FEOSOL  Take 1 tablet (325 mg total) by mouth every 12 (twelve) hours.     hydroCHLOROthiazide 25 MG tablet  Commonly known as:  HYDRODIURIL  Take 1 tablet (25 mg total) by mouth once daily.     loperamide 2 mg capsule  Commonly known as:  IMODIUM  1 capsule 3 (three) times daily as needed.     pantoprazole 40 MG tablet  Commonly known as:  PROTONIX  Take 1 tablet (40 mg total) by mouth every 12 (twelve) hours. Take one pill every morning 45 minutes before breakfast in the morning.     sucralfate 100 mg/mL suspension  Commonly known as:  CARAFATE  Take 10 mLs (1 g total) by mouth 4 (four) times daily with meals and nightly.     vitamin D3-vitamin K2 1000-90 unit-mcg Tbdl  1 tablet once daily.        STOP taking these medications    OFEV 150 mg Cap  Generic drug:  nintedanib        \  Patient Instructions:   No discharge procedures on file.  Follow Up:  Dr. Teresita Reynolds       PLEASE CONTACT DR. TERESITA REYNOLDS FOR ALL QUESTIONS REGARDING CARE WHILE PATIENT IS IN REHAB FACILITY AND FOR FOLLOW UP.        Pollo Saunders NP  Lung Transplant  [facility]

## 2019-08-09 NOTE — NURSING
Pt c/o inability to void. Bladder scan showed approx. 340 ml. George NEIL notified and ordered sullivan placement for urinary retention.

## 2019-08-09 NOTE — NURSING
Pt and wife upset that transport to Rehab not yet here. RN explained that she has made multiple phone calls to try to get updates and expedite pt's discharge

## 2019-08-09 NOTE — CARE UPDATE
Rapid Response Nurse Chart Check     Chart check completed, abnormal VS noted, bedside RN, Renee [id number] contacted, patient with complains of urinary retention, RN contacted provider who ordered sullivan insertion, no cocerns   verbalized at this time, instructed to call [phone] for further concerns or assistance.

## 2019-08-09 NOTE — PLAN OF CARE
Problem: Adult Inpatient Plan of Care  Goal: Plan of Care Review  Outcome: Ongoing (interventions implemented as appropriate)  Pt AAO x4 throughout shift. Pt to be discharged to IP Rehab today. Transport set up for 1430. Pt and wife informed of time of . PIV removed. Tele removed. Ribeiro emptied.

## 2019-08-09 NOTE — PLAN OF CARE
Pt AAOx4, VSS, afebrile. Pt maintains SpO2> 94% on 2L NC. Telemetry monitoring continued, showing NSR. See previous note about UOP. Ribeiro CDI and adhered to top of thigh with StatLock.  Drainage bag below bladder and off the floor, no dependent loops or kinks. Draining clear yellow urine. PRN trazodone given. Fall risk precautions initiated.  Pt in lowest bed position setting, lighting adjusted, pt to wear nonskid socks when ambulating, side rails up x2.  Pt remain free from falls during shift.  Call light within reach. Will continue to monitor.

## 2019-08-09 NOTE — PLAN OF CARE
UPDATE--  1430   Expected arrival of patient at destination      1147   Request Accepted   Joann Barahona      Transport Dispatched marked incomplete for destination OTHER        Transport Dispatched marked complete for destination OTHER      1146   Transport Dispatched marked resolved for destination OTHER        Call from [facility]        [phone]   Entered By: Joann Burger 14:30        Transportation dispatched to  patient            Discharge Planning:    Patient accepted and will transfer to [facility]    Facility transfer orders completed    RN to call report to [phone]    [facility] order placed for transportation pickup--time requested 12:30    Please call [phone] for concerns or delays with transportation

## 2019-08-12 ENCOUNTER — PATIENT MESSAGE (OUTPATIENT)
Dept: PULMONOLOGY | Facility: CLINIC | Age: 69
End: 2019-08-12

## 2019-08-12 ENCOUNTER — PATIENT MESSAGE (OUTPATIENT)
Dept: UROLOGY | Facility: CLINIC | Age: 69
End: 2019-08-12

## 2019-08-12 ENCOUNTER — PATIENT MESSAGE (OUTPATIENT)
Dept: ENDOSCOPY | Facility: HOSPITAL | Age: 69
End: 2019-08-12

## 2019-08-12 ENCOUNTER — TELEPHONE (OUTPATIENT)
Dept: ENDOSCOPY | Facility: HOSPITAL | Age: 69
End: 2019-08-12

## 2019-08-12 RX ORDER — NINTEDANIB 100 MG/1
CAPSULE ORAL
Qty: 60 CAPSULE | Refills: 6 | Status: SHIPPED | OUTPATIENT
Start: 2019-08-12

## 2019-08-12 NOTE — TELEPHONE ENCOUNTER
Dr Pradhan-  Dr Saldana wanted me to clarify with you-is patient cleared from pulmonary standpoint to have procedure?  Please advise.  Thank you.  Anne

## 2019-08-18 ENCOUNTER — PATIENT MESSAGE (OUTPATIENT)
Dept: GASTROENTEROLOGY | Facility: CLINIC | Age: 69
End: 2019-08-18

## 2019-08-19 ENCOUNTER — PATIENT MESSAGE (OUTPATIENT)
Dept: PULMONOLOGY | Facility: CLINIC | Age: 69
End: 2019-08-19

## 2019-08-19 ENCOUNTER — HOSPITAL ENCOUNTER (OUTPATIENT)
Dept: RADIOLOGY | Facility: HOSPITAL | Age: 69
Discharge: HOME | End: 2019-08-19
Attending: PHYSICAL MEDICINE & REHABILITATION
Payer: COMMERCIAL

## 2019-08-19 PROCEDURE — 71045 XR CHEST 1 VIEW: ICD-10-PCS | Mod: 26,,, | Performed by: RADIOLOGY

## 2019-08-19 PROCEDURE — 71045 X-RAY EXAM CHEST 1 VIEW: CPT | Mod: 26,,, | Performed by: RADIOLOGY

## 2019-08-21 ENCOUNTER — HOSPITAL ENCOUNTER (INPATIENT)
Facility: HOSPITAL | Age: 69
LOS: 7 days | DRG: 189 | End: 2019-08-28
Attending: INTERNAL MEDICINE | Admitting: INTERNAL MEDICINE
Payer: COMMERCIAL

## 2019-08-21 DIAGNOSIS — J84.10 PULMONARY FIBROSIS: ICD-10-CM

## 2019-08-21 DIAGNOSIS — I27.20 PULMONARY HYPERTENSION: ICD-10-CM

## 2019-08-21 DIAGNOSIS — R06.03 RESPIRATORY DISTRESS: ICD-10-CM

## 2019-08-21 DIAGNOSIS — R53.81 DEBILITY: ICD-10-CM

## 2019-08-21 DIAGNOSIS — J96.21 ACUTE ON CHRONIC RESPIRATORY FAILURE WITH HYPOXEMIA: Primary | ICD-10-CM

## 2019-08-21 DIAGNOSIS — R00.0 TACHYCARDIA: ICD-10-CM

## 2019-08-21 LAB
ALBUMIN SERPL BCP-MCNC: 2.6 G/DL (ref 3.5–5.2)
ALP SERPL-CCNC: 121 U/L (ref 55–135)
ALT SERPL W/O P-5'-P-CCNC: 30 U/L (ref 10–44)
ANION GAP SERPL CALC-SCNC: 9 MMOL/L (ref 8–16)
AST SERPL-CCNC: 23 U/L (ref 10–40)
BASOPHILS # BLD AUTO: 0.01 K/UL (ref 0–0.2)
BASOPHILS NFR BLD: 0.1 % (ref 0–1.9)
BILIRUB SERPL-MCNC: 0.3 MG/DL (ref 0.1–1)
BILIRUB UR QL STRIP: NEGATIVE
BUN SERPL-MCNC: 15 MG/DL (ref 8–23)
CALCIUM SERPL-MCNC: 9.8 MG/DL (ref 8.7–10.5)
CHLORIDE SERPL-SCNC: 100 MMOL/L (ref 95–110)
CLARITY UR REFRACT.AUTO: CLEAR
CO2 SERPL-SCNC: 31 MMOL/L (ref 23–29)
COLOR UR AUTO: YELLOW
CREAT SERPL-MCNC: 0.7 MG/DL (ref 0.5–1.4)
DIFFERENTIAL METHOD: ABNORMAL
EOSINOPHIL # BLD AUTO: 0 K/UL (ref 0–0.5)
EOSINOPHIL NFR BLD: 0.1 % (ref 0–8)
ERYTHROCYTE [DISTWIDTH] IN BLOOD BY AUTOMATED COUNT: 15.5 % (ref 11.5–14.5)
EST. GFR  (AFRICAN AMERICAN): >60 ML/MIN/1.73 M^2
EST. GFR  (NON AFRICAN AMERICAN): >60 ML/MIN/1.73 M^2
GLUCOSE SERPL-MCNC: 162 MG/DL (ref 70–110)
GLUCOSE UR QL STRIP: NEGATIVE
HCT VFR BLD AUTO: 36.5 % (ref 40–54)
HGB BLD-MCNC: 10.9 G/DL (ref 14–18)
HGB UR QL STRIP: NEGATIVE
IMM GRANULOCYTES # BLD AUTO: 0.06 K/UL (ref 0–0.04)
IMM GRANULOCYTES NFR BLD AUTO: 0.7 % (ref 0–0.5)
KETONES UR QL STRIP: NEGATIVE
LACTATE SERPL-SCNC: 2.8 MMOL/L (ref 0.5–2.2)
LEUKOCYTE ESTERASE UR QL STRIP: NEGATIVE
LYMPHOCYTES # BLD AUTO: 0.4 K/UL (ref 1–4.8)
LYMPHOCYTES NFR BLD: 4.7 % (ref 18–48)
MAGNESIUM SERPL-MCNC: 1.8 MG/DL (ref 1.6–2.6)
MCH RBC QN AUTO: 26 PG (ref 27–31)
MCHC RBC AUTO-ENTMCNC: 29.9 G/DL (ref 32–36)
MCV RBC AUTO: 87 FL (ref 82–98)
MONOCYTES # BLD AUTO: 0.2 K/UL (ref 0.3–1)
MONOCYTES NFR BLD: 2.6 % (ref 4–15)
NEUTROPHILS # BLD AUTO: 8.1 K/UL (ref 1.8–7.7)
NEUTROPHILS NFR BLD: 91.8 % (ref 38–73)
NITRITE UR QL STRIP: NEGATIVE
NRBC BLD-RTO: 0 /100 WBC
PH UR STRIP: 8 [PH] (ref 5–8)
PHOSPHATE SERPL-MCNC: 3.1 MG/DL (ref 2.7–4.5)
PLATELET # BLD AUTO: 277 K/UL (ref 150–350)
PMV BLD AUTO: 9.2 FL (ref 9.2–12.9)
POTASSIUM SERPL-SCNC: 4.7 MMOL/L (ref 3.5–5.1)
PROCALCITONIN SERPL IA-MCNC: 0.05 NG/ML
PROT SERPL-MCNC: 6 G/DL (ref 6–8.4)
PROT UR QL STRIP: NEGATIVE
RBC # BLD AUTO: 4.19 M/UL (ref 4.6–6.2)
SODIUM SERPL-SCNC: 140 MMOL/L (ref 136–145)
SP GR UR STRIP: 1.02 (ref 1–1.03)
URN SPEC COLLECT METH UR: NORMAL
WBC # BLD AUTO: 8.8 K/UL (ref 3.9–12.7)

## 2019-08-21 PROCEDURE — 99223 PR INITIAL HOSPITAL CARE,LEVL III: ICD-10-PCS | Mod: ,,, | Performed by: HOSPITALIST

## 2019-08-21 PROCEDURE — 99223 1ST HOSP IP/OBS HIGH 75: CPT | Mod: ,,, | Performed by: HOSPITALIST

## 2019-08-21 PROCEDURE — 20600001 HC STEP DOWN PRIVATE ROOM

## 2019-08-21 PROCEDURE — 80053 COMPREHEN METABOLIC PANEL: CPT

## 2019-08-21 PROCEDURE — 87205 SMEAR GRAM STAIN: CPT

## 2019-08-21 PROCEDURE — 83735 ASSAY OF MAGNESIUM: CPT

## 2019-08-21 PROCEDURE — 93010 EKG 12-LEAD: ICD-10-PCS | Mod: ,,, | Performed by: INTERNAL MEDICINE

## 2019-08-21 PROCEDURE — 99239 HOSP IP/OBS DSCHRG MGMT >30: CPT | Mod: ,,, | Performed by: PHYSICAL MEDICINE & REHABILITATION

## 2019-08-21 PROCEDURE — 63600175 PHARM REV CODE 636 W HCPCS: Performed by: HOSPITALIST

## 2019-08-21 PROCEDURE — 93005 ELECTROCARDIOGRAM TRACING: CPT

## 2019-08-21 PROCEDURE — 84100 ASSAY OF PHOSPHORUS: CPT

## 2019-08-21 PROCEDURE — 87070 CULTURE OTHR SPECIMN AEROBIC: CPT

## 2019-08-21 PROCEDURE — 27000221 HC OXYGEN, UP TO 24 HOURS

## 2019-08-21 PROCEDURE — 85025 COMPLETE CBC W/AUTO DIFF WBC: CPT

## 2019-08-21 PROCEDURE — A0435 FIXED WING AIR MILEAGE: HCPCS | Mod: QN

## 2019-08-21 PROCEDURE — 27100171 HC OXYGEN HIGH FLOW UP TO 24 HOURS

## 2019-08-21 PROCEDURE — 93010 ELECTROCARDIOGRAM REPORT: CPT | Mod: ,,, | Performed by: INTERNAL MEDICINE

## 2019-08-21 PROCEDURE — 83605 ASSAY OF LACTIC ACID: CPT

## 2019-08-21 PROCEDURE — 36415 COLL VENOUS BLD VENIPUNCTURE: CPT

## 2019-08-21 PROCEDURE — 87040 BLOOD CULTURE FOR BACTERIA: CPT

## 2019-08-21 PROCEDURE — 25000242 PHARM REV CODE 250 ALT 637 W/ HCPCS: Performed by: HOSPITALIST

## 2019-08-21 PROCEDURE — 99239 PR HOSPITAL DISCHARGE DAY,>30 MIN: ICD-10-PCS | Mod: ,,, | Performed by: PHYSICAL MEDICINE & REHABILITATION

## 2019-08-21 PROCEDURE — 94640 AIRWAY INHALATION TREATMENT: CPT

## 2019-08-21 PROCEDURE — 84145 PROCALCITONIN (PCT): CPT

## 2019-08-21 PROCEDURE — 81003 URINALYSIS AUTO W/O SCOPE: CPT

## 2019-08-21 PROCEDURE — 27100092 HC HIGH FLOW DELIVERY CANNULA

## 2019-08-21 PROCEDURE — 25000003 PHARM REV CODE 250: Performed by: HOSPITALIST

## 2019-08-21 RX ORDER — LEVOFLOXACIN 750 MG/1
750 TABLET ORAL DAILY
Status: COMPLETED | OUTPATIENT
Start: 2019-08-22 | End: 2019-08-26

## 2019-08-21 RX ORDER — GUAIFENESIN 600 MG/1
600 TABLET, EXTENDED RELEASE ORAL 2 TIMES DAILY
Status: DISCONTINUED | OUTPATIENT
Start: 2019-08-21 | End: 2019-08-28 | Stop reason: HOSPADM

## 2019-08-21 RX ORDER — RAMELTEON 8 MG/1
8 TABLET ORAL NIGHTLY PRN
Status: DISCONTINUED | OUTPATIENT
Start: 2019-08-21 | End: 2019-08-28 | Stop reason: HOSPADM

## 2019-08-21 RX ORDER — FERROUS SULFATE 325(65) MG
325 TABLET, DELAYED RELEASE (ENTERIC COATED) ORAL 2 TIMES DAILY
Status: DISCONTINUED | OUTPATIENT
Start: 2019-08-21 | End: 2019-08-28 | Stop reason: HOSPADM

## 2019-08-21 RX ORDER — ATORVASTATIN CALCIUM 20 MG/1
20 TABLET, FILM COATED ORAL NIGHTLY
Status: DISCONTINUED | OUTPATIENT
Start: 2019-08-21 | End: 2019-08-28 | Stop reason: HOSPADM

## 2019-08-21 RX ORDER — ONDANSETRON 8 MG/1
8 TABLET, ORALLY DISINTEGRATING ORAL EVERY 8 HOURS PRN
Status: DISCONTINUED | OUTPATIENT
Start: 2019-08-21 | End: 2019-08-28 | Stop reason: HOSPADM

## 2019-08-21 RX ORDER — TAMSULOSIN HYDROCHLORIDE 0.4 MG/1
0.4 CAPSULE ORAL DAILY
Status: DISCONTINUED | OUTPATIENT
Start: 2019-08-22 | End: 2019-08-23

## 2019-08-21 RX ORDER — AMOXICILLIN 250 MG
1 CAPSULE ORAL 2 TIMES DAILY
Status: DISCONTINUED | OUTPATIENT
Start: 2019-08-21 | End: 2019-08-28 | Stop reason: HOSPADM

## 2019-08-21 RX ORDER — ONDANSETRON 2 MG/ML
4 INJECTION INTRAMUSCULAR; INTRAVENOUS EVERY 8 HOURS PRN
Status: DISCONTINUED | OUTPATIENT
Start: 2019-08-21 | End: 2019-08-28 | Stop reason: HOSPADM

## 2019-08-21 RX ORDER — BISACODYL 10 MG
10 SUPPOSITORY, RECTAL RECTAL DAILY PRN
Status: DISCONTINUED | OUTPATIENT
Start: 2019-08-21 | End: 2019-08-28 | Stop reason: HOSPADM

## 2019-08-21 RX ORDER — FUROSEMIDE 40 MG/1
40 TABLET ORAL DAILY
Status: DISCONTINUED | OUTPATIENT
Start: 2019-08-22 | End: 2019-08-28 | Stop reason: HOSPADM

## 2019-08-21 RX ORDER — SODIUM CHLORIDE 0.9 % (FLUSH) 0.9 %
10 SYRINGE (ML) INJECTION
Status: DISCONTINUED | OUTPATIENT
Start: 2019-08-21 | End: 2019-08-28 | Stop reason: HOSPADM

## 2019-08-21 RX ORDER — IBUPROFEN 200 MG
16 TABLET ORAL
Status: DISCONTINUED | OUTPATIENT
Start: 2019-08-21 | End: 2019-08-28 | Stop reason: HOSPADM

## 2019-08-21 RX ORDER — IPRATROPIUM BROMIDE AND ALBUTEROL SULFATE 2.5; .5 MG/3ML; MG/3ML
3 SOLUTION RESPIRATORY (INHALATION) EVERY 4 HOURS
Status: DISCONTINUED | OUTPATIENT
Start: 2019-08-21 | End: 2019-08-24

## 2019-08-21 RX ORDER — HEPARIN SODIUM 5000 [USP'U]/ML
5000 INJECTION, SOLUTION INTRAVENOUS; SUBCUTANEOUS EVERY 8 HOURS
Status: DISCONTINUED | OUTPATIENT
Start: 2019-08-21 | End: 2019-08-28 | Stop reason: HOSPADM

## 2019-08-21 RX ORDER — PANTOPRAZOLE SODIUM 40 MG/1
40 TABLET, DELAYED RELEASE ORAL EVERY 12 HOURS
Status: DISCONTINUED | OUTPATIENT
Start: 2019-08-21 | End: 2019-08-28 | Stop reason: HOSPADM

## 2019-08-21 RX ORDER — GLUCAGON 1 MG
1 KIT INJECTION
Status: DISCONTINUED | OUTPATIENT
Start: 2019-08-21 | End: 2019-08-28 | Stop reason: HOSPADM

## 2019-08-21 RX ORDER — ACETAMINOPHEN 325 MG/1
650 TABLET ORAL EVERY 4 HOURS PRN
Status: DISCONTINUED | OUTPATIENT
Start: 2019-08-21 | End: 2019-08-28 | Stop reason: HOSPADM

## 2019-08-21 RX ORDER — SUCRALFATE 1 G/10ML
1 SUSPENSION ORAL
Status: DISCONTINUED | OUTPATIENT
Start: 2019-08-21 | End: 2019-08-28 | Stop reason: HOSPADM

## 2019-08-21 RX ORDER — IBUPROFEN 200 MG
24 TABLET ORAL
Status: DISCONTINUED | OUTPATIENT
Start: 2019-08-21 | End: 2019-08-28 | Stop reason: HOSPADM

## 2019-08-21 RX ADMIN — GUAIFENESIN 600 MG: 600 TABLET, EXTENDED RELEASE ORAL at 09:08

## 2019-08-21 RX ADMIN — SUCRALFATE 1 G: 1 SUSPENSION ORAL at 09:08

## 2019-08-21 RX ADMIN — ATORVASTATIN CALCIUM 20 MG: 20 TABLET, FILM COATED ORAL at 09:08

## 2019-08-21 RX ADMIN — RAMELTEON 8 MG: 8 TABLET ORAL at 09:08

## 2019-08-21 RX ADMIN — SENNOSIDES,DOCUSATE SODIUM 1 TABLET: 8.6; 5 TABLET, FILM COATED ORAL at 09:08

## 2019-08-21 RX ADMIN — HEPARIN SODIUM 5000 UNITS: 5000 INJECTION INTRAVENOUS; SUBCUTANEOUS at 09:08

## 2019-08-21 RX ADMIN — SUCRALFATE 1 G: 1 SUSPENSION ORAL at 06:08

## 2019-08-21 RX ADMIN — IPRATROPIUM BROMIDE AND ALBUTEROL SULFATE 3 ML: .5; 3 SOLUTION RESPIRATORY (INHALATION) at 08:08

## 2019-08-21 RX ADMIN — FERROUS SULFATE TAB EC 325 MG (65 MG FE EQUIVALENT) 325 MG: 325 (65 FE) TABLET DELAYED RESPONSE at 09:08

## 2019-08-21 RX ADMIN — PANTOPRAZOLE SODIUM 40 MG: 40 TABLET, DELAYED RELEASE ORAL at 09:08

## 2019-08-21 NOTE — ASSESSMENT & PLAN NOTE
Lab Results   Component Value Date    HGB 11.4 (L) 08/19/2019     H/H stable  No signs of active bleed  Cont home Ferrous sulfate with bowel regimen

## 2019-08-21 NOTE — PLAN OF CARE
(Physician in Lead of Transfers)   Outside Transfer Acceptance Note / Regional Referral Center    Transferring Physician: Mike Ambriz MD (PM&R)    Accepting Physician: Sami Byrne MD    Date of Acceptance: 08/21/2019    Transferring Facility: [facility]    Reason for Transfer: Acute on chronic respiratory failure    Report from Transferring Physician/Hospital course:     68-year-old man with HTN, HLD, UIP (diagnosed 1 year ago, followed closely by Dr. Pradhan, on ofev) who is currently admitted to [facility]. He has had a complicated recent history beginning with an admission in July 2019 for GIB requiring massive transfusion. He has had increasing oxygen rqeuirements and was discharged on 8/9 after an admission for acute on chronic hypoxic respiratory failure, unclear if this was related to infection, non-cardiogenic pulmonary edema, or an exacerbation of his IPF. He improved with levaquin, lasix, and prednisone and was discharged to inpatient rehab for continued PT/OT and oxygen weaning (down to 4L upon discharge).    He did well with rehab, but starting Saturday began to have increasing oxygen requirements, eventually being transferred to the ED for increased work of breathing on 8/19, at which time a CTA was performed that ruled out PE. He improved while in the ED and was transferred back to rehab for further care. His shortness of breath has returned over the early week, culminating in an episode of respiratory distress this morning requiring VM. He has now improved, but remains short of breath on his home nasal canula requirements. Dr. Pradhan has been visiting him daily at rehab and has requested direct admission to hospital medicine after direct admission to lung transplant service was declined. Originally the patient was going to go directly to the ED given the respiratory distress and acute nature, but strongly requested direct floor admission, and given the subsequent improvement   Lorne feels that he is safe to wait at rehab for a bed.    VS:     Temp: Afebrile  HR: 122  RR: 22  SpO2: 98% on 5L  BP: 117/51    Labs:     No updated labs, these are from prior admission:  Hemoglobin 11.4  BUN 11,sCr 0.8  Bicarb 32      Radiographs: see above    To Do List:     1. Consult pulm. Dr. Pradhan would like to be personally involved in this patient's care  2. Adjust oxygen to goal SpO2 88-92%  3. CXR PA and LAT  4. Telemetry + continuous pulse ox  5. Blood culture x2, infectious workup    Upon patient arrival to floor, please call extension [phone] for Hospital Medicine admit team assignment and for additional admit orders for the patient.  Do not page the attending physician associated with the patient on arrival (this physician may not be on duty at the time of arrival).  Rather, always call [phone] to reach the triage physician for orders and team assignment.    Sami Byrne M.D.  Department of Hospital Medicine  [facility]  [phone] (pager)

## 2019-08-21 NOTE — SUBJECTIVE & OBJECTIVE
Past Medical History:   Diagnosis Date    Hyperlipemia     Hypertension     Nasal congestion     Pulmonary fibrosis        Past Surgical History:   Procedure Laterality Date    CATARACT EXTRACTION Left     COLONOSCOPY N/A 8/5/2016    Performed by Leon Padilla [facility] at [facility] ENDO (4TH FLR)    EGD (ESOPHAGOGASTRODUODENOSCOPY) N/A 7/18/2019    Performed by Fer Fox MD at [facility] ENDO (2ND FLR)    EGD (ESOPHAGOGASTRODUODENOSCOPY) N/A 7/12/2019    Performed by Jimmy Saldana MD at [facility] ENDO (2ND FLR)    EGD (ESOPHAGOGASTRODUODENOSCOPY) N/A 7/11/2019    Performed by Jimmy [facility]dana MD at [facility] ENDO (2ND FLR)    EGD (ESOPHAGOGASTRODUODENOSCOPY) N/A 7/10/2019    Performed by Jimmy Saldana MD at [facility] ENDO (2ND FLR)    HAND SURGERY      left    HIATAL HERNIA REPAIR      MICRODISCECTOMY, SPINE MIS Right L5- S1 Right 6/27/2018    Performed by Angel Pendleton MD at [facility] OR 2ND FLR    TONSILLECTOMY, ADENOIDECTOMY      VASECTOMY         Review of patient's allergies indicates:   Allergen Reactions    Penicillins     Erythromycin Rash       Current Facility-Administered Medications on File Prior to Encounter   Medication    [DISCONTINUED] azithromycin tablet    [DISCONTINUED] GENERIC EXTERNAL MEDICATION    [DISCONTINUED] potassium chloride SA CR tablet    [DISCONTINUED] zolpidem tablet     Current Outpatient Medications on File Prior to Encounter   Medication Sig    atorvastatin (LIPITOR) 20 MG tablet TAKE ONE TABLET BY MOUTH EVERY EVENING    desonide (DESOWEN) 0.05 % lotion 1 application 2 (two) times daily.    diphenhydrAMINE (BENADRYL) 25 mg capsule Take 1 capsule (25 mg total) by mouth nightly as needed for Insomnia.    doxycycline (VIBRAMYCIN) 100 MG Cap TAKE ONE CAPSULE BY MOUTH EVERY DAY FOR THE FIRST 5 DAYS OF EACH MONTH    ferrous sulfate (FEOSOL) 325 mg (65 mg iron) Tab tablet Take 1 tablet (325 mg total) by mouth every 12 (twelve) hours.    furosemide (LASIX) 40 MG tablet Take 1 tablet  (40 mg total) by mouth once daily.    hydroCHLOROthiazide (HYDRODIURIL) 25 MG tablet Take 1 tablet (25 mg total) by mouth once daily.    lidocaine (LIDODERM) 5 % Place 2 patches onto the skin once daily. Remove & Discard patch within 12 hours or as directed by MD    loperamide (IMODIUM) 2 mg capsule 1 capsule 3 (three) times daily as needed.    OFEV 100 mg Cap TAKE 1 CAPSULE BY MOUTH TWICE DAILY WITH FOOD.    pantoprazole (PROTONIX) 40 MG tablet Take 1 tablet (40 mg total) by mouth every 12 (twelve) hours. Take one pill every morning 45 minutes before breakfast in the morning.    ramelteon (ROZEREM) 8 mg tablet Take 1 tablet (8 mg total) by mouth nightly as needed for Insomnia.    sucralfate (CARAFATE) 100 mg/mL suspension Take 10 mLs (1 g total) by mouth 4 (four) times daily with meals and nightly.    tamsulosin (FLOMAX) 0.4 mg Cap Take 1 capsule (0.4 mg total) by mouth once daily.    traZODone (DESYREL) 50 MG tablet Take 0.5 tablets (25 mg total) by mouth nightly as needed for Insomnia.    vitamin D3-vitamin K2 1000-90 unit-mcg TbDL 1 tablet once daily.     Family History     Problem Relation (Age of Onset)    Coronary artery disease Mother, Sister    Dementia Father    Multiple sclerosis Sister    Parkinsonism Father        Tobacco Use    Smoking status: Former Smoker     Packs/day: 1.50     Years: 20.00     Pack years: 30.00     Types: Cigarettes     Last attempt to quit: 3/17/1985     Years since quittin.4    Smokeless tobacco: Former User     Quit date: 1986   Substance and Sexual Activity    Alcohol use: Yes     Alcohol/week: 16.8 oz     Types: 28 Glasses of wine per week     Comment: 6-7 bottles per week    Drug use: No    Sexual activity: Yes     Partners: Female     Review of Systems   Constitutional: Positive for activity change and fatigue. Negative for chills and fever.   HENT: Negative for congestion, facial swelling, hearing loss and trouble swallowing.    Eyes: Negative for  photophobia and visual disturbance.   Respiratory: Positive for cough and shortness of breath. Negative for chest tightness and wheezing.    Cardiovascular: Negative for chest pain, palpitations and leg swelling.   Gastrointestinal: Negative for abdominal pain, blood in stool, constipation, diarrhea, nausea and vomiting.   Endocrine: Negative.    Genitourinary: Negative.    Musculoskeletal: Negative for back pain, joint swelling and myalgias.   Skin: Negative.    Allergic/Immunologic: Negative.    Neurological: Negative for dizziness, facial asymmetry, speech difficulty, weakness and numbness.   Hematological: Negative.    Psychiatric/Behavioral: Negative for agitation, confusion and dysphoric mood. The patient is not nervous/anxious.      Objective:     Vital Signs (Most Recent):  Temp: 98.3 °F (36.8 °C) (08/21/19 1459)  Pulse: (!) 118 (08/21/19 1459)  Resp: 18 (08/21/19 1459)  BP: 111/70 (08/21/19 1459)  SpO2: 98 % (08/21/19 1459) Vital Signs (24h Range):  Temp:  [98.3 °F (36.8 °C)] 98.3 °F (36.8 °C)  Pulse:  [118-122] 118  Resp:  [18-22] 18  SpO2:  [98 %] 98 %  BP: (111-117)/(51-70) 111/70        There is no height or weight on file to calculate BMI.    Physical Exam   Constitutional: He is oriented to person, place, and time. Vital signs are normal. He appears well-developed and well-nourished.  Non-toxic appearance. He does not appear ill. No distress. Nasal cannula in place.   HENT:   Head: Normocephalic and atraumatic.   Eyes: Pupils are equal, round, and reactive to light. Conjunctivae and EOM are normal. No scleral icterus.   Neck: Normal range of motion and full passive range of motion without pain. Neck supple. No JVD present. Carotid bruit is not present. No thyromegaly present.   Cardiovascular: Regular rhythm, S1 normal, S2 normal, normal heart sounds and normal pulses. Tachycardia present. Exam reveals no gallop, no S3, no S4 and no friction rub.   No murmur heard.  Pulmonary/Chest: Effort normal. No  accessory muscle usage. No tachypnea. No respiratory distress. He has decreased breath sounds. He has no wheezes. He has no rhonchi. He has no rales.   Abdominal: Soft. Normal appearance and bowel sounds are normal. He exhibits no shifting dullness, no distension, no abdominal bruit and no ascites. There is no hepatosplenomegaly. There is no tenderness. There is no rigidity and no guarding.   Musculoskeletal: Normal range of motion. He exhibits no edema or tenderness.   Neurological: He is alert and oriented to person, place, and time. He has normal strength. He is not disoriented. No cranial nerve deficit or sensory deficit. GCS eye subscore is 4. GCS verbal subscore is 5. GCS motor subscore is 6.   Skin: Skin is warm, dry and intact. No abrasion and no lesion noted.   Psychiatric: He has a normal mood and affect. His behavior is normal. Judgment and thought content normal. His mood appears not anxious. His speech is not slurred. He is not actively hallucinating. Cognition and memory are normal. He does not exhibit a depressed mood. He is attentive.         CRANIAL NERVES     CN III, IV, VI   Pupils are equal, round, and reactive to light.  Extraocular motions are normal.        Significant Labs: CBC: No results for input(s): WBC, HGB, HCT, PLT in the last 48 hours.  CMP: No results for input(s): NA, K, CL, CO2, GLU, BUN, CREATININE, CALCIUM, PROT, ALBUMIN, BILITOT, ALKPHOS, AST, ALT, ANIONGAP, EGFRNONAA in the last 48 hours.    Invalid input(s): ESTGFAFRICA    Significant Imaging: I have reviewed all pertinent imaging results/findings within the past 24 hours.

## 2019-08-21 NOTE — ASSESSMENT & PLAN NOTE
Cont with PT/OT for gait training and strengthening and restoration of ADL's   Encourage mobility, OOB in chair, and early ambulation as appropriate  Fall precautions   Monitor for bowel and bladder dysfunction  Monitor for and prevent skin breakdown and pressure ulcers

## 2019-08-21 NOTE — ASSESSMENT & PLAN NOTE
Etiology unclear  Acute respiratory infection, non-cardiogenic pulmonary edema, or an exacerbation of his IPF.  Check Blood C/S, Respiratory induced Sputum gram stain and C/S to rule out acute infection   Start antibiotic therapy with Levofloxacin empirically   Consider steroids if OK with pulmonary   Cont oral Lasix. Patient does not appear grossly volume overloaded on exam.   Check pulse oximetry to maintain O2 Sat ? 91%.   Maintain oxygenation with nasal canula.   Incentive spirometry q 1-2 hours while awake.   Check chest xray, ABG  Cont to monitor I/O's and daily weights.  Fluid restriction (2 liters/24 hours)  Low Na diet  Consult pulmonary

## 2019-08-21 NOTE — HPI
68-year-old man with HTN, HLD, UIP (diagnosed 1 year ago, followed closely by Dr. Pradhan, on ofev) who is currently admitted to [facility]. He has had a complicated recent history beginning with an admission in July 2019 for GIB requiring massive transfusion. He has had increasing oxygen rqeuirements and was discharged on 8/9 after an admission for acute on chronic hypoxic respiratory failure, unclear if this was related to infection, non-cardiogenic pulmonary edema, or an exacerbation of his IPF. He improved with levaquin, lasix, and prednisone and was discharged to inpatient rehab for continued PT/OT and oxygen weaning (down to 4L upon discharge).     He did well with rehab, but starting Saturday began to have increasing oxygen requirements, eventually being transferred to the ED for increased work of breathing on 8/19, at which time a CTA was performed that ruled out PE. He improved while in the ED and was transferred back to rehab for further care. His shortness of breath has returned over the early week, culminating in an episode of respiratory distress this morning requiring VM. He has now improved, but remains short of breath on his home nasal canula requirements. Dr. Pradhan has been visiting him daily at rehab and has requested direct admission to hospital medicine after direct admission to lung transplant service was declined. Originally the patient was going to go directly to the ED given the respiratory distress and acute nature, but strongly requested direct floor admission, and given the subsequent improvement Dr. Pradhan feels that he is safe to wait at rehab for a bed.

## 2019-08-21 NOTE — H&P
[facility] Hospital Medicine  History & Physical    Patient Name: Manpreet Hamilton  MRN: [id number]  Admission Date: 8/21/2019  Attending Physician: Domingo Lin MD   Primary Care Provider: Radames Pradhan MD    [facility] Medicine Team: [facility] MED  Domingo Morrow MD     Patient information was obtained from patient and ER records.     Subjective:     Principal Problem:Acute on chronic respiratory failure with hypoxemia    Chief Complaint: No chief complaint on file.       HPI: 68-year-old man with HTN, HLD, UIP (diagnosed 1 year ago, followed closely by Dr. Pradhan, on ofev) who is currently admitted to [facility]. He has had a complicated recent history beginning with an admission in July 2019 for GIB requiring massive transfusion. He has had increasing oxygen rqeuirements and was discharged on 8/9 after an admission for acute on chronic hypoxic respiratory failure, unclear if this was related to infection, non-cardiogenic pulmonary edema, or an exacerbation of his IPF. He improved with levaquin, lasix, and prednisone and was discharged to inpatient rehab for continued PT/OT and oxygen weaning (down to 4L upon discharge).     He did well with rehab, but starting Saturday began to have increasing oxygen requirements, eventually being transferred to the ED for increased work of breathing on 8/19, at which time a CTA was performed that ruled out PE. He improved while in the ED and was transferred back to rehab for further care. His shortness of breath has returned over the early week, culminating in an episode of respiratory distress this morning requiring VM. He has now improved, but remains short of breath on his home nasal canula requirements. Dr. Pradhan has been visiting him daily at rehab and has requested direct admission to hospital medicine after direct admission to lung transplant service was declined. Originally the patient was going to go directly to the ED given the respiratory distress  and acute nature, but strongly requested direct floor admission, and given the subsequent improvement Dr. Pradhan feels that he is safe to wait at rehab for a bed.    Past Medical History:   Diagnosis Date    Hyperlipemia     Hypertension     Nasal congestion     Pulmonary fibrosis        Past Surgical History:   Procedure Laterality Date    CATARACT EXTRACTION Left     COLONOSCOPY N/A 8/5/2016    Performed by [facility]illa [facility] at [facility] END[facility] (4TH FLR)    EGD (ESOPHAGOGASTRODUODENOSCOPY) N/A 7/18/2019    Performed by Fer Fox MD at [facility] ENDO (2ND FLR)    EGD (ESOPHAGOGASTRODUODENOSCOPY) N/A 7/12/2019    Performed by Jimmy Saldana MD at [facility] ENDO (2ND FLR)    EGD (ESOPHAGOGASTRODUODENOSCOPY) N/A 7/11/2019    Performed by Jimmy [facility]ana MD at [facility] ENDO (2ND FLR)    EGD (ESOPHAGOGASTRODUODENOSCOPY) N/A 7/10/2019    Performed by Jimmy Sal[facility]ana MD at [facility] ENDO (2ND FLR)    HAND SURGERY      left    HIATAL HERNIA REPAIR      MICRODISCECTOMY, SPINE MIS Right L5- S1 Right 6/27/2018    Performed by Angel Pendleton MD at [facility] OR 2ND FLR    TONSILLECTOMY, ADENOIDECTOMY      VASECTOMY         Review of patient's allergies indicates:   Allergen Reactions    Penicillins     Erythromycin Rash       Current Facility-Administered Medications on File Prior to Encounter   Medication    [DISCONTINUED] azithromycin tablet    [DISCONTINUED] GENERIC EXTERNAL MEDICATION    [DISCONTINUED] potassium chloride SA CR tablet    [DISCONTINUED] zolpidem tablet     Current Outpatient Medications on File Prior to Encounter   Medication Sig    atorvastatin (LIPITOR) 20 MG tablet TAKE ONE TABLET BY MOUTH EVERY EVENING    desonide (DESOWEN) 0.05 % lotion 1 application 2 (two) times daily.    diphenhydrAMINE (BENADRYL) 25 mg capsule Take 1 capsule (25 mg total) by mouth nightly as needed for Insomnia.    doxycycline (VIBRAMYCIN) 100 MG Cap TAKE ONE CAPSULE BY MOUTH EVERY DAY FOR THE FIRST 5 DAYS OF EACH MONTH     ferrous sulfate (FEOSOL) 325 mg (65 mg iron) Tab tablet Take 1 tablet (325 mg total) by mouth every 12 (twelve) hours.    furosemide (LASIX) 40 MG tablet Take 1 tablet (40 mg total) by mouth once daily.    hydroCHLOROthiazide (HYDRODIURIL) 25 MG tablet Take 1 tablet (25 mg total) by mouth once daily.    lidocaine (LIDODERM) 5 % Place 2 patches onto the skin once daily. Remove & Discard patch within 12 hours or as directed by MD    loperamide (IMODIUM) 2 mg capsule 1 capsule 3 (three) times daily as needed.    OFEV 100 mg Cap TAKE 1 CAPSULE BY MOUTH TWICE DAILY WITH FOOD.    pantoprazole (PROTONIX) 40 MG tablet Take 1 tablet (40 mg total) by mouth every 12 (twelve) hours. Take one pill every morning 45 minutes before breakfast in the morning.    ramelteon (ROZEREM) 8 mg tablet Take 1 tablet (8 mg total) by mouth nightly as needed for Insomnia.    sucralfate (CARAFATE) 100 mg/mL suspension Take 10 mLs (1 g total) by mouth 4 (four) times daily with meals and nightly.    tamsulosin (FLOMAX) 0.4 mg Cap Take 1 capsule (0.4 mg total) by mouth once daily.    traZODone (DESYREL) 50 MG tablet Take 0.5 tablets (25 mg total) by mouth nightly as needed for Insomnia.    vitamin D3-vitamin K2 1000-90 unit-mcg TbDL 1 tablet once daily.     Family History     Problem Relation (Age of Onset)    Coronary artery disease Mother, Sister    Dementia Father    Multiple sclerosis Sister    Parkinsonism Father        Tobacco Use    Smoking status: Former Smoker     Packs/day: 1.50     Years: 20.00     Pack years: 30.00     Types: Cigarettes     Last attempt to quit: 3/17/1985     Years since quittin.4    Smokeless tobacco: Former User     Quit date: 1986   Substance and Sexual Activity    Alcohol use: Yes     Alcohol/week: 16.8 oz     Types: 28 Glasses of wine per week     Comment: 6-7 bottles per week    Drug use: No    Sexual activity: Yes     Partners: Female     Review of Systems   Constitutional: Positive for  activity change and fatigue. Negative for chills and fever.   HENT: Negative for congestion, facial swelling, hearing loss and trouble swallowing.    Eyes: Negative for photophobia and visual disturbance.   Respiratory: Positive for cough and shortness of breath. Negative for chest tightness and wheezing.    Cardiovascular: Negative for chest pain, palpitations and leg swelling.   Gastrointestinal: Negative for abdominal pain, blood in stool, constipation, diarrhea, nausea and vomiting.   Endocrine: Negative.    Genitourinary: Negative.    Musculoskeletal: Negative for back pain, joint swelling and myalgias.   Skin: Negative.    Allergic/Immunologic: Negative.    Neurological: Negative for dizziness, facial asymmetry, speech difficulty, weakness and numbness.   Hematological: Negative.    Psychiatric/Behavioral: Negative for agitation, confusion and dysphoric mood. The patient is not nervous/anxious.      Objective:     Vital Signs (Most Recent):  Temp: 98.3 °F (36.8 °C) (08/21/19 1459)  Pulse: (!) 118 (08/21/19 1459)  Resp: 18 (08/21/19 1459)  BP: 111/70 (08/21/19 1459)  SpO2: 98 % (08/21/19 1459) Vital Signs (24h Range):  Temp:  [98.3 °F (36.8 °C)] 98.3 °F (36.8 °C)  Pulse:  [118-122] 118  Resp:  [18-22] 18  SpO2:  [98 %] 98 %  BP: (111-117)/(51-70) 111/70        There is no height or weight on file to calculate BMI.    Physical Exam   Constitutional: He is oriented to person, place, and time. Vital signs are normal. He appears well-developed and well-nourished.  Non-toxic appearance. He does not appear ill. No distress. Nasal cannula in place.   HENT:   Head: Normocephalic and atraumatic.   Eyes: Pupils are equal, round, and reactive to light. Conjunctivae and EOM are normal. No scleral icterus.   Neck: Normal range of motion and full passive range of motion without pain. Neck supple. No JVD present. Carotid bruit is not present. No thyromegaly present.   Cardiovascular: Regular rhythm, S1 normal, S2 normal,  normal heart sounds and normal pulses. Tachycardia present. Exam reveals no gallop, no S3, no S4 and no friction rub.   No murmur heard.  Pulmonary/Chest: Effort normal. No accessory muscle usage. No tachypnea. No respiratory distress. He has decreased breath sounds. He has no wheezes. He has no rhonchi. He has no rales.   Abdominal: Soft. Normal appearance and bowel sounds are normal. He exhibits no shifting dullness, no distension, no abdominal bruit and no ascites. There is no hepatosplenomegaly. There is no tenderness. There is no rigidity and no guarding.   Musculoskeletal: Normal range of motion. He exhibits no edema or tenderness.   Neurological: He is alert and oriented to person, place, and time. He has normal strength. He is not disoriented. No cranial nerve deficit or sensory deficit. GCS eye subscore is 4. GCS verbal subscore is 5. GCS motor subscore is 6.   Skin: Skin is warm, dry and intact. No abrasion and no lesion noted.   Psychiatric: He has a normal mood and affect. His behavior is normal. Judgment and thought content normal. His mood appears not anxious. His speech is not slurred. He is not actively hallucinating. Cognition and memory are normal. He does not exhibit a depressed mood. He is attentive.         CRANIAL NERVES     CN III, IV, VI   Pupils are equal, round, and reactive to light.  Extraocular motions are normal.        Significant Labs: CBC: No results for input(s): WBC, HGB, HCT, PLT in the last 48 hours.  CMP: No results for input(s): NA, K, CL, CO2, GLU, BUN, CREATININE, CALCIUM, PROT, ALBUMIN, BILITOT, ALKPHOS, AST, ALT, ANIONGAP, EGFRNONAA in the last 48 hours.    Invalid input(s): ESTGFAFRICA    Significant Imaging: I have reviewed all pertinent imaging results/findings within the past 24 hours.    Assessment/Plan:     * Acute on chronic respiratory failure with hypoxemia  Etiology unclear  Acute respiratory infection, non-cardiogenic pulmonary edema, or an exacerbation of his  IPF.  Check Blood C/S, Respiratory induced Sputum gram stain and C/S to rule out acute infection   Start antibiotic therapy with Levofloxacin empirically   Consider steroids if OK with pulmonary   Cont oral Lasix. Patient does not appear grossly volume overloaded on exam.   Check pulse oximetry to maintain O2 Sat ? 91%.   Maintain oxygenation with nasal canula.   Incentive spirometry q 1-2 hours while awake.   Check chest xray, ABG  Cont to monitor I/O's and daily weights.  Fluid restriction (2 liters/24 hours)  Low Na diet  Consult pulmonary     Pulmonary fibrosis  Resume home Nintedanib  Further treatment as above.       Debility  Cont with PT/OT for gait training and strengthening and restoration of ADL's   Encourage mobility, OOB in chair, and early ambulation as appropriate  Fall precautions   Monitor for bowel and bladder dysfunction  Monitor for and prevent skin breakdown and pressure ulcers    Acute blood loss anemia  Lab Results   Component Value Date    HGB 11.4 (L) 08/19/2019     H/H stable  No signs of active bleed  Cont home Ferrous sulfate with bowel regimen         VTE Risk Mitigation (From admission, onward)        Ordered     heparin (porcine) injection 5,000 Units  Every 8 hours      08/21/19 1600             Domingo Morrow MD  Department of Hospital Medicine   [facility]

## 2019-08-22 ENCOUNTER — PATIENT MESSAGE (OUTPATIENT)
Dept: GASTROENTEROLOGY | Facility: CLINIC | Age: 69
End: 2019-08-22

## 2019-08-22 LAB
ALBUMIN SERPL BCP-MCNC: 2.6 G/DL (ref 3.5–5.2)
ALLENS TEST: ABNORMAL
ALP SERPL-CCNC: 106 U/L (ref 55–135)
ALT SERPL W/O P-5'-P-CCNC: 29 U/L (ref 10–44)
ANION GAP SERPL CALC-SCNC: 12 MMOL/L (ref 8–16)
ASCENDING AORTA: 4.02 CM
AST SERPL-CCNC: 20 U/L (ref 10–40)
AV INDEX (PROSTH): 1.13
AV MEAN GRADIENT: 4 MMHG
AV PEAK GRADIENT: 5 MMHG
AV VALVE AREA: 3.54 CM2
AV VELOCITY RATIO: 0.91
BASOPHILS # BLD AUTO: 0.03 K/UL (ref 0–0.2)
BASOPHILS NFR BLD: 0.3 % (ref 0–1.9)
BILIRUB SERPL-MCNC: 0.5 MG/DL (ref 0.1–1)
BUN SERPL-MCNC: 18 MG/DL (ref 8–23)
CALCIUM SERPL-MCNC: 9.4 MG/DL (ref 8.7–10.5)
CHLORIDE SERPL-SCNC: 99 MMOL/L (ref 95–110)
CO2 SERPL-SCNC: 28 MMOL/L (ref 23–29)
CORTIS SERPL-MCNC: 11.4 UG/DL
CREAT SERPL-MCNC: 0.7 MG/DL (ref 0.5–1.4)
CV ECHO LV RWT: 0.46 CM
DELSYS: ABNORMAL
DIFFERENTIAL METHOD: ABNORMAL
DOP CALC AO PEAK VEL: 1.17 M/S
DOP CALC AO VTI: 15.88 CM
DOP CALC LVOT AREA: 3.1 CM2
DOP CALC LVOT DIAMETER: 2 CM
DOP CALC LVOT PEAK VEL: 1.06 M/S
DOP CALC LVOT STROKE VOLUME: 56.27 CM3
DOP CALCLVOT PEAK VEL VTI: 17.92 CM
ECHO LV POSTERIOR WALL: 0.98 CM (ref 0.6–1.1)
EOSINOPHIL # BLD AUTO: 0.2 K/UL (ref 0–0.5)
EOSINOPHIL NFR BLD: 2.2 % (ref 0–8)
ERYTHROCYTE [DISTWIDTH] IN BLOOD BY AUTOMATED COUNT: 15.7 % (ref 11.5–14.5)
EST. GFR  (AFRICAN AMERICAN): >60 ML/MIN/1.73 M^2
EST. GFR  (NON AFRICAN AMERICAN): >60 ML/MIN/1.73 M^2
FLOW: 10
FRACTIONAL SHORTENING: 23 % (ref 28–44)
GLUCOSE SERPL-MCNC: 65 MG/DL (ref 70–110)
HCO3 UR-SCNC: 29.6 MMOL/L (ref 24–28)
HCT VFR BLD AUTO: 33.1 % (ref 40–54)
HGB BLD-MCNC: 10.2 G/DL (ref 14–18)
IMM GRANULOCYTES # BLD AUTO: 0.03 K/UL (ref 0–0.04)
IMM GRANULOCYTES NFR BLD AUTO: 0.3 % (ref 0–0.5)
INTERVENTRICULAR SEPTUM: 1.35 CM (ref 0.6–1.1)
LA MAJOR: 5.29 CM
LA MINOR: 4.86 CM
LA WIDTH: 3.69 CM
LEFT ATRIUM SIZE: 3.66 CM
LEFT ATRIUM VOLUME: 58.15 CM3
LEFT INTERNAL DIMENSION IN SYSTOLE: 3.29 CM (ref 2.1–4)
LEFT VENTRICLE DIASTOLIC VOLUME: 82.11 ML
LEFT VENTRICLE SYSTOLIC VOLUME: 43.7 ML
LEFT VENTRICULAR INTERNAL DIMENSION IN DIASTOLE: 4.28 CM (ref 3.5–6)
LEFT VENTRICULAR MASS: 175.65 G
LYMPHOCYTES # BLD AUTO: 1.4 K/UL (ref 1–4.8)
LYMPHOCYTES NFR BLD: 13.6 % (ref 18–48)
MCH RBC QN AUTO: 26.2 PG (ref 27–31)
MCHC RBC AUTO-ENTMCNC: 30.8 G/DL (ref 32–36)
MCV RBC AUTO: 85 FL (ref 82–98)
MODE: ABNORMAL
MONOCYTES # BLD AUTO: 0.9 K/UL (ref 0.3–1)
MONOCYTES NFR BLD: 8.7 % (ref 4–15)
NEUTROPHILS # BLD AUTO: 7.5 K/UL (ref 1.8–7.7)
NEUTROPHILS NFR BLD: 74.9 % (ref 38–73)
NRBC BLD-RTO: 0 /100 WBC
PCO2 BLDA: 39.1 MMHG (ref 35–45)
PH SMN: 7.49 [PH] (ref 7.35–7.45)
PISA TR MAX VEL: 3.01 M/S
PLATELET # BLD AUTO: 292 K/UL (ref 150–350)
PMV BLD AUTO: 9.3 FL (ref 9.2–12.9)
PO2 BLDA: 36 MMHG (ref 80–100)
POC BE: 6 MMOL/L
POC SATURATED O2: 74 % (ref 95–100)
POC TCO2: 31 MMOL/L (ref 23–27)
POTASSIUM SERPL-SCNC: 3.6 MMOL/L (ref 3.5–5.1)
PROT SERPL-MCNC: 5.7 G/DL (ref 6–8.4)
RA MAJOR: 5.15 CM
RA PRESSURE: 3 MMHG
RA WIDTH: 2.81 CM
RBC # BLD AUTO: 3.9 M/UL (ref 4.6–6.2)
RIGHT VENTRICULAR END-DIASTOLIC DIMENSION: 3.08 CM
SAMPLE: ABNORMAL
SINUS: 3.73 CM
SITE: ABNORMAL
SODIUM SERPL-SCNC: 139 MMOL/L (ref 136–145)
STJ: 3.72 CM
TDI LATERAL: 0.1 M/S
TDI SEPTAL: 0.07 M/S
TDI: 0.09 M/S
TR MAX PG: 36 MMHG
TRICUSPID ANNULAR PLANE SYSTOLIC EXCURSION: 1.98 CM
TV REST PULMONARY ARTERY PRESSURE: 39 MMHG
WBC # BLD AUTO: 10.05 K/UL (ref 3.9–12.7)

## 2019-08-22 PROCEDURE — 99233 PR SUBSEQUENT HOSPITAL CARE,LEVL III: ICD-10-PCS | Mod: ,,, | Performed by: HOSPITALIST

## 2019-08-22 PROCEDURE — 82533 TOTAL CORTISOL: CPT

## 2019-08-22 PROCEDURE — 94761 N-INVAS EAR/PLS OXIMETRY MLT: CPT

## 2019-08-22 PROCEDURE — 63600175 PHARM REV CODE 636 W HCPCS: Performed by: INTERNAL MEDICINE

## 2019-08-22 PROCEDURE — 27000646 HC AEROBIKA DEVICE

## 2019-08-22 PROCEDURE — 94640 AIRWAY INHALATION TREATMENT: CPT

## 2019-08-22 PROCEDURE — 99233 SBSQ HOSP IP/OBS HIGH 50: CPT | Mod: ,,, | Performed by: HOSPITALIST

## 2019-08-22 PROCEDURE — 27100092 HC HIGH FLOW DELIVERY CANNULA

## 2019-08-22 PROCEDURE — 36600 WITHDRAWAL OF ARTERIAL BLOOD: CPT

## 2019-08-22 PROCEDURE — 36415 COLL VENOUS BLD VENIPUNCTURE: CPT

## 2019-08-22 PROCEDURE — 63600175 PHARM REV CODE 636 W HCPCS: Performed by: HOSPITALIST

## 2019-08-22 PROCEDURE — 99223 1ST HOSP IP/OBS HIGH 75: CPT | Mod: ,,, | Performed by: INTERNAL MEDICINE

## 2019-08-22 PROCEDURE — 82803 BLOOD GASES ANY COMBINATION: CPT

## 2019-08-22 PROCEDURE — 94664 DEMO&/EVAL PT USE INHALER: CPT

## 2019-08-22 PROCEDURE — 25000242 PHARM REV CODE 250 ALT 637 W/ HCPCS: Performed by: HOSPITALIST

## 2019-08-22 PROCEDURE — 27100171 HC OXYGEN HIGH FLOW UP TO 24 HOURS

## 2019-08-22 PROCEDURE — 25000003 PHARM REV CODE 250: Performed by: HOSPITALIST

## 2019-08-22 PROCEDURE — 99223 PR INITIAL HOSPITAL CARE,LEVL III: ICD-10-PCS | Mod: ,,, | Performed by: INTERNAL MEDICINE

## 2019-08-22 PROCEDURE — 85025 COMPLETE CBC W/AUTO DIFF WBC: CPT

## 2019-08-22 PROCEDURE — 80053 COMPREHEN METABOLIC PANEL: CPT

## 2019-08-22 PROCEDURE — 20600001 HC STEP DOWN PRIVATE ROOM

## 2019-08-22 PROCEDURE — 99900035 HC TECH TIME PER 15 MIN (STAT)

## 2019-08-22 RX ORDER — PREDNISONE 20 MG/1
80 TABLET ORAL DAILY
Status: DISCONTINUED | OUTPATIENT
Start: 2019-08-22 | End: 2019-08-26

## 2019-08-22 RX ADMIN — SUCRALFATE 1 G: 1 SUSPENSION ORAL at 12:08

## 2019-08-22 RX ADMIN — IPRATROPIUM BROMIDE AND ALBUTEROL SULFATE 3 ML: .5; 3 SOLUTION RESPIRATORY (INHALATION) at 03:08

## 2019-08-22 RX ADMIN — IPRATROPIUM BROMIDE AND ALBUTEROL SULFATE 3 ML: .5; 3 SOLUTION RESPIRATORY (INHALATION) at 11:08

## 2019-08-22 RX ADMIN — GUAIFENESIN 600 MG: 600 TABLET, EXTENDED RELEASE ORAL at 10:08

## 2019-08-22 RX ADMIN — SUCRALFATE 1 G: 1 SUSPENSION ORAL at 09:08

## 2019-08-22 RX ADMIN — FERROUS SULFATE TAB EC 325 MG (65 MG FE EQUIVALENT) 325 MG: 325 (65 FE) TABLET DELAYED RESPONSE at 09:08

## 2019-08-22 RX ADMIN — HEPARIN SODIUM 5000 UNITS: 5000 INJECTION INTRAVENOUS; SUBCUTANEOUS at 06:08

## 2019-08-22 RX ADMIN — SUCRALFATE 1 G: 1 SUSPENSION ORAL at 10:08

## 2019-08-22 RX ADMIN — PANTOPRAZOLE SODIUM 40 MG: 40 TABLET, DELAYED RELEASE ORAL at 09:08

## 2019-08-22 RX ADMIN — ATORVASTATIN CALCIUM 20 MG: 20 TABLET, FILM COATED ORAL at 09:08

## 2019-08-22 RX ADMIN — RAMELTEON 8 MG: 8 TABLET ORAL at 09:08

## 2019-08-22 RX ADMIN — FUROSEMIDE 40 MG: 40 TABLET ORAL at 10:08

## 2019-08-22 RX ADMIN — LEVOFLOXACIN 750 MG: 750 TABLET, FILM COATED ORAL at 10:08

## 2019-08-22 RX ADMIN — GUAIFENESIN 600 MG: 600 TABLET, EXTENDED RELEASE ORAL at 09:08

## 2019-08-22 RX ADMIN — HEPARIN SODIUM 5000 UNITS: 5000 INJECTION INTRAVENOUS; SUBCUTANEOUS at 09:08

## 2019-08-22 RX ADMIN — IPRATROPIUM BROMIDE AND ALBUTEROL SULFATE 3 ML: .5; 3 SOLUTION RESPIRATORY (INHALATION) at 08:08

## 2019-08-22 RX ADMIN — SENNOSIDES,DOCUSATE SODIUM 1 TABLET: 8.6; 5 TABLET, FILM COATED ORAL at 10:08

## 2019-08-22 RX ADMIN — SENNOSIDES,DOCUSATE SODIUM 1 TABLET: 8.6; 5 TABLET, FILM COATED ORAL at 09:08

## 2019-08-22 RX ADMIN — SUCRALFATE 1 G: 1 SUSPENSION ORAL at 06:08

## 2019-08-22 RX ADMIN — IPRATROPIUM BROMIDE AND ALBUTEROL SULFATE 3 ML: .5; 3 SOLUTION RESPIRATORY (INHALATION) at 07:08

## 2019-08-22 RX ADMIN — FERROUS SULFATE TAB EC 325 MG (65 MG FE EQUIVALENT) 325 MG: 325 (65 FE) TABLET DELAYED RESPONSE at 10:08

## 2019-08-22 RX ADMIN — PANTOPRAZOLE SODIUM 40 MG: 40 TABLET, DELAYED RELEASE ORAL at 10:08

## 2019-08-22 RX ADMIN — PREDNISONE 80 MG: 20 TABLET ORAL at 10:08

## 2019-08-22 RX ADMIN — TAMSULOSIN HYDROCHLORIDE 0.4 MG: 0.4 CAPSULE ORAL at 10:08

## 2019-08-22 NOTE — ASSESSMENT & PLAN NOTE
-baseline hypoxia (approx 4L O2); likely d/t IPF  -currently on Ofev, but per notes there were plans to change to Esbriet d/t GI bleed  -not grossly volume overloaded  -recently on high doses of steroids for exacerbation of IPF; infectious work up negative  -was weaned to Pred 25mg daily but increased to Pred 50mg daily on 8/20  -CXR w/ what appears to be worsening infiltrates and given the paucity of infectious signs or symptoms, may need to consider AE IPF, which will require higher doses of steroids. (1mg/kg prednisone)  -CTA negative for PE on Monday  -not currently on OI ppx, would ideally like to bronch but unsure if patient would be able to tolerate given High O2 requirements  -start Prednisone 80mg daily  -follow echo w/ doppler

## 2019-08-22 NOTE — PLAN OF CARE
Problem: Adult Inpatient Plan of Care  Goal: Plan of Care Review  Outcome: Ongoing (interventions implemented as appropriate)     08/21/19 1924   Plan of Care Review   Plan of Care Reviewed With patient;spouse     Pt remains free from falls/injury. No acute events during shift. VSS, NAD. Bed in lowest position, wheels locked, side rails up times 2, call light w/in reach, bed alarm on, family at bedside. Room clear of obstacles.

## 2019-08-22 NOTE — SUBJECTIVE & OBJECTIVE
Past Medical History:   Diagnosis Date    Hyperlipemia     Hypertension     Nasal congestion     Pulmonary fibrosis        Past Surgical History:   Procedure Laterality Date    CATARACT EXTRACTION Left     COLONOSCOPY N/A 2016    Performed by Leon Padilla MD at [facility] ENDO (4TH FLR)    EGD (ESOPHAGOGASTRODUODENOSCOPY) N/A 2019    Performed by Fer Fox MD at [facility] ENDO (2ND FLR)    EGD (ESOPHAGOGASTRODUODENOSCOPY) N/A 2019    Performed by Jimmy Saldana MD at [facility] ENDO (2ND FLR)    EGD (ESOPHAGOGASTRODUODENOSCOPY) N/A 2019    Performed by Jimmy Saldana MD at [facility] ENDO (2ND FLR)    EGD (ESOPHAGOGASTRODUODENOSCOPY) N/A 7/10/2019    Performed by Jimmy Saldana MD at [facility] ENDO (2ND FLR)    HAND SURGERY      left    HIATAL HERNIA REPAIR      MICRODISCECTOMY, SPINE MIS Right L5- S1 Right 2018    Performed by Angel Pendleton MD at [facility] OR 2ND FLR    TONSILLECTOMY, ADENOIDECTOMY      VASECTOMY         Review of patient's allergies indicates:   Allergen Reactions    Penicillins     Erythromycin Rash       Family History     Problem Relation (Age of Onset)    Coronary artery disease Mother, Sister    Dementia Father    Multiple sclerosis Sister    Parkinsonism Father        Tobacco Use    Smoking status: Former Smoker     Packs/day: 1.50     Years: 20.00     Pack years: 30.00     Types: Cigarettes     Last attempt to quit: 3/17/1985     Years since quittin.4    Smokeless tobacco: Former User     Quit date: 1986   Substance and Sexual Activity    Alcohol use: Yes     Alcohol/week: 16.8 oz     Types: 28 Glasses of wine per week     Comment: 6-7 bottles per week    Drug use: No    Sexual activity: Yes     Partners: Female         Review of Systems   Constitutional: Positive for activity change and fatigue. Negative for chills and fever.   HENT: Negative for congestion, facial swelling, hearing loss and trouble swallowing.    Eyes: Negative for photophobia and  visual disturbance.   Respiratory: Positive for cough and shortness of breath. Negative for chest tightness and wheezing.    Cardiovascular: Negative for chest pain, palpitations and leg swelling.   Gastrointestinal: Negative for abdominal pain, blood in stool, constipation, diarrhea, nausea and vomiting.   Endocrine: Negative.    Genitourinary: Negative.    Musculoskeletal: Negative for back pain, joint swelling and myalgias.   Skin: Negative.    Allergic/Immunologic: Negative.    Neurological: Negative for dizziness, facial asymmetry, speech difficulty, weakness and numbness.   Hematological: Negative.    Psychiatric/Behavioral: Negative for agitation, confusion and dysphoric mood. The patient is not nervous/anxious.      Objective:     Vital Signs (Most Recent):  Temp: 98.6 °F (37 °C) (08/22/19 0350)  Pulse: (!) 113 (08/22/19 0742)  Resp: (!) 42 (08/22/19 0742)  BP: 121/60 (08/22/19 0350)  SpO2: (!) 92 % (08/22/19 0742) Vital Signs (24h Range):  Temp:  [98.3 °F (36.8 °C)-98.8 °F (37.1 °C)] 98.6 °F (37 °C)  Pulse:  [] 113  Resp:  [17-42] 42  SpO2:  [85 %-99 %] 92 %  BP: (110-121)/(51-70) 121/60        There is no height or weight on file to calculate BMI.      Intake/Output Summary (Last 24 hours) at 8/22/2019 0748  Last data filed at 8/22/2019 0600  Gross per 24 hour   Intake 240 ml   Output 350 ml   Net -110 ml       Physical Exam   Constitutional: He is oriented to person, place, and time. Vital signs are normal. He appears well-developed and well-nourished.  Non-toxic appearance. He does not appear ill. No distress. Nasal cannula in place.   HENT:   Head: Normocephalic and atraumatic.   Eyes: Pupils are equal, round, and reactive to light. Conjunctivae and EOM are normal. No scleral icterus.   Neck: Normal range of motion and full passive range of motion without pain. Neck supple. No JVD present. Carotid bruit is not present. No thyromegaly present.   Cardiovascular: Regular rhythm, S1 normal, S2 normal,  normal heart sounds and normal pulses. Tachycardia present. Exam reveals no gallop, no S3, no S4 and no friction rub.   No murmur heard.  Pulmonary/Chest: Tachypnea noted. No respiratory distress. He has no wheezes. He has no rhonchi. He has no rales.   Crackles throughout, but more prominent in the bases   Abdominal: Soft. Normal appearance and bowel sounds are normal. He exhibits no shifting dullness, no distension, no abdominal bruit and no ascites. There is no hepatosplenomegaly. There is no tenderness. There is no rigidity and no guarding.   Musculoskeletal: Normal range of motion. He exhibits no edema.   Neurological: He is alert and oriented to person, place, and time. He has normal strength. He is not disoriented. No cranial nerve deficit or sensory deficit. GCS eye subscore is 4. GCS verbal subscore is 5. GCS motor subscore is 6.   Skin: Skin is warm, dry and intact. No abrasion and no lesion noted.   Psychiatric: He has a normal mood and affect. His behavior is normal. His mood appears not anxious. His speech is not slurred. He is not actively hallucinating. Cognition and memory are normal. He does not exhibit a depressed mood. He is attentive.       Vents:  Oxygen Concentration (%): 70 (08/22/19 0350)    Lines/Drains/Airways     Drain                 Urethral Catheter 08/08/19 2200 Double-lumen 16 Fr. 13 days          Peripheral Intravenous Line                 Peripheral IV - Single Lumen 08/21/19 2047 20 G Anterior;Left Upper Arm less than 1 day                Significant Labs:    CBC/Anemia Profile:  Recent Labs   Lab 08/21/19  1624   WBC 8.80   HGB 10.9*   HCT 36.5*      MCV 87   RDW 15.5*        Chemistries:  Recent Labs   Lab 08/21/19  1624      K 4.7      CO2 31*   BUN 15   CREATININE 0.7   CALCIUM 9.8   ALBUMIN 2.6*   PROT 6.0   BILITOT 0.3   ALKPHOS 121   ALT 30   AST 23   MG 1.8   PHOS 3.1       All pertinent labs within the past 24 hours have been reviewed.    Significant  Imaging:   I have reviewed and interpreted all pertinent imaging results/findings within the past 24 hours.

## 2019-08-22 NOTE — HPI
Mr. Hamilton is a 67yo WM w/ pmhx of HTN, HLD, UIP (on OFEV) who was recently discharged from [facility] on 8/9 for acute on chronic respiratory failure. He was treated with high doses of steroids and slowly tapered. He was discharged to [facility] on 8/9 on 4L NC. Per the Patient he was doing well at rehab. He was able to go 12 minutes on the pedal bike without significant dyspnea. However on Monday, he noted becoming much more dyspneic with exertion. Approx 12 steps and his O2 sat would drop. He was sent to the ED for a CTA to r/o PE. CTA negative for PE. He denies any chest pain. He currently denies fevers, chills. Occasional cough with clear fluid. Denies Orthopnea or swelling. Denies any sick contacts or trouble swallowing. He was continuing to take his steroids on discharge (Pred 25mg for 10d (ended 8/19), but was increased to Pred 50 on 8/20 by his primary pulmonologist.

## 2019-08-22 NOTE — CARE UPDATE
Rt called per pt consistent desat, Order was obtained and pt placed on HFNC, he was been titrated to maintain SaO2 between 92-88%. He is currently on 7lpm, rt will follow.

## 2019-08-22 NOTE — PLAN OF CARE
Problem: Adult Inpatient Plan of Care  Goal: Plan of Care Review  Outcome: Ongoing (interventions implemented as appropriate)  Rested comfortably some of the night in between coughing spells. When pt coughs, O2 sats drop to 80's and upper 70's. High flow NC attempted then Comfort Flow placed. Prod cough with thick sputum secretions. Pt also running tachy on telemetry.

## 2019-08-22 NOTE — PT/OT/SLP PROGRESS
Speech Language Pathology      Manpreet Hamilton  MRN: [id number]    Orders received and chart reviewed.  Pt with fluctuating desaturations (to 70's-80s) and need for high level of respiratory support at this time (comfort flow 15 L @ 50% FiO2).  SLP discussed with Dr. Lin via secure chat and determined pt not appropriate for swallow assessment at this time given tenuous respiratory status.  Speech service will continue to monitor and assess as medically appropriate.      Sona Morse CCC-SLP

## 2019-08-22 NOTE — PLAN OF CARE
"CM met with patient to complete the discharge planning assessment. Patient stated he did not need the [facility] " My Health Packet" since he already has one. CM placed name and phone number on the whiteboard. Patient expressed understanding of the discharge process. Mr. Hamilton would like to return to [facility] if rehab is recommended by PT/OT.    Radames Pradhan MD   [address] [facility] / [place] [address]       [facility] [place] - [address]  [address]  [place] [address]  Phone: [phone] Fax: [phone]    [facility] - [place], [place] - [address]  [address]  [address]  [facility] [address]  Phone: [phone] Fax: [phone]    Payor: BLUE CROSS BLUE SHIELD / Plan: BCBS ALL OUT OF STATE / Product Type: PPO /         08/22/19 1255   Discharge Assessment   Assessment Type Discharge Planning Assessment   Confirmed/corrected address and phone number on facesheet? Yes   Assessment information obtained from? Patient;Caregiver   Expected Length of Stay (days) 3   Communicated expected length of stay with patient/caregiver yes   Prior to hospitilization cognitive status: Alert/Oriented   Prior to hospitalization functional status: Assistive Equipment   Current cognitive status: Alert/Oriented   Current Functional Status: Assistive Equipment   Facility Arrived From: [facility]   Lives With spouse   Able to Return to Prior Arrangements yes   Is patient able to care for self after discharge? Unable to determine at this time (comments)   Who are your caregiver(s) and their phone number(s)? Traci Hamilton (spouse) [phone]   Patient's perception of discharge disposition rehab facility   Readmission Within the Last 30 Days previous discharge plan unsuccessful   If yes, most recent facility name: Recently discharged from [facility] on 8/9   Patient currently being followed by outpatient case management? No "   Patient currently receives any other outside agency services? No   Equipment Currently Used at Home hospital bed;walker, rolling;oxygen;shower chair   Do you have any problems affording any of your prescribed medications? No   Is the patient taking medications as prescribed? yes   Does the patient have transportation home? Yes   Transportation Anticipated family or friend will provide   Does the patient receive services at the [facility]? No   Discharge Plan A Rehab  ([facility])   Discharge Plan B Home Health  ([facility])   DME Needed Upon Discharge  other (see comments)  (TBD)   Patient/Family in Agreement with Plan yes   Readmission Questionnaire   At the time of your discharge, did someone talk to you about what your health problems were? Yes   At the time of discharge, did someone talk to you about what to watch out for regarding worsening of your health problem? Yes   At the time of discharge, did someone talk to you about what to do if you experienced worsening of your health problem? Yes   At the time of discharge, did someone talk to you about which medication to take when you left the hospital and which ones to stop taking? Yes   At the time of discharge, did someone talk to you about when and where to follow up with a doctor after you left the hospital? Yes   Do you have problems taking your medications as prescribed? No   Do you have any problems affording any of  your prescribed medications? No   Do you have problems obtaining/receiving your medications? No   Does the patient have transportation to healthcare appointments? Yes   Does the patient have family/friends to help with healtcare needs after discharge? yes

## 2019-08-22 NOTE — SUBJECTIVE & OBJECTIVE
Interval History: patient with worsening respiratory distress this morning while taking a bath. Severe hypoxemia and now placed on High flow oxygen. Pulmonary following and started on oral steroids. Rapid response at bedside. Satting low 90's on 20 L oxygen at 60% FiO2.     Review of Systems   Constitutional: Positive for activity change and fatigue. Negative for chills and fever.   HENT: Negative for congestion, facial swelling, hearing loss and trouble swallowing.    Eyes: Negative for photophobia and visual disturbance.   Respiratory: Positive for cough and shortness of breath. Negative for chest tightness and wheezing.    Cardiovascular: Negative for chest pain, palpitations and leg swelling.   Gastrointestinal: Negative for abdominal pain, blood in stool, constipation, diarrhea, nausea and vomiting.   Endocrine: Negative.    Genitourinary: Negative.    Musculoskeletal: Negative for back pain, joint swelling and myalgias.   Skin: Negative.    Allergic/Immunologic: Negative.    Neurological: Negative for dizziness, facial asymmetry, speech difficulty, weakness and numbness.   Hematological: Negative.    Psychiatric/Behavioral: Negative for agitation, confusion and dysphoric mood. The patient is not nervous/anxious.      Objective:     Vital Signs (Most Recent):  Temp: 98.6 °F (37 °C) (08/22/19 0350)  Pulse: (!) 111 (08/22/19 0754)  Resp: (!) 40 (08/22/19 0754)  BP: 111/70 (08/22/19 0754)  SpO2: (!) 89 % (08/22/19 0943) Vital Signs (24h Range):  Temp:  [98.3 °F (36.8 °C)-98.8 °F (37.1 °C)] 98.6 °F (37 °C)  Pulse:  [] 111  Resp:  [17-42] 40  SpO2:  [84 %-99 %] 89 %  BP: (110-121)/(51-70) 111/70        There is no height or weight on file to calculate BMI.    Intake/Output Summary (Last 24 hours) at 8/22/2019 0950  Last data filed at 8/22/2019 0600  Gross per 24 hour   Intake 240 ml   Output 350 ml   Net -110 ml      Physical Exam   Constitutional: He is oriented to person, place, and time. Vital signs are  normal. He appears well-developed and well-nourished.  Non-toxic appearance. He does not appear ill. No distress. Nasal cannula in place.   HENT:   Head: Normocephalic and atraumatic.   Eyes: Pupils are equal, round, and reactive to light. Conjunctivae and EOM are normal. No scleral icterus.   Neck: Normal range of motion and full passive range of motion without pain. Neck supple. No JVD present. Carotid bruit is not present. No thyromegaly present.   Cardiovascular: Regular rhythm, S1 normal, S2 normal, normal heart sounds and normal pulses. Tachycardia present. Exam reveals no gallop, no S3, no S4 and no friction rub.   No murmur heard.  Pulmonary/Chest: Effort normal. No accessory muscle usage. Tachypnea noted. No respiratory distress. He has decreased breath sounds. He has no wheezes. He has no rhonchi. He has no rales.   Abdominal: Soft. Normal appearance and bowel sounds are normal. He exhibits no shifting dullness, no distension, no abdominal bruit and no ascites. There is no hepatosplenomegaly. There is no tenderness. There is no rigidity and no guarding.   Musculoskeletal: Normal range of motion. He exhibits no edema or tenderness.   Neurological: He is alert and oriented to person, place, and time. He has normal strength. He is not disoriented. No cranial nerve deficit or sensory deficit. GCS eye subscore is 4. GCS verbal subscore is 5. GCS motor subscore is 6.   Skin: Skin is warm, dry and intact. No abrasion and no lesion noted.   Psychiatric: He has a normal mood and affect. His behavior is normal. Judgment and thought content normal. His mood appears not anxious. His speech is not slurred. He is not actively hallucinating. Cognition and memory are normal. He does not exhibit a depressed mood. He is attentive.       Significant Labs: All pertinent labs within the past 24 hours have been reviewed.    Significant Imaging: I have reviewed all pertinent imaging results/findings within the past 24 hours.      X-Ray Chest AP Portable  Narrative: EXAMINATION:  XR CHEST AP PORTABLE    CLINICAL HISTORY:  SOB, Low Pulse Ox;    TECHNIQUE:  Single frontal view of the chest was performed.    COMPARISON:  August 19, 2019.    FINDINGS:  Surgical clips left upper quadrant.  Heart is mildly enlarged.  Diffuse increase in interstitial and alveolar markings with some patchy areas of opacification similar.  No pneumothorax.  Impression: Diffuse patchy interstitial and alveolar consolidation similar.    Electronically signed by: Pierre Aleman MD  Date:    08/22/2019  Time:    07:59

## 2019-08-22 NOTE — CARE UPDATE
"RAPID RESPONSE NURSE PROACTIVE ROUNDING NOTE     Time of Visit: 1012    Admit Date: 2019  LOS: 1  Code Status: Full Code   Date of Visit: 2019  : 1950  Age: 68 y.o.  Sex: male  Race: White  Bed: 8080/8080 A:   MRN: [id number]  Was the patient discharged from an ICU this admission? no   Was the patient discharged from a PACU within last 24 hours?  no  Did the patient receive conscious sedation/general anesthesia in last 24 hours?  no  Was the patient in the ED within the past 24 hours?  no  Was the patient started on NIPPV within the past 24 hours?  no  Attending Physician: Domingo Lin MD  Primary Service: [facility]     ASSESSMENT     Diagnosis: Acute on chronic respiratory failure with hypoxemia    Abnormal Vital Signs: /66 (BP Location: Left arm, Patient Position: Lying)   Pulse (!) 115   Temp 98.4 °F (36.9 °C) (Oral)   Resp 18   SpO2 (!) 94%      Clinical Issues: Respiratory    Patient  has a past medical history of Hyperlipemia, Hypertension, Nasal congestion, and Pulmonary fibrosis.    Upon exam, pt significantly tachypnic in the 40s, sats 78% on ViSi, and pt clearly short of breath. Comfort flow currently on 20L/45% FiO2. Pt not allowing RRN to assess sats at this time stating "its not a good time, I need 5-10 mins and a bath first." Comfort flow increased to 20L/70% on comfort flow. Nurse and PCT at bedside to give pt a bath. Pt much more settled thereafter, sats 84-85%. Pt continually becoming SOB and desat with exertion. Comfort flow titrated at bedside until sats > 90% and pt appearing comfortable. CC at 20L/60% Fio2 upon leaving pt with sats 92-93%. Steroid dose to increase as team does not feel this is volume overload problem but more so pulm fibrosis event. Pt frequently hospitalized for recurrent resp failure events.     INTERVENTIONS/ RECOMMENDATIONS     Monitor resp status closely. Please titrate comfort flow accordingly and appropriately to maintain sats > " 90%    Discussed plan of care with RNFarzana.    PHYSICIAN ESCALATION     Yes/No  no    Orders received and case discussed with Dr. Tejinder MD at bedside to round on patient.    Disposition: Remain in room 8080A.    FOLLOW-UP     Call back the Rapid Response Nurse, Noe Lucas RN at [phone] for additional questions or concerns.

## 2019-08-22 NOTE — CARE UPDATE
Rapid Response Nurse Chart Check     Chart check completed, abnormal VS noted. Bedside RN Farzana contacted, no concerns verbalized at this time, instructed to call [phone] for further concerns or assistance.

## 2019-08-22 NOTE — ASSESSMENT & PLAN NOTE
Lab Results   Component Value Date    HGB 10.2 (L) 08/22/2019     H/H stable  No signs of active bleed  Cont home Ferrous sulfate with bowel regimen

## 2019-08-22 NOTE — PROGRESS NOTES
[facility] Hospital Medicine  Progress Note    Patient Name: Manpreet Hamilton  MRN: [id number]  Patient Class: IP- Inpatient   Admission Date: 8/21/2019  Length of Stay: 1 days  Attending Physician: Domingo Lin MD  Primary Care Provider: Radames Pradhan MD    Hospital Medicine Team: [facility] MED  Domingo Morrow MD    Subjective:     Principal Problem:Acute on chronic respiratory failure with hypoxemia        HPI:  68-year-old man with HTN, HLD, UIP (diagnosed 1 year ago, followed closely by Dr. Pradhan, on ofev) who is currently admitted to [facility]. He has had a complicated recent history beginning with an admission in July 2019 for GIB requiring massive transfusion. He has had increasing oxygen rqeuirements and was discharged on 8/9 after an admission for acute on chronic hypoxic respiratory failure, unclear if this was related to infection, non-cardiogenic pulmonary edema, or an exacerbation of his IPF. He improved with levaquin, lasix, and prednisone and was discharged to inpatient rehab for continued PT/OT and oxygen weaning (down to 4L upon discharge).     He did well with rehab, but starting Saturday began to have increasing oxygen requirements, eventually being transferred to the ED for increased work of breathing on 8/19, at which time a CTA was performed that ruled out PE. He improved while in the ED and was transferred back to rehab for further care. His shortness of breath has returned over the early week, culminating in an episode of respiratory distress this morning requiring VM. He has now improved, but remains short of breath on his home nasal canula requirements. Dr. Pradhan has been visiting him daily at rehab and has requested direct admission to hospital medicine after direct admission to lung transplant service was declined. Originally the patient was going to go directly to the ED given the respiratory distress and acute nature, but strongly requested direct floor admission,  and given the subsequent improvement Dr. Pradhan feels that he is safe to wait at rehab for a bed.    Overview/Hospital Course:  No notes on file    Interval History: patient with worsening respiratory distress this morning while taking a bath. Severe hypoxemia and now placed on High flow oxygen. Pulmonary following and started on oral steroids. Rapid response at bedside. Satting low 90's on 20 L oxygen at 60% FiO2.     Review of Systems   Constitutional: Positive for activity change and fatigue. Negative for chills and fever.   HENT: Negative for congestion, facial swelling, hearing loss and trouble swallowing.    Eyes: Negative for photophobia and visual disturbance.   Respiratory: Positive for cough and shortness of breath. Negative for chest tightness and wheezing.    Cardiovascular: Negative for chest pain, palpitations and leg swelling.   Gastrointestinal: Negative for abdominal pain, blood in stool, constipation, diarrhea, nausea and vomiting.   Endocrine: Negative.    Genitourinary: Negative.    Musculoskeletal: Negative for back pain, joint swelling and myalgias.   Skin: Negative.    Allergic/Immunologic: Negative.    Neurological: Negative for dizziness, facial asymmetry, speech difficulty, weakness and numbness.   Hematological: Negative.    Psychiatric/Behavioral: Negative for agitation, confusion and dysphoric mood. The patient is not nervous/anxious.      Objective:     Vital Signs (Most Recent):  Temp: 98.6 °F (37 °C) (08/22/19 0350)  Pulse: (!) 111 (08/22/19 0754)  Resp: (!) 40 (08/22/19 0754)  BP: 111/70 (08/22/19 0754)  SpO2: (!) 89 % (08/22/19 0943) Vital Signs (24h Range):  Temp:  [98.3 °F (36.8 °C)-98.8 °F (37.1 °C)] 98.6 °F (37 °C)  Pulse:  [] 111  Resp:  [17-42] 40  SpO2:  [84 %-99 %] 89 %  BP: (110-121)/(51-70) 111/70        There is no height or weight on file to calculate BMI.    Intake/Output Summary (Last 24 hours) at 8/22/2019 0950  Last data filed at 8/22/2019 0600  Gross per 24  hour   Intake 240 ml   Output 350 ml   Net -110 ml      Physical Exam   Constitutional: He is oriented to person, place, and time. Vital signs are normal. He appears well-developed and well-nourished.  Non-toxic appearance. He does not appear ill. No distress. Nasal cannula in place.   HENT:   Head: Normocephalic and atraumatic.   Eyes: Pupils are equal, round, and reactive to light. Conjunctivae and EOM are normal. No scleral icterus.   Neck: Normal range of motion and full passive range of motion without pain. Neck supple. No JVD present. Carotid bruit is not present. No thyromegaly present.   Cardiovascular: Regular rhythm, S1 normal, S2 normal, normal heart sounds and normal pulses. Tachycardia present. Exam reveals no gallop, no S3, no S4 and no friction rub.   No murmur heard.  Pulmonary/Chest: Effort normal. No accessory muscle usage. Tachypnea noted. No respiratory distress. He has decreased breath sounds. He has no wheezes. He has no rhonchi. He has no rales.   Abdominal: Soft. Normal appearance and bowel sounds are normal. He exhibits no shifting dullness, no distension, no abdominal bruit and no ascites. There is no hepatosplenomegaly. There is no tenderness. There is no rigidity and no guarding.   Musculoskeletal: Normal range of motion. He exhibits no edema or tenderness.   Neurological: He is alert and oriented to person, place, and time. He has normal strength. He is not disoriented. No cranial nerve deficit or sensory deficit. GCS eye subscore is 4. GCS verbal subscore is 5. GCS motor subscore is 6.   Skin: Skin is warm, dry and intact. No abrasion and no lesion noted.   Psychiatric: He has a normal mood and affect. His behavior is normal. Judgment and thought content normal. His mood appears not anxious. His speech is not slurred. He is not actively hallucinating. Cognition and memory are normal. He does not exhibit a depressed mood. He is attentive.       Significant Labs: All pertinent labs within  the past 24 hours have been reviewed.    Significant Imaging: I have reviewed all pertinent imaging results/findings within the past 24 hours.     X-Ray Chest AP Portable  Narrative: EXAMINATION:  XR CHEST AP PORTABLE    CLINICAL HISTORY:  SOB, Low Pulse Ox;    TECHNIQUE:  Single frontal view of the chest was performed.    COMPARISON:  August 19, 2019.    FINDINGS:  Surgical clips left upper quadrant.  Heart is mildly enlarged.  Diffuse increase in interstitial and alveolar markings with some patchy areas of opacification similar.  No pneumothorax.  Impression: Diffuse patchy interstitial and alveolar consolidation similar.    Electronically signed by: Pierre Aleman MD  Date:    08/22/2019  Time:    07:59        Assessment/Plan:      * Acute on chronic respiratory failure with hypoxemia  Etiology unclear  Acute respiratory infection, non-cardiogenic pulmonary edema, or an exacerbation of his IPF.  Check Blood C/S, Respiratory induced Sputum gram stain and C/S to rule out acute infection   Start antibiotic therapy with Levofloxacin empirically   Start oral prednisone   Cont oral Lasix. Patient does not appear grossly volume overloaded on exam.   Check pulse oximetry to maintain O2 Sat ? 91%.   Maintain oxygenation with nasal canula.   Incentive spirometry q 1-2 hours while awake.   chest xray: Diffuse patchy interstitial and alveolar consolidation similar.  Cont to monitor I/O's and daily weights.  Fluid restriction (2 liters/24 hours)  Low Na diet  Pulmonary following     Pulmonary fibrosis  Resume home Nintedanib  Further treatment as above.       Debility  Cont with PT/OT for gait training and strengthening and restoration of ADL's   Encourage mobility, OOB in chair, and early ambulation as appropriate  Fall precautions   Monitor for bowel and bladder dysfunction  Monitor for and prevent skin breakdown and pressure ulcers    Acute blood loss anemia  Lab Results   Component Value Date    HGB 10.2 (L) 08/22/2019      H/H stable  No signs of active bleed  Cont home Ferrous sulfate with bowel regimen         VTE Risk Mitigation (From admission, onward)        Ordered     heparin (porcine) injection 5,000 Units  Every 8 hours      08/21/19 1600                Domingo Morrow MD  Department of Hospital Medicine   [facility]

## 2019-08-22 NOTE — CONSULTS
[facility]  Pulmonology  Consult Note    Patient Name: Manpreet Hamilton  MRN: [id number]  Admission Date: 8/21/2019  Hospital Length of Stay: 1 days  Code Status: Full Code  Attending Physician: Domingo Lin MD  Primary Care Provider: Radames Pradhan MD   Principal Problem: Acute on chronic respiratory failure with hypoxemia    Consults  Subjective:     HPI:  Mr. Hamilton is a 67yo WM w/ pmhx of HTN, HLD, UIP (on OFEV) who was recently discharged from [facility] on 8/9 for acute on chronic respiratory failure. He was treated with high doses of steroids and slowly tapered. He was discharged to [facility] on 8/9 on 4L NC. Per the Patient he was doing well at rehab. He was able to go 12 minutes on the pedal bike without significant dyspnea. However on Monday, he noted becoming much more dyspneic with exertion. Approx 12 steps and his O2 sat would drop. He was sent to the ED for a CTA to r/o PE. CTA negative for PE. He denies any chest pain. He currently denies fevers, chills. Occasional cough with clear fluid. Denies Orthopnea or swelling. Denies any sick contacts or trouble swallowing. He was continuing to take his steroids on discharge (Pred 25mg for 10d (ended 8/19), but was increased to Pred 50 on 8/20 by his primary pulmonologist.     Past Medical History:   Diagnosis Date    Hyperlipemia     Hypertension     Nasal congestion     Pulmonary fibrosis        Past Surgical History:   Procedure Laterality Date    CATARACT EXTRACTION Left     COLONOSCOPY N/A 8/5/2016    Performed by Leon Padilla MD at [facility] (4TH FLR)    EGD (ESOPHAGOGASTRODUODENOSCOPY) N/A 7/18/2019    Performed by Fer Fox MD at [facility] END[facility] (2ND FLR)    EGD (ESOPHAGOGASTRODUODENOSCOPY) N/A 7/12/2019    Performed by iJmmy Saldana MD at [facility] END[facility] (2ND FLR)    EGD (ESOPHAGOGASTRODUODENOSCOPY) N/A 7/11/2019    Performed by Jimmy Saldana MD at [facility] END[facility] (2ND FLR)    EGD (ESOPHAGOGASTRODUODENOSCOPY) N/A 7/10/2019     Performed by Jimmy Saldana MD at [facility] ENDO (2ND FLR)    HAND SURGERY      left    HIATAL HERNIA REPAIR      MICRODISCECTOMY, SPINE MIS Right L5- S1 Right 2018    Performed by Angel Pendleton MD at [facility] OR 2ND FLR    TONSILLECTOMY, ADENOIDECTOMY      VASECTOMY         Review of patient's allergies indicates:   Allergen Reactions    Penicillins     Erythromycin Rash       Family History     Problem Relation (Age of Onset)    Coronary artery disease Mother, Sister    Dementia Father    Multiple sclerosis Sister    Parkinsonism Father        Tobacco Use    Smoking status: Former Smoker     Packs/day: 1.50     Years: 20.00     Pack years: 30.00     Types: Cigarettes     Last attempt to quit: 3/17/1985     Years since quittin.4    Smokeless tobacco: Former User     Quit date: 1986   Substance and Sexual Activity    Alcohol use: Yes     Alcohol/week: 16.8 oz     Types: 28 Glasses of wine per week     Comment: 6-7 bottles per week    Drug use: No    Sexual activity: Yes     Partners: Female         Review of Systems   Constitutional: Positive for activity change and fatigue. Negative for chills and fever.   HENT: Negative for congestion, facial swelling, hearing loss and trouble swallowing.    Eyes: Negative for photophobia and visual disturbance.   Respiratory: Positive for cough and shortness of breath. Negative for chest tightness and wheezing.    Cardiovascular: Negative for chest pain, palpitations and leg swelling.   Gastrointestinal: Negative for abdominal pain, blood in stool, constipation, diarrhea, nausea and vomiting.   Endocrine: Negative.    Genitourinary: Negative.    Musculoskeletal: Negative for back pain, joint swelling and myalgias.   Skin: Negative.    Allergic/Immunologic: Negative.    Neurological: Negative for dizziness, facial asymmetry, speech difficulty, weakness and numbness.   Hematological: Negative.    Psychiatric/Behavioral: Negative for agitation, confusion and  dysphoric mood. The patient is not nervous/anxious.      Objective:     Vital Signs (Most Recent):  Temp: 98.6 °F (37 °C) (08/22/19 0350)  Pulse: (!) 113 (08/22/19 0742)  Resp: (!) 42 (08/22/19 0742)  BP: 121/60 (08/22/19 0350)  SpO2: (!) 92 % (08/22/19 0742) Vital Signs (24h Range):  Temp:  [98.3 °F (36.8 °C)-98.8 °F (37.1 °C)] 98.6 °F (37 °C)  Pulse:  [] 113  Resp:  [17-42] 42  SpO2:  [85 %-99 %] 92 %  BP: (110-121)/(51-70) 121/60        There is no height or weight on file to calculate BMI.      Intake/Output Summary (Last 24 hours) at 8/22/2019 0748  Last data filed at 8/22/2019 0600  Gross per 24 hour   Intake 240 ml   Output 350 ml   Net -110 ml       Physical Exam   Constitutional: He is oriented to person, place, and time. Vital signs are normal. He appears well-developed and well-nourished.  Non-toxic appearance. He does not appear ill. No distress. Nasal cannula in place.   HENT:   Head: Normocephalic and atraumatic.   Eyes: Pupils are equal, round, and reactive to light. Conjunctivae and EOM are normal. No scleral icterus.   Neck: Normal range of motion and full passive range of motion without pain. Neck supple. No JVD present. Carotid bruit is not present. No thyromegaly present.   Cardiovascular: Regular rhythm, S1 normal, S2 normal, normal heart sounds and normal pulses. Tachycardia present. Exam reveals no gallop, no S3, no S4 and no friction rub.   No murmur heard.  Pulmonary/Chest: Tachypnea noted. No respiratory distress. He has no wheezes. He has no rhonchi. He has no rales.   Crackles throughout, but more prominent in the bases   Abdominal: Soft. Normal appearance and bowel sounds are normal. He exhibits no shifting dullness, no distension, no abdominal bruit and no ascites. There is no hepatosplenomegaly. There is no tenderness. There is no rigidity and no guarding.   Musculoskeletal: Normal range of motion. He exhibits no edema.   Neurological: He is alert and oriented to person, place,  and time. He has normal strength. He is not disoriented. No cranial nerve deficit or sensory deficit. GCS eye subscore is 4. GCS verbal subscore is 5. GCS motor subscore is 6.   Skin: Skin is warm, dry and intact. No abrasion and no lesion noted.   Psychiatric: He has a normal mood and affect. His behavior is normal. His mood appears not anxious. His speech is not slurred. He is not actively hallucinating. Cognition and memory are normal. He does not exhibit a depressed mood. He is attentive.       Vents:  Oxygen Concentration (%): 70 (08/22/19 0350)    Lines/Drains/Airways     Drain                 Urethral Catheter 08/08/19 2200 Double-lumen 16 Fr. 13 days          Peripheral Intravenous Line                 Peripheral IV - Single Lumen 08/21/19 2047 20 G Anterior;Left Upper Arm less than 1 day                Significant Labs:    CBC/Anemia Profile:  Recent Labs   Lab 08/21/19  1624   WBC 8.80   HGB 10.9*   HCT 36.5*      MCV 87   RDW 15.5*        Chemistries:  Recent Labs   Lab 08/21/19  1624      K 4.7      CO2 31*   BUN 15   CREATININE 0.7   CALCIUM 9.8   ALBUMIN 2.6*   PROT 6.0   BILITOT 0.3   ALKPHOS 121   ALT 30   AST 23   MG 1.8   PHOS 3.1       All pertinent labs within the past 24 hours have been reviewed.    Significant Imaging:   I have reviewed and interpreted all pertinent imaging results/findings within the past 24 hours.    Assessment/Plan:     * Acute on chronic respiratory failure with hypoxemia  -baseline hypoxia (approx 4L O2); likely d/t IPF  -currently on Ofev, but per notes there were plans to change to Esbriet d/t GI bleed  -not grossly volume overloaded  -recently on high doses of steroids for exacerbation of IPF; infectious work up negative  -was weaned to Pred 25mg daily but increased to Pred 50mg daily on 8/20  -CXR w/ what appears to be worsening infiltrates and given the paucity of infectious signs or symptoms, may need to consider AE IPF, which will require higher  doses of steroids. (1mg/kg prednisone)  -CTA negative for PE on Monday  -not currently on OI ppx, would ideally like to bronch but unsure if patient would be able to tolerate given High O2 requirements  -start Prednisone 80mg daily  -follow echo w/ doppler          Thank you for your consult. I will follow-up with patient. Please contact us if you have any additional questions.     Ty Isbell MD  Pulmonology  [facility]

## 2019-08-22 NOTE — PROGRESS NOTES
Pt received on Comfort Flow 15L/45% with sats >=92%.    Switched from Comfort Flow to High Flow Nasal Cannula 15L with sats >=98%.    Contacted SHONDA Lin about oxygen device. Gave telephone verbal to keep sats >=90% on HFNC and okay to change order.    Titrated to 11lpm HFNC when wife complaints of  being uncomfortable with higher flows.    Explained that we have to wean not to aggressively. She stated she would titrate it if he would feel uncomfortable.    I explained once again that a health care professional should be weaning his oxygen and to not adjust the flow.    Left pt on 11lpm on HFNC, Sats 98%.

## 2019-08-22 NOTE — ASSESSMENT & PLAN NOTE
Etiology unclear  Acute respiratory infection, non-cardiogenic pulmonary edema, or an exacerbation of his IPF.  Check Blood C/S, Respiratory induced Sputum gram stain and C/S to rule out acute infection   Start antibiotic therapy with Levofloxacin empirically   Start oral prednisone   Cont oral Lasix. Patient does not appear grossly volume overloaded on exam.   Check pulse oximetry to maintain O2 Sat ? 91%.   Maintain oxygenation with nasal canula.   Incentive spirometry q 1-2 hours while awake.   chest xray: Diffuse patchy interstitial and alveolar consolidation similar.  Cont to monitor I/O's and daily weights.  Fluid restriction (2 liters/24 hours)  Low Na diet  Pulmonary following

## 2019-08-22 NOTE — PLAN OF CARE
08/22/19 1303   Post-Acute Status   Post-Acute Authorization Placement   Post-Acute Placement Status Awaiting Internal Medical Clearance   Discharge Delays None known at this time

## 2019-08-22 NOTE — CARE UPDATE
Rapid Response Respiratory Therapy Note     Followed up with patient for proactive rounding. Pt. desated due to receiving a bath, sats drops on exertion  Performed an pulse ox check, pt sats were at 100%. Adjusted Comfort flow, settings recorded in flow sheet.  Will continue to monitor. Plan of care reviewed with primary RT, Francine, I Please call Rapid Response RT, Leon Church, RRT at [phone] with any questions or concerns.

## 2019-08-22 NOTE — CARE UPDATE
Per pt abg result and mild respiratory distress, permission was granted to place pt on comfort flow, He has been placed on 20l at 50%, RT will titrate to maintain pt normal SaO2.

## 2019-08-22 NOTE — CARE UPDATE
Rapid Response Respiratory Therapy Proactive Rounding Note      Time of visit: 08    Code Status: Full Code   : 1950  Age: 68 y.o.  Weight:   Wt Readings from Last 1 Encounters:   19 76.7 kg (169 lb 1.5 oz)     Sex: male  Race: White   Bed: 8080/8080 A:   MRN: [id number]    SITUATION     Evaluated patient for: Respiratory Status    BACKGROUND     Patient has a past medical history of Hyperlipemia, Hypertension, Nasal congestion, and Pulmonary fibrosis.  Pulmonary Hx: Pulmonary fibrosis  Clinically Significant Surgical Hx: None    ASSESSMENT     Pulse: Pulse: (!) 111 Respiratory rate: Resp: (!) 40 Temperature: Temp: 98.6 °F (37 °C) BP: BP: 111/70 SpO2:SpO2: (!) 94 %   Level of Consciousness: Level of Consciousness (AVPU): alert  Respiratory Effort: Respiratory Effort: Unlabored  Expansion/Accessory Muscle Usage: Expansion/Accessory Muscles/Retractions: no use of accessory muscles  All Lung Field Breath Sounds: All Lung Fields Breath Sounds: Anterior:, Lateral:, clear, equal bilaterally  LLL Breath Sounds: diminished  RLL Breath Sounds: diminished  Cough Type: Cough Type: productive  Mobility at time of assessment: General Mobility: generalized weakness, moderately impaired  O2 Device/Concentration: O2 Device (Oxygen Therapy): Comfort Flow   Flow (L/min): 15 Oxygen Concentration (%): 45  Most recent blood gas:   Recent Labs     199   PH 7.486*   PCO2 39.1   PO2 36*   HCO3 29.6*   POCSATURATED 74*   BE 6     PF Ratio Calculator  P/F Ratio:    Current Respiratory Care Orders:    Oxygen Continuous Continuous     References: Oxygen Titration Protocol   Question Answer Comment   Device type: High flow    Device: Comfort Flow    LPM: 4    Titrate O2 per Oxygen Titration Protocol: Yes    To maintain SpO2 goal of: >= 90%    Notify MD of: Inability to achieve desired SpO2; Sudden change in patient status and requires 20% increase in FiO2; Patient requires >60% FiO2        19 0434     08/21/19 2000  Incentive spirometry Every 4 hours (8 of 37 released)    Release    08/21/19 1602   08/21/19 2000  ACAPELLA TREATMENT Q4H Every 4 hours (8 of 37 released)    Release    08/21/19 1602   08/21/19 1600  Inhalation Treatment Q4H Every 4 hours (9 of 38 released)    Release    08/21/19 1600   08/21/19 1558  Pulse Oximetry Continuous Continuous             NIPPV: No  Surgical airway: No  ETCO2 monitored:    Ambu at bedside: Ambu bag with the patient?: Yes, Adult Ambu    INTERVENTIONS/RECOMMENDATIONS   ?  Oxygen Check. Pt Comfort Flow adjusted, settings recorded in flowsheet. Will continue to monitor.    Discussed plan of care with  primary RT, JAIDEN Escamilla.     FOLLOW-UP     Please call back the Rapid Response RT, Leon Church, RRT at x [phone] for any questions or concerns.

## 2019-08-23 LAB
ALBUMIN SERPL BCP-MCNC: 2.6 G/DL (ref 3.5–5.2)
ALP SERPL-CCNC: 109 U/L (ref 55–135)
ALT SERPL W/O P-5'-P-CCNC: 30 U/L (ref 10–44)
ANION GAP SERPL CALC-SCNC: 12 MMOL/L (ref 8–16)
AST SERPL-CCNC: 16 U/L (ref 10–40)
BACTERIA SPEC AEROBE CULT: NORMAL
BACTERIA SPEC AEROBE CULT: NORMAL
BASOPHILS # BLD AUTO: 0.01 K/UL (ref 0–0.2)
BASOPHILS NFR BLD: 0.1 % (ref 0–1.9)
BILIRUB SERPL-MCNC: 0.5 MG/DL (ref 0.1–1)
BUN SERPL-MCNC: 21 MG/DL (ref 8–23)
CALCIUM SERPL-MCNC: 9.9 MG/DL (ref 8.7–10.5)
CHLORIDE SERPL-SCNC: 99 MMOL/L (ref 95–110)
CO2 SERPL-SCNC: 30 MMOL/L (ref 23–29)
CREAT SERPL-MCNC: 0.7 MG/DL (ref 0.5–1.4)
DIFFERENTIAL METHOD: ABNORMAL
EOSINOPHIL # BLD AUTO: 0 K/UL (ref 0–0.5)
EOSINOPHIL NFR BLD: 0.5 % (ref 0–8)
ERYTHROCYTE [DISTWIDTH] IN BLOOD BY AUTOMATED COUNT: 15.4 % (ref 11.5–14.5)
EST. GFR  (AFRICAN AMERICAN): >60 ML/MIN/1.73 M^2
EST. GFR  (NON AFRICAN AMERICAN): >60 ML/MIN/1.73 M^2
GLUCOSE SERPL-MCNC: 97 MG/DL (ref 70–110)
GRAM STN SPEC: NORMAL
HCT VFR BLD AUTO: 36.3 % (ref 40–54)
HGB BLD-MCNC: 10.9 G/DL (ref 14–18)
IMM GRANULOCYTES # BLD AUTO: 0.02 K/UL (ref 0–0.04)
IMM GRANULOCYTES NFR BLD AUTO: 0.3 % (ref 0–0.5)
LYMPHOCYTES # BLD AUTO: 1.3 K/UL (ref 1–4.8)
LYMPHOCYTES NFR BLD: 18 % (ref 18–48)
MCH RBC QN AUTO: 25.6 PG (ref 27–31)
MCHC RBC AUTO-ENTMCNC: 30 G/DL (ref 32–36)
MCV RBC AUTO: 85 FL (ref 82–98)
MONOCYTES # BLD AUTO: 0.7 K/UL (ref 0.3–1)
MONOCYTES NFR BLD: 9 % (ref 4–15)
NEUTROPHILS # BLD AUTO: 5.3 K/UL (ref 1.8–7.7)
NEUTROPHILS NFR BLD: 72.1 % (ref 38–73)
NRBC BLD-RTO: 0 /100 WBC
PLATELET # BLD AUTO: 329 K/UL (ref 150–350)
PMV BLD AUTO: 9.3 FL (ref 9.2–12.9)
POTASSIUM SERPL-SCNC: 3.7 MMOL/L (ref 3.5–5.1)
PROT SERPL-MCNC: 5.7 G/DL (ref 6–8.4)
RBC # BLD AUTO: 4.26 M/UL (ref 4.6–6.2)
SODIUM SERPL-SCNC: 141 MMOL/L (ref 136–145)
WBC # BLD AUTO: 7.33 K/UL (ref 3.9–12.7)

## 2019-08-23 PROCEDURE — 63600175 PHARM REV CODE 636 W HCPCS: Performed by: INTERNAL MEDICINE

## 2019-08-23 PROCEDURE — 85025 COMPLETE CBC W/AUTO DIFF WBC: CPT

## 2019-08-23 PROCEDURE — 80053 COMPREHEN METABOLIC PANEL: CPT

## 2019-08-23 PROCEDURE — 94761 N-INVAS EAR/PLS OXIMETRY MLT: CPT

## 2019-08-23 PROCEDURE — 25000003 PHARM REV CODE 250: Performed by: PHYSICIAN ASSISTANT

## 2019-08-23 PROCEDURE — 25000242 PHARM REV CODE 250 ALT 637 W/ HCPCS: Performed by: HOSPITALIST

## 2019-08-23 PROCEDURE — 97165 OT EVAL LOW COMPLEX 30 MIN: CPT

## 2019-08-23 PROCEDURE — 99232 SBSQ HOSP IP/OBS MODERATE 35: CPT | Mod: ,,, | Performed by: INTERNAL MEDICINE

## 2019-08-23 PROCEDURE — 63600175 PHARM REV CODE 636 W HCPCS: Performed by: HOSPITALIST

## 2019-08-23 PROCEDURE — 36415 COLL VENOUS BLD VENIPUNCTURE: CPT

## 2019-08-23 PROCEDURE — 27100171 HC OXYGEN HIGH FLOW UP TO 24 HOURS

## 2019-08-23 PROCEDURE — 99232 PR SUBSEQUENT HOSPITAL CARE,LEVL II: ICD-10-PCS | Mod: ,,, | Performed by: HOSPITALIST

## 2019-08-23 PROCEDURE — 99252 IP/OBS CONSLTJ NEW/EST SF 35: CPT | Mod: ,,, | Performed by: NURSE PRACTITIONER

## 2019-08-23 PROCEDURE — 20600001 HC STEP DOWN PRIVATE ROOM

## 2019-08-23 PROCEDURE — 99252 PR INITIAL INPATIENT CONSULT,LEVL II: ICD-10-PCS | Mod: ,,, | Performed by: NURSE PRACTITIONER

## 2019-08-23 PROCEDURE — 27100092 HC HIGH FLOW DELIVERY CANNULA

## 2019-08-23 PROCEDURE — 99232 PR SUBSEQUENT HOSPITAL CARE,LEVL II: ICD-10-PCS | Mod: ,,, | Performed by: INTERNAL MEDICINE

## 2019-08-23 PROCEDURE — 92610 EVALUATE SWALLOWING FUNCTION: CPT

## 2019-08-23 PROCEDURE — 99232 SBSQ HOSP IP/OBS MODERATE 35: CPT | Mod: ,,, | Performed by: HOSPITALIST

## 2019-08-23 PROCEDURE — 25000003 PHARM REV CODE 250: Performed by: HOSPITALIST

## 2019-08-23 PROCEDURE — 94640 AIRWAY INHALATION TREATMENT: CPT

## 2019-08-23 PROCEDURE — 99900035 HC TECH TIME PER 15 MIN (STAT)

## 2019-08-23 RX ORDER — TAMSULOSIN HYDROCHLORIDE 0.4 MG/1
0.4 CAPSULE ORAL ONCE
Status: COMPLETED | OUTPATIENT
Start: 2019-08-23 | End: 2019-08-23

## 2019-08-23 RX ORDER — TAMSULOSIN HYDROCHLORIDE 0.4 MG/1
0.8 CAPSULE ORAL DAILY
Status: DISCONTINUED | OUTPATIENT
Start: 2019-08-24 | End: 2019-08-24

## 2019-08-23 RX ADMIN — TAMSULOSIN HYDROCHLORIDE 0.4 MG: 0.4 CAPSULE ORAL at 08:08

## 2019-08-23 RX ADMIN — PANTOPRAZOLE SODIUM 40 MG: 40 TABLET, DELAYED RELEASE ORAL at 09:08

## 2019-08-23 RX ADMIN — SUCRALFATE 1 G: 1 SUSPENSION ORAL at 02:08

## 2019-08-23 RX ADMIN — HEPARIN SODIUM 5000 UNITS: 5000 INJECTION INTRAVENOUS; SUBCUTANEOUS at 02:08

## 2019-08-23 RX ADMIN — SENNOSIDES,DOCUSATE SODIUM 1 TABLET: 8.6; 5 TABLET, FILM COATED ORAL at 08:08

## 2019-08-23 RX ADMIN — PREDNISONE 80 MG: 20 TABLET ORAL at 09:08

## 2019-08-23 RX ADMIN — HEPARIN SODIUM 5000 UNITS: 5000 INJECTION INTRAVENOUS; SUBCUTANEOUS at 08:08

## 2019-08-23 RX ADMIN — FUROSEMIDE 40 MG: 40 TABLET ORAL at 09:08

## 2019-08-23 RX ADMIN — GUAIFENESIN 600 MG: 600 TABLET, EXTENDED RELEASE ORAL at 09:08

## 2019-08-23 RX ADMIN — GUAIFENESIN 600 MG: 600 TABLET, EXTENDED RELEASE ORAL at 08:08

## 2019-08-23 RX ADMIN — IPRATROPIUM BROMIDE AND ALBUTEROL SULFATE 3 ML: .5; 3 SOLUTION RESPIRATORY (INHALATION) at 08:08

## 2019-08-23 RX ADMIN — LEVOFLOXACIN 750 MG: 750 TABLET, FILM COATED ORAL at 09:08

## 2019-08-23 RX ADMIN — FERROUS SULFATE TAB EC 325 MG (65 MG FE EQUIVALENT) 325 MG: 325 (65 FE) TABLET DELAYED RESPONSE at 08:08

## 2019-08-23 RX ADMIN — IPRATROPIUM BROMIDE AND ALBUTEROL SULFATE 3 ML: .5; 3 SOLUTION RESPIRATORY (INHALATION) at 12:08

## 2019-08-23 RX ADMIN — IPRATROPIUM BROMIDE AND ALBUTEROL SULFATE 3 ML: .5; 3 SOLUTION RESPIRATORY (INHALATION) at 07:08

## 2019-08-23 RX ADMIN — SUCRALFATE 1 G: 1 SUSPENSION ORAL at 09:08

## 2019-08-23 RX ADMIN — SUCRALFATE 1 G: 1 SUSPENSION ORAL at 08:08

## 2019-08-23 RX ADMIN — PANTOPRAZOLE SODIUM 40 MG: 40 TABLET, DELAYED RELEASE ORAL at 08:08

## 2019-08-23 RX ADMIN — SENNOSIDES,DOCUSATE SODIUM 1 TABLET: 8.6; 5 TABLET, FILM COATED ORAL at 09:08

## 2019-08-23 RX ADMIN — ACETAMINOPHEN 650 MG: 325 TABLET ORAL at 06:08

## 2019-08-23 RX ADMIN — IPRATROPIUM BROMIDE AND ALBUTEROL SULFATE 3 ML: .5; 3 SOLUTION RESPIRATORY (INHALATION) at 04:08

## 2019-08-23 RX ADMIN — TAMSULOSIN HYDROCHLORIDE 0.4 MG: 0.4 CAPSULE ORAL at 07:08

## 2019-08-23 RX ADMIN — FERROUS SULFATE TAB EC 325 MG (65 MG FE EQUIVALENT) 325 MG: 325 (65 FE) TABLET DELAYED RESPONSE at 09:08

## 2019-08-23 RX ADMIN — SUCRALFATE 1 G: 1 SUSPENSION ORAL at 06:08

## 2019-08-23 RX ADMIN — HEPARIN SODIUM 5000 UNITS: 5000 INJECTION INTRAVENOUS; SUBCUTANEOUS at 06:08

## 2019-08-23 RX ADMIN — ATORVASTATIN CALCIUM 20 MG: 20 TABLET, FILM COATED ORAL at 08:08

## 2019-08-23 RX ADMIN — RAMELTEON 8 MG: 8 TABLET ORAL at 08:08

## 2019-08-23 NOTE — CARE UPDATE
Rapid Response Nurse Chart Check     Chart check completed, abnormal VS noted. Bedside RN Erum contacted, no concerns verbalized at this time, instructed to call [phone] for further concerns or assistance.

## 2019-08-23 NOTE — SUBJECTIVE & OBJECTIVE
Interval History: patient with worsening respiratory distress this morning while taking a bath. Severe hypoxemia and now placed on High flow oxygen. Pulmonary following and started on oral steroids. Rapid response at bedside. Satting low 90's on 20 L oxygen at 60% FiO2.     8/23: Respiratory status improved with steroids. Weaned to 5L NC. Continues to complain of SOB with minimal activity.     Review of Systems   Constitutional: Positive for activity change and fatigue. Negative for chills and fever.   HENT: Negative for congestion, facial swelling, hearing loss and trouble swallowing.    Eyes: Negative for photophobia and visual disturbance.   Respiratory: Positive for cough and shortness of breath. Negative for chest tightness and wheezing.    Cardiovascular: Negative for chest pain, palpitations and leg swelling.   Gastrointestinal: Negative for abdominal pain, blood in stool, constipation, diarrhea, nausea and vomiting.   Endocrine: Negative.    Genitourinary: Negative.    Musculoskeletal: Negative for back pain, joint swelling and myalgias.   Skin: Negative.    Allergic/Immunologic: Negative.    Neurological: Negative for dizziness, facial asymmetry, speech difficulty, weakness and numbness.   Hematological: Negative.    Psychiatric/Behavioral: Negative for agitation, confusion and dysphoric mood. The patient is not nervous/anxious.      Objective:     Vital Signs (Most Recent):  Temp: 98.5 °F (36.9 °C) (08/23/19 0911)  Pulse: (!) 123 (08/23/19 0911)  Resp: (!) 30 (08/23/19 0911)  BP: 114/62 (08/23/19 0911)  SpO2: (!) 91 % (08/23/19 0911) Vital Signs (24h Range):  Temp:  [97.1 °F (36.2 °C)-98.5 °F (36.9 °C)] 98.5 °F (36.9 °C)  Pulse:  [] 123  Resp:  [18-40] 30  SpO2:  [84 %-100 %] 91 %  BP: (111-131)/(62-76) 114/62        There is no height or weight on file to calculate BMI.    Intake/Output Summary (Last 24 hours) at 8/23/2019 1021  Last data filed at 8/23/2019 0600  Gross per 24 hour   Intake 240 ml    Output 200 ml   Net 40 ml      Physical Exam   Constitutional: He is oriented to person, place, and time. Vital signs are normal. He appears well-developed and well-nourished.  Non-toxic appearance. He does not appear ill. No distress. Nasal cannula in place.   HENT:   Head: Normocephalic and atraumatic.   Eyes: Pupils are equal, round, and reactive to light. Conjunctivae and EOM are normal. No scleral icterus.   Neck: Normal range of motion and full passive range of motion without pain. Neck supple. No JVD present. Carotid bruit is not present. No thyromegaly present.   Cardiovascular: Regular rhythm, S1 normal, S2 normal, normal heart sounds and normal pulses. Tachycardia present. Exam reveals no gallop, no S3, no S4 and no friction rub.   No murmur heard.  Pulmonary/Chest: Effort normal. No accessory muscle usage. Tachypnea noted. No respiratory distress. He has decreased breath sounds. He has no wheezes. He has no rhonchi. He has no rales.   Abdominal: Soft. Normal appearance and bowel sounds are normal. He exhibits no shifting dullness, no distension, no abdominal bruit and no ascites. There is no hepatosplenomegaly. There is no tenderness. There is no rigidity and no guarding.   Musculoskeletal: Normal range of motion. He exhibits no edema or tenderness.   Neurological: He is alert and oriented to person, place, and time. He has normal strength. He is not disoriented. No cranial nerve deficit or sensory deficit. GCS eye subscore is 4. GCS verbal subscore is 5. GCS motor subscore is 6.   Skin: Skin is warm, dry and intact. No abrasion and no lesion noted.   Psychiatric: He has a normal mood and affect. His behavior is normal. Judgment and thought content normal. His mood appears not anxious. His speech is not slurred. He is not actively hallucinating. Cognition and memory are normal. He does not exhibit a depressed mood. He is attentive.       Significant Labs: All pertinent labs within the past 24 hours have  been reviewed.    Significant Imaging: I have reviewed all pertinent imaging results/findings within the past 24 hours.     Transthoracic echo (TTE) 2D with Color Flow  · Normal left ventricular systolic function. The estimated ejection   fraction is 60%  · Concentric left ventricular hypertrophy.  · No wall motion abnormalities.  · Normal LV diastolic function.  · Normal right ventricular systolic function.  · Mild left atrial enlargement.  · Mild right atrial enlargement.  · Mild tricuspid regurgitation.  · Normal central venous pressure (3 mm Hg).  · The estimated PA systolic pressure is 39 mm Hg     X-Ray Chest AP Portable  Narrative: EXAMINATION:  XR CHEST AP PORTABLE    CLINICAL HISTORY:  SOB, Low Pulse Ox;    TECHNIQUE:  Single frontal view of the chest was performed.    COMPARISON:  August 19, 2019.    FINDINGS:  Surgical clips left upper quadrant.  Heart is mildly enlarged.  Diffuse increase in interstitial and alveolar markings with some patchy areas of opacification similar.  No pneumothorax.  Impression: Diffuse patchy interstitial and alveolar consolidation similar.    Electronically signed by: Pierre Aleman MD  Date:    08/22/2019  Time:    07:59

## 2019-08-23 NOTE — PLAN OF CARE
Problem: SLP Goal  Goal: SLP Goal  Outcome: Outcome(s) achieved Date Met: 08/23/19  Bedside swallow evaluation completed. Pt presents with functional oropharyngeal swallow. Recommend continue regular consistency diet with thin liquids. No ST f/u required. Please re-consult if necessary. Kalyn Gilliland CCC-SLP 8/23/2019 10:01 AM

## 2019-08-23 NOTE — ASSESSMENT & PLAN NOTE
-  Transferred from [facility]  -  Related to recent acute/prolonged hospital course and increased O2 demands resulting in impaired functional mobility, endurance, and cardiopulmonary response to activity  See hospital course for functional status.    Recommendations  -  Encourage mobility, OOB in chair, and early ambulation as appropriate  -  PT/OT evaluate and treat  -  Pain management  -  DVT prophylaxis  -  Monitor for and prevent skin breakdown and pressure ulcers  · Early mobility, repositioning/weight shifting every 20-30 minutes when sitting, turn patient every 2 hours, proper mattress/overlay and chair cushioning, pressure relief/heel protector boots

## 2019-08-23 NOTE — PROGRESS NOTES
[facility] Hospital Medicine  Progress Note    Patient Name: Manpreet Hamilton  MRN: [id number]  Patient Class: IP- Inpatient   Admission Date: 8/21/2019  Length of Stay: 2 days  Attending Physician: Domingo Lin MD  Primary Care Provider: Radames Pradhan MD    Hospital Medicine Team: [facility] MED  Domingo Morrow MD    Subjective:     Principal Problem:Acute on chronic respiratory failure with hypoxemia        HPI:  68-year-old man with HTN, HLD, UIP (diagnosed 1 year ago, followed closely by Dr. Pradhan, on ofev) who is currently admitted to [facility]. He has had a complicated recent history beginning with an admission in July 2019 for GIB requiring massive transfusion. He has had increasing oxygen rqeuirements and was discharged on 8/9 after an admission for acute on chronic hypoxic respiratory failure, unclear if this was related to infection, non-cardiogenic pulmonary edema, or an exacerbation of his IPF. He improved with levaquin, lasix, and prednisone and was discharged to inpatient rehab for continued PT/OT and oxygen weaning (down to 4L upon discharge).     He did well with rehab, but starting Saturday began to have increasing oxygen requirements, eventually being transferred to the ED for increased work of breathing on 8/19, at which time a CTA was performed that ruled out PE. He improved while in the ED and was transferred back to rehab for further care. His shortness of breath has returned over the early week, culminating in an episode of respiratory distress this morning requiring VM. He has now improved, but remains short of breath on his home nasal canula requirements. Dr. Pradhan has been visiting him daily at rehab and has requested direct admission to hospital medicine after direct admission to lung transplant service was declined. Originally the patient was going to go directly to the ED given the respiratory distress and acute nature, but strongly requested direct floor admission,  and given the subsequent improvement Dr. Pradhan feels that he is safe to wait at rehab for a bed.    Overview/Hospital Course:  No notes on file    Interval History: patient with worsening respiratory distress this morning while taking a bath. Severe hypoxemia and now placed on High flow oxygen. Pulmonary following and started on oral steroids. Rapid response at bedside. Satting low 90's on 20 L oxygen at 60% FiO2.     8/23: Respiratory status improved with steroids. Weaned to 5L NC. Continues to complain of SOB with minimal activity.     Review of Systems   Constitutional: Positive for activity change and fatigue. Negative for chills and fever.   HENT: Negative for congestion, facial swelling, hearing loss and trouble swallowing.    Eyes: Negative for photophobia and visual disturbance.   Respiratory: Positive for cough and shortness of breath. Negative for chest tightness and wheezing.    Cardiovascular: Negative for chest pain, palpitations and leg swelling.   Gastrointestinal: Negative for abdominal pain, blood in stool, constipation, diarrhea, nausea and vomiting.   Endocrine: Negative.    Genitourinary: Negative.    Musculoskeletal: Negative for back pain, joint swelling and myalgias.   Skin: Negative.    Allergic/Immunologic: Negative.    Neurological: Negative for dizziness, facial asymmetry, speech difficulty, weakness and numbness.   Hematological: Negative.    Psychiatric/Behavioral: Negative for agitation, confusion and dysphoric mood. The patient is not nervous/anxious.      Objective:     Vital Signs (Most Recent):  Temp: 98.5 °F (36.9 °C) (08/23/19 0911)  Pulse: (!) 123 (08/23/19 0911)  Resp: (!) 30 (08/23/19 0911)  BP: 114/62 (08/23/19 0911)  SpO2: (!) 91 % (08/23/19 0911) Vital Signs (24h Range):  Temp:  [97.1 °F (36.2 °C)-98.5 °F (36.9 °C)] 98.5 °F (36.9 °C)  Pulse:  [] 123  Resp:  [18-40] 30  SpO2:  [84 %-100 %] 91 %  BP: (111-131)/(62-76) 114/62        There is no height or weight on file to  calculate BMI.    Intake/Output Summary (Last 24 hours) at 8/23/2019 1021  Last data filed at 8/23/2019 0600  Gross per 24 hour   Intake 240 ml   Output 200 ml   Net 40 ml      Physical Exam   Constitutional: He is oriented to person, place, and time. Vital signs are normal. He appears well-developed and well-nourished.  Non-toxic appearance. He does not appear ill. No distress. Nasal cannula in place.   HENT:   Head: Normocephalic and atraumatic.   Eyes: Pupils are equal, round, and reactive to light. Conjunctivae and EOM are normal. No scleral icterus.   Neck: Normal range of motion and full passive range of motion without pain. Neck supple. No JVD present. Carotid bruit is not present. No thyromegaly present.   Cardiovascular: Regular rhythm, S1 normal, S2 normal, normal heart sounds and normal pulses. Tachycardia present. Exam reveals no gallop, no S3, no S4 and no friction rub.   No murmur heard.  Pulmonary/Chest: Effort normal. No accessory muscle usage. Tachypnea noted. No respiratory distress. He has decreased breath sounds. He has no wheezes. He has no rhonchi. He has no rales.   Abdominal: Soft. Normal appearance and bowel sounds are normal. He exhibits no shifting dullness, no distension, no abdominal bruit and no ascites. There is no hepatosplenomegaly. There is no tenderness. There is no rigidity and no guarding.   Musculoskeletal: Normal range of motion. He exhibits no edema or tenderness.   Neurological: He is alert and oriented to person, place, and time. He has normal strength. He is not disoriented. No cranial nerve deficit or sensory deficit. GCS eye subscore is 4. GCS verbal subscore is 5. GCS motor subscore is 6.   Skin: Skin is warm, dry and intact. No abrasion and no lesion noted.   Psychiatric: He has a normal mood and affect. His behavior is normal. Judgment and thought content normal. His mood appears not anxious. His speech is not slurred. He is not actively hallucinating. Cognition and  memory are normal. He does not exhibit a depressed mood. He is attentive.       Significant Labs: All pertinent labs within the past 24 hours have been reviewed.    Significant Imaging: I have reviewed all pertinent imaging results/findings within the past 24 hours.     Transthoracic echo (TTE) 2D with Color Flow  · Normal left ventricular systolic function. The estimated ejection   fraction is 60%  · Concentric left ventricular hypertrophy.  · No wall motion abnormalities.  · Normal LV diastolic function.  · Normal right ventricular systolic function.  · Mild left atrial enlargement.  · Mild right atrial enlargement.  · Mild tricuspid regurgitation.  · Normal central venous pressure (3 mm Hg).  · The estimated PA systolic pressure is 39 mm Hg     X-Ray Chest AP Portable  Narrative: EXAMINATION:  XR CHEST AP PORTABLE    CLINICAL HISTORY:  SOB, Low Pulse Ox;    TECHNIQUE:  Single frontal view of the chest was performed.    COMPARISON:  August 19, 2019.    FINDINGS:  Surgical clips left upper quadrant.  Heart is mildly enlarged.  Diffuse increase in interstitial and alveolar markings with some patchy areas of opacification similar.  No pneumothorax.  Impression: Diffuse patchy interstitial and alveolar consolidation similar.    Electronically signed by: Pierre Aleman MD  Date:    08/22/2019  Time:    07:59        Assessment/Plan:      * Acute on chronic respiratory failure with hypoxemia  Likely due to exacerbation of his IPF.  Unlikely Acute respiratory infection or pulmonary edema  Blood and Sputum cultures NGTD  Cont antibiotic therapy with Levofloxacin empirically   Cont oral prednisone   Cont oral Lasix. Patient does not appear grossly volume overloaded on exam.   Check pulse oximetry to maintain O2 Sat ? 91%.   Maintain oxygenation with nasal canula.   Incentive spirometry q 1-2 hours while awake.   chest xray: Diffuse patchy interstitial and alveolar consolidation similar.  Cont to monitor I/O's and daily  weights.  Fluid restriction (2 liters/24 hours)  Low Na diet  Pulmonary following     Pulmonary fibrosis  Resume home Nintedanib  Further treatment as above.       Debility  Cont with PT/OT for gait training and strengthening and restoration of ADL's   Encourage mobility, OOB in chair, and early ambulation as appropriate  Fall precautions   Monitor for bowel and bladder dysfunction  Monitor for and prevent skin breakdown and pressure ulcers    Acute blood loss anemia  Lab Results   Component Value Date    HGB 10.9 (L) 08/23/2019     H/H stable  No signs of active bleed  Cont home Ferrous sulfate with bowel regimen         VTE Risk Mitigation (From admission, onward)        Ordered     heparin (porcine) injection 5,000 Units  Every 8 hours      08/21/19 1600                Domingo Morrow MD  Department of Hospital Medicine   [facility]

## 2019-08-23 NOTE — CONSULTS
Inpatient consult to Physical Medicine Rehab  Consult performed by: BAO Moreland  Consult ordered by: Domingo Lin MD  Reason for consult: rehab evaluation       Consult received.  Reviewed patient history and current admission.  Therapy evaluations pending.  Rehab team following.  Full consult to follow.    VARSHA Lee, NEYDAP-C  Physical Medicine & Rehabilitation   08/23/2019  Spectralink: [id number]

## 2019-08-23 NOTE — PLAN OF CARE
Problem: Occupational Therapy Goal  Goal: Occupational Therapy Goal  Goals to be met by: 8/30/19     Patient will increase functional independence with ADLs by performing:    LE Dressing with Set-up Assistance.  Grooming while standing at sink with Set-up Assistance.  Toileting from toilet with Set-up Assistance for hygiene and clothing management.   Toilet transfer to toilet with Supervision.    Outcome: Ongoing (interventions implemented as appropriate)  Evaluation completed and POC established.    NADEEM Barakat

## 2019-08-23 NOTE — PLAN OF CARE
Problem: Adult Inpatient Plan of Care  Goal: Plan of Care Review  Outcome: Ongoing (interventions implemented as appropriate)  Rested comfortably most all night, VSS, NAD. Breathing much improved over previous night and not having the bad coughing spells of the previous night, Sats maintained in the 90's on high flow nc O2. Tachy cont on tele. Pt got angry with staff and did not want to be awakened at night.

## 2019-08-23 NOTE — CARE UPDATE
RAPID RESPONSE NURSE PROACTIVE ROUNDING NOTE     Time of Visit: 1630    Admit Date: 2019  LOS: 2  Code Status: Full Code   Date of Visit: 2019  : 1950  Age: 68 y.o.  Sex: male  Race: White  Bed: 8080/8080 A:   MRN: [id number]  Was the patient discharged from an ICU this admission? no   Was the patient discharged from a PACU within last 24 hours?  no  Did the patient receive conscious sedation/general anesthesia in last 24 hours?  no  Was the patient in the ED within the past 24 hours?  no  Was the patient started on NIPPV within the past 24 hours?  no  Attending Physician: Domingo Lin MD  Primary Service: [facility]    ASSESSMENT     Diagnosis: Acute on chronic respiratory failure with hypoxemia    Abnormal Vital Signs: /79 (BP Location: Left arm, Patient Position: Lying)   Pulse (!) 111   Temp 97.4 °F (36.3 °C) (Oral)   Resp 18   SpO2 99%      Clinical Issues: Respiratory    Patient  has a past medical history of Hyperlipemia, Hypertension, Nasal congestion, and Pulmonary fibrosis.    Pt resting in bed comfortable. Still having moments of SOB with exertion. Pt having no complaints of SOB. VSS.      INTERVENTIONS/ RECOMMENDATIONS     Continue plan of care per primary.     Discussed plan of care with RNFarzana.    PHYSICIAN ESCALATION     Yes/No  no      Disposition: Remain in room 8080.    FOLLOW-UP     Call back the Rapid Response Nurse, Ricardo Dunn RN at [phone] for additional questions or concerns.

## 2019-08-23 NOTE — CONSULTS
"[facility]  Physical Medicine & Rehab  Consult Note    Patient Name: Manpreet Hamilton  MRN: [id number]  Admission Date: 8/21/2019  Hospital Length of Stay: 2 days  Attending Physician: Domingo Lin MD     Inpatient consult to Physical Medicine & Rehabilitation  Consult performed by: Erin Fuentes NP  Consult requested by:  Domingo Lin MD    Collaborating Physician: Kinga Lynn MD  Reason for Consult:  Rehab evaluation     Consults  Subjective:     Principal Problem: Acute on chronic respiratory failure with hypoxemia    HPI: Manpreet Hamilton (Johnny) is a 68-year-old male with PMHx of HTN, HLD, idiopathic pulmonary fibrosis (followed by Dr. Pradhan, on OFEV), recent prolonged/acute admission 7/6/19-7/22/19 for extensive UGIB with acute blood loss anemia requiring massive transfusion, endoscopic clipping, and IR embolization further complicated by acute hypoxemic respiratory failure requiring intubation with discharge home on supplemental O2 and home health therapy.  Following discharge, patient with increased oxygen requirements, dyspnea, and progressively worsening weakness and activity intolerance resulting in readmission on 7/31/19 for acute on chronic hypoxemic respiratory failure of unclear etiology (possibly health care associated infection vs non-cardiogenic pulmonary edema vs IPF exacerbation) treated with antibiotics, steroids, and diuresis and discharged to [facility] on 8/9/19.  While in rehab, patient developed increased oxygen requirements and worsening SOB.  Evaluated by Dr. Pradhan and directly admitted to [facility] on 8/21/19 for closer monitoring and management of acute on chronic respiratory failure likely related to IPF.     Functional History: Patient lives in [place] with his wife in a 2 story home with 3 steps to enter and flight of stairs to 2nd floor.  Prior to admission in July, he was active and(I) with ADLs and mobility; working, driving, and walking 2 miles per day.  " Since discharge on 7/22/19, he required assistance with some ADLs and walked short distances with rollator.  Difficulty tolerating home health therapy during this time.  Did not require home oxygen until after recent admission.  [facility] 8/9/19-8/21/19.  DME: PARVEEN bowen.    Hospital Course: 8/23/19:  Evaluated by SLP.  SLP recommendation: regular diet and thin liquids.  PT and OT evaluations pending.     Past Medical History:   Diagnosis Date    Hyperlipemia     Hypertension     Nasal congestion     Pulmonary fibrosis      Past Surgical History:   Procedure Laterality Date    CATARACT EXTRACTION Left     COLONOSCOPY N/A 8/5/2016    Performed by Leon Padilla MD at [facility] ENDO (4TH FLR)    EGD (ESOPHAGOGASTRODUODENOSCOPY) N/A 7/18/2019    Performed by Fer Fox MD at [facility] ENDO (2ND FLR)    EGD (ESOPHAGOGASTRODUODENOSCOPY) N/A 7/12/2019    Performed by Jimmy Saldana MD at [facility] ENDO (2ND FLR)    EGD (ESOPHAGOGASTRODUODENOSCOPY) N/A 7/11/2019    Performed by Jimmy Saldana MD at [facility] ENDO (2ND FLR)    EGD (ESOPHAGOGASTRODUODENOSCOPY) N/A 7/10/2019    Performed by Jimmy Saldana MD at [facility] ENDO (2ND FLR)    HAND SURGERY      left    HIATAL HERNIA REPAIR      MICRODISCECTOMY, SPINE MIS Right L5- S1 Right 6/27/2018    Performed by Angel Pendleton MD at [facility] OR 2ND FLR    TONSILLECTOMY, ADENOIDECTOMY      VASECTOMY       Review of patient's allergies indicates:   Allergen Reactions    Penicillins     Erythromycin Rash       Scheduled Medications:    albuterol-ipratropium  3 mL Nebulization Q4H    atorvastatin  20 mg Oral QHS    ferrous sulfate  325 mg Oral BID    furosemide  40 mg Oral Daily    guaiFENesin  600 mg Oral BID    heparin (porcine)  5,000 Units Subcutaneous Q8H    levoFLOXacin  750 mg Oral Daily    nintedanib  1 capsule Oral BID    pantoprazole  40 mg Oral Q12H    predniSONE  80 mg Oral Daily    senna-docusate 8.6-50 mg  1 tablet Oral BID    sucralfate  1 g Oral  QID (WM & HS)    [START ON 2019] tamsulosin  0.8 mg Oral Daily       PRN Medications: acetaminophen, bisacodyl, Dextrose 10% Bolus, Dextrose 10% Bolus, glucagon (human recombinant), glucose, glucose, ondansetron, ondansetron, ramelteon, sodium chloride 0.9%    Family History     Problem Relation (Age of Onset)    Coronary artery disease Mother, Sister    Dementia Father    Multiple sclerosis Sister    Parkinsonism Father        Tobacco Use    Smoking status: Former Smoker     Packs/day: 1.50     Years: 20.00     Pack years: 30.00     Types: Cigarettes     Last attempt to quit: 3/17/1985     Years since quittin.4    Smokeless tobacco: Former User     Quit date: 1986   Substance and Sexual Activity    Alcohol use: Yes     Alcohol/week: 16.8 oz     Types: 28 Glasses of wine per week     Comment: 6-7 bottles per week    Drug use: No    Sexual activity: Yes     Partners: Female     Review of Systems   Constitutional: Positive for activity change and appetite change. Negative for chills and fever.   HENT: Negative for drooling, hearing loss, trouble swallowing and voice change.    Eyes: Negative for pain and visual disturbance.   Respiratory: Positive for shortness of breath. Negative for cough.    Cardiovascular: Negative for chest pain and palpitations.   Gastrointestinal: Negative for abdominal pain, nausea and vomiting.   Genitourinary: Positive for difficulty urinating. Negative for flank pain.   Musculoskeletal: Negative for arthralgias, back pain, myalgias and neck pain.   Skin: Negative for rash and wound.   Neurological: Positive for weakness. Negative for dizziness, numbness and headaches.   Psychiatric/Behavioral: Negative for agitation and hallucinations. The patient is not nervous/anxious.      Objective:     Vital Signs (Most Recent):  Temp: 97.6 °F (36.4 °C) (19 1134)  Pulse: (!) 114 (19 1134)  Resp: 18 (19 1134)  BP: 110/74 (19 1134)  SpO2: 96 % (19  1134)    Vital Signs (24h Range):  Temp:  [97.1 °F (36.2 °C)-98.5 °F (36.9 °C)] 97.6 °F (36.4 °C)  Pulse:  [] 114  Resp:  [18-32] 18  SpO2:  [84 %-99 %] 96 %  BP: (110-131)/(62-76) 110/74     There is no height or weight on file to calculate BMI.    Physical Exam   Constitutional: He is oriented to person, place, and time. He appears well-developed and well-nourished. No distress. Nasal cannula in place.   HENT:   Head: Normocephalic and atraumatic.   Right Ear: External ear normal.   Left Ear: External ear normal.   Nose: Nose normal.   Eyes: Right eye exhibits no discharge. Left eye exhibits no discharge. No scleral icterus.   Neck: Normal range of motion.   Cardiovascular: Intact distal pulses. Tachycardia present.   Pulmonary/Chest: Tachypnea noted. No respiratory distress.   Abdominal: Soft. He exhibits no distension. There is no tenderness.   Musculoskeletal: Normal range of motion. He exhibits no edema or tenderness.   Neurological: He is alert and oriented to person, place, and time. No sensory deficit. He exhibits normal muscle tone.   Skin: Skin is warm and dry. No rash noted.   Psychiatric: He has a normal mood and affect. His behavior is normal.   Vitals reviewed.    Diagnostic Results:   Labs: Reviewed  X-Ray: Reviewed  CT: Reviewed    Assessment/Plan:     * Acute on chronic respiratory failure with hypoxemia  -  Recent admission 7/31-8/9 for acute on chronic respiratory failure with subsequent admission to [facility]  -  Likely related to IPF exacerbation  -  Admitted to Hospital Medicine   -  Pulmonology following    Pulmonary fibrosis  -  Followed in clinic by Dr. Pradhan- on Ofev    Debility  -  Transferred from [facility]  -  Related to recent acute/prolonged hospital course and increased O2 demands resulting in impaired functional mobility, endurance, and cardiopulmonary response to activity  See hospital course for functional status.    Recommendations  -  Encourage mobility, OOB in chair,  and early ambulation as appropriate  -  PT/OT evaluate and treat  -  Pain management  -  DVT prophylaxis  -  Monitor for and prevent skin breakdown and pressure ulcers  · Early mobility, repositioning/weight shifting every 20-30 minutes when sitting, turn patient every 2 hours, proper mattress/overlay and chair cushioning, pressure relief/heel protector boots    Transferred from IRF.  PT and OT ordered, evaluations pending.  Prefers to go home at discharge.  Will follow for final post-acute recommendation.     Thank you for your consult.     BAO Thao  Department of Physical Medicine & Rehab  [facility]

## 2019-08-23 NOTE — SUBJECTIVE & OBJECTIVE
Past Medical History:   Diagnosis Date    Hyperlipemia     Hypertension     Nasal congestion     Pulmonary fibrosis      Past Surgical History:   Procedure Laterality Date    CATARACT EXTRACTION Left     COLONOSCOPY N/A 8/5/2016    Performed by Leon Padilla MD at [facility] ENDO (4TH FLR)    EGD (ESOPHAGOGASTRODUODENOSCOPY) N/A 7/18/2019    Performed by Fer Fox MD at [facility] ENDO (2ND FLR)    EGD (ESOPHAGOGASTRODUODENOSCOPY) N/A 7/12/2019    Performed by Jimmy Saldana MD at [facility] ENDO (2ND FLR)    EGD (ESOPHAGOGASTRODUODENOSCOPY) N/A 7/11/2019    Performed by Jimmy Saldana MD at [facility] ENDO (2ND FLR)    EGD (ESOPHAGOGASTRODUODENOSCOPY) N/A 7/10/2019    Performed by Jimmy Saldana MD at [facility] ENDO (2ND FLR)    HAND SURGERY      left    HIATAL HERNIA REPAIR      MICRODISCECTOMY, SPINE MIS Right L5- S1 Right 6/27/2018    Performed by Angel Pendleton MD at [facility] OR 2ND FLR    TONSILLECTOMY, ADENOIDECTOMY      VASECTOMY       Review of patient's allergies indicates:   Allergen Reactions    Penicillins     Erythromycin Rash       Scheduled Medications:    albuterol-ipratropium  3 mL Nebulization Q4H    atorvastatin  20 mg Oral QHS    ferrous sulfate  325 mg Oral BID    furosemide  40 mg Oral Daily    guaiFENesin  600 mg Oral BID    heparin (porcine)  5,000 Units Subcutaneous Q8H    levoFLOXacin  750 mg Oral Daily    nintedanib  1 capsule Oral BID    pantoprazole  40 mg Oral Q12H    predniSONE  80 mg Oral Daily    senna-docusate 8.6-50 mg  1 tablet Oral BID    sucralfate  1 g Oral QID (WM & HS)    [START ON 8/24/2019] tamsulosin  0.8 mg Oral Daily       PRN Medications: acetaminophen, bisacodyl, Dextrose 10% Bolus, Dextrose 10% Bolus, glucagon (human recombinant), glucose, glucose, ondansetron, ondansetron, ramelteon, sodium chloride 0.9%    Family History     Problem Relation (Age of Onset)    Coronary artery disease Mother, Sister    Dementia Father    Multiple sclerosis Sister     Parkinsonism Father        Tobacco Use    Smoking status: Former Smoker     Packs/day: 1.50     Years: 20.00     Pack years: 30.00     Types: Cigarettes     Last attempt to quit: 3/17/1985     Years since quittin.4    Smokeless tobacco: Former User     Quit date: 1986   Substance and Sexual Activity    Alcohol use: Yes     Alcohol/week: 16.8 oz     Types: 28 Glasses of wine per week     Comment: 6-7 bottles per week    Drug use: No    Sexual activity: Yes     Partners: Female     Review of Systems   Constitutional: Positive for activity change and appetite change. Negative for chills and fever.   HENT: Negative for drooling, hearing loss, trouble swallowing and voice change.    Eyes: Negative for pain and visual disturbance.   Respiratory: Positive for shortness of breath. Negative for cough.    Cardiovascular: Negative for chest pain and palpitations.   Gastrointestinal: Negative for abdominal pain, nausea and vomiting.   Genitourinary: Positive for difficulty urinating. Negative for flank pain.   Musculoskeletal: Negative for arthralgias, back pain, myalgias and neck pain.   Skin: Negative for rash and wound.   Neurological: Positive for weakness. Negative for dizziness, numbness and headaches.   Psychiatric/Behavioral: Negative for agitation and hallucinations. The patient is not nervous/anxious.      Objective:     Vital Signs (Most Recent):  Temp: 97.6 °F (36.4 °C) (19 1134)  Pulse: (!) 114 (19 1134)  Resp: 18 (19 1134)  BP: 110/74 (19 1134)  SpO2: 96 % (19 1134)    Vital Signs (24h Range):  Temp:  [97.1 °F (36.2 °C)-98.5 °F (36.9 °C)] 97.6 °F (36.4 °C)  Pulse:  [] 114  Resp:  [18-32] 18  SpO2:  [84 %-99 %] 96 %  BP: (110-131)/(62-76) 110/74     There is no height or weight on file to calculate BMI.    Physical Exam   Constitutional: He is oriented to person, place, and time. He appears well-developed and well-nourished. No distress. Nasal cannula in place.    HENT:   Head: Normocephalic and atraumatic.   Right Ear: External ear normal.   Left Ear: External ear normal.   Nose: Nose normal.   Eyes: Right eye exhibits no discharge. Left eye exhibits no discharge. No scleral icterus.   Neck: Normal range of motion.   Cardiovascular: Intact distal pulses. Tachycardia present.   Pulmonary/Chest: Tachypnea noted. No respiratory distress.   Abdominal: Soft. He exhibits no distension. There is no tenderness.   Musculoskeletal: Normal range of motion. He exhibits no edema or tenderness.   Neurological: He is alert and oriented to person, place, and time. No sensory deficit. He exhibits normal muscle tone.   Skin: Skin is warm and dry. No rash noted.   Psychiatric: He has a normal mood and affect. His behavior is normal.   Vitals reviewed.    NEUROLOGICAL EXAMINATION:     MENTAL STATUS   Oriented to person, place, and time.       Diagnostic Results:   Labs: Reviewed  X-Ray: Reviewed  CT: Reviewed

## 2019-08-23 NOTE — ASSESSMENT & PLAN NOTE
Likely due to exacerbation of his IPF.  Unlikely Acute respiratory infection or pulmonary edema  Blood and Sputum cultures NGTD  Cont antibiotic therapy with Levofloxacin empirically   Cont oral prednisone   Cont oral Lasix. Patient does not appear grossly volume overloaded on exam.   Check pulse oximetry to maintain O2 Sat ? 91%.   Maintain oxygenation with nasal canula.   Incentive spirometry q 1-2 hours while awake.   chest xray: Diffuse patchy interstitial and alveolar consolidation similar.  Cont to monitor I/O's and daily weights.  Fluid restriction (2 liters/24 hours)  Low Na diet  Pulmonary following

## 2019-08-23 NOTE — PT/OT/SLP EVAL
"Speech Language Pathology Evaluation  Bedside Swallow    Patient Name:  Manpreet Hamilton   MRN:  [id number]  Admitting Diagnosis: Acute on chronic respiratory failure with hypoxemia    Recommendations:                 General Recommendations:  Follow-up not indicated  Diet recommendations:  Regular, Thin   Aspiration Precautions: Standard aspiration precautions   General Precautions: Standard, fall  Communication strategies:  none    History:     Past Medical History:   Diagnosis Date    Hyperlipemia     Hypertension     Nasal congestion     Pulmonary fibrosis        Past Surgical History:   Procedure Laterality Date    CATARACT EXTRACTION Left     COLONOSCOPY N/A 8/5/2016    Performed by Leon Padilla MD at [facility] ENDO (4TH FLR)    EGD (ESOPHAGOGASTRODUODENOSCOPY) N/A 7/18/2019    Performed by Fer Fox MD at [facility] ENDO (2ND FLR)    EGD (ESOPHAGOGASTRODUODENOSCOPY) N/A 7/12/2019    Performed by Jimmy Saldana MD at [facility] ENDO (2ND FLR)    EGD (ESOPHAGOGASTRODUODENOSCOPY) N/A 7/11/2019    Performed by Jimmy Saldana MD at [facility] ENDO (2ND FLR)    EGD (ESOPHAGOGASTRODUODENOSCOPY) N/A 7/10/2019    Performed by Jimmy Saldana MD at [facility] ENDO (2ND FLR)    HAND SURGERY      left    HIATAL HERNIA REPAIR      MICRODISCECTOMY, SPINE MIS Right L5- S1 Right 6/27/2018    Performed by Angel Pendleton MD at [facility] OR 2ND FLR    TONSILLECTOMY, ADENOIDECTOMY      VASECTOMY         Chest X-Rays: 8/21- Diffuse patchy interstitial and alveolar consolidation similar.    Prior diet: regular/thin liquids    Subjective     "Who ordered this?"     Spoke to RN prior to entering pt's room. Pt seen bedside for session. Agreeable to ST given encouragement.    Pain/Comfort:  · Pain Rating 1: 0/10  · Pain Rating Post-Intervention 1: 0/10    Objective:     Oral Musculature Evaluation  · Oral Musculature: WFL  · Dentition: present and adequate  · Secretion Management: adequate  · Oral Labial Strength and Mobility: " WFL  · Lingual Strength and Mobility: WFL  · Volitional Cough: intact  · Volitional Swallow: intact  · Voice Prior to PO Intake: strong and clear    Bedside Swallow Eval:   Consistencies Assessed:  · Thin liquids 4 oz water via single and consecutive cup sips  · Puree pudding via tsp x2  · Solids cracker x2     Oral Phase:   · WFL    Pharyngeal Phase:   · no overt clinical signs/symptoms of aspiration  · no overt clinical signs/symptoms of pharyngeal dysphagia    Compensatory Strategies  · None    Treatment: Provided education to pt and family re: role of ST, POC, recommendations to continue regular consistency diet with thin liquids, and plan for no further ST f/u. He verbalized understanding. White board updated. RN notified of results and recs.       Assessment:     Manpreet Hamilton is a 68 y.o. male with an SLP diagnosis of functional oropharyngeal swallow.    Goals:   Multidisciplinary Problems     SLP Goals     Not on file          Multidisciplinary Problems (Resolved)        Problem: SLP Goal    Goal Priority Disciplines Outcome   SLP Goal   (Resolved)     SLP Outcome(s) achieved                   Plan:     · Patient to be seen:      · Plan of Care expires:     · Plan of Care reviewed with:  patient   · SLP Follow-Up:  No       Discharge recommendations:      Barriers to Discharge:  None    Time Tracking:     SLP Treatment Date:   08/23/19  Speech Start Time:  0858  Speech Stop Time:  0908     Speech Total Time (min):  10 min    Billable Minutes: Eval Swallow and Oral Function 10 minutes    Kalyn Gilliland CCC-SLP  08/23/2019

## 2019-08-23 NOTE — PROGRESS NOTES
[facility]  Pulmonology  Progress Note    Patient Name: Manpreet Hamilton  MRN: [id number]  Admission Date: 8/21/2019  Hospital Length of Stay: 2 days  Code Status: Full Code  Attending Provider: Domingo Lin MD  Primary Care Provider: Radames Pradhan MD   Principal Problem: Acute on chronic respiratory failure with hypoxemia    Subjective:     Interval History: No issues overnight. Still dyspneic, especially exertional. Still c/o cough that is not productive. But overall no change in breathing.     Objective:     Vital Signs (Most Recent):  Temp: (pt does not want to be awakened) (08/23/19 0400)  Pulse: (!) 117 (08/23/19 0728)  Resp: 18 (08/23/19 0702)  BP: 114/65 (08/22/19 2332)  SpO2: 98 %(on high flow 9lpm) (08/23/19 0702) Vital Signs (24h Range):  Temp:  [97.1 °F (36.2 °C)-98.4 °F (36.9 °C)] 97.1 °F (36.2 °C)  Pulse:  [] 117  Resp:  [18-42] 18  SpO2:  [84 %-100 %] 98 %  BP: (111-131)/(65-76) 114/65        There is no height or weight on file to calculate BMI.      Intake/Output Summary (Last 24 hours) at 8/23/2019 0729  Last data filed at 8/23/2019 0600  Gross per 24 hour   Intake 240 ml   Output 200 ml   Net 40 ml       Physical Exam    Vents:  Oxygen Concentration (%): 56 (08/22/19 1756)    Lines/Drains/Airways     Drain                 Urethral Catheter 08/08/19 2200 Double-lumen 16 Fr. 14 days          Peripheral Intravenous Line                 Peripheral IV - Single Lumen 08/21/19 2047 20 G Anterior;Left Upper Arm 1 day                Significant Labs:    CBC/Anemia Profile:  Recent Labs   Lab 08/21/19  1624 08/22/19  0525 08/23/19  0429   WBC 8.80 10.05 7.33   HGB 10.9* 10.2* 10.9*   HCT 36.5* 33.1* 36.3*    292 329   MCV 87 85 85   RDW 15.5* 15.7* 15.4*        Chemistries:  Recent Labs   Lab 08/21/19  1624 08/22/19  0525 08/23/19  0429    139 141   K 4.7 3.6 3.7    99 99   CO2 31* 28 30*   BUN 15 18 21   CREATININE 0.7 0.7 0.7   CALCIUM 9.8 9.4 9.9   ALBUMIN 2.6*  2.6* 2.6*   PROT 6.0 5.7* 5.7*   BILITOT 0.3 0.5 0.5   ALKPHOS 121 106 109   ALT 30 29 30   AST 23 20 16   MG 1.8  --   --    PHOS 3.1  --   --        All pertinent labs within the past 24 hours have been reviewed.    Significant Imaging:  I have reviewed and interpreted all pertinent imaging results/findings within the past 24 hours.    Assessment/Plan:     * Acute on chronic respiratory failure with hypoxemia  -baseline hypoxia (approx 4L O2); likely d/t IPF  -currently on Ofev, but per notes there were plans to change to Esbriet d/t GI bleed  -recently on high doses of steroids (7/31/19) for exacerbation of IPF; infectious work up negative  -was weaned to Pred 25mg daily but increased to Pred 50mg daily on 8/20  -CXR w/ what appears to be worsening infiltrates and given the paucity of infectious signs or symptoms, may need to consider AE IPF, which will require higher doses of steroids. (1mg/kg prednisone); Started on Pred 80mg daily  -CTA negative for PE on Monday  -echo w/ normal systolic fx; conc LVH, mild LAE, UVALDO  -continue steroids/abx for now             Ty Isbell MD  Pulmonology  [facility]

## 2019-08-23 NOTE — ASSESSMENT & PLAN NOTE
-  Recent admission 7/31-8/9 for acute on chronic respiratory failure with subsequent admission to [facility]  -  Likely related to IPF exacerbation  -  Admitted to Hospital Medicine   -  Pulmonology following

## 2019-08-23 NOTE — ASSESSMENT & PLAN NOTE
Lab Results   Component Value Date    HGB 10.9 (L) 08/23/2019     H/H stable  No signs of active bleed  Cont home Ferrous sulfate with bowel regimen

## 2019-08-23 NOTE — HPI
"Manpreet"Brandon Hamilton is a 68-year-old male with PMHx of HTN, HLD, idiopathic pulmonary fibrosis (followed by Dr. Pradhan, on OFEV), recent prolonged/acute admission 7/6/19-7/22/19 for extensive UGIB with acute blood loss anemia requiring massive transfusion, endoscopic clipping, and IR embolization further complicated by acute hypoxemic respiratory failure requiring intubation with discharge home on supplemental O2 and home health therapy.  Following discharge, patient with increased oxygen requirements, dyspnea, and progressively worsening weakness and activity intolerance resulting in readmission on 7/31/19 for acute on chronic hypoxemic respiratory failure of unclear etiology (possibly health care associated infection vs non-cardiogenic pulmonary edema vs IPF exacerbation) treated with antibiotics, steroids, and diuresis and discharged to [facility] on 8/9/19.  While in rehab, patient developed increased oxygen requirements and worsening SOB.  Evaluated by Dr. Pradhan and directly admitted to [facility] on 8/21/19 for closer monitoring and management of acute on chronic respiratory failure likely related to IPF.     Functional History: Patient lives in [place] with his wife in a 2 story home with 3 steps to enter and flight of stairs to 2nd floor.  Prior to admission in July, he was active and(I) with ADLs and mobility; working, driving, and walking 2 miles per day.  Since discharge on 7/22/19, he required assistance with some ADLs and walked short distances with rollator.  Difficulty tolerating home health therapy during this time.  Did not require home oxygen until after recent admission.  [facility] 8/9/19-8/21/19.  DME: ELIE bowen"

## 2019-08-23 NOTE — CARE UPDATE
Pt spo2 98 on high flow 9lpm. MD. Crawford wants the pt spo2 at 88-92. The pt just had a breathing treatment and spo2 is 98, RT will return and titrate patient oxygen level. MD is awear.

## 2019-08-23 NOTE — PT/OT/SLP EVAL
Occupational Therapy   Evaluation    Name: Manpreet Hamilton  MRN: [id number]  Admitting Diagnosis:  Acute on chronic respiratory failure with hypoxemia      Recommendations:     Discharge Recommendations: home with home health  Discharge Equipment Recommendations:  commode, wheelchair, manual  Barriers to discharge:  None    Assessment:     Manpreet Hamilton is a 68 y.o. male with a medical diagnosis of Acute on chronic respiratory failure with hypoxemia.  He presents with SOB on minimal exertion limiting his ability to participate in ADLs and mobility. Upon sitting EOB, pt quickly desats and becomes light-headed prompting him to lie back down. Pt wants to go home after D/C from [facility]. Performance deficits affecting function: weakness, impaired self care skills, impaired endurance, impaired functional mobilty, impaired cardiopulmonary response to activity, gait instability, impaired balance.      Rehab Prognosis: Fair; patient would benefit from acute skilled OT services to address these deficits and reach maximum level of function.       Plan:     Patient to be seen 3 x/week to address the above listed problems via self-care/home management, therapeutic activities, therapeutic exercises  · Plan of Care Expires: 09/22/19  · Plan of Care Reviewed with: patient, spouse    Subjective     Chief Complaint: SOB  Patient/Family Comments/goals: Go home.    Occupational Profile:   Living Environment: Patient lives in [place] with his wife in a 2 story home with 3 steps to enter and flight of stairs to 2nd floor. Has recently been [facility] and was sent back to [facility] for SOB. Prior to admission in July, he was active and (I) with ADLs and mobility; working, driving, and walking 2 miles per day.  Since discharge on 7/22/19, he required assistance with some ADLs and walked short distances with rollator. Difficulty tolerating home health therapy during this time.  Did not require home oxygen until after recent admission.     Equipment Used at Home:  hospital bed, rollator, shower chair, oxygen  Assistance upon Discharge: Wife as able.    Pain/Comfort:  · Pain Rating 1: 0/10    Patients cultural, spiritual, Alevism conflicts given the current situation:      Objective:     Communicated with: rn prior to session.  Patient found supine with oxygen upon OT entry to room.    General Precautions: Standard, fall, respiratory   Orthopedic Precautions:    Braces:       Occupational Performance:    Bed Mobility:    · Independent    Functional Mobility/Transfers:  Deferred by pt due to SOB.    Activities of Daily Living:  · Feeding:  independence   · Grooming: independence     Cognitive/Visual Perceptual:  Cognitive/Psychosocial Skills:     -       Oriented to: Person, Place, Time and Situation   -       Safety awareness/insight to disability: intact     Physical Exam:  BUE AROM/MMT: WNL    AMPAC 6 Click ADL:  AMPAC Total Score: 21    Treatment & Education:  UE ROM/MMT  Bed mobility training / assessment  Sitting balance assessment  Educated on importance of sitting OOB in bedside chair to promote increased strength, endurance & breathing.  Discussed OT POC / Post-acute plan  Education:    Patient left supine with all lines intact and call button in reach    GOALS:   Multidisciplinary Problems     Occupational Therapy Goals        Problem: Occupational Therapy Goal    Goal Priority Disciplines Outcome Interventions   Occupational Therapy Goal     OT, PT/OT Ongoing (interventions implemented as appropriate)    Description:  Goals to be met by: 8/30/19     Patient will increase functional independence with ADLs by performing:    LE Dressing with Set-up Assistance.  Grooming while standing at sink with Set-up Assistance.  Toileting from toilet with Set-up Assistance for hygiene and clothing management.   Toilet transfer to toilet with Supervision.                      History:     Past Medical History:   Diagnosis Date    Hyperlipemia      Hypertension     Nasal congestion     Pulmonary fibrosis        Past Surgical History:   Procedure Laterality Date    CATARACT EXTRACTION Left     COLONOSCOPY N/A 8/5/2016    Performed by Leon [facility]illa MD at [facility] ENDO (4TH FLR)    EGD (ESOPHAGOGASTRODUODENOSCOPY) N/A 7/18/2019    Performed by Fer Fox MD at [facility] ENDO (2ND FLR)    EGD (ESOPHAGOGASTRODUODENOSCOPY) N/A 7/12/2019    Performed by Jimmy Saldana MD at [facility] ENDO (2ND FLR)    EGD (ESOPHAGOGASTRODUODENOSCOPY) N/A 7/11/2019    Performed by Jimmy Saldana MD at [facility] ENDO (2ND FLR)    EGD (ESOPHAGOGASTRODUODENOSCOPY) N/A 7/10/2019    Performed by Jimmy Saldana MD at [facility] ENDO (2ND FLR)    HAND SURGERY      left    HIATAL HERNIA REPAIR      MICRODISCECTOMY, SPINE MIS Right L5- S1 Right 6/27/2018    Performed by Angel Pendleton MD at [facility] OR 2ND FLR    TONSILLECTOMY, ADENOIDECTOMY      VASECTOMY         Time Tracking:     OT Date of Treatment: 08/23/19  OT Start Time: 1332  OT Stop Time: 1350  OT Total Time (min): 18 min    Billable Minutes:Evaluation 18    NADEEM Burton  8/23/2019

## 2019-08-23 NOTE — HOSPITAL COURSE
8/23/19:  Evaluated by SLP.  SLP recommendation: regular diet and thin liquids.  Bed mobility and ADLs (I).    8/25/19:  Evaluated by PT.  Bed mobility CGA.  Sit to stand and transfers CGA.  Marching in place Kelly.

## 2019-08-23 NOTE — CARE UPDATE
Rapid Response Respiratory Therapy Proactive Rounding Note      Time of visit: 1319    Code Status: Full Code   : 1950  Age: 68 y.o.  Weight:   Wt Readings from Last 1 Encounters:   19 76.7 kg (169 lb 1.5 oz)     Sex: male  Race: White   Bed: 8080/8080 A:   MRN: [id number]    SITUATION     Evaluated patient for: Respiratory (High iris nasal cannula)    BACKGROUND     Patient has a past medical history of Hyperlipemia, Hypertension, Nasal congestion, and Pulmonary fibrosis.  Pulmonary Hx: Pulmonary fibrosis  Clinically Significant Surgical Hx: None    ASSESSMENT     Pulse: Pulse: (!) 116 Respiratory rate: Resp: 18 Temperature: Temp: 97.6 °F (36.4 °C) BP: BP: 110/74 SpO2:SpO2: 99 %   Level of Consciousness: Level of Consciousness (AVPU): alert  Respiratory Effort: Respiratory Effort: Unlabored  Expansion/Accessory Muscle Usage: Expansion/Accessory Muscles/Retractions: no use of accessory muscles, expansion symmetric  All Lung Field Breath Sounds: All Lung Fields Breath Sounds: Anterior:, Lateral:, diminished  LLL Breath Sounds: diminished  RLL Breath Sounds: diminished  Cough Type: Cough Type: no productive sputum  Mobility at time of assessment: General Mobility: generalized weakness, moderately impaired  O2 Device/Concentration: O2 Device (Oxygen Therapy): High Flow nasal Cannula   Flow (L/min): 5 Oxygen Concentration (%): 56  Most recent blood gas:   Recent Labs     19  0319   PH 7.486*   PCO2 39.1   PO2 36*   HCO3 29.6*   POCSATURATED 74*   BE 6         Current Respiratory Care Orders:  19 1612  Oxygen Continuous Continuous     References: Oxygen Titration Protocol   Question Answer Comment   Device type: High flow    Device: High Flow Nasal Cannula (6 -15 Liters)    LPM: 6-15    Titrate O2 per Oxygen Titration Protocol: Yes    To maintain SpO2 goal of: >= 90%    Notify MD of: Inability to achieve desired SpO2; Sudden change in patient status and requires 20% increase in FiO2; Patient  requires >60% FiO2        08/22/19 1612   08/21/19 2000  Incentive spirometry Every 4 hours (14 of 37 released)    Release    08/21/19 1602   08/21/19 1600  Inhalation Treatment Q4H Every 4 hours (15 of 38 released)    Release    08/21/19 1600   08/21/19 1558  Pulse Oximetry Continuous Continuous      08/21/19 1600   IP Meds - Nasal and Inhaled   Comment  Hide   (From admission, onward)      Last edited by  on  at    Start   Stop Status Route Frequency Ordered   08/21/19 1615  albuterol-ipratropium 2.5 mg-0.5 mg/3 mL nebulizer solution 3 mL (albuterol-ipratropium (DUO-NEB) 0.5 - 3 mg (2.5 mg base)/ 3 mL nebulizer panel)    Discontinue    -- Dispensed NEBULIZATION Every 4 hours         NIPPV: No  Surgical airway: No  ETCO2 monitored:    Ambu at bedside: Ambu bag with the patient?: Yes, Adult Ambu    INTERVENTIONS/RECOMMENDATIONS   ?  High iris nasal cannula weaned from 9L to 5L, oxygen saturation within normal parameters.  Continue to wean as tolerated.     Discussed plan of care with  primary RTPiyush.     FOLLOW-UP     Please call back the Rapid Response RTArely, CRT at x [phone] for any questions or concerns.

## 2019-08-23 NOTE — CARE UPDATE
Rapid Response Nurse Chart Check     Chart check completed, abnormal VS noted. Bedside RN Suleman contacted, no concerns   verbalized at this time, instructed to call [phone] for further concerns or assistance.

## 2019-08-23 NOTE — SUBJECTIVE & OBJECTIVE
Interval History: No issues overnight. Still dyspneic, especially exertional. Still c/o cough that is not productive. But overall no change in breathing.     Objective:     Vital Signs (Most Recent):  Temp: (pt does not want to be awakened) (08/23/19 0400)  Pulse: (!) 117 (08/23/19 0728)  Resp: 18 (08/23/19 0702)  BP: 114/65 (08/22/19 2332)  SpO2: 98 %(on high flow 9lpm) (08/23/19 0702) Vital Signs (24h Range):  Temp:  [97.1 °F (36.2 °C)-98.4 °F (36.9 °C)] 97.1 °F (36.2 °C)  Pulse:  [] 117  Resp:  [18-42] 18  SpO2:  [84 %-100 %] 98 %  BP: (111-131)/(65-76) 114/65        There is no height or weight on file to calculate BMI.      Intake/Output Summary (Last 24 hours) at 8/23/2019 0729  Last data filed at 8/23/2019 0600  Gross per 24 hour   Intake 240 ml   Output 200 ml   Net 40 ml       Physical Exam    Vents:  Oxygen Concentration (%): 56 (08/22/19 1756)    Lines/Drains/Airways     Drain                 Urethral Catheter 08/08/19 2200 Double-lumen 16 Fr. 14 days          Peripheral Intravenous Line                 Peripheral IV - Single Lumen 08/21/19 2047 20 G Anterior;Left Upper Arm 1 day                Significant Labs:    CBC/Anemia Profile:  Recent Labs   Lab 08/21/19 1624 08/22/19  0525 08/23/19  0429   WBC 8.80 10.05 7.33   HGB 10.9* 10.2* 10.9*   HCT 36.5* 33.1* 36.3*    292 329   MCV 87 85 85   RDW 15.5* 15.7* 15.4*        Chemistries:  Recent Labs   Lab 08/21/19  1624 08/22/19  0525 08/23/19  0429    139 141   K 4.7 3.6 3.7    99 99   CO2 31* 28 30*   BUN 15 18 21   CREATININE 0.7 0.7 0.7   CALCIUM 9.8 9.4 9.9   ALBUMIN 2.6* 2.6* 2.6*   PROT 6.0 5.7* 5.7*   BILITOT 0.3 0.5 0.5   ALKPHOS 121 106 109   ALT 30 29 30   AST 23 20 16   MG 1.8  --   --    PHOS 3.1  --   --        All pertinent labs within the past 24 hours have been reviewed.    Significant Imaging:  I have reviewed and interpreted all pertinent imaging results/findings within the past 24 hours.

## 2019-08-24 LAB
ALBUMIN SERPL BCP-MCNC: 2.7 G/DL (ref 3.5–5.2)
ALP SERPL-CCNC: 113 U/L (ref 55–135)
ALT SERPL W/O P-5'-P-CCNC: 36 U/L (ref 10–44)
ANION GAP SERPL CALC-SCNC: 9 MMOL/L (ref 8–16)
AST SERPL-CCNC: 18 U/L (ref 10–40)
BASOPHILS # BLD AUTO: 0.02 K/UL (ref 0–0.2)
BASOPHILS NFR BLD: 0.2 % (ref 0–1.9)
BILIRUB SERPL-MCNC: 0.4 MG/DL (ref 0.1–1)
BUN SERPL-MCNC: 22 MG/DL (ref 8–23)
CALCIUM SERPL-MCNC: 9.8 MG/DL (ref 8.7–10.5)
CHLORIDE SERPL-SCNC: 98 MMOL/L (ref 95–110)
CO2 SERPL-SCNC: 32 MMOL/L (ref 23–29)
CREAT SERPL-MCNC: 0.7 MG/DL (ref 0.5–1.4)
DIFFERENTIAL METHOD: ABNORMAL
EOSINOPHIL # BLD AUTO: 0.1 K/UL (ref 0–0.5)
EOSINOPHIL NFR BLD: 1.2 % (ref 0–8)
ERYTHROCYTE [DISTWIDTH] IN BLOOD BY AUTOMATED COUNT: 15.4 % (ref 11.5–14.5)
EST. GFR  (AFRICAN AMERICAN): >60 ML/MIN/1.73 M^2
EST. GFR  (NON AFRICAN AMERICAN): >60 ML/MIN/1.73 M^2
GLUCOSE SERPL-MCNC: 97 MG/DL (ref 70–110)
HCT VFR BLD AUTO: 38.7 % (ref 40–54)
HGB BLD-MCNC: 11.8 G/DL (ref 14–18)
IMM GRANULOCYTES # BLD AUTO: 0.02 K/UL (ref 0–0.04)
IMM GRANULOCYTES NFR BLD AUTO: 0.2 % (ref 0–0.5)
LYMPHOCYTES # BLD AUTO: 1.5 K/UL (ref 1–4.8)
LYMPHOCYTES NFR BLD: 17.6 % (ref 18–48)
MCH RBC QN AUTO: 25.5 PG (ref 27–31)
MCHC RBC AUTO-ENTMCNC: 30.5 G/DL (ref 32–36)
MCV RBC AUTO: 84 FL (ref 82–98)
MONOCYTES # BLD AUTO: 0.7 K/UL (ref 0.3–1)
MONOCYTES NFR BLD: 8 % (ref 4–15)
NEUTROPHILS # BLD AUTO: 6.2 K/UL (ref 1.8–7.7)
NEUTROPHILS NFR BLD: 72.8 % (ref 38–73)
NRBC BLD-RTO: 0 /100 WBC
PLATELET # BLD AUTO: 382 K/UL (ref 150–350)
PMV BLD AUTO: 9.1 FL (ref 9.2–12.9)
POTASSIUM SERPL-SCNC: 3.2 MMOL/L (ref 3.5–5.1)
PROT SERPL-MCNC: 6 G/DL (ref 6–8.4)
RBC # BLD AUTO: 4.62 M/UL (ref 4.6–6.2)
SODIUM SERPL-SCNC: 139 MMOL/L (ref 136–145)
WBC # BLD AUTO: 8.53 K/UL (ref 3.9–12.7)

## 2019-08-24 PROCEDURE — 36415 COLL VENOUS BLD VENIPUNCTURE: CPT

## 2019-08-24 PROCEDURE — 99232 SBSQ HOSP IP/OBS MODERATE 35: CPT | Mod: ,,, | Performed by: HOSPITALIST

## 2019-08-24 PROCEDURE — 94799 UNLISTED PULMONARY SVC/PX: CPT

## 2019-08-24 PROCEDURE — 63600175 PHARM REV CODE 636 W HCPCS: Performed by: INTERNAL MEDICINE

## 2019-08-24 PROCEDURE — 99232 PR SUBSEQUENT HOSPITAL CARE,LEVL II: ICD-10-PCS | Mod: ,,, | Performed by: HOSPITALIST

## 2019-08-24 PROCEDURE — 94640 AIRWAY INHALATION TREATMENT: CPT

## 2019-08-24 PROCEDURE — 27100171 HC OXYGEN HIGH FLOW UP TO 24 HOURS

## 2019-08-24 PROCEDURE — 25000242 PHARM REV CODE 250 ALT 637 W/ HCPCS: Performed by: HOSPITALIST

## 2019-08-24 PROCEDURE — 25000003 PHARM REV CODE 250: Performed by: HOSPITALIST

## 2019-08-24 PROCEDURE — 85025 COMPLETE CBC W/AUTO DIFF WBC: CPT

## 2019-08-24 PROCEDURE — 94761 N-INVAS EAR/PLS OXIMETRY MLT: CPT

## 2019-08-24 PROCEDURE — 27100092 HC HIGH FLOW DELIVERY CANNULA

## 2019-08-24 PROCEDURE — 99900035 HC TECH TIME PER 15 MIN (STAT)

## 2019-08-24 PROCEDURE — 25000003 PHARM REV CODE 250: Performed by: PHYSICIAN ASSISTANT

## 2019-08-24 PROCEDURE — 25000242 PHARM REV CODE 250 ALT 637 W/ HCPCS: Performed by: INTERNAL MEDICINE

## 2019-08-24 PROCEDURE — 20600001 HC STEP DOWN PRIVATE ROOM

## 2019-08-24 PROCEDURE — 80053 COMPREHEN METABOLIC PANEL: CPT

## 2019-08-24 PROCEDURE — 63600175 PHARM REV CODE 636 W HCPCS: Performed by: HOSPITALIST

## 2019-08-24 RX ORDER — IPRATROPIUM BROMIDE AND ALBUTEROL SULFATE 2.5; .5 MG/3ML; MG/3ML
3 SOLUTION RESPIRATORY (INHALATION)
Status: DISCONTINUED | OUTPATIENT
Start: 2019-08-24 | End: 2019-08-28 | Stop reason: HOSPADM

## 2019-08-24 RX ORDER — TAMSULOSIN HYDROCHLORIDE 0.4 MG/1
0.8 CAPSULE ORAL
Status: DISCONTINUED | OUTPATIENT
Start: 2019-08-24 | End: 2019-08-28 | Stop reason: HOSPADM

## 2019-08-24 RX ORDER — POTASSIUM CHLORIDE 20 MEQ/1
40 TABLET, EXTENDED RELEASE ORAL ONCE
Status: COMPLETED | OUTPATIENT
Start: 2019-08-24 | End: 2019-08-24

## 2019-08-24 RX ADMIN — FERROUS SULFATE TAB EC 325 MG (65 MG FE EQUIVALENT) 325 MG: 325 (65 FE) TABLET DELAYED RESPONSE at 08:08

## 2019-08-24 RX ADMIN — IPRATROPIUM BROMIDE AND ALBUTEROL SULFATE 3 ML: .5; 3 SOLUTION RESPIRATORY (INHALATION) at 12:08

## 2019-08-24 RX ADMIN — HEPARIN SODIUM 5000 UNITS: 5000 INJECTION INTRAVENOUS; SUBCUTANEOUS at 01:08

## 2019-08-24 RX ADMIN — PANTOPRAZOLE SODIUM 40 MG: 40 TABLET, DELAYED RELEASE ORAL at 09:08

## 2019-08-24 RX ADMIN — SUCRALFATE 1 G: 1 SUSPENSION ORAL at 08:08

## 2019-08-24 RX ADMIN — GUAIFENESIN 600 MG: 600 TABLET, EXTENDED RELEASE ORAL at 08:08

## 2019-08-24 RX ADMIN — IPRATROPIUM BROMIDE AND ALBUTEROL SULFATE 3 ML: .5; 3 SOLUTION RESPIRATORY (INHALATION) at 07:08

## 2019-08-24 RX ADMIN — HEPARIN SODIUM 5000 UNITS: 5000 INJECTION INTRAVENOUS; SUBCUTANEOUS at 08:08

## 2019-08-24 RX ADMIN — RAMELTEON 8 MG: 8 TABLET ORAL at 08:08

## 2019-08-24 RX ADMIN — ATORVASTATIN CALCIUM 20 MG: 20 TABLET, FILM COATED ORAL at 08:08

## 2019-08-24 RX ADMIN — LEVOFLOXACIN 750 MG: 750 TABLET, FILM COATED ORAL at 08:08

## 2019-08-24 RX ADMIN — PANTOPRAZOLE SODIUM 40 MG: 40 TABLET, DELAYED RELEASE ORAL at 08:08

## 2019-08-24 RX ADMIN — PREDNISONE 80 MG: 20 TABLET ORAL at 08:08

## 2019-08-24 RX ADMIN — POTASSIUM CHLORIDE 40 MEQ: 20 TABLET, EXTENDED RELEASE ORAL at 08:08

## 2019-08-24 RX ADMIN — FUROSEMIDE 40 MG: 40 TABLET ORAL at 08:08

## 2019-08-24 RX ADMIN — IPRATROPIUM BROMIDE AND ALBUTEROL SULFATE 3 ML: .5; 3 SOLUTION RESPIRATORY (INHALATION) at 04:08

## 2019-08-24 RX ADMIN — SUCRALFATE 1 G: 1 SUSPENSION ORAL at 05:08

## 2019-08-24 RX ADMIN — TAMSULOSIN HYDROCHLORIDE 0.8 MG: 0.4 CAPSULE ORAL at 06:08

## 2019-08-24 RX ADMIN — SUCRALFATE 1 G: 1 SUSPENSION ORAL at 12:08

## 2019-08-24 RX ADMIN — HEPARIN SODIUM 5000 UNITS: 5000 INJECTION INTRAVENOUS; SUBCUTANEOUS at 06:08

## 2019-08-24 RX ADMIN — SENNOSIDES,DOCUSATE SODIUM 1 TABLET: 8.6; 5 TABLET, FILM COATED ORAL at 08:08

## 2019-08-24 NOTE — PLAN OF CARE
Problem: Adult Inpatient Plan of Care  Goal: Plan of Care Review  Pt is AAOx4, pt desats easily with movement, otherwise vital signs stable. Pt does not want to be woken up for vital signs or breathing treatments.  Call light and personal belongings within reach. Plan of care reviewed with pt, all questions and concerns were addressed. Will continue to monitor.

## 2019-08-24 NOTE — PLAN OF CARE
Problem: Adult Inpatient Plan of Care  Goal: Plan of Care Review  Outcome: Ongoing (interventions implemented as appropriate)     08/23/19 1929   Plan of Care Review   Plan of Care Reviewed With patient;spouse;daughter   Progress no change     Goal: Patient-Specific Goal (Individualization)  Outcome: Ongoing (interventions implemented as appropriate)     08/23/19 1929   OTHER   Anxieties, Fears or Concerns discharge planning, will need HH   Individualized Care Needs pt prefers early meds 8A and 8P instead of 9A and 9P   Patient-Specific Goal (Individualization)   Patient-Specific Goals (Include Timeframe) Flomax early at 7am to facilitate UOP   Flomax time change made per MD, nursing to facilitate discussion of giving meds as early as allowed to improve patient comfort. Reviewed POC with pt and family at bedside, questions asked and answered.

## 2019-08-24 NOTE — PROGRESS NOTES
[facility] Hospital Medicine  Progress Note    Patient Name: Manpreet Hamilton  MRN: [id number]  Patient Class: IP- Inpatient   Admission Date: 8/21/2019  Length of Stay: 3 days  Attending Physician: Domingo Lin MD  Primary Care Provider: Radames Pradhan MD    Hospital Medicine Team: [facility] MED  Domingo Morrow MD    Subjective:     Principal Problem:Acute on chronic respiratory failure with hypoxemia        HPI:  68-year-old man with HTN, HLD, UIP (diagnosed 1 year ago, followed closely by Dr. Pradhan, on ofev) who is currently admitted to [facility]. He has had a complicated recent history beginning with an admission in July 2019 for GIB requiring massive transfusion. He has had increasing oxygen rqeuirements and was discharged on 8/9 after an admission for acute on chronic hypoxic respiratory failure, unclear if this was related to infection, non-cardiogenic pulmonary edema, or an exacerbation of his IPF. He improved with levaquin, lasix, and prednisone and was discharged to inpatient rehab for continued PT/OT and oxygen weaning (down to 4L upon discharge).     He did well with rehab, but starting Saturday began to have increasing oxygen requirements, eventually being transferred to the ED for increased work of breathing on 8/19, at which time a CTA was performed that ruled out PE. He improved while in the ED and was transferred back to rehab for further care. His shortness of breath has returned over the early week, culminating in an episode of respiratory distress this morning requiring VM. He has now improved, but remains short of breath on his home nasal canula requirements. Dr. Pradhan has been visiting him daily at rehab and has requested direct admission to hospital medicine after direct admission to lung transplant service was declined. Originally the patient was going to go directly to the ED given the respiratory distress and acute nature, but strongly requested direct floor admission,  and given the subsequent improvement Dr. Pradhan feels that he is safe to wait at rehab for a bed.    Overview/Hospital Course:  No notes on file    Interval History: patient with worsening respiratory distress this morning while taking a bath. Severe hypoxemia and now placed on High flow oxygen. Pulmonary following and started on oral steroids. Rapid response at bedside. Satting low 90's on 20 L oxygen at 60% FiO2.     8/24: Respiratory status stable with steroids. Weaned to 5L NC. Continues to complain of SOB with minimal activity. Oxygen saturation quickly drops with ADL's.     Review of Systems   Constitutional: Positive for activity change and fatigue. Negative for chills and fever.   HENT: Negative for congestion, facial swelling, hearing loss and trouble swallowing.    Eyes: Negative for photophobia and visual disturbance.   Respiratory: Positive for cough and shortness of breath. Negative for chest tightness and wheezing.    Cardiovascular: Negative for chest pain, palpitations and leg swelling.   Gastrointestinal: Negative for abdominal pain, blood in stool, constipation, diarrhea, nausea and vomiting.   Endocrine: Negative.    Genitourinary: Negative.    Musculoskeletal: Negative for back pain, joint swelling and myalgias.   Skin: Negative.    Allergic/Immunologic: Negative.    Neurological: Negative for dizziness, facial asymmetry, speech difficulty, weakness and numbness.   Hematological: Negative.    Psychiatric/Behavioral: Negative for agitation, confusion and dysphoric mood. The patient is not nervous/anxious.      Objective:     Vital Signs (Most Recent):  Temp: 97.6 °F (36.4 °C) (08/24/19 0749)  Pulse: 103 (08/24/19 1134)  Resp: 18 (08/24/19 1134)  BP: 101/61 (08/24/19 1134)  SpO2: (!) 88 % (08/24/19 1134) Vital Signs (24h Range):  Temp:  [97.4 °F (36.3 °C)-98 °F (36.7 °C)] 97.6 °F (36.4 °C)  Pulse:  [100-127] 103  Resp:  [16-28] 18  SpO2:  [87 %-100 %] 88 %  BP: (101-118)/(61-79) 101/61     Weight:  76.8 kg (169 lb 5 oz)  Body mass index is 25 kg/m².    Intake/Output Summary (Last 24 hours) at 8/24/2019 1136  Last data filed at 8/24/2019 1100  Gross per 24 hour   Intake 400 ml   Output 1275 ml   Net -875 ml      Physical Exam   Constitutional: He is oriented to person, place, and time. Vital signs are normal. He appears well-developed and well-nourished.  Non-toxic appearance. He does not appear ill. No distress. Nasal cannula in place.   HENT:   Head: Normocephalic and atraumatic.   Eyes: Pupils are equal, round, and reactive to light. Conjunctivae and EOM are normal. No scleral icterus.   Neck: Normal range of motion and full passive range of motion without pain. Neck supple. No JVD present. Carotid bruit is not present. No thyromegaly present.   Cardiovascular: Regular rhythm, S1 normal, S2 normal, normal heart sounds and normal pulses. Tachycardia present. Exam reveals no gallop, no S3, no S4 and no friction rub.   No murmur heard.  Pulmonary/Chest: Effort normal. No accessory muscle usage. Tachypnea noted. No respiratory distress. He has decreased breath sounds. He has no wheezes. He has no rhonchi. He has no rales.   Abdominal: Soft. Normal appearance and bowel sounds are normal. He exhibits no shifting dullness, no distension, no abdominal bruit and no ascites. There is no hepatosplenomegaly. There is no tenderness. There is no rigidity and no guarding.   Musculoskeletal: Normal range of motion. He exhibits no edema or tenderness.   Neurological: He is alert and oriented to person, place, and time. He has normal strength. He is not disoriented. No cranial nerve deficit or sensory deficit. GCS eye subscore is 4. GCS verbal subscore is 5. GCS motor subscore is 6.   Skin: Skin is warm, dry and intact. No abrasion and no lesion noted.   Psychiatric: He has a normal mood and affect. His behavior is normal. Judgment and thought content normal. His mood appears not anxious. His speech is not slurred. He is not  actively hallucinating. Cognition and memory are normal. He does not exhibit a depressed mood. He is attentive.       Significant Labs: All pertinent labs within the past 24 hours have been reviewed.    Significant Imaging: I have reviewed all pertinent imaging results/findings within the past 24 hours.     Transthoracic echo (TTE) 2D with Color Flow  · Normal left ventricular systolic function. The estimated ejection   fraction is 60%  · Concentric left ventricular hypertrophy.  · No wall motion abnormalities.  · Normal LV diastolic function.  · Normal right ventricular systolic function.  · Mild left atrial enlargement.  · Mild right atrial enlargement.  · Mild tricuspid regurgitation.  · Normal central venous pressure (3 mm Hg).  · The estimated PA systolic pressure is 39 mm Hg     X-Ray Chest AP Portable  Narrative: EXAMINATION:  XR CHEST AP PORTABLE    CLINICAL HISTORY:  SOB, Low Pulse Ox;    TECHNIQUE:  Single frontal view of the chest was performed.    COMPARISON:  August 19, 2019.    FINDINGS:  Surgical clips left upper quadrant.  Heart is mildly enlarged.  Diffuse increase in interstitial and alveolar markings with some patchy areas of opacification similar.  No pneumothorax.  Impression: Diffuse patchy interstitial and alveolar consolidation similar.    Electronically signed by: Pierre Aleman MD  Date:    08/22/2019  Time:    07:59        Assessment/Plan:      * Acute on chronic respiratory failure with hypoxemia  Likely due to exacerbation of his IPF.  Unlikely Acute respiratory infection or pulmonary edema  Blood and Sputum cultures NGTD  Cont antibiotic therapy with Levofloxacin empirically   Cont oral prednisone   Cont oral Lasix. Patient does not appear grossly volume overloaded on exam.   Check pulse oximetry to maintain O2 Sat ? 91%.   Maintain oxygenation with nasal canula.   Incentive spirometry q 1-2 hours while awake.   chest xray: Diffuse patchy interstitial and alveolar consolidation  similar.  Cont to monitor I/O's and daily weights.  Fluid restriction (2 liters/24 hours)  Low Na diet  Pulmonary following     Pulmonary fibrosis  Resume home Nintedanib  Further treatment as above.       Debility  Cont with PT/OT for gait training and strengthening and restoration of ADL's   Encourage mobility, OOB in chair, and early ambulation as appropriate  Fall precautions   Monitor for bowel and bladder dysfunction  Monitor for and prevent skin breakdown and pressure ulcers    Acute blood loss anemia  Lab Results   Component Value Date    HGB 11.8 (L) 08/24/2019     H/H stable  No signs of active bleed  Cont home Ferrous sulfate with bowel regimen         VTE Risk Mitigation (From admission, onward)        Ordered     heparin (porcine) injection 5,000 Units  Every 8 hours      08/21/19 1600                Domingo Morrow MD  Department of Hospital Medicine   [facility]

## 2019-08-24 NOTE — PLAN OF CARE
U Pulmonology Plan of Care Note:  - patient seen and examined this morning.  Persistent shortness of breath but stable on supplemental O2.  Planning for potential PLEX Monday. Will continue to follow. Please contact us with any questions or concerns regarding the care of this patient.    Cody Vazquez MD  [facility] Pulmonology/Critical Care, HO-IV  5:41 PM 08/24/2019

## 2019-08-24 NOTE — ASSESSMENT & PLAN NOTE
Lab Results   Component Value Date    HGB 11.8 (L) 08/24/2019     H/H stable  No signs of active bleed  Cont home Ferrous sulfate with bowel regimen

## 2019-08-24 NOTE — PLAN OF CARE
Problem: Adult Inpatient Plan of Care  Goal: Plan of Care Review  Outcome: Ongoing (interventions implemented as appropriate)  Plan of care reviewed with patient. AAOx4. Vitals were stable this morning. Patient was able to reposition himself independently in the bed. Cardiac monitoring were done. Safety precautions were in placed.

## 2019-08-24 NOTE — SUBJECTIVE & OBJECTIVE
Interval History: patient with worsening respiratory distress this morning while taking a bath. Severe hypoxemia and now placed on High flow oxygen. Pulmonary following and started on oral steroids. Rapid response at bedside. Satting low 90's on 20 L oxygen at 60% FiO2.     8/24: Respiratory status stable with steroids. Weaned to 5L NC. Continues to complain of SOB with minimal activity. Oxygen saturation quickly drops with ADL's.     Review of Systems   Constitutional: Positive for activity change and fatigue. Negative for chills and fever.   HENT: Negative for congestion, facial swelling, hearing loss and trouble swallowing.    Eyes: Negative for photophobia and visual disturbance.   Respiratory: Positive for cough and shortness of breath. Negative for chest tightness and wheezing.    Cardiovascular: Negative for chest pain, palpitations and leg swelling.   Gastrointestinal: Negative for abdominal pain, blood in stool, constipation, diarrhea, nausea and vomiting.   Endocrine: Negative.    Genitourinary: Negative.    Musculoskeletal: Negative for back pain, joint swelling and myalgias.   Skin: Negative.    Allergic/Immunologic: Negative.    Neurological: Negative for dizziness, facial asymmetry, speech difficulty, weakness and numbness.   Hematological: Negative.    Psychiatric/Behavioral: Negative for agitation, confusion and dysphoric mood. The patient is not nervous/anxious.      Objective:     Vital Signs (Most Recent):  Temp: 97.6 °F (36.4 °C) (08/24/19 0749)  Pulse: 103 (08/24/19 1134)  Resp: 18 (08/24/19 1134)  BP: 101/61 (08/24/19 1134)  SpO2: (!) 88 % (08/24/19 1134) Vital Signs (24h Range):  Temp:  [97.4 °F (36.3 °C)-98 °F (36.7 °C)] 97.6 °F (36.4 °C)  Pulse:  [100-127] 103  Resp:  [16-28] 18  SpO2:  [87 %-100 %] 88 %  BP: (101-118)/(61-79) 101/61     Weight: 76.8 kg (169 lb 5 oz)  Body mass index is 25 kg/m².    Intake/Output Summary (Last 24 hours) at 8/24/2019 1136  Last data filed at 8/24/2019  1100  Gross per 24 hour   Intake 400 ml   Output 1275 ml   Net -875 ml      Physical Exam   Constitutional: He is oriented to person, place, and time. Vital signs are normal. He appears well-developed and well-nourished.  Non-toxic appearance. He does not appear ill. No distress. Nasal cannula in place.   HENT:   Head: Normocephalic and atraumatic.   Eyes: Pupils are equal, round, and reactive to light. Conjunctivae and EOM are normal. No scleral icterus.   Neck: Normal range of motion and full passive range of motion without pain. Neck supple. No JVD present. Carotid bruit is not present. No thyromegaly present.   Cardiovascular: Regular rhythm, S1 normal, S2 normal, normal heart sounds and normal pulses. Tachycardia present. Exam reveals no gallop, no S3, no S4 and no friction rub.   No murmur heard.  Pulmonary/Chest: Effort normal. No accessory muscle usage. Tachypnea noted. No respiratory distress. He has decreased breath sounds. He has no wheezes. He has no rhonchi. He has no rales.   Abdominal: Soft. Normal appearance and bowel sounds are normal. He exhibits no shifting dullness, no distension, no abdominal bruit and no ascites. There is no hepatosplenomegaly. There is no tenderness. There is no rigidity and no guarding.   Musculoskeletal: Normal range of motion. He exhibits no edema or tenderness.   Neurological: He is alert and oriented to person, place, and time. He has normal strength. He is not disoriented. No cranial nerve deficit or sensory deficit. GCS eye subscore is 4. GCS verbal subscore is 5. GCS motor subscore is 6.   Skin: Skin is warm, dry and intact. No abrasion and no lesion noted.   Psychiatric: He has a normal mood and affect. His behavior is normal. Judgment and thought content normal. His mood appears not anxious. His speech is not slurred. He is not actively hallucinating. Cognition and memory are normal. He does not exhibit a depressed mood. He is attentive.       Significant Labs: All  pertinent labs within the past 24 hours have been reviewed.    Significant Imaging: I have reviewed all pertinent imaging results/findings within the past 24 hours.     Transthoracic echo (TTE) 2D with Color Flow  · Normal left ventricular systolic function. The estimated ejection   fraction is 60%  · Concentric left ventricular hypertrophy.  · No wall motion abnormalities.  · Normal LV diastolic function.  · Normal right ventricular systolic function.  · Mild left atrial enlargement.  · Mild right atrial enlargement.  · Mild tricuspid regurgitation.  · Normal central venous pressure (3 mm Hg).  · The estimated PA systolic pressure is 39 mm Hg     X-Ray Chest AP Portable  Narrative: EXAMINATION:  XR CHEST AP PORTABLE    CLINICAL HISTORY:  SOB, Low Pulse Ox;    TECHNIQUE:  Single frontal view of the chest was performed.    COMPARISON:  August 19, 2019.    FINDINGS:  Surgical clips left upper quadrant.  Heart is mildly enlarged.  Diffuse increase in interstitial and alveolar markings with some patchy areas of opacification similar.  No pneumothorax.  Impression: Diffuse patchy interstitial and alveolar consolidation similar.    Electronically signed by: Pierre Aleman MD  Date:    08/22/2019  Time:    07:59

## 2019-08-25 LAB
ALBUMIN SERPL BCP-MCNC: 3 G/DL (ref 3.5–5.2)
ALP SERPL-CCNC: 111 U/L (ref 55–135)
ALT SERPL W/O P-5'-P-CCNC: 39 U/L (ref 10–44)
ANION GAP SERPL CALC-SCNC: 11 MMOL/L (ref 8–16)
AST SERPL-CCNC: 19 U/L (ref 10–40)
BASOPHILS # BLD AUTO: 0.02 K/UL (ref 0–0.2)
BASOPHILS NFR BLD: 0.3 % (ref 0–1.9)
BILIRUB SERPL-MCNC: 0.4 MG/DL (ref 0.1–1)
BUN SERPL-MCNC: 27 MG/DL (ref 8–23)
CALCIUM SERPL-MCNC: 10 MG/DL (ref 8.7–10.5)
CHLORIDE SERPL-SCNC: 96 MMOL/L (ref 95–110)
CO2 SERPL-SCNC: 31 MMOL/L (ref 23–29)
CREAT SERPL-MCNC: 0.8 MG/DL (ref 0.5–1.4)
DIFFERENTIAL METHOD: ABNORMAL
EOSINOPHIL # BLD AUTO: 0.1 K/UL (ref 0–0.5)
EOSINOPHIL NFR BLD: 1.2 % (ref 0–8)
ERYTHROCYTE [DISTWIDTH] IN BLOOD BY AUTOMATED COUNT: 15.1 % (ref 11.5–14.5)
EST. GFR  (AFRICAN AMERICAN): >60 ML/MIN/1.73 M^2
EST. GFR  (NON AFRICAN AMERICAN): >60 ML/MIN/1.73 M^2
GLUCOSE SERPL-MCNC: 96 MG/DL (ref 70–110)
HCT VFR BLD AUTO: 39.4 % (ref 40–54)
HGB BLD-MCNC: 12.1 G/DL (ref 14–18)
IMM GRANULOCYTES # BLD AUTO: 0.03 K/UL (ref 0–0.04)
IMM GRANULOCYTES NFR BLD AUTO: 0.4 % (ref 0–0.5)
LYMPHOCYTES # BLD AUTO: 1.6 K/UL (ref 1–4.8)
LYMPHOCYTES NFR BLD: 20.2 % (ref 18–48)
MCH RBC QN AUTO: 25.6 PG (ref 27–31)
MCHC RBC AUTO-ENTMCNC: 30.7 G/DL (ref 32–36)
MCV RBC AUTO: 84 FL (ref 82–98)
MONOCYTES # BLD AUTO: 0.7 K/UL (ref 0.3–1)
MONOCYTES NFR BLD: 8.6 % (ref 4–15)
NEUTROPHILS # BLD AUTO: 5.3 K/UL (ref 1.8–7.7)
NEUTROPHILS NFR BLD: 69.3 % (ref 38–73)
NRBC BLD-RTO: 0 /100 WBC
PLATELET # BLD AUTO: 431 K/UL (ref 150–350)
PMV BLD AUTO: 9.6 FL (ref 9.2–12.9)
POTASSIUM SERPL-SCNC: 3.7 MMOL/L (ref 3.5–5.1)
PROT SERPL-MCNC: 6.3 G/DL (ref 6–8.4)
RBC # BLD AUTO: 4.72 M/UL (ref 4.6–6.2)
SODIUM SERPL-SCNC: 138 MMOL/L (ref 136–145)
WBC # BLD AUTO: 7.69 K/UL (ref 3.9–12.7)

## 2019-08-25 PROCEDURE — 27100092 HC HIGH FLOW DELIVERY CANNULA

## 2019-08-25 PROCEDURE — 94799 UNLISTED PULMONARY SVC/PX: CPT

## 2019-08-25 PROCEDURE — 20600001 HC STEP DOWN PRIVATE ROOM

## 2019-08-25 PROCEDURE — 63600175 PHARM REV CODE 636 W HCPCS: Performed by: HOSPITALIST

## 2019-08-25 PROCEDURE — 99232 SBSQ HOSP IP/OBS MODERATE 35: CPT | Mod: ,,, | Performed by: HOSPITALIST

## 2019-08-25 PROCEDURE — 80053 COMPREHEN METABOLIC PANEL: CPT

## 2019-08-25 PROCEDURE — 27100171 HC OXYGEN HIGH FLOW UP TO 24 HOURS

## 2019-08-25 PROCEDURE — 94761 N-INVAS EAR/PLS OXIMETRY MLT: CPT

## 2019-08-25 PROCEDURE — 36415 COLL VENOUS BLD VENIPUNCTURE: CPT

## 2019-08-25 PROCEDURE — 25000242 PHARM REV CODE 250 ALT 637 W/ HCPCS: Performed by: INTERNAL MEDICINE

## 2019-08-25 PROCEDURE — 94667 MNPJ CHEST WALL 1ST: CPT

## 2019-08-25 PROCEDURE — 97530 THERAPEUTIC ACTIVITIES: CPT

## 2019-08-25 PROCEDURE — 25000242 PHARM REV CODE 250 ALT 637 W/ HCPCS: Performed by: PHYSICIAN ASSISTANT

## 2019-08-25 PROCEDURE — 97161 PT EVAL LOW COMPLEX 20 MIN: CPT

## 2019-08-25 PROCEDURE — 99291 PR CRITICAL CARE, E/M 30-74 MINUTES: ICD-10-PCS | Mod: ,,, | Performed by: PHYSICIAN ASSISTANT

## 2019-08-25 PROCEDURE — 99900035 HC TECH TIME PER 15 MIN (STAT)

## 2019-08-25 PROCEDURE — 85025 COMPLETE CBC W/AUTO DIFF WBC: CPT

## 2019-08-25 PROCEDURE — 25000003 PHARM REV CODE 250: Performed by: HOSPITALIST

## 2019-08-25 PROCEDURE — 99291 CRITICAL CARE FIRST HOUR: CPT | Mod: ,,, | Performed by: PHYSICIAN ASSISTANT

## 2019-08-25 PROCEDURE — 99232 PR SUBSEQUENT HOSPITAL CARE,LEVL II: ICD-10-PCS | Mod: ,,, | Performed by: HOSPITALIST

## 2019-08-25 PROCEDURE — 25000003 PHARM REV CODE 250: Performed by: PHYSICIAN ASSISTANT

## 2019-08-25 PROCEDURE — 94640 AIRWAY INHALATION TREATMENT: CPT

## 2019-08-25 RX ORDER — IPRATROPIUM BROMIDE AND ALBUTEROL SULFATE 2.5; .5 MG/3ML; MG/3ML
3 SOLUTION RESPIRATORY (INHALATION) EVERY 4 HOURS PRN
Status: DISCONTINUED | OUTPATIENT
Start: 2019-08-25 | End: 2019-08-28 | Stop reason: HOSPADM

## 2019-08-25 RX ADMIN — ATORVASTATIN CALCIUM 20 MG: 20 TABLET, FILM COATED ORAL at 08:08

## 2019-08-25 RX ADMIN — IPRATROPIUM BROMIDE AND ALBUTEROL SULFATE 3 ML: .5; 3 SOLUTION RESPIRATORY (INHALATION) at 12:08

## 2019-08-25 RX ADMIN — FUROSEMIDE 40 MG: 40 TABLET ORAL at 08:08

## 2019-08-25 RX ADMIN — FERROUS SULFATE TAB EC 325 MG (65 MG FE EQUIVALENT) 325 MG: 325 (65 FE) TABLET DELAYED RESPONSE at 08:08

## 2019-08-25 RX ADMIN — SUCRALFATE 1 G: 1 SUSPENSION ORAL at 08:08

## 2019-08-25 RX ADMIN — HEPARIN SODIUM 5000 UNITS: 5000 INJECTION INTRAVENOUS; SUBCUTANEOUS at 06:08

## 2019-08-25 RX ADMIN — GUAIFENESIN 600 MG: 600 TABLET, EXTENDED RELEASE ORAL at 08:08

## 2019-08-25 RX ADMIN — SUCRALFATE 1 G: 1 SUSPENSION ORAL at 12:08

## 2019-08-25 RX ADMIN — IPRATROPIUM BROMIDE AND ALBUTEROL SULFATE 3 ML: .5; 3 SOLUTION RESPIRATORY (INHALATION) at 02:08

## 2019-08-25 RX ADMIN — LEVOFLOXACIN 750 MG: 750 TABLET, FILM COATED ORAL at 08:08

## 2019-08-25 RX ADMIN — SUCRALFATE 1 G: 1 SUSPENSION ORAL at 05:08

## 2019-08-25 RX ADMIN — IPRATROPIUM BROMIDE AND ALBUTEROL SULFATE 3 ML: .5; 3 SOLUTION RESPIRATORY (INHALATION) at 07:08

## 2019-08-25 RX ADMIN — IPRATROPIUM BROMIDE AND ALBUTEROL SULFATE 3 ML: .5; 3 SOLUTION RESPIRATORY (INHALATION) at 03:08

## 2019-08-25 RX ADMIN — RAMELTEON 8 MG: 8 TABLET ORAL at 08:08

## 2019-08-25 RX ADMIN — PANTOPRAZOLE SODIUM 40 MG: 40 TABLET, DELAYED RELEASE ORAL at 08:08

## 2019-08-25 RX ADMIN — HEPARIN SODIUM 5000 UNITS: 5000 INJECTION INTRAVENOUS; SUBCUTANEOUS at 09:08

## 2019-08-25 RX ADMIN — TAMSULOSIN HYDROCHLORIDE 0.8 MG: 0.4 CAPSULE ORAL at 06:08

## 2019-08-25 RX ADMIN — HEPARIN SODIUM 5000 UNITS: 5000 INJECTION INTRAVENOUS; SUBCUTANEOUS at 01:08

## 2019-08-25 RX ADMIN — SENNOSIDES,DOCUSATE SODIUM 1 TABLET: 8.6; 5 TABLET, FILM COATED ORAL at 08:08

## 2019-08-25 RX ADMIN — PREDNISONE 80 MG: 20 TABLET ORAL at 08:08

## 2019-08-25 NOTE — PLAN OF CARE
Problem: Adult Inpatient Plan of Care  Goal: Plan of Care Review  Pt is AAOx4, pt desats easily with movement, otherwise vital signs stable. Pt does not want to be woken up for vital signs. Pt is on continuous pulse ox, satting >92% on 4L O2 per NC. Call light and personal belongings within reach. Plan of care reviewed with pt, all questions and concerns were addressed. Will continue to monitor.

## 2019-08-25 NOTE — PLAN OF CARE
Problem: Adult Inpatient Plan of Care  Goal: Plan of Care Review  Outcome: Ongoing (interventions implemented as appropriate)  Plan of care reviewed with patient and spouse. AAOx4. SpO2 and cardiac monitoring was done. SpO2 have been in the 90's range on high flow nasal cannula. Patient was able to participate in using his incentitive spirometer. Vital signs were stable. Safety precautions were in placed.

## 2019-08-25 NOTE — CARE UPDATE
RAPID RESPONSE NURSE PROACTIVE ROUNDING NOTE     Time of Visit: 0847    Admit Date: 2019  LOS: 4  Code Status: Full Code   Date of Visit: 2019  : 1950  Age: 68 y.o.  Sex: male  Race: White  Bed: 8080/8080 A:   MRN: [id number]  Was the patient discharged from an ICU this admission? no   Was the patient discharged from a PACU within last 24 hours?  no  Did the patient receive conscious sedation/general anesthesia in last 24 hours?  no  Was the patient in the ED within the past 24 hours?  no  Was the patient started on NIPPV within the past 24 hours?  no  Attending Physician: Veda Lin MD  Primary Service: [facility] MED G    ASSESSMENT     Diagnosis: Acute on chronic respiratory failure with hypoxemia    Abnormal Vital Signs: /75 (BP Location: Right arm, Patient Position: Lying)   Pulse 109   Temp 98.4 °F (36.9 °C) (Oral)   Resp 18   Wt 76.6 kg (168 lb 14 oz)   SpO2 95%   BMI 24.94 kg/m²      Clinical Issues: Respiratory    Patient  has a past medical history of Hyperlipemia, Hypertension, Nasal congestion, and Pulmonary fibrosis.    Upon arrival, pt sitting in chair with family at bedside. Currently on 6L NC with sats 96%. Pt states that he feels fine except with dyspnea on exertion. Otherwise in NAD.     INTERVENTIONS/ RECOMMENDATIONS     Monitor resp status closely and titrate high flow as needed to maintain sats > 90%    Discussed plan of care with Sirisha GRAVES.    PHYSICIAN ESCALATION     Yes/No  no    Orders received and case discussed with NA.    Disposition: Remain in room 8080A.    FOLLOW-UP     Call back the Rapid Response Nurse, Noe Lucas RN at [phone] for additional questions or concerns.

## 2019-08-25 NOTE — CARE UPDATE
Rapid Response Nurse AI Alert     AI alert received, chart check completed abnormal VS noted. Bedside RNVaishnavi contacted, no concerns verbalized at this time, instructed to call [phone] for further concerns or assistance.

## 2019-08-25 NOTE — SIGNIFICANT EVENT
RAPID RESPONSE NURSE NOTE     Admit Date: 2019  LOS: 4  Code Status: Full Code   Date of Consult: 2019  : 1950  Age: 68 y.o.  Weight:   Wt Readings from Last 1 Encounters:   19 76.8 kg (169 lb 5 oz)     Sex: male  Race: White   Bed: 8080/8080 A:   MRN: [id number]  Time Rapid Response Team page Received: 0250  Time Rapid Response Team at Bedside: 0253  Time Rapid Response Team left Bedside: 0303  Was the patient discharged from an ICU this admission?   no  Was the patient discharged from a PACU within last 24 hours?  no  Did the patient receive conscious sedation/general anesthesia within last 24 hours?  no  Was the patient in the ED within the past 24 hours?  no  Was the patient started on NIPPV within the past 24 hours?  no  Did this progress into an ARC or CPA:  no  Attending Physician: Domingo Lin MD  Primary Service: [facility] G  Consult Requested By: Domingo Lin MD     SITUATION     Reason for Call: hypoxia  Called to evaluate the patient for Respiratory    BACKGROUND     Why is the patient in the hospital?: Acute on chronic respiratory failure with hypoxemia    Patient has a past medical history of Hyperlipemia, Hypertension, Nasal congestion, and Pulmonary fibrosis.    ASSESSMENT/INTERVENTIONS     /78 (BP Location: Left arm, Patient Position: Sitting)   Pulse (!) 129   Temp 97.2 °F (36.2 °C) (Axillary)   Resp (!) 31   Wt 76.8 kg (169 lb 5 oz)   SpO2 95%   BMI 25.00 kg/m²     What did you find: Upon arrival, patient in bed, 6L humidified NC, SpO2 89%, 's. Using yankauer to suction self, coughing continuously. @ 0254 Claritza Luna NP arrived with CCS team and LANETTE Palomares.   Bedside RN states patient was lying down in bed with 4L NC, sleeping, then awoke in a coughing fit and hasn't stopped coughing yet, to which he started to desat down to the high 60's-low 70's, they applied a NRB and he is now SpO2 88-90%, coughing up thin clear sputum.   Duoneb treatment given,  "CXR obtained.   See all other orders per LANETTE Hoyos.   Upon leaving, patient states he feels okay he just needs time to "come back up" after coughing. NRB left at bedside for patient comfort as he is a chronic resp patient due to history.     RECOMMENDATIONS     We recommend: CPT, and breathing treatments to help expectorate.    FOLLOW-UP/CONTINGENCY PLAN     Call the Rapid Response Nurse, Johana Gates RN at x [phone] for additional questions or concerns.    PHYSICIAN ESCALATION     Orders received and case discussed with LANETTE Palomares.    Disposition: Remain in room 8080.        "

## 2019-08-25 NOTE — PLAN OF CARE
Problem: Physical Therapy Goal  Goal: Physical Therapy Goal  PT goals until 9/4/19    1. Pt sit to supine with mod independent-not met  2. Pt sit to stand with or without RW as needed for safety with supervision-not met  3. Pt to perform gait 30ft with or without RW as needed for safety with supervision.-not met  4. Pt to up/down 3 steps with single rail with CGA.-not met  5. Pt to perform B LE exs in sitting or supine x 15 reps to strengthen B LE to improve functional mobility.-not met    Outcome: Ongoing (interventions implemented as appropriate)  Pt's goals set and pt will benefit from skilled PT services to work towards improved functional mobility including: bed mobility, transfers, up/down steps, and gait.   Debora Allen, PT  8/25/2019

## 2019-08-25 NOTE — PT/OT/SLP EVAL
"Physical Therapy Evaluation    Patient Name:  Manpreet Hamilton   MRN:  [id number]    Recommendations:     Discharge Recommendations:  home health PT   Discharge Equipment Recommendations: commode, wheelchair, manual   Barriers to discharge: Inaccessible home    Assessment:     Manpreet Hamilton is a 68 y.o. male admitted with a medical diagnosis of Acute on chronic respiratory failure with hypoxemia.  He presents with the following impairments/functional limitations:  weakness, impaired endurance, gait instability, impaired functional mobilty, impaired balance, pain, impaired cardiopulmonary response to activity . Pt limited with functional mobility due to SOB and required frequent rest breaks between tasks. Pt is motivated to progress with functional mobility.    Rehab Prognosis: Fair; patient would benefit from acute skilled PT services to address these deficits and reach maximum level of function.    Recent Surgery: * No surgery found *      Plan:     During this hospitalization, patient to be seen 4 x/week to address the identified rehab impairments via gait training, therapeutic activities, therapeutic exercises, neuromuscular re-education and progress toward the following goals:    · Plan of Care Expires:  09/24/19    Subjective   "I had a rough night"    Pain/Comfort:  · Pain Rating 1: (c/o chest pain from coughing last night, did not grade)  · Location - Orientation 1: generalized  · Location 1: chest  · Pain Addressed 1: Cessation of Activity    Patients cultural, spiritual, Mandaen conflicts given the current situation: no    Living Environment:  Pt lives with wife in a 2 story home with 3 [address] and a flight of steps inside home  Prior to admission, patients level of function was independent prior to July.  Equipment used at home: hospital bed, oxygen, shower chair, walker, rolling.  Upon discharge, patient will have assistance from wife.    Objective:     Communicated with nurse prior to session.  Patient " found supine with oxygen, telemetry  upon PT entry to room.    General Precautions: Standard, fall, respiratory   Orthopedic Precautions:N/A   Braces: N/A     Exams:  · Cognitive Exam:  Patient is oriented to Person, Place and Situation  · Sensation:    · -       Intact  light/touch B LE above ankle  · -       Impaired  light/touch below ankles B LE (inconsistent with responses)  · RLE ROM: WFL  · RLE Strength: Deficits: hip flex 4-/5; knee flex/ext 4/5; ankle DF 4-/5  · LLE ROM: WFL  · LLE Strength: Deficits: hip flex 4-/5; knee flex/ext 4/5; ankle DF 4-/5    Functional Mobility:  · Bed Mobility:     · Supine to Sit: contact guard assistance  · Transfers:     · Sit to Stand:  Contact guard assistance with no AD x 1 trial from bed and 2 trials from bedside chair  · Bed to Chair: contact guard assistance with  no AD  using  Squat Pivot  · Gait: pt performed marching in place x 6 reps x 2 trials with minimal assist. Pt with L sided instability in standing.     Therapeutic Activities and Exercises:   pt required frequent rest periods after each functional task due to SOB and decreased ox sat, pt required ~ 1-2 min rest to recover. Pt sat on the EOB ~ 6 min with SBA prior to transfer to bedside chair.    AM-PAC 6 CLICK MOBILITY  Total Score:18     Patient left up in chair with all lines intact, call button in reach, nurse notified and wife present.    GOALS:   Multidisciplinary Problems     Physical Therapy Goals        Problem: Physical Therapy Goal    Goal Priority Disciplines Outcome Goal Variances Interventions   Physical Therapy Goal     PT, PT/OT Ongoing (interventions implemented as appropriate)     Description:  PT goals until 9/4/19    1. Pt sit to supine with mod independent-not met  2. Pt sit to stand with or without RW as needed for safety with supervision-not met  3. Pt to perform gait 30ft with or without RW as needed for safety with supervision.-not met  4. Pt to up/down 3 steps with single rail with  CGA.-not met  5. Pt to perform B LE exs in sitting or supine x 15 reps to strengthen B LE to improve functional mobility.-not met                      History:     Past Medical History:   Diagnosis Date    Hyperlipemia     Hypertension     Nasal congestion     Pulmonary fibrosis        Past Surgical History:   Procedure Laterality Date    CATARACT EXTRACTION Left     COLONOSCOPY N/A 8/5/2016    Performed by Leon Padilla MD at [facility] (4TH FLR)    EGD (ESOPHAGOGASTRODUODENOSCOPY) N/A 7/18/2019    Performed by Fer Fox MD at [facility] END[facility] (2ND FLR)    EGD (ESOPHAGOGASTRODUODENOSCOPY) N/A 7/12/2019    Performed by Jimmy Saldana MD at [facility] END[facility] (2ND FLR)    EGD (ESOPHAGOGASTRODUODENOSCOPY) N/A 7/11/2019    Performed by Jimmy Saldana MD at [facility] END[facility] (2ND FLR)    EGD (ESOPHAGOGASTRODUODENOSCOPY) N/A 7/10/2019    Performed by Jimmy Saldana MD at [facility] END[facility] (2ND FLR)    HAND SURGERY      left    HIATAL HERNIA REPAIR      MICRODISCECTOMY, SPINE MIS Right L5- S1 Right 6/27/2018    Performed by Angel Pendleton MD at [facility] OR 2ND FLR    TONSILLECTOMY, ADENOIDECTOMY      VASECTOMY         Time Tracking:     PT Received On: 08/25/19  PT Start Time: 0845     PT Stop Time: 0911  PT Total Time (min): 26 min     Billable Minutes: Evaluation 16 and Therapeutic Activity 10      Debora Allen, PT  08/25/2019

## 2019-08-25 NOTE — SIGNIFICANT EVENT
Significant Event  Hospital Medicine    CC:  Acute on chronic respiratory failure with hypoxemia  Date:  08/25/2019  Admit Date:  8/21/2019  Hospital Length of Stay:  4    Code Status:  Full Code     SUBJECTIVE:     Significant Events:  Called urgently to the bedside by nurse to evaluate patient for desaturation.  RNs report that patient had a coughing fit and began to desat to the 60s.  Placed on 100% nonrebreather with improvement to low 90s.  CXR ordered, Duonebs.  Will titrate O2.        OBJECTIVE:     Physical Exam:  Last Vitals:   Vitals:    08/24/19 2348   BP:    Pulse: 108   Resp:    Temp:      GA:  Mild distress  HEENT:  PERRL.  No scleral icterus or JVD.   Pulmonary:  Clear to auscultation A/P/L.  No wheezing, crackles, or rhonchi.  Cardiac:  RRR, S1 & S2 w/o rubs/murmurs/gallops.   Abdominal:  Bowel sounds present x 4. No appreciable hepatosplenomegaly.  Neuro:  --GCS:  15  --CN II-XII grossly intact.  Corneal reflex, gag, cough intact.  --Extremity strength and sensation intact x 4.     ASSESSMENT AND PLAN/INTERVENTIONS:     1) Acute hypoxia  Plan/Interventions:  - Improvement with nonrebreather.  - Will order PRN duonebs, chest PT.  - CXR on chart.  - Titrate O2 as able.    Uninterrupted Critical Care/Counseling Time (not including procedures):  30 minutes    PATEL Kenny, PA-C  Hospital Medicine Department  Staff:  Dr. Duncan

## 2019-08-26 LAB
ALBUMIN SERPL BCP-MCNC: 3 G/DL (ref 3.5–5.2)
ALP SERPL-CCNC: 108 U/L (ref 55–135)
ALT SERPL W/O P-5'-P-CCNC: 41 U/L (ref 10–44)
ANION GAP SERPL CALC-SCNC: 9 MMOL/L (ref 8–16)
AST SERPL-CCNC: 17 U/L (ref 10–40)
BACTERIA BLD CULT: NORMAL
BACTERIA BLD CULT: NORMAL
BASOPHILS # BLD AUTO: 0.02 K/UL (ref 0–0.2)
BASOPHILS NFR BLD: 0.2 % (ref 0–1.9)
BILIRUB SERPL-MCNC: 0.4 MG/DL (ref 0.1–1)
BUN SERPL-MCNC: 22 MG/DL (ref 8–23)
CALCIUM SERPL-MCNC: 9.5 MG/DL (ref 8.7–10.5)
CHLORIDE SERPL-SCNC: 95 MMOL/L (ref 95–110)
CO2 SERPL-SCNC: 33 MMOL/L (ref 23–29)
CREAT SERPL-MCNC: 0.8 MG/DL (ref 0.5–1.4)
DIFFERENTIAL METHOD: ABNORMAL
EOSINOPHIL # BLD AUTO: 0.1 K/UL (ref 0–0.5)
EOSINOPHIL NFR BLD: 1.3 % (ref 0–8)
ERYTHROCYTE [DISTWIDTH] IN BLOOD BY AUTOMATED COUNT: 14.8 % (ref 11.5–14.5)
EST. GFR  (AFRICAN AMERICAN): >60 ML/MIN/1.73 M^2
EST. GFR  (NON AFRICAN AMERICAN): >60 ML/MIN/1.73 M^2
GLUCOSE SERPL-MCNC: 100 MG/DL (ref 70–110)
HBV CORE AB SERPL QL IA: NEGATIVE
HBV CORE IGM SERPL QL IA: NEGATIVE
HBV SURFACE AB SER-ACNC: POSITIVE M[IU]/ML
HBV SURFACE AG SERPL QL IA: NEGATIVE
HCT VFR BLD AUTO: 39.7 % (ref 40–54)
HGB BLD-MCNC: 12.4 G/DL (ref 14–18)
IGA SERPL-MCNC: 192 MG/DL (ref 40–350)
IMM GRANULOCYTES # BLD AUTO: 0.04 K/UL (ref 0–0.04)
IMM GRANULOCYTES NFR BLD AUTO: 0.4 % (ref 0–0.5)
LYMPHOCYTES # BLD AUTO: 1.7 K/UL (ref 1–4.8)
LYMPHOCYTES NFR BLD: 19.2 % (ref 18–48)
MAGNESIUM SERPL-MCNC: 2 MG/DL (ref 1.6–2.6)
MCH RBC QN AUTO: 25.6 PG (ref 27–31)
MCHC RBC AUTO-ENTMCNC: 31.2 G/DL (ref 32–36)
MCV RBC AUTO: 82 FL (ref 82–98)
MONOCYTES # BLD AUTO: 0.7 K/UL (ref 0.3–1)
MONOCYTES NFR BLD: 8.3 % (ref 4–15)
NEUTROPHILS # BLD AUTO: 6.3 K/UL (ref 1.8–7.7)
NEUTROPHILS NFR BLD: 70.6 % (ref 38–73)
NRBC BLD-RTO: 0 /100 WBC
PHOSPHATE SERPL-MCNC: 3 MG/DL (ref 2.7–4.5)
PLATELET # BLD AUTO: 396 K/UL (ref 150–350)
PMV BLD AUTO: 9 FL (ref 9.2–12.9)
POTASSIUM SERPL-SCNC: 3.4 MMOL/L (ref 3.5–5.1)
PROT SERPL-MCNC: 6.2 G/DL (ref 6–8.4)
RBC # BLD AUTO: 4.84 M/UL (ref 4.6–6.2)
SODIUM SERPL-SCNC: 137 MMOL/L (ref 136–145)
WBC # BLD AUTO: 8.91 K/UL (ref 3.9–12.7)

## 2019-08-26 PROCEDURE — 99233 PR SUBSEQUENT HOSPITAL CARE,LEVL III: ICD-10-PCS | Mod: ,,, | Performed by: INTERNAL MEDICINE

## 2019-08-26 PROCEDURE — 25000003 PHARM REV CODE 250: Performed by: PHYSICIAN ASSISTANT

## 2019-08-26 PROCEDURE — 63600175 PHARM REV CODE 636 W HCPCS: Performed by: HOSPITALIST

## 2019-08-26 PROCEDURE — 82784 ASSAY IGA/IGD/IGG/IGM EACH: CPT

## 2019-08-26 PROCEDURE — 20600001 HC STEP DOWN PRIVATE ROOM

## 2019-08-26 PROCEDURE — 83735 ASSAY OF MAGNESIUM: CPT

## 2019-08-26 PROCEDURE — 99900035 HC TECH TIME PER 15 MIN (STAT)

## 2019-08-26 PROCEDURE — 99232 SBSQ HOSP IP/OBS MODERATE 35: CPT | Mod: ,,, | Performed by: NURSE PRACTITIONER

## 2019-08-26 PROCEDURE — 99233 SBSQ HOSP IP/OBS HIGH 50: CPT | Mod: ,,, | Performed by: INTERNAL MEDICINE

## 2019-08-26 PROCEDURE — 25000242 PHARM REV CODE 250 ALT 637 W/ HCPCS: Performed by: INTERNAL MEDICINE

## 2019-08-26 PROCEDURE — 36415 COLL VENOUS BLD VENIPUNCTURE: CPT

## 2019-08-26 PROCEDURE — 25000003 PHARM REV CODE 250: Performed by: HOSPITALIST

## 2019-08-26 PROCEDURE — 99232 PR SUBSEQUENT HOSPITAL CARE,LEVL II: ICD-10-PCS | Mod: ,,, | Performed by: HOSPITALIST

## 2019-08-26 PROCEDURE — 86704 HEP B CORE ANTIBODY TOTAL: CPT

## 2019-08-26 PROCEDURE — 94761 N-INVAS EAR/PLS OXIMETRY MLT: CPT

## 2019-08-26 PROCEDURE — 99232 SBSQ HOSP IP/OBS MODERATE 35: CPT | Mod: ,,, | Performed by: HOSPITALIST

## 2019-08-26 PROCEDURE — 80053 COMPREHEN METABOLIC PANEL: CPT

## 2019-08-26 PROCEDURE — 86706 HEP B SURFACE ANTIBODY: CPT

## 2019-08-26 PROCEDURE — 99232 PR SUBSEQUENT HOSPITAL CARE,LEVL II: ICD-10-PCS | Mod: ,,, | Performed by: NURSE PRACTITIONER

## 2019-08-26 PROCEDURE — 86705 HEP B CORE ANTIBODY IGM: CPT

## 2019-08-26 PROCEDURE — 85025 COMPLETE CBC W/AUTO DIFF WBC: CPT

## 2019-08-26 PROCEDURE — 87340 HEPATITIS B SURFACE AG IA: CPT

## 2019-08-26 PROCEDURE — 94640 AIRWAY INHALATION TREATMENT: CPT

## 2019-08-26 PROCEDURE — 84100 ASSAY OF PHOSPHORUS: CPT

## 2019-08-26 PROCEDURE — 86703 HIV-1/HIV-2 1 RESULT ANTBDY: CPT

## 2019-08-26 PROCEDURE — 27000221 HC OXYGEN, UP TO 24 HOURS

## 2019-08-26 PROCEDURE — 25000242 PHARM REV CODE 250 ALT 637 W/ HCPCS: Performed by: PHYSICIAN ASSISTANT

## 2019-08-26 RX ORDER — PREDNISONE 10 MG/1
40 TABLET ORAL DAILY
Status: DISCONTINUED | OUTPATIENT
Start: 2019-08-27 | End: 2019-08-28 | Stop reason: HOSPADM

## 2019-08-26 RX ORDER — PREDNISONE 20 MG/1
80 TABLET ORAL ONCE
Status: COMPLETED | OUTPATIENT
Start: 2019-08-26 | End: 2019-08-26

## 2019-08-26 RX ORDER — PREDNISONE 10 MG/1
40 TABLET ORAL DAILY
Status: DISCONTINUED | OUTPATIENT
Start: 2019-08-26 | End: 2019-08-26

## 2019-08-26 RX ORDER — POTASSIUM CHLORIDE 20 MEQ/1
40 TABLET, EXTENDED RELEASE ORAL ONCE
Status: COMPLETED | OUTPATIENT
Start: 2019-08-26 | End: 2019-08-26

## 2019-08-26 RX ADMIN — ATORVASTATIN CALCIUM 20 MG: 20 TABLET, FILM COATED ORAL at 08:08

## 2019-08-26 RX ADMIN — SUCRALFATE 1 G: 1 SUSPENSION ORAL at 12:08

## 2019-08-26 RX ADMIN — SUCRALFATE 1 G: 1 SUSPENSION ORAL at 05:08

## 2019-08-26 RX ADMIN — TAMSULOSIN HYDROCHLORIDE 0.8 MG: 0.4 CAPSULE ORAL at 05:08

## 2019-08-26 RX ADMIN — RAMELTEON 8 MG: 8 TABLET ORAL at 08:08

## 2019-08-26 RX ADMIN — IPRATROPIUM BROMIDE AND ALBUTEROL SULFATE 3 ML: .5; 3 SOLUTION RESPIRATORY (INHALATION) at 08:08

## 2019-08-26 RX ADMIN — LEVOFLOXACIN 750 MG: 750 TABLET, FILM COATED ORAL at 08:08

## 2019-08-26 RX ADMIN — IPRATROPIUM BROMIDE AND ALBUTEROL SULFATE 3 ML: .5; 3 SOLUTION RESPIRATORY (INHALATION) at 04:08

## 2019-08-26 RX ADMIN — PREDNISONE 40 MG: 20 TABLET ORAL at 12:08

## 2019-08-26 RX ADMIN — GUAIFENESIN 600 MG: 600 TABLET, EXTENDED RELEASE ORAL at 08:08

## 2019-08-26 RX ADMIN — SENNOSIDES,DOCUSATE SODIUM 1 TABLET: 8.6; 5 TABLET, FILM COATED ORAL at 08:08

## 2019-08-26 RX ADMIN — FUROSEMIDE 40 MG: 40 TABLET ORAL at 08:08

## 2019-08-26 RX ADMIN — PANTOPRAZOLE SODIUM 40 MG: 40 TABLET, DELAYED RELEASE ORAL at 08:08

## 2019-08-26 RX ADMIN — FERROUS SULFATE TAB EC 325 MG (65 MG FE EQUIVALENT) 325 MG: 325 (65 FE) TABLET DELAYED RESPONSE at 08:08

## 2019-08-26 RX ADMIN — SUCRALFATE 1 G: 1 SUSPENSION ORAL at 08:08

## 2019-08-26 RX ADMIN — SUCRALFATE 1 G: 1 SUSPENSION ORAL at 11:08

## 2019-08-26 RX ADMIN — POTASSIUM CHLORIDE 40 MEQ: 20 TABLET, EXTENDED RELEASE ORAL at 08:08

## 2019-08-26 RX ADMIN — HEPARIN SODIUM 5000 UNITS: 5000 INJECTION INTRAVENOUS; SUBCUTANEOUS at 02:08

## 2019-08-26 RX ADMIN — HEPARIN SODIUM 5000 UNITS: 5000 INJECTION INTRAVENOUS; SUBCUTANEOUS at 05:08

## 2019-08-26 RX ADMIN — IPRATROPIUM BROMIDE AND ALBUTEROL SULFATE 3 ML: .5; 3 SOLUTION RESPIRATORY (INHALATION) at 12:08

## 2019-08-26 RX ADMIN — HEPARIN SODIUM 5000 UNITS: 5000 INJECTION INTRAVENOUS; SUBCUTANEOUS at 09:08

## 2019-08-26 NOTE — PLAN OF CARE
Problem: Adult Inpatient Plan of Care  Goal: Plan of Care Review  AAOx4 VS stable. Pt currently in bed with family at bedside. No procedures today. No complaint of pain or discomfort on shift. Pt had a request to speak with a CM and was unable to get in contact with them per pt, MD notified. Team spoke with pt and spouse about concerns. No acute events noted on shift.

## 2019-08-26 NOTE — SUBJECTIVE & OBJECTIVE
Interval History: Rapid response called overnight due to SOB. Improved with NRB. Reports feeling better through out the day.     Review of Systems   Constitutional: Positive for activity change and fatigue. Negative for chills and fever.   HENT: Negative for congestion, facial swelling, hearing loss and trouble swallowing.    Eyes: Negative for photophobia and visual disturbance.   Respiratory: Positive for cough and shortness of breath (improved). Negative for chest tightness and wheezing.    Cardiovascular: Negative for chest pain, palpitations and leg swelling.   Gastrointestinal: Negative for abdominal pain, blood in stool, constipation, diarrhea, nausea and vomiting.   Endocrine: Negative.    Genitourinary: Negative.    Musculoskeletal: Negative for back pain, joint swelling and myalgias.   Skin: Negative.    Allergic/Immunologic: Negative.    Neurological: Negative for dizziness, facial asymmetry, speech difficulty, weakness and numbness.   Hematological: Negative.    Psychiatric/Behavioral: Negative for agitation, confusion and dysphoric mood. The patient is not nervous/anxious.      Objective:     Vital Signs (Most Recent):  Temp: 98.6 °F (37 °C) (08/25/19 1535)  Pulse: (!) 113 (08/25/19 1537)  Resp: 18 (08/25/19 1537)  BP: 123/78 (08/25/19 1535)  SpO2: 99 % (08/25/19 1537) Vital Signs (24h Range):  Temp:  [97.2 °F (36.2 °C)-98.6 °F (37 °C)] 98.6 °F (37 °C)  Pulse:  [] 113  Resp:  [18-31] 18  SpO2:  [85 %-99 %] 99 %  BP: (107-123)/(69-78) 123/78     Weight: 76.6 kg (168 lb 14 oz)  Body mass index is 24.94 kg/m².    Intake/Output Summary (Last 24 hours) at 8/25/2019 1903  Last data filed at 8/25/2019 1700  Gross per 24 hour   Intake 540 ml   Output 1400 ml   Net -860 ml      Physical Exam   Constitutional: He is oriented to person, place, and time. Vital signs are normal. He appears well-developed and well-nourished.  Non-toxic appearance. He does not appear ill. No distress. Nasal cannula in place.    HENT:   Head: Normocephalic and atraumatic.   Eyes: Pupils are equal, round, and reactive to light. Conjunctivae and EOM are normal. No scleral icterus.   Neck: Normal range of motion and full passive range of motion without pain. Neck supple. No JVD present. Carotid bruit is not present. No thyromegaly present.   Cardiovascular: Regular rhythm, S1 normal, S2 normal, normal heart sounds and normal pulses. Tachycardia present. Exam reveals no gallop, no S3, no S4 and no friction rub.   No murmur heard.  Pulmonary/Chest: Effort normal. No accessory muscle usage. Tachypnea noted. No respiratory distress. He has decreased breath sounds. He has no wheezes. He has no rhonchi. He has no rales.   Fine basilar crackles   Abdominal: Soft. Normal appearance and bowel sounds are normal. He exhibits no shifting dullness, no distension, no abdominal bruit and no ascites. There is no hepatosplenomegaly. There is no tenderness. There is no rigidity and no guarding.   Musculoskeletal: Normal range of motion. He exhibits no edema or tenderness.   Neurological: He is alert and oriented to person, place, and time. He has normal strength. He is not disoriented. No cranial nerve deficit or sensory deficit. GCS eye subscore is 4. GCS verbal subscore is 5. GCS motor subscore is 6.   Skin: Skin is warm, dry and intact. No abrasion and no lesion noted.   Psychiatric: He has a normal mood and affect. His behavior is normal. Judgment and thought content normal. His mood appears not anxious. His speech is not slurred. He is not actively hallucinating. Cognition and memory are normal. He does not exhibit a depressed mood. He is attentive.       Significant Labs: All pertinent labs within the past 24 hours have been reviewed.    Significant Imaging: I have reviewed all pertinent imaging results/findings within the past 24 hours.

## 2019-08-26 NOTE — PROGRESS NOTES
[facility] Hospital Medicine  Progress Note    Patient Name: Manpreet Hamilton  MRN: [id number]  Patient Class: IP- Inpatient   Admission Date: 8/21/2019  Length of Stay: 4 days  Attending Physician: Veda Lin MD  Primary Care Provider: Radames Pradhan MD    Hospital Medicine Team: [facility] HOSP MED  Veda Lin MD    Subjective:     Principal Problem:Acute on chronic respiratory failure with hypoxemia        HPI:  68-year-old man with HTN, HLD, UIP (diagnosed 1 year ago, followed closely by Dr. Pradhan, on ofev) who is currently admitted to [facility]. He has had a complicated recent history beginning with an admission in July 2019 for GIB requiring massive transfusion. He has had increasing oxygen rqeuirements and was discharged on 8/9 after an admission for acute on chronic hypoxic respiratory failure, unclear if this was related to infection, non-cardiogenic pulmonary edema, or an exacerbation of his IPF. He improved with levaquin, lasix, and prednisone and was discharged to inpatient rehab for continued PT/OT and oxygen weaning (down to 4L upon discharge).     He did well with rehab, but starting Saturday began to have increasing oxygen requirements, eventually being transferred to the ED for increased work of breathing on 8/19, at which time a CTA was performed that ruled out PE. He improved while in the ED and was transferred back to rehab for further care. His shortness of breath has returned over the early week, culminating in an episode of respiratory distress this morning requiring VM. He has now improved, but remains short of breath on his home nasal canula requirements. Dr. Pradhan has been visiting him daily at rehab and has requested direct admission to hospital medicine after direct admission to lung transplant service was declined. Originally the patient was going to go directly to the ED given the respiratory distress and acute nature, but strongly requested direct floor  admission, and given the subsequent improvement Dr. Pradhan feels that he is safe to wait at rehab for a bed.    Overview/Hospital Course:  No notes on file    Interval History: Rapid response called overnight due to SOB. Improved with NRB. Reports feeling better through out the day.     Review of Systems   Constitutional: Positive for activity change and fatigue. Negative for chills and fever.   HENT: Negative for congestion, facial swelling, hearing loss and trouble swallowing.    Eyes: Negative for photophobia and visual disturbance.   Respiratory: Positive for cough and shortness of breath (improved). Negative for chest tightness and wheezing.    Cardiovascular: Negative for chest pain, palpitations and leg swelling.   Gastrointestinal: Negative for abdominal pain, blood in stool, constipation, diarrhea, nausea and vomiting.   Endocrine: Negative.    Genitourinary: Negative.    Musculoskeletal: Negative for back pain, joint swelling and myalgias.   Skin: Negative.    Allergic/Immunologic: Negative.    Neurological: Negative for dizziness, facial asymmetry, speech difficulty, weakness and numbness.   Hematological: Negative.    Psychiatric/Behavioral: Negative for agitation, confusion and dysphoric mood. The patient is not nervous/anxious.      Objective:     Vital Signs (Most Recent):  Temp: 98.6 °F (37 °C) (08/25/19 1535)  Pulse: (!) 113 (08/25/19 1537)  Resp: 18 (08/25/19 1537)  BP: 123/78 (08/25/19 1535)  SpO2: 99 % (08/25/19 1537) Vital Signs (24h Range):  Temp:  [97.2 °F (36.2 °C)-98.6 °F (37 °C)] 98.6 °F (37 °C)  Pulse:  [] 113  Resp:  [18-31] 18  SpO2:  [85 %-99 %] 99 %  BP: (107-123)/(69-78) 123/78     Weight: 76.6 kg (168 lb 14 oz)  Body mass index is 24.94 kg/m².    Intake/Output Summary (Last 24 hours) at 8/25/2019 1903  Last data filed at 8/25/2019 1700  Gross per 24 hour   Intake 540 ml   Output 1400 ml   Net -860 ml      Physical Exam   Constitutional: He is oriented to person, place, and time.  Vital signs are normal. He appears well-developed and well-nourished.  Non-toxic appearance. He does not appear ill. No distress. Nasal cannula in place.   HENT:   Head: Normocephalic and atraumatic.   Eyes: Pupils are equal, round, and reactive to light. Conjunctivae and EOM are normal. No scleral icterus.   Neck: Normal range of motion and full passive range of motion without pain. Neck supple. No JVD present. Carotid bruit is not present. No thyromegaly present.   Cardiovascular: Regular rhythm, S1 normal, S2 normal, normal heart sounds and normal pulses. Tachycardia present. Exam reveals no gallop, no S3, no S4 and no friction rub.   No murmur heard.  Pulmonary/Chest: Effort normal. No accessory muscle usage. Tachypnea noted. No respiratory distress. He has decreased breath sounds. He has no wheezes. He has no rhonchi. He has no rales.   Fine basilar crackles   Abdominal: Soft. Normal appearance and bowel sounds are normal. He exhibits no shifting dullness, no distension, no abdominal bruit and no ascites. There is no hepatosplenomegaly. There is no tenderness. There is no rigidity and no guarding.   Musculoskeletal: Normal range of motion. He exhibits no edema or tenderness.   Neurological: He is alert and oriented to person, place, and time. He has normal strength. He is not disoriented. No cranial nerve deficit or sensory deficit. GCS eye subscore is 4. GCS verbal subscore is 5. GCS motor subscore is 6.   Skin: Skin is warm, dry and intact. No abrasion and no lesion noted.   Psychiatric: He has a normal mood and affect. His behavior is normal. Judgment and thought content normal. His mood appears not anxious. His speech is not slurred. He is not actively hallucinating. Cognition and memory are normal. He does not exhibit a depressed mood. He is attentive.       Significant Labs: All pertinent labs within the past 24 hours have been reviewed.    Significant Imaging: I have reviewed all pertinent imaging  results/findings within the past 24 hours.           Assessment/Plan:      * Acute on chronic respiratory failure with hypoxemia  Likely due to exacerbation of his IPF.  Unlikely Acute respiratory infection or pulmonary edema  Blood and Sputum cultures NGTD  Cont antibiotic therapy with Levofloxacin empirically   Cont oral prednisone   Cont oral Lasix. Patient does not appear grossly volume overloaded on exam.   Check pulse oximetry to maintain O2 Sat ? 91%.   Maintain oxygenation with nasal canula.   Incentive spirometry q 1-2 hours while awake.   chest xray: Diffuse patchy interstitial and alveolar consolidation similar.  Cont to monitor I/O's and daily weights.  Fluid restriction (2 liters/24 hours)  Low Na diet  Pulmonary following and may need PLEX; will discuss final plans with pulmonary tomorrow; pt will need line placement for PLEX     Debility  Cont with PT/OT for gait training and strengthening and restoration of ADL's   Encourage mobility, OOB in chair, and early ambulation as appropriate  Fall precautions   Monitor for bowel and bladder dysfunction  Monitor for and prevent skin breakdown and pressure ulcers    Acute blood loss anemia  Lab Results   Component Value Date    HGB 12.1 (L) 08/25/2019     H/H stable  No signs of active bleed  Cont home Ferrous sulfate with bowel regimen       Pulmonary fibrosis  Resume home Nintedanib  Further treatment as above.         VTE Risk Mitigation (From admission, onward)        Ordered     heparin (porcine) injection 5,000 Units  Every 8 hours      08/21/19 1600                Veda Lin MD  Department of Hospital Medicine   [facility]

## 2019-08-26 NOTE — SUBJECTIVE & OBJECTIVE
Interval History 8/26/2019:  Patient is seen for follow-up rehab evaluation and recommendations: Therapy following.  Per patient and his family, final treatment plan pending.      HPI, Past Medical, Family, and Social History remains the same as documented in the initial encounter.    Scheduled Medications:    albuterol-ipratropium  3 mL Nebulization Q4H WAKE    atorvastatin  20 mg Oral QHS    ferrous sulfate  325 mg Oral BID    furosemide  40 mg Oral Daily    guaiFENesin  600 mg Oral BID    heparin (porcine)  5,000 Units Subcutaneous Q8H    nintedanib  1 capsule Oral BID    pantoprazole  40 mg Oral Q12H    [START ON 8/27/2019] predniSONE  40 mg Oral Daily    senna-docusate 8.6-50 mg  1 tablet Oral BID    sucralfate  1 g Oral QID (WM & HS)    tamsulosin  0.8 mg Oral Before breakfast     PRN Medications: acetaminophen, albuterol-ipratropium, bisacodyl, Dextrose 10% Bolus, Dextrose 10% Bolus, glucagon (human recombinant), glucose, glucose, ondansetron, ondansetron, ramelteon, sodium chloride 0.9%    Review of Systems   Constitutional: Positive for activity change, appetite change and fatigue. Negative for chills.   HENT: Negative for trouble swallowing and voice change.    Eyes: Negative for pain and visual disturbance.   Respiratory: Positive for shortness of breath. Negative for cough.    Cardiovascular: Negative for chest pain and palpitations.   Gastrointestinal: Negative for abdominal pain, nausea and vomiting.   Genitourinary: Positive for difficulty urinating. Negative for flank pain.   Musculoskeletal: Negative for back pain and neck pain.   Skin: Negative for rash and wound.   Neurological: Positive for weakness. Negative for numbness and headaches.   Psychiatric/Behavioral: Negative for agitation. The patient is not nervous/anxious.      Objective:     Vital Signs (Most Recent):  Temp: 97.7 °F (36.5 °C) (08/26/19 0749)  Pulse: (!) 113 (08/26/19 1121)  Resp: 18 (08/26/19 0833)  BP: 117/72  (08/26/19 0749)  SpO2: 99 % (08/26/19 1121)    Vital Signs (24h Range):  Temp:  [97.3 °F (36.3 °C)-98.6 °F (37 °C)] 97.7 °F (36.5 °C)  Pulse:  [103-130] 113  Resp:  [18-24] 18  SpO2:  [84 %-99 %] 99 %  BP: (115-137)/(72-78) 117/72     Physical Exam   Constitutional: He is oriented to person, place, and time. He appears well-developed and well-nourished. No distress. Nasal cannula in place.   HENT:   Head: Normocephalic and atraumatic.   Right Ear: External ear normal.   Left Ear: External ear normal.   Nose: Nose normal.   Eyes: Right eye exhibits no discharge. Left eye exhibits no discharge. No scleral icterus.   Neck: Normal range of motion.   Cardiovascular: Intact distal pulses. Tachycardia present.   Pulmonary/Chest: Tachypnea noted. No respiratory distress.   Abdominal: Soft. He exhibits no distension. There is no tenderness.   Musculoskeletal: Normal range of motion. He exhibits no edema or tenderness.   Neurological: He is alert and oriented to person, place, and time. No sensory deficit. He exhibits normal muscle tone.   Skin: Skin is warm and dry. No rash noted.   Psychiatric: He has a normal mood and affect. His behavior is normal.   Vitals reviewed.    Diagnostic Results:   Labs: Reviewed  X-Ray: Reviewed  CT: Reviewed    NEUROLOGICAL EXAMINATION:     MENTAL STATUS   Oriented to person, place, and time.

## 2019-08-26 NOTE — ASSESSMENT & PLAN NOTE
Lab Results   Component Value Date    HGB 12.4 (L) 08/26/2019     H/H stable  No signs of active bleed  Cont home Ferrous sulfate with bowel regimen

## 2019-08-26 NOTE — PLAN OF CARE
Problem: Adult Inpatient Plan of Care  Goal: Plan of Care Review  Outcome: Ongoing (interventions implemented as appropriate)  VSS. AAO x 4. No acute events. Pts O2 Sat dropped into 80s once this shift when he took of his NC temporarily but no other  Resp Issues. PRN duonebs administered. NC at 4LPM throughout shift. Pt states he did not void overnight , Bladder scan 220cc this AM. Flomax administered pt states he normally voids 1 hr post Flomax. May have Central Line placed For PLEX therapy today. Callbell placed within reach and use encouraged.

## 2019-08-26 NOTE — ASSESSMENT & PLAN NOTE
-baseline hypoxia (approx 4L O2);on O2 since around mid July (after hospitalization for GIB);   -wean O2 to maintain sats 88-92%  -currently on Ofev, but per notes there were plans to change to Esbriet d/t GI bleed  -recently on high doses of steroids (7/31/19) for exacerbation of IPF; infectious work up negative  -was weaned to Pred 25mg daily but increased to Pred 50mg daily on 8/20  -CXR w/ what appears to be worsening infiltrates and given the paucity of infectious signs or symptoms, AE IPF seems most likely  -he was Started on Pred 80mg daily on 8/20  -CTA negative for PE on 8/21  -echo w/ normal systolic fx; conc LVH, mild LAE, UVALDO  -will plan to wean steroids (decreased to Pred 40mg); needs taper to avoid adrenal insufficiency  He was considered for possible experimental protocol from the STRIVE-IPF study, currently ongoing at [facility]. This would mean a 3 week course of PLEX, rituxan, and IVIG. Performing this protocol at this institution would not be feasible. Due to age, he has been denied lung transplant here. We have reached out to [facility] transplant for possible transfer and are waiting to hear back.

## 2019-08-26 NOTE — ASSESSMENT & PLAN NOTE
Likely due to exacerbation of his IPF.  Unlikely Acute respiratory infection or pulmonary edema  Blood and Sputum cultures NGTD  Cont antibiotic therapy with Levofloxacin empirically   Cont oral prednisone   Cont oral Lasix. Patient does not appear grossly volume overloaded on exam.   Check pulse oximetry to maintain O2 Sat ? 91%.   Maintain oxygenation with nasal canula.   Incentive spirometry q 1-2 hours while awake.   chest xray: Diffuse patchy interstitial and alveolar consolidation similar.  Cont to monitor I/O's and daily weights.  Fluid restriction (2 liters/24 hours)  Low Na diet  Pulmonary following and may need PLEX; will discuss final plans with pulmonary tomorrow; pt will need line placement for PLEX

## 2019-08-26 NOTE — SUBJECTIVE & OBJECTIVE
Interval History: No acute issues overnight. Stable on 4-5L high flow. Awaiting pulmonary recs regarding PLEX.     Review of Systems   Constitutional: Positive for activity change. Negative for chills and fever.   HENT: Negative for congestion, facial swelling, hearing loss and trouble swallowing.    Eyes: Negative for photophobia and visual disturbance.   Respiratory: Positive for cough and shortness of breath (improved). Negative for chest tightness and wheezing.    Cardiovascular: Negative for chest pain, palpitations and leg swelling.   Gastrointestinal: Negative for abdominal pain, blood in stool, constipation, diarrhea, nausea and vomiting.   Endocrine: Negative.    Genitourinary: Negative.    Musculoskeletal: Negative for back pain, joint swelling and myalgias.   Skin: Negative.    Allergic/Immunologic: Negative.    Neurological: Negative for dizziness, facial asymmetry, speech difficulty, weakness and numbness.   Hematological: Negative.    Psychiatric/Behavioral: Negative for agitation, confusion and dysphoric mood. The patient is not nervous/anxious.      Objective:     Vital Signs (Most Recent):  Temp: 97.7 °F (36.5 °C) (08/26/19 0749)  Pulse: (!) 117 (08/26/19 1230)  Resp: 18 (08/26/19 1230)  BP: 117/72 (08/26/19 0749)  SpO2: 98 % (08/26/19 1230) Vital Signs (24h Range):  Temp:  [97.3 °F (36.3 °C)-98.6 °F (37 °C)] 97.7 °F (36.5 °C)  Pulse:  [103-130] 117  Resp:  [18-24] 18  SpO2:  [84 %-99 %] 98 %  BP: (115-137)/(72-78) 117/72     Weight: 73.6 kg (162 lb 4.1 oz)  Body mass index is 23.96 kg/m².    Intake/Output Summary (Last 24 hours) at 8/26/2019 1257  Last data filed at 8/26/2019 0543  Gross per 24 hour   Intake 350 ml   Output 1000 ml   Net -650 ml      Physical Exam   Constitutional: He is oriented to person, place, and time. Vital signs are normal. He appears well-developed and well-nourished.  Non-toxic appearance. He does not appear ill. No distress. Nasal cannula in place.   HENT:   Head:  Normocephalic and atraumatic.   Eyes: Pupils are equal, round, and reactive to light. Conjunctivae and EOM are normal. No scleral icterus.   Neck: Normal range of motion and full passive range of motion without pain. Neck supple. No JVD present. Carotid bruit is not present. No thyromegaly present.   Cardiovascular: Regular rhythm, S1 normal, S2 normal, normal heart sounds and normal pulses. Tachycardia present. Exam reveals no gallop, no S3, no S4 and no friction rub.   No murmur heard.  Pulmonary/Chest:   Fine basilar crackles  Mild tachypenia   Abdominal: Soft. Normal appearance and bowel sounds are normal. He exhibits no shifting dullness, no distension, no abdominal bruit and no ascites. There is no hepatosplenomegaly. There is no tenderness. There is no rigidity and no guarding.   Musculoskeletal: Normal range of motion. He exhibits no edema or tenderness.   Neurological: He is alert and oriented to person, place, and time. He has normal strength. He is not disoriented. No cranial nerve deficit or sensory deficit.   Skin: Skin is warm, dry and intact.   Psychiatric: He has a normal mood and affect. His behavior is normal. Judgment and thought content normal. His mood appears not anxious. His speech is not slurred. He is not actively hallucinating. Cognition and memory are normal. He does not exhibit a depressed mood. He is attentive.       Significant Labs: All pertinent labs within the past 24 hours have been reviewed.    Significant Imaging: I have reviewed all pertinent imaging results/findings within the past 24 hours.

## 2019-08-26 NOTE — ASSESSMENT & PLAN NOTE
Likely due to exacerbation of his IPF.  Unlikely Acute respiratory infection or pulmonary edema  Blood and Sputum cultures NGTD  Cont antibiotic therapy with Levofloxacin empirically   Cont oral prednisone   Cont oral Lasix. Patient does not appear grossly volume overloaded on exam.   Check pulse oximetry to maintain O2 Sat ? 91%.   Maintain oxygenation with nasal canula.   Incentive spirometry q 1-2 hours while awake.   chest xray: Diffuse patchy interstitial and alveolar consolidation similar.  Cont to monitor I/O's and daily weights.  Fluid restriction (2 liters/24 hours)  Low Na diet  Pulmonary following and may need PLEX; will await final pulm recs today

## 2019-08-26 NOTE — PROGRESS NOTES
[facility]  Physical Medicine & Rehab  Progress Note    Patient Name: Manpreet Hamilton  MRN: [id number]  Admission Date: 8/21/2019  Length of Stay: 5 days  Attending Physician: Veda Lin MD    Subjective:     Principal Problem:Acute on chronic respiratory failure with hypoxemia    Hospital Course:   8/23/19:  Evaluated by OT and SLP.  SLP recommendation: regular diet and thin liquids.  Bed mobility and ADLs (I).    8/25/19:  Evaluated by PT.  Bed mobility CGA.  Sit to stand and transfers CGA.  Marching in place Kelly.      Interval History 8/26/2019:  Patient is seen for follow-up rehab evaluation and recommendations: Therapy following.  Per patient and his family, final treatment plan pending.      HPI, Past Medical, Family, and Social History remains the same as documented in the initial encounter.    Scheduled Medications:    albuterol-ipratropium  3 mL Nebulization Q4H WAKE    atorvastatin  20 mg Oral QHS    ferrous sulfate  325 mg Oral BID    furosemide  40 mg Oral Daily    guaiFENesin  600 mg Oral BID    heparin (porcine)  5,000 Units Subcutaneous Q8H    nintedanib  1 capsule Oral BID    pantoprazole  40 mg Oral Q12H    [START ON 8/27/2019] predniSONE  40 mg Oral Daily    senna-docusate 8.6-50 mg  1 tablet Oral BID    sucralfate  1 g Oral QID (WM & HS)    tamsulosin  0.8 mg Oral Before breakfast     PRN Medications: acetaminophen, albuterol-ipratropium, bisacodyl, Dextrose 10% Bolus, Dextrose 10% Bolus, glucagon (human recombinant), glucose, glucose, ondansetron, ondansetron, ramelteon, sodium chloride 0.9%    Review of Systems   Constitutional: Positive for activity change, appetite change and fatigue. Negative for chills.   HENT: Negative for trouble swallowing and voice change.    Eyes: Negative for pain and visual disturbance.   Respiratory: Positive for shortness of breath. Negative for cough.    Cardiovascular: Negative for chest pain and palpitations.    Gastrointestinal: Negative for abdominal pain, nausea and vomiting.   Genitourinary: Positive for difficulty urinating. Negative for flank pain.   Musculoskeletal: Negative for back pain and neck pain.   Skin: Negative for rash and wound.   Neurological: Positive for weakness. Negative for numbness and headaches.   Psychiatric/Behavioral: Negative for agitation. The patient is not nervous/anxious.      Objective:     Vital Signs (Most Recent):  Temp: 97.7 °F (36.5 °C) (08/26/19 0749)  Pulse: (!) 113 (08/26/19 1121)  Resp: 18 (08/26/19 0833)  BP: 117/72 (08/26/19 0749)  SpO2: 99 % (08/26/19 1121)    Vital Signs (24h Range):  Temp:  [97.3 °F (36.3 °C)-98.6 °F (37 °C)] 97.7 °F (36.5 °C)  Pulse:  [103-130] 113  Resp:  [18-24] 18  SpO2:  [84 %-99 %] 99 %  BP: (115-137)/(72-78) 117/72     Physical Exam   Constitutional: He is oriented to person, place, and time. He appears well-developed and well-nourished. No distress. Nasal cannula in place.   HENT:   Head: Normocephalic and atraumatic.   Right Ear: External ear normal.   Left Ear: External ear normal.   Nose: Nose normal.   Eyes: Right eye exhibits no discharge. Left eye exhibits no discharge. No scleral icterus.   Neck: Normal range of motion.   Cardiovascular: Intact distal pulses. Tachycardia present.   Pulmonary/Chest: Tachypnea noted. No respiratory distress.   Abdominal: Soft. He exhibits no distension. There is no tenderness.   Musculoskeletal: Normal range of motion. He exhibits no edema or tenderness.   Neurological: He is alert and oriented to person, place, and time. No sensory deficit. He exhibits normal muscle tone.   Skin: Skin is warm and dry. No rash noted.   Psychiatric: He has a normal mood and affect. His behavior is normal.   Vitals reviewed.    Diagnostic Results:   Labs: Reviewed  X-Ray: Reviewed  CT: Reviewed    Assessment/Plan:      * Acute on chronic respiratory failure with hypoxemia  -  Recent admission 7/31-8/9 for acute on chronic  respiratory failure with subsequent admission to [facility]  -  Likely related to IPF exacerbation  -  Admitted to Hospital Medicine   -  Pulmonology following    Pulmonary fibrosis  -  Followed in clinic by Dr. Pradhan- on Ofev  -  Pulmonology following     Debility  -  Transferred from [facility]  -  Related to recent acute/prolonged hospital course and increased O2 demands resulting in impaired functional mobility, endurance, and cardiopulmonary response to activity  See hospital course for functional status.    Recommendations  -  Encourage mobility, OOB in chair, and early ambulation as appropriate  -  PT/OT evaluate and treat  -  Pain management  -  DVT prophylaxis  -  Monitor for and prevent skin breakdown and pressure ulcers  · Early mobility, repositioning/weight shifting every 20-30 minutes when sitting, turn patient every 2 hours, proper mattress/overlay and chair cushioning, pressure relief/heel protector boots    Continues to have therapy/rehab needs related to impaired endurance and cardiopulmonary response to activity.  Per patient and his family, plan is for home at discharge vs [facility] for further treatment.  Will sign off.  Please call with questions/concerns or re-consult if situation changes.    BAO Thao  Department of Physical Medicine & Rehab   [facility]

## 2019-08-26 NOTE — PROGRESS NOTES
[facility]  Pulmonology  Progress Note    Patient Name: Manpreet Hamilton  MRN: [id number]  Admission Date: 8/21/2019  Hospital Length of Stay: 5 days  Code Status: Full Code  Attending Provider: Veda Lin MD  Primary Care Provider: Radames Pradhan MD   Principal Problem: Acute on chronic respiratory failure with hypoxemia    Subjective:     Interval History: No issues overnight. Still continues be dyspneic with exertion. Still with cough. Not productive. Denies fevers or chills.     Objective:     Vital Signs (Most Recent):  Temp: 97.7 °F (36.5 °C) (08/26/19 0749)  Pulse: (!) 117 (08/26/19 1230)  Resp: 18 (08/26/19 1230)  BP: 117/72 (08/26/19 0749)  SpO2: 98 % (08/26/19 1230) Vital Signs (24h Range):  Temp:  [97.3 °F (36.3 °C)-98.6 °F (37 °C)] 97.7 °F (36.5 °C)  Pulse:  [103-130] 117  Resp:  [18-24] 18  SpO2:  [84 %-99 %] 98 %  BP: (115-137)/(72-78) 117/72     Weight: 73.6 kg (162 lb 4.1 oz)  Body mass index is 23.96 kg/m².      Intake/Output Summary (Last 24 hours) at 8/26/2019 1327  Last data filed at 8/26/2019 0543  Gross per 24 hour   Intake 350 ml   Output 900 ml   Net -550 ml       Physical Exam   Constitutional: He is oriented to person, place, and time. Vital signs are normal. He appears well-developed and well-nourished.  Non-toxic appearance. He does not appear ill. No distress. Nasal cannula in place.   HENT:   Head: Normocephalic and atraumatic.   Eyes: Pupils are equal, round, and reactive to light. Conjunctivae and EOM are normal. No scleral icterus.   Neck: Normal range of motion and full passive range of motion without pain. Neck supple. No JVD present. Carotid bruit is not present. No thyromegaly present.   Cardiovascular: Regular rhythm, S1 normal, S2 normal, normal heart sounds and normal pulses. Tachycardia present. Exam reveals no gallop, no S3, no S4 and no friction rub.   No murmur heard.  Pulmonary/Chest: Tachypnea noted. No respiratory distress. He has no wheezes. He  has no rhonchi. He has no rales.   Crackles throughout, but more prominent in the bases   Abdominal: Soft. Normal appearance and bowel sounds are normal. He exhibits no shifting dullness, no distension, no abdominal bruit and no ascites. There is no hepatosplenomegaly. There is no tenderness. There is no rigidity and no guarding.   Musculoskeletal: Normal range of motion. He exhibits no edema.   Neurological: He is alert and oriented to person, place, and time. He has normal strength. He is not disoriented. No cranial nerve deficit or sensory deficit. GCS eye subscore is 4. GCS verbal subscore is 5. GCS motor subscore is 6.   Skin: Skin is warm, dry and intact. No abrasion and no lesion noted.   Psychiatric: He has a normal mood and affect. His behavior is normal. His mood appears not anxious. His speech is not slurred. He is not actively hallucinating. Cognition and memory are normal. He does not exhibit a depressed mood. He is attentive.       Vents:  Oxygen Concentration (%): 56 (08/24/19 1600)    Lines/Drains/Airways     Peripheral Intravenous Line                 Peripheral IV - Single Lumen 08/25/19 1256 18 G Left;Posterior Forearm 1 day                Significant Labs:    CBC/Anemia Profile:  Recent Labs   Lab 08/25/19  0412 08/26/19  0343   WBC 7.69 8.91   HGB 12.1* 12.4*   HCT 39.4* 39.7*   * 396*   MCV 84 82   RDW 15.1* 14.8*        Chemistries:  Recent Labs   Lab 08/25/19  0412 08/26/19  0343    137   K 3.7 3.4*   CL 96 95   CO2 31* 33*   BUN 27* 22   CREATININE 0.8 0.8   CALCIUM 10.0 9.5   ALBUMIN 3.0* 3.0*   PROT 6.3 6.2   BILITOT 0.4 0.4   ALKPHOS 111 108   ALT 39 41   AST 19 17   MG  --  2.0   PHOS  --  3.0       All pertinent labs within the past 24 hours have been reviewed.      Assessment/Plan:     * Acute on chronic respiratory failure with hypoxemia  -baseline hypoxia (approx 4L O2);on O2 since around mid July (after hospitalization for GIB);   -wean O2 to maintain sats  88-92%  -currently on Ofev, but per notes there were plans to change to Esbriet d/t GI bleed  -recently on high doses of steroids (7/31/19) for exacerbation of IPF; infectious work up negative  -was weaned to Pred 25mg daily but increased to Pred 50mg daily on 8/20  -CXR w/ what appears to be worsening infiltrates and given the paucity of infectious signs or symptoms, AE IPF seems most likely  -he was Started on Pred 80mg daily on 8/20  -CTA negative for PE on 8/21  -echo w/ normal systolic fx; conc LVH, mild LAE, UVALDO  -will plan to wean steroids (decreased to Pred 40mg); needs taper to avoid adrenal insufficiency  He was considered for possible experimental protocol from the STRIVE-IPF study, currently ongoing at [facility]. This would mean a 3 week course of PLEX, rituxan, and IVIG. Performing this protocol at this institution would not be feasible. Due to age, he has been denied lung transplant here. We have reached out to [facility] transplant for possible transfer and are waiting to hear back.              Ty Isbell MD  Pulmonology  [facility]

## 2019-08-26 NOTE — SUBJECTIVE & OBJECTIVE
Interval History: No issues overnight. Still continues be dyspneic with exertion. Still with cough. Not productive. Denies fevers or chills.     Objective:     Vital Signs (Most Recent):  Temp: 97.7 °F (36.5 °C) (08/26/19 0749)  Pulse: (!) 117 (08/26/19 1230)  Resp: 18 (08/26/19 1230)  BP: 117/72 (08/26/19 0749)  SpO2: 98 % (08/26/19 1230) Vital Signs (24h Range):  Temp:  [97.3 °F (36.3 °C)-98.6 °F (37 °C)] 97.7 °F (36.5 °C)  Pulse:  [103-130] 117  Resp:  [18-24] 18  SpO2:  [84 %-99 %] 98 %  BP: (115-137)/(72-78) 117/72     Weight: 73.6 kg (162 lb 4.1 oz)  Body mass index is 23.96 kg/m².      Intake/Output Summary (Last 24 hours) at 8/26/2019 1327  Last data filed at 8/26/2019 0543  Gross per 24 hour   Intake 350 ml   Output 900 ml   Net -550 ml       Physical Exam   Constitutional: He is oriented to person, place, and time. Vital signs are normal. He appears well-developed and well-nourished.  Non-toxic appearance. He does not appear ill. No distress. Nasal cannula in place.   HENT:   Head: Normocephalic and atraumatic.   Eyes: Pupils are equal, round, and reactive to light. Conjunctivae and EOM are normal. No scleral icterus.   Neck: Normal range of motion and full passive range of motion without pain. Neck supple. No JVD present. Carotid bruit is not present. No thyromegaly present.   Cardiovascular: Regular rhythm, S1 normal, S2 normal, normal heart sounds and normal pulses. Tachycardia present. Exam reveals no gallop, no S3, no S4 and no friction rub.   No murmur heard.  Pulmonary/Chest: Tachypnea noted. No respiratory distress. He has no wheezes. He has no rhonchi. He has no rales.   Crackles throughout, but more prominent in the bases   Abdominal: Soft. Normal appearance and bowel sounds are normal. He exhibits no shifting dullness, no distension, no abdominal bruit and no ascites. There is no hepatosplenomegaly. There is no tenderness. There is no rigidity and no guarding.   Musculoskeletal: Normal range of  motion. He exhibits no edema.   Neurological: He is alert and oriented to person, place, and time. He has normal strength. He is not disoriented. No cranial nerve deficit or sensory deficit. GCS eye subscore is 4. GCS verbal subscore is 5. GCS motor subscore is 6.   Skin: Skin is warm, dry and intact. No abrasion and no lesion noted.   Psychiatric: He has a normal mood and affect. His behavior is normal. His mood appears not anxious. His speech is not slurred. He is not actively hallucinating. Cognition and memory are normal. He does not exhibit a depressed mood. He is attentive.       Vents:  Oxygen Concentration (%): 56 (08/24/19 1600)    Lines/Drains/Airways     Peripheral Intravenous Line                 Peripheral IV - Single Lumen 08/25/19 1256 18 G Left;Posterior Forearm 1 day                Significant Labs:    CBC/Anemia Profile:  Recent Labs   Lab 08/25/19  0412 08/26/19  0343   WBC 7.69 8.91   HGB 12.1* 12.4*   HCT 39.4* 39.7*   * 396*   MCV 84 82   RDW 15.1* 14.8*        Chemistries:  Recent Labs   Lab 08/25/19  0412 08/26/19  0343    137   K 3.7 3.4*   CL 96 95   CO2 31* 33*   BUN 27* 22   CREATININE 0.8 0.8   CALCIUM 10.0 9.5   ALBUMIN 3.0* 3.0*   PROT 6.3 6.2   BILITOT 0.4 0.4   ALKPHOS 111 108   ALT 39 41   AST 19 17   MG  --  2.0   PHOS  --  3.0       All pertinent labs within the past 24 hours have been reviewed.

## 2019-08-26 NOTE — ASSESSMENT & PLAN NOTE
Lab Results   Component Value Date    HGB 12.1 (L) 08/25/2019     H/H stable  No signs of active bleed  Cont home Ferrous sulfate with bowel regimen

## 2019-08-27 LAB
ALBUMIN SERPL BCP-MCNC: 2.9 G/DL (ref 3.5–5.2)
ALP SERPL-CCNC: 104 U/L (ref 55–135)
ALT SERPL W/O P-5'-P-CCNC: 37 U/L (ref 10–44)
ANION GAP SERPL CALC-SCNC: 11 MMOL/L (ref 8–16)
AST SERPL-CCNC: 20 U/L (ref 10–40)
BASOPHILS # BLD AUTO: 0.01 K/UL (ref 0–0.2)
BASOPHILS NFR BLD: 0.1 % (ref 0–1.9)
BILIRUB SERPL-MCNC: 0.4 MG/DL (ref 0.1–1)
BUN SERPL-MCNC: 21 MG/DL (ref 8–23)
CALCIUM SERPL-MCNC: 10.2 MG/DL (ref 8.7–10.5)
CCP AB SER IA-ACNC: 2.5 U/ML
CHLORIDE SERPL-SCNC: 97 MMOL/L (ref 95–110)
CO2 SERPL-SCNC: 30 MMOL/L (ref 23–29)
CREAT SERPL-MCNC: 0.8 MG/DL (ref 0.5–1.4)
DIFFERENTIAL METHOD: ABNORMAL
EOSINOPHIL # BLD AUTO: 0.1 K/UL (ref 0–0.5)
EOSINOPHIL NFR BLD: 1.3 % (ref 0–8)
ERYTHROCYTE [DISTWIDTH] IN BLOOD BY AUTOMATED COUNT: 14.7 % (ref 11.5–14.5)
EST. GFR  (AFRICAN AMERICAN): >60 ML/MIN/1.73 M^2
EST. GFR  (NON AFRICAN AMERICAN): >60 ML/MIN/1.73 M^2
GLUCOSE SERPL-MCNC: 87 MG/DL (ref 70–110)
HCT VFR BLD AUTO: 40.7 % (ref 40–54)
HGB BLD-MCNC: 12.2 G/DL (ref 14–18)
IGG SERPL-MCNC: 571 MG/DL (ref 650–1600)
IMM GRANULOCYTES # BLD AUTO: 0.05 K/UL (ref 0–0.04)
IMM GRANULOCYTES NFR BLD AUTO: 0.6 % (ref 0–0.5)
LYMPHOCYTES # BLD AUTO: 1.6 K/UL (ref 1–4.8)
LYMPHOCYTES NFR BLD: 19.5 % (ref 18–48)
MAGNESIUM SERPL-MCNC: 2.1 MG/DL (ref 1.6–2.6)
MCH RBC QN AUTO: 25.5 PG (ref 27–31)
MCHC RBC AUTO-ENTMCNC: 30 G/DL (ref 32–36)
MCV RBC AUTO: 85 FL (ref 82–98)
MONOCYTES # BLD AUTO: 0.7 K/UL (ref 0.3–1)
MONOCYTES NFR BLD: 8.1 % (ref 4–15)
NEUTROPHILS # BLD AUTO: 5.8 K/UL (ref 1.8–7.7)
NEUTROPHILS NFR BLD: 70.4 % (ref 38–73)
NRBC BLD-RTO: 0 /100 WBC
PHOSPHATE SERPL-MCNC: 3.2 MG/DL (ref 2.7–4.5)
PLATELET # BLD AUTO: 402 K/UL (ref 150–350)
PMV BLD AUTO: 9 FL (ref 9.2–12.9)
POTASSIUM SERPL-SCNC: 4.4 MMOL/L (ref 3.5–5.1)
PROT SERPL-MCNC: 6.2 G/DL (ref 6–8.4)
RBC # BLD AUTO: 4.79 M/UL (ref 4.6–6.2)
RHEUMATOID FACT SERPL-ACNC: 16 IU/ML (ref 0–15)
SODIUM SERPL-SCNC: 138 MMOL/L (ref 136–145)
WBC # BLD AUTO: 8.26 K/UL (ref 3.9–12.7)

## 2019-08-27 PROCEDURE — 83516 IMMUNOASSAY NONANTIBODY: CPT

## 2019-08-27 PROCEDURE — 86431 RHEUMATOID FACTOR QUANT: CPT

## 2019-08-27 PROCEDURE — 97110 THERAPEUTIC EXERCISES: CPT

## 2019-08-27 PROCEDURE — 20600001 HC STEP DOWN PRIVATE ROOM

## 2019-08-27 PROCEDURE — 86038 ANTINUCLEAR ANTIBODIES: CPT

## 2019-08-27 PROCEDURE — 84100 ASSAY OF PHOSPHORUS: CPT

## 2019-08-27 PROCEDURE — 86235 NUCLEAR ANTIGEN ANTIBODY: CPT

## 2019-08-27 PROCEDURE — 85025 COMPLETE CBC W/AUTO DIFF WBC: CPT

## 2019-08-27 PROCEDURE — 99232 SBSQ HOSP IP/OBS MODERATE 35: CPT | Mod: ,,, | Performed by: HOSPITALIST

## 2019-08-27 PROCEDURE — 83735 ASSAY OF MAGNESIUM: CPT

## 2019-08-27 PROCEDURE — 27000221 HC OXYGEN, UP TO 24 HOURS

## 2019-08-27 PROCEDURE — 80053 COMPREHEN METABOLIC PANEL: CPT

## 2019-08-27 PROCEDURE — 25000003 PHARM REV CODE 250: Performed by: PHYSICIAN ASSISTANT

## 2019-08-27 PROCEDURE — 86803 HEPATITIS C AB TEST: CPT

## 2019-08-27 PROCEDURE — 99233 PR SUBSEQUENT HOSPITAL CARE,LEVL III: ICD-10-PCS | Mod: ,,, | Performed by: INTERNAL MEDICINE

## 2019-08-27 PROCEDURE — 94761 N-INVAS EAR/PLS OXIMETRY MLT: CPT

## 2019-08-27 PROCEDURE — 63600175 PHARM REV CODE 636 W HCPCS: Performed by: HOSPITALIST

## 2019-08-27 PROCEDURE — 25000242 PHARM REV CODE 250 ALT 637 W/ HCPCS: Performed by: INTERNAL MEDICINE

## 2019-08-27 PROCEDURE — 86235 NUCLEAR ANTIGEN ANTIBODY: CPT | Mod: 59

## 2019-08-27 PROCEDURE — 83516 IMMUNOASSAY NONANTIBODY: CPT | Mod: 59

## 2019-08-27 PROCEDURE — 86200 CCP ANTIBODY: CPT

## 2019-08-27 PROCEDURE — 99900035 HC TECH TIME PER 15 MIN (STAT)

## 2019-08-27 PROCEDURE — 86255 FLUORESCENT ANTIBODY SCREEN: CPT

## 2019-08-27 PROCEDURE — 94640 AIRWAY INHALATION TREATMENT: CPT

## 2019-08-27 PROCEDURE — 36415 COLL VENOUS BLD VENIPUNCTURE: CPT

## 2019-08-27 PROCEDURE — 25000003 PHARM REV CODE 250: Performed by: HOSPITALIST

## 2019-08-27 PROCEDURE — 99233 SBSQ HOSP IP/OBS HIGH 50: CPT | Mod: ,,, | Performed by: INTERNAL MEDICINE

## 2019-08-27 PROCEDURE — 82784 ASSAY IGA/IGD/IGG/IGM EACH: CPT

## 2019-08-27 PROCEDURE — 99232 PR SUBSEQUENT HOSPITAL CARE,LEVL II: ICD-10-PCS | Mod: ,,, | Performed by: HOSPITALIST

## 2019-08-27 RX ADMIN — GUAIFENESIN 600 MG: 600 TABLET, EXTENDED RELEASE ORAL at 08:08

## 2019-08-27 RX ADMIN — SENNOSIDES,DOCUSATE SODIUM 1 TABLET: 8.6; 5 TABLET, FILM COATED ORAL at 07:08

## 2019-08-27 RX ADMIN — GUAIFENESIN 600 MG: 600 TABLET, EXTENDED RELEASE ORAL at 07:08

## 2019-08-27 RX ADMIN — SUCRALFATE 1 G: 1 SUSPENSION ORAL at 08:08

## 2019-08-27 RX ADMIN — HEPARIN SODIUM 5000 UNITS: 5000 INJECTION INTRAVENOUS; SUBCUTANEOUS at 07:08

## 2019-08-27 RX ADMIN — HEPARIN SODIUM 5000 UNITS: 5000 INJECTION INTRAVENOUS; SUBCUTANEOUS at 02:08

## 2019-08-27 RX ADMIN — IPRATROPIUM BROMIDE AND ALBUTEROL SULFATE 3 ML: .5; 3 SOLUTION RESPIRATORY (INHALATION) at 04:08

## 2019-08-27 RX ADMIN — FUROSEMIDE 40 MG: 40 TABLET ORAL at 08:08

## 2019-08-27 RX ADMIN — IPRATROPIUM BROMIDE AND ALBUTEROL SULFATE 3 ML: .5; 3 SOLUTION RESPIRATORY (INHALATION) at 08:08

## 2019-08-27 RX ADMIN — SENNOSIDES,DOCUSATE SODIUM 1 TABLET: 8.6; 5 TABLET, FILM COATED ORAL at 08:08

## 2019-08-27 RX ADMIN — FERROUS SULFATE TAB EC 325 MG (65 MG FE EQUIVALENT) 325 MG: 325 (65 FE) TABLET DELAYED RESPONSE at 08:08

## 2019-08-27 RX ADMIN — SUCRALFATE 1 G: 1 SUSPENSION ORAL at 12:08

## 2019-08-27 RX ADMIN — HEPARIN SODIUM 5000 UNITS: 5000 INJECTION INTRAVENOUS; SUBCUTANEOUS at 05:08

## 2019-08-27 RX ADMIN — FERROUS SULFATE TAB EC 325 MG (65 MG FE EQUIVALENT) 325 MG: 325 (65 FE) TABLET DELAYED RESPONSE at 07:08

## 2019-08-27 RX ADMIN — PANTOPRAZOLE SODIUM 40 MG: 40 TABLET, DELAYED RELEASE ORAL at 07:08

## 2019-08-27 RX ADMIN — RAMELTEON 8 MG: 8 TABLET ORAL at 08:08

## 2019-08-27 RX ADMIN — SUCRALFATE 1 G: 1 SUSPENSION ORAL at 06:08

## 2019-08-27 RX ADMIN — ATORVASTATIN CALCIUM 20 MG: 20 TABLET, FILM COATED ORAL at 07:08

## 2019-08-27 RX ADMIN — PANTOPRAZOLE SODIUM 40 MG: 40 TABLET, DELAYED RELEASE ORAL at 08:08

## 2019-08-27 RX ADMIN — TAMSULOSIN HYDROCHLORIDE 0.8 MG: 0.4 CAPSULE ORAL at 05:08

## 2019-08-27 RX ADMIN — PREDNISONE 40 MG: 10 TABLET ORAL at 08:08

## 2019-08-27 RX ADMIN — IPRATROPIUM BROMIDE AND ALBUTEROL SULFATE 3 ML: .5; 3 SOLUTION RESPIRATORY (INHALATION) at 12:08

## 2019-08-27 NOTE — CARE UPDATE
Rapid Response Respiratory Therapy Note   RT called concerning patient's O2 desaturation to 78% after getting up to use restroom. Pt placed on 15 L 50% venti mask. SpO2 now 99% upon assessment. Due to patient's condition of pulmonary fibrosis, patient desaturates upon exertion. Pt now placed on bubble high flow O2 nasal cannula for wider range of liter flow. Pt currently at 5 L but can be increased up to 15 L when on exertion. SpO2 now 96%. MD Lin at bedside discussing condition with patient and family.  Followed up with patient for proactive rounding.   No acute issues at this time. Plan of care reviewed with primary RT, Aly SOSA, and primary MD Lin.  Please call Rapid Response RT, Kathie Higginbotham, CRT at [phone] with any questions or concerns.

## 2019-08-27 NOTE — CONSULTS
This is not a new consult as patient has been followed by pulmonary/Dr. Seo.     Please see progress note dated today

## 2019-08-27 NOTE — PLAN OF CARE
Dr. JEREMY Lin, CM, and SW met with Mr. and Mrs. Hamilton to discuss his discharge plan. Pulmonology currently on possible transfer to [facility] to continue treatment vs home with HH pending medical stability. CM confirmed with Mrs. Hamilton that Admit had been to the room and scanned in patient's current insurance card. They would like to continue home health services with [facility]. Mrs. Hamilton asked this CM if she would contact Letty Mcfarlane at [facility] to discuss discharge planning.    CM placed a call to Letty Mcfarlane ([phone]) at [facility] to update her on patient's current discharge plan. Letty notified this CM that when patient does discharge home if any DME needs to be ordered , the orders should be sent to [facility] (P) [phone], (F) [phone] for processing. If Mr. Hamilton does need to be transferred to [facility], Letty stated that she would be able to assist with those arrangements. She informed this CM that [facility] has been receiving all of the clinical information being sent for patient's hospital stay. CM will continue to follow and update Letty with any discharge needs.    Erendira Vega RN  Ext [phone]

## 2019-08-27 NOTE — PT/OT/SLP PROGRESS
"     Physical Therapy  Pt Not Seen    Patient Name:  Manpreet Hamilton   MRN:  [id number]    3:05 pm    Patient not seen today secondary to  Patient unwilling to participate(Pt refused tx session stating "I already did some walking with the OT about 45 min ago and I just got back in the bed"  Pt agreeable to performing OOB mobility the next day). Will follow-up on next scheduled visit.    Re Rodrigues, PTA  8/27/2019      "

## 2019-08-27 NOTE — PLAN OF CARE
08/27/19 1549   Discharge Reassessment   Assessment Type Discharge Planning Reassessment   Provided patient/caregiver education on the expected discharge date and the discharge plan Yes   Do you have any problems affording any of your prescribed medications? No   Discharge Plan A Other  (Transfer to [facility] )   Discharge Plan B Home Health  ([facility])   DME Needed Upon Discharge  other (see comments)  (TBD)   Post-Acute Status   Post-Acute Authorization Home Health/Hospice   Home Health/Hospice Status Awaiting Internal Medical Clearance

## 2019-08-27 NOTE — CARE UPDATE
Rapid Response Nurse Chart Check     Chart check completed, abnormal VS noted. Bedside RN Alex contacted, no concerns   verbalized at this time, instructed to call [phone] for further concerns or assistance.

## 2019-08-27 NOTE — PLAN OF CARE
Spoke with Letty at [facility] to discuss discharge planning for Mr. Hamilton. CM informed that Mrs. Hamilton had contacted her stating that patient was going to be discharged home tomorrow and would need 15L's of home O2 if possible. This CM then spoke with Dr. JEREMY Lin to confirm if patient was discharging in am. Dr. Lin stated that a transfer could possibly be initiated to [facility] tomorrow, not home with HH, and she would have more information in the morning. CM contacted Letty and informed her of above conversation. This CM will continue to follow and update Letty on Mr. Hamilton's discharge plan.    Erendira Vega RN  Ext [phone]

## 2019-08-27 NOTE — SUBJECTIVE & OBJECTIVE
Interval History: No issues overnight. Desatted after BM but recovered with NRB    Objective:     Vital Signs (Most Recent):  Temp: 98.3 °F (36.8 °C) (08/27/19 1204)  Pulse: (!) 120 (08/27/19 1231)  Resp: (!) 24 (08/27/19 1231)  BP: (!) 101/57 (08/27/19 1204)  SpO2: (!) 91 % (08/27/19 1231) Vital Signs (24h Range):  Temp:  [97.9 °F (36.6 °C)-98.4 °F (36.9 °C)] 98.3 °F (36.8 °C)  Pulse:  [] 120  Resp:  [18-24] 24  SpO2:  [75 %-99 %] 91 %  BP: ()/(57-80) 101/57     Weight: 74.5 kg (164 lb 3.9 oz)  Body mass index is 24.25 kg/m².      Intake/Output Summary (Last 24 hours) at 8/27/2019 1341  Last data filed at 8/27/2019 0500  Gross per 24 hour   Intake 320 ml   Output 250 ml   Net 70 ml       Physical Exam   Constitutional: He is oriented to person, place, and time. Vital signs are normal. He appears well-developed and well-nourished.  Non-toxic appearance. He does not appear ill. No distress. Nasal cannula in place.   HENT:   Head: Normocephalic and atraumatic.   Eyes: Pupils are equal, round, and reactive to light. Conjunctivae and EOM are normal. No scleral icterus.   Neck: Normal range of motion and full passive range of motion without pain. Neck supple. No JVD present. Carotid bruit is not present. No thyromegaly present.   Cardiovascular: Regular rhythm, S1 normal, S2 normal, normal heart sounds and normal pulses. Tachycardia present. Exam reveals no gallop, no S3, no S4 and no friction rub.   No murmur heard.  Pulmonary/Chest: Tachypnea noted. No respiratory distress. He has no wheezes. He has no rhonchi. He has no rales.   Crackles throughout, but more prominent in the bases   Abdominal: Soft. Normal appearance and bowel sounds are normal. He exhibits no shifting dullness, no distension, no abdominal bruit and no ascites. There is no hepatosplenomegaly. There is no tenderness. There is no rigidity and no guarding.   Musculoskeletal: Normal range of motion. He exhibits no edema.   Neurological: He is  alert and oriented to person, place, and time. He has normal strength. He is not disoriented. No cranial nerve deficit or sensory deficit. GCS eye subscore is 4. GCS verbal subscore is 5. GCS motor subscore is 6.   Skin: Skin is warm, dry and intact. No abrasion and no lesion noted.   Psychiatric: He has a normal mood and affect. His behavior is normal. His mood appears not anxious. His speech is not slurred. He is not actively hallucinating. Cognition and memory are normal. He does not exhibit a depressed mood. He is attentive.       Vents:  Oxygen Concentration (%): 50 (08/27/19 1005)    Lines/Drains/Airways     Peripheral Intravenous Line                 Peripheral IV - Single Lumen 08/25/19 1256 18 G Left;Posterior Forearm 2 days                Significant Labs:    CBC/Anemia Profile:  Recent Labs   Lab 08/26/19  0343 08/27/19  0508   WBC 8.91 8.26   HGB 12.4* 12.2*   HCT 39.7* 40.7   * 402*   MCV 82 85   RDW 14.8* 14.7*        Chemistries:  Recent Labs   Lab 08/26/19  0343 08/27/19  0508    138   K 3.4* 4.4   CL 95 97   CO2 33* 30*   BUN 22 21   CREATININE 0.8 0.8   CALCIUM 9.5 10.2   ALBUMIN 3.0* 2.9*   PROT 6.2 6.2   BILITOT 0.4 0.4   ALKPHOS 108 104   ALT 41 37   AST 17 20   MG 2.0 2.1   PHOS 3.0 3.2       All pertinent labs within the past 24 hours have been reviewed.    Significant Imaging:  No new images to review

## 2019-08-27 NOTE — PLAN OF CARE
AVA spoke with Andrea, [facility] RN, at 215pm regarding pt request to speak with a SW.  Andrea stated that she had already spokmen to JAHAIRA Krishna, regarding some insurance questions.  AVA advised Andrea that Dr. Lin would be called to address further questions regarding transfer to [facility] vs home with HH.  Andrea explaiend that she was filling in for another nurse, but she would let the RN taking care of pt know of the conversation.  This interaction was witnessed by another .  Note was erroneously written by LPN taking care of pt around 631pm stating that SW was not able to be reached.  SW and  had been reached and contacted pt reached out to Dr. Veda Lin to address this situation.  LPN notified that there was a meeting scheduled between JAHAIRA goodson SW and Dr. Lin and asked for her official documentation to be updated.    Adeline Garcia, SW  [phone]

## 2019-08-27 NOTE — ASSESSMENT & PLAN NOTE
-baseline hypoxia (approx 4L O2);on O2 since around mid July (after hospitalization for GIB);   -wean O2 to maintain sats 88-92%  -currently on Ofev, but per notes there were plans to change to Esbriet d/t GI bleed  -recently on high doses of steroids (7/31/19) for exacerbation of IPF; infectious work up negative  -was weaned to Pred 25mg daily but increased to Pred 50mg daily on 8/20  -CXR w/ what appears to be worsening infiltrates and given the paucity of infectious signs or symptoms, AE IPF seems most likely  -CTA negative for PE on 8/21  -echo w/ normal systolic fx; conc LVH, mild LAE, UVALDO  -he was Started on Pred 80mg daily on 8/20  -will plan to wean steroids (decreased to Pred 40mg); needs taper to avoid adrenal insufficiency  -continue Prednisone 40x 1 week, followed by 30 for 1 week, followed by 20 until seen by outpt pulm  He was considered for possible experimental protocol from the STRIVE-IPF study, currently ongoing at [facility]. This would mean a 3 week course of PLEX, rituxan, and IVIG. Performing this protocol at this institution would not be feasible. Due to age, he has been denied lung transplant here.   -Still awaiting to hear back from [facility]

## 2019-08-27 NOTE — PLAN OF CARE
Problem: Occupational Therapy Goal  Goal: Occupational Therapy Goal  Goals to be met by: 8/30/19     Patient will increase functional independence with ADLs by performing:    LE Dressing with Set-up Assistance.  Grooming while standing at sink with Set-up Assistance.  Toileting from toilet with Set-up Assistance for hygiene and clothing management.   Toilet transfer to toilet with Supervision.     Outcome: Ongoing (interventions implemented as appropriate)  Pt progressing with goals. Continue with POC.   Margaret Clifford OT  8/27/2019

## 2019-08-27 NOTE — PT/OT/SLP PROGRESS
Occupational Therapy   Treatment    Name: Manpreet Hamilton  MRN: [id number]  Admitting Diagnosis:  Acute on chronic respiratory failure with hypoxemia       Recommendations:     Discharge Recommendations: home health OT  Discharge Equipment Recommendations:  commode, wheelchair, manual  Barriers to discharge:  None    Assessment:     Manpreet Hamilton is a 68 y.o. male with a medical diagnosis of Acute on chronic respiratory failure with hypoxemia.  He presents alert and willing to participate in therapy session. Upon arrival, pt found supine with wife at bedside. Pt required frequent rests breaks between each functional task/exercise 2/2 decrease in 02 sats. Performance deficits affecting function are weakness, impaired endurance, impaired self care skills, impaired functional mobilty, impaired cardiopulmonary response to activity. Pt would benefit from HHOT following d/c to continue to progress towards goals and improve quality of life.     Rehab Prognosis:  Good; patient would benefit from acute skilled OT services to address these deficits and reach maximum level of function.       Plan:     Patient to be seen 3 x/week to address the above listed problems via self-care/home management, therapeutic exercises, therapeutic activities, neuromuscular re-education  · Plan of Care Expires: 09/22/19  · Plan of Care Reviewed with: patient    Subjective     Pain/Comfort:  · Pain Rating 1: 0/10  · Pain Rating Post-Intervention 2: 0/10    Objective:     Communicated with: RN prior to session.  Patient found supine with oxygen, telemetry upon OT entry to room.    General Precautions: Standard, fall, respiratory   Orthopedic Precautions:N/A   Braces: N/A     Occupational Performance:     Bed Mobility:    · Patient completed Rolling/Turning to Right with stand by assistance  · Patient completed Supine to Sit with stand by assistance     Functional Mobility/Transfers:  · Patient completed Sit <> Stand Transfer with stand by  assistance  with  no assistive device   · Patient completed Bed <> Chair Transfer using Stand Pivot technique with contact guard assistance with no assistive device  · Functional Mobility: Pt completed mobitlity from EOB < bedside chair with CGA ( distance limited/required seated rest break following mobility 2/2 drop in 02 sat)    Activities of Daily Living:  · Upper Body Dressing: minimum assistance to brandon gown like jacket while seated EOB    AMPAC 6 Click ADL: 21    Treatment & Education:  -Pt edu on OT role/POC  -Importance of OOB activity with staff assistance  -Safety during functional t/f and mobility  - White board updated  - All questions/concerns answered within OT scope of practice    Pt completed BUE exercises using 2# dumbbell  while seated UIC including: 1x10 reps in shoulder flexion, elbow flexion/extension, chest press, and alternating punches. Pt required rest breaks between each exercises to recover 2/2 drop in 02 sats ( starting at 93 and decreasing to 85-87) on 4.5 lnc      Patient left up in chair with all lines intact, call button in reach and RN notifiedEducation:      GOALS:   Multidisciplinary Problems     Occupational Therapy Goals        Problem: Occupational Therapy Goal    Goal Priority Disciplines Outcome Interventions   Occupational Therapy Goal     OT, PT/OT Ongoing (interventions implemented as appropriate)    Description:  Goals to be met by: 8/30/19     Patient will increase functional independence with ADLs by performing:    LE Dressing with Set-up Assistance.  Grooming while standing at sink with Set-up Assistance.  Toileting from toilet with Set-up Assistance for hygiene and clothing management.   Toilet transfer to toilet with Supervision.                      Time Tracking:     OT Date of Treatment: 08/27/19  OT Start Time: 1339  OT Stop Time: 1411  OT Total Time (min): 32 min    Billable Minutes:Therapeutic Exercise 32    Margaret Clifford OT  8/27/2019

## 2019-08-27 NOTE — PLAN OF CARE
Problem: Adult Inpatient Plan of Care  Goal: Plan of Care Review  AAOx4 VS stable. Pt currently in bed with wife at bedside. No procedures on shift. Pt attempted to ambulate to bathroom, Sp02 dropped to the 70's. Respiratory therapist and MD notified. Pt was placed on a ventimask at 50% for a couple hours and converted back to 5L high flow NC, SpO2 90's. Monitoring continued. No further complaints on shift.

## 2019-08-27 NOTE — PLAN OF CARE
Problem: Adult Inpatient Plan of Care  Goal: Plan of Care Review  Outcome: Ongoing (interventions implemented as appropriate)  AAO x4. O2 increased from 4- 5 L this AM due to Desaturation from coughing, SPO2 -95% currently. Callbell placed within reach and use encouraged.

## 2019-08-27 NOTE — CARE UPDATE
Rapid Response Nurse Chart Check     Chart check completed, abnormal VS noted. Bedside RN Gregorio contacted, no concerns   verbalized at this time, instructed to call [phone] for further concerns or assistance.

## 2019-08-27 NOTE — PROGRESS NOTES
[facility]  Pulmonology  Progress Note    Patient Name: Manpreet Hamilton  MRN: [id number]  Admission Date: 8/21/2019  Hospital Length of Stay: 6 days  Code Status: Full Code  Attending Provider: Veda Lin MD  Primary Care Provider: Radames Pradhan MD   Principal Problem: Acute on chronic respiratory failure with hypoxemia    Subjective:     Interval History: No issues overnight. Desatted after BM but recovered with NRB    Objective:     Vital Signs (Most Recent):  Temp: 98.3 °F (36.8 °C) (08/27/19 1204)  Pulse: (!) 120 (08/27/19 1231)  Resp: (!) 24 (08/27/19 1231)  BP: (!) 101/57 (08/27/19 1204)  SpO2: (!) 91 % (08/27/19 1231) Vital Signs (24h Range):  Temp:  [97.9 °F (36.6 °C)-98.4 °F (36.9 °C)] 98.3 °F (36.8 °C)  Pulse:  [] 120  Resp:  [18-24] 24  SpO2:  [75 %-99 %] 91 %  BP: ()/(57-80) 101/57     Weight: 74.5 kg (164 lb 3.9 oz)  Body mass index is 24.25 kg/m².      Intake/Output Summary (Last 24 hours) at 8/27/2019 1341  Last data filed at 8/27/2019 0500  Gross per 24 hour   Intake 320 ml   Output 250 ml   Net 70 ml       Physical Exam   Constitutional: He is oriented to person, place, and time. Vital signs are normal. He appears well-developed and well-nourished.  Non-toxic appearance. He does not appear ill. No distress. Nasal cannula in place.   HENT:   Head: Normocephalic and atraumatic.   Eyes: Pupils are equal, round, and reactive to light. Conjunctivae and EOM are normal. No scleral icterus.   Neck: Normal range of motion and full passive range of motion without pain. Neck supple. No JVD present. Carotid bruit is not present. No thyromegaly present.   Cardiovascular: Regular rhythm, S1 normal, S2 normal, normal heart sounds and normal pulses. Tachycardia present. Exam reveals no gallop, no S3, no S4 and no friction rub.   No murmur heard.  Pulmonary/Chest: Tachypnea noted. No respiratory distress. He has no wheezes. He has no rhonchi. He has no rales.   Crackles  throughout, but more prominent in the bases   Abdominal: Soft. Normal appearance and bowel sounds are normal. He exhibits no shifting dullness, no distension, no abdominal bruit and no ascites. There is no hepatosplenomegaly. There is no tenderness. There is no rigidity and no guarding.   Musculoskeletal: Normal range of motion. He exhibits no edema.   Neurological: He is alert and oriented to person, place, and time. He has normal strength. He is not disoriented. No cranial nerve deficit or sensory deficit. GCS eye subscore is 4. GCS verbal subscore is 5. GCS motor subscore is 6.   Skin: Skin is warm, dry and intact. No abrasion and no lesion noted.   Psychiatric: He has a normal mood and affect. His behavior is normal. His mood appears not anxious. His speech is not slurred. He is not actively hallucinating. Cognition and memory are normal. He does not exhibit a depressed mood. He is attentive.       Vents:  Oxygen Concentration (%): 50 (08/27/19 1005)    Lines/Drains/Airways     Peripheral Intravenous Line                 Peripheral IV - Single Lumen 08/25/19 1256 18 G Left;Posterior Forearm 2 days                Significant Labs:    CBC/Anemia Profile:  Recent Labs   Lab 08/26/19  0343 08/27/19  0508   WBC 8.91 8.26   HGB 12.4* 12.2*   HCT 39.7* 40.7   * 402*   MCV 82 85   RDW 14.8* 14.7*        Chemistries:  Recent Labs   Lab 08/26/19  0343 08/27/19  0508    138   K 3.4* 4.4   CL 95 97   CO2 33* 30*   BUN 22 21   CREATININE 0.8 0.8   CALCIUM 9.5 10.2   ALBUMIN 3.0* 2.9*   PROT 6.2 6.2   BILITOT 0.4 0.4   ALKPHOS 108 104   ALT 41 37   AST 17 20   MG 2.0 2.1   PHOS 3.0 3.2       All pertinent labs within the past 24 hours have been reviewed.    Significant Imaging:  No new images to review    Assessment/Plan:     * Acute on chronic respiratory failure with hypoxemia  -baseline hypoxia (approx 4L O2);on O2 since around mid July (after hospitalization for GIB);   -wean O2 to maintain sats  88-92%  -currently on Ofev, but per notes there were plans to change to Esbriet d/t GI bleed  -recently on high doses of steroids (7/31/19) for exacerbation of IPF; infectious work up negative  -was weaned to Pred 25mg daily but increased to Pred 50mg daily on 8/20  -CXR w/ what appears to be worsening infiltrates and given the paucity of infectious signs or symptoms, AE IPF seems most likely  -CTA negative for PE on 8/21  -echo w/ normal systolic fx; conc LVH, mild LAE, UVALDO  -he was Started on Pred 80mg daily on 8/20  -will plan to wean steroids (decreased to Pred 40mg); needs taper to avoid adrenal insufficiency  -continue Prednisone 40x 1 week, followed by 30 for 1 week, followed by 20 until seen by outpt pulm  He was considered for possible experimental protocol from the STRIVE-IPF study, currently ongoing at [facility]. This would mean a 3 week course of PLEX, rituxan, and IVIG. Performing this protocol at this institution would not be feasible. Due to age, he has been denied lung transplant here.   -Still awaiting to hear back from [facility]             Ty Isbell MD  Pulmonology  [facility]

## 2019-08-27 NOTE — CARE UPDATE
Rapid Response Respiratory Therapy Proactive Rounding Note      Time of visit: 08:16    Code Status: Full Code   : 1950  Age: 68 y.o.  Weight:   Wt Readings from Last 1 Encounters:   19 74.5 kg (164 lb 3.9 oz)     Sex: male  Race: White   Bed: 8080/8080 A:   MRN: [id number]    SITUATION     Evaluated patient for: High flow oxygen requirements    BACKGROUND     Patient has a past medical history of Hyperlipemia, Hypertension, Nasal congestion, and Pulmonary fibrosis.  Pulmonary Hx: Pulmonary fibrosis  Clinically Significant Surgical Hx: None    ASSESSMENT     Pulse: Pulse: 104 Respiratory rate: Resp: 20 Temperature: Temp: 98.4 °F (36.9 °C) BP: BP: 108/63 SpO2:SpO2: (!) 94 %   Level of Consciousness: Level of Consciousness (AVPU): alert  Respiratory Effort: Respiratory Effort: Unlabored  Expansion/Accessory Muscle Usage: Expansion/Accessory Muscles/Retractions: expansion symmetric, no retractions, no use of accessory muscles  All Lung Field Breath Sounds: All Lung Fields Breath Sounds: Anterior:, Lateral:, clear, diminished  ELIZ Breath Sounds: clear, diminished  LLL Breath Sounds: clear, diminished  RML Breath Sounds: clear, diminished  RLL Breath Sounds: clear, diminished  Cough Type: Cough Type: good, productive  Mobility at time of assessment: General Mobility: generalized weakness  O2 Device/Concentration: O2 Device (Oxygen Therapy): nasal cannula w/ humidification   Flow (L/min): 4 Oxygen Concentration (%): 36  Most recent blood gas: No results for input(s): PH, PCO2, PO2, HCO3, POCSATURATED, BE in the last 72 hours.    Current Respiratory Care Orders:   19 0825  Oxygen Continuous Continuous     References: Oxygen Titration Protocol   Question Answer Comment   Device type: Low flow    Device: Nasal Cannula (1- 5 Liters)    LPM: 1-5    Titrate O2 per Oxygen Titration Protocol: Yes    To maintain SpO2 goal of: >= 90%    Notify MD of: Inability to achieve desired SpO2; Sudden change in  patient status and requires 20% increase in FiO2; Patient requires >60% FiO2        08/27/19 0825   08/25/19 0800  Chest physiotherapy Q4H WAKE Every 4 hours while awake (12 of 28 released)    Release   Question: Indications: Answer: COPIOUS SPUTUM PRODUCTION    08/25/19 0357 08/24/19 1600  Inhalation Treatment Q4H WAKE Every 4 hours while awake (14 of 26 released)    Release    08/24/19 1432   08/21/19 2000  Incentive spirometry Every 4 hours (38 of 55 released)    Release    08/21/19 1602   08/21/19 1558  Pulse Oximetry Continuous Continuous      08/21/19 1600   Unscheduled  Inhalation Treatment Q4H PRN Every 4 hours PRN (0 of 81165 released)    Release    08/25/19 0357 08/25/19 0457  albuterol-ipratropium 2.5 mg-0.5 mg/3 mL nebulizer solution 3 mL (albuterol-ipratropium (DUO-NEB) 0.5 - 3 mg (2.5 mg base)/ 3 mL nebulizer panel)    Discontinue    -- Dispensed NEBULIZATION Every 4 hours PRN 08/25/19 0357 08/24/19 1200  albuterol-ipratropium 2.5 mg-0.5 mg/3 mL nebulizer solution 3 mL (albuterol-ipratropium (DUO-NEB) 0.5 - 3 mg (2.5 mg base)/ 3 mL nebulizer panel)    Discontinue    -- Dispensed NEBULIZATION Every 4 hours while awake 08/24/19 0736         NIPPV: No  Surgical airway: No  ETCO2 monitored:    Ambu at bedside: Ambu bag with the patient?: Yes, Adult Ambu    INTERVENTIONS/RECOMMENDATIONS   ?  Epic orders indicate patient is on high flow nasal cannula. On assessment, patient found to be on 4 L low flow nasal cannula. SpO2 94%. RR 20. Pt WOB unlabored. O2 order changed in epic to low flow nasal cannula.    Discussed plan of care with primary RTAly.     FOLLOW-UP     Please call back the Rapid Response RT, Kathie Higginbotham, CRT at x [phone] for any questions or concerns.

## 2019-08-28 VITALS
OXYGEN SATURATION: 91 % | BODY MASS INDEX: 24.25 KG/M2 | SYSTOLIC BLOOD PRESSURE: 128 MMHG | DIASTOLIC BLOOD PRESSURE: 81 MMHG | RESPIRATION RATE: 18 BRPM | TEMPERATURE: 98 F | WEIGHT: 164.25 LBS | HEART RATE: 124 BPM

## 2019-08-28 LAB
ALBUMIN SERPL BCP-MCNC: 2.8 G/DL (ref 3.5–5.2)
ALP SERPL-CCNC: 99 U/L (ref 55–135)
ALT SERPL W/O P-5'-P-CCNC: 29 U/L (ref 10–44)
ANA SER QL IF: NORMAL
ANION GAP SERPL CALC-SCNC: 13 MMOL/L (ref 8–16)
ANTI-SSA ANTIBODY: 1.47 EU (ref 0–19.99)
ANTI-SSA INTERPRETATION: NEGATIVE
ANTI-SSB ANTIBODY: 0.65 EU (ref 0–19.99)
ANTI-SSB INTERPRETATION: NEGATIVE
AST SERPL-CCNC: 15 U/L (ref 10–40)
BASOPHILS # BLD AUTO: 0.01 K/UL (ref 0–0.2)
BASOPHILS NFR BLD: 0.1 % (ref 0–1.9)
BILIRUB SERPL-MCNC: 0.4 MG/DL (ref 0.1–1)
BUN SERPL-MCNC: 22 MG/DL (ref 8–23)
CALCIUM SERPL-MCNC: 9.3 MG/DL (ref 8.7–10.5)
CHLORIDE SERPL-SCNC: 97 MMOL/L (ref 95–110)
CO2 SERPL-SCNC: 28 MMOL/L (ref 23–29)
CREAT SERPL-MCNC: 0.8 MG/DL (ref 0.5–1.4)
DIFFERENTIAL METHOD: ABNORMAL
EOSINOPHIL # BLD AUTO: 0.2 K/UL (ref 0–0.5)
EOSINOPHIL NFR BLD: 2.5 % (ref 0–8)
ERYTHROCYTE [DISTWIDTH] IN BLOOD BY AUTOMATED COUNT: 14.7 % (ref 11.5–14.5)
EST. GFR  (AFRICAN AMERICAN): >60 ML/MIN/1.73 M^2
EST. GFR  (NON AFRICAN AMERICAN): >60 ML/MIN/1.73 M^2
GLUCOSE SERPL-MCNC: 89 MG/DL (ref 70–110)
HCT VFR BLD AUTO: 39.9 % (ref 40–54)
HCV AB SERPL QL IA: NEGATIVE
HGB BLD-MCNC: 12.2 G/DL (ref 14–18)
HIV 1+2 AB+HIV1 P24 AG SERPL QL IA: NEGATIVE
IMM GRANULOCYTES # BLD AUTO: 0.05 K/UL (ref 0–0.04)
IMM GRANULOCYTES NFR BLD AUTO: 0.6 % (ref 0–0.5)
LYMPHOCYTES # BLD AUTO: 1.7 K/UL (ref 1–4.8)
LYMPHOCYTES NFR BLD: 19.8 % (ref 18–48)
MAGNESIUM SERPL-MCNC: 2 MG/DL (ref 1.6–2.6)
MCH RBC QN AUTO: 25.6 PG (ref 27–31)
MCHC RBC AUTO-ENTMCNC: 30.6 G/DL (ref 32–36)
MCV RBC AUTO: 84 FL (ref 82–98)
MONOCYTES # BLD AUTO: 0.7 K/UL (ref 0.3–1)
MONOCYTES NFR BLD: 8 % (ref 4–15)
NEUTROPHILS # BLD AUTO: 6 K/UL (ref 1.8–7.7)
NEUTROPHILS NFR BLD: 69 % (ref 38–73)
NRBC BLD-RTO: 0 /100 WBC
PHOSPHATE SERPL-MCNC: 2.8 MG/DL (ref 2.7–4.5)
PLATELET # BLD AUTO: 389 K/UL (ref 150–350)
PMV BLD AUTO: 9.1 FL (ref 9.2–12.9)
POTASSIUM SERPL-SCNC: 2.9 MMOL/L (ref 3.5–5.1)
PROT SERPL-MCNC: 5.7 G/DL (ref 6–8.4)
RBC # BLD AUTO: 4.76 M/UL (ref 4.6–6.2)
SODIUM SERPL-SCNC: 138 MMOL/L (ref 136–145)
WBC # BLD AUTO: 8.75 K/UL (ref 3.9–12.7)

## 2019-08-28 PROCEDURE — 84100 ASSAY OF PHOSPHORUS: CPT

## 2019-08-28 PROCEDURE — 94761 N-INVAS EAR/PLS OXIMETRY MLT: CPT

## 2019-08-28 PROCEDURE — 99900035 HC TECH TIME PER 15 MIN (STAT)

## 2019-08-28 PROCEDURE — 25000003 PHARM REV CODE 250: Performed by: PHYSICIAN ASSISTANT

## 2019-08-28 PROCEDURE — 99239 PR HOSPITAL DISCHARGE DAY,>30 MIN: ICD-10-PCS | Mod: ,,, | Performed by: HOSPITALIST

## 2019-08-28 PROCEDURE — 94640 AIRWAY INHALATION TREATMENT: CPT

## 2019-08-28 PROCEDURE — 99239 HOSP IP/OBS DSCHRG MGMT >30: CPT | Mod: ,,, | Performed by: HOSPITALIST

## 2019-08-28 PROCEDURE — 83735 ASSAY OF MAGNESIUM: CPT

## 2019-08-28 PROCEDURE — 36415 COLL VENOUS BLD VENIPUNCTURE: CPT

## 2019-08-28 PROCEDURE — 85025 COMPLETE CBC W/AUTO DIFF WBC: CPT

## 2019-08-28 PROCEDURE — 25000003 PHARM REV CODE 250: Performed by: HOSPITALIST

## 2019-08-28 PROCEDURE — 97530 THERAPEUTIC ACTIVITIES: CPT

## 2019-08-28 PROCEDURE — 80053 COMPREHEN METABOLIC PANEL: CPT

## 2019-08-28 PROCEDURE — 63600175 PHARM REV CODE 636 W HCPCS: Performed by: HOSPITALIST

## 2019-08-28 PROCEDURE — 27000221 HC OXYGEN, UP TO 24 HOURS

## 2019-08-28 PROCEDURE — 25000242 PHARM REV CODE 250 ALT 637 W/ HCPCS: Performed by: INTERNAL MEDICINE

## 2019-08-28 RX ORDER — GUAIFENESIN 600 MG/1
600 TABLET, EXTENDED RELEASE ORAL 2 TIMES DAILY
COMMUNITY
Start: 2019-08-28

## 2019-08-28 RX ORDER — POTASSIUM CHLORIDE 20 MEQ/1
40 TABLET, EXTENDED RELEASE ORAL
Status: DISCONTINUED | OUTPATIENT
Start: 2019-08-28 | End: 2019-08-28 | Stop reason: HOSPADM

## 2019-08-28 RX ORDER — PREDNISONE 20 MG/1
40 TABLET ORAL DAILY
Qty: 20 TABLET | Refills: 0
Start: 2019-08-29 | End: 2019-09-08

## 2019-08-28 RX ADMIN — HEPARIN SODIUM 5000 UNITS: 5000 INJECTION INTRAVENOUS; SUBCUTANEOUS at 06:08

## 2019-08-28 RX ADMIN — SENNOSIDES,DOCUSATE SODIUM 1 TABLET: 8.6; 5 TABLET, FILM COATED ORAL at 08:08

## 2019-08-28 RX ADMIN — PANTOPRAZOLE SODIUM 40 MG: 40 TABLET, DELAYED RELEASE ORAL at 08:08

## 2019-08-28 RX ADMIN — GUAIFENESIN 600 MG: 600 TABLET, EXTENDED RELEASE ORAL at 08:08

## 2019-08-28 RX ADMIN — IPRATROPIUM BROMIDE AND ALBUTEROL SULFATE 3 ML: .5; 3 SOLUTION RESPIRATORY (INHALATION) at 11:08

## 2019-08-28 RX ADMIN — IPRATROPIUM BROMIDE AND ALBUTEROL SULFATE 3 ML: .5; 3 SOLUTION RESPIRATORY (INHALATION) at 07:08

## 2019-08-28 RX ADMIN — POTASSIUM CHLORIDE 40 MEQ: 20 TABLET, EXTENDED RELEASE ORAL at 09:08

## 2019-08-28 RX ADMIN — SUCRALFATE 1 G: 1 SUSPENSION ORAL at 12:08

## 2019-08-28 RX ADMIN — SUCRALFATE 1 G: 1 SUSPENSION ORAL at 08:08

## 2019-08-28 RX ADMIN — TAMSULOSIN HYDROCHLORIDE 0.8 MG: 0.4 CAPSULE ORAL at 06:08

## 2019-08-28 RX ADMIN — PREDNISONE 40 MG: 10 TABLET ORAL at 08:08

## 2019-08-28 RX ADMIN — FUROSEMIDE 40 MG: 40 TABLET ORAL at 08:08

## 2019-08-28 RX ADMIN — FERROUS SULFATE TAB EC 325 MG (65 MG FE EQUIVALENT) 325 MG: 325 (65 FE) TABLET DELAYED RESPONSE at 08:08

## 2019-08-28 NOTE — ASSESSMENT & PLAN NOTE
Likely due to exacerbation of his IPF.  Unlikely Acute respiratory infection or pulmonary edema  Blood and Sputum cultures NGTD  Cont antibiotic therapy with Levofloxacin empirically   Cont oral prednisone   Cont oral Lasix. Patient does not appear grossly volume overloaded on exam.   Check pulse oximetry to maintain O2 Sat ? 91%.   Maintain oxygenation with nasal canula.   Incentive spirometry q 1-2 hours while awake.   chest xray: Diffuse patchy interstitial and alveolar consolidation similar.  Cont to monitor I/O's and daily weights.  Fluid restriction (2 liters/24 hours)  Low Na diet  Pulmonary following and patient's outpatient pulmonologist, Dr. Pradhan. Patient will most likely be transferred to [facility] possibly on 8/27 under the care of Dr. Argueta.  Serology for collagen vascular disease ordered

## 2019-08-28 NOTE — PLAN OF CARE
Dr. Radames Seo has arranged for patient to be transferred to [facility] for advanced lung disease/transplant evaluation.     Please call us if further facilitation is necessary.

## 2019-08-28 NOTE — CARE UPDATE
Rapid Response Nurse Chart Check     Chart check completed, abnormal VS noted. Bedside RN Zeina contacted, no concerns   verbalized at this time, instructed to call [phone] for further concerns or assistance.

## 2019-08-28 NOTE — PLAN OF CARE
08/28/19 1413   Post-Acute Status   Post-Acute Authorization Other   Other Status See Comments  (Transfer to [facility] )

## 2019-08-28 NOTE — NURSING
Pt transportation here, pt telebox dc'd placed at nurses station, wife at bedside, all belongings packed and accounted for, RN called report to [facility], phone number provided was not a nurses station, attempted main line of [phone] and asked for assistance from [facility] .     Spoke with ELY Miramontes , provided report,  All questions answered. Pt in transit

## 2019-08-28 NOTE — NURSING
[facility] contacted RN, will be picking pt up at approximately 0961-2418, wife requesting to ride along, updated representative on wife's weight, and luggage for  calculations.  This was approved, Rn will call report when appropriate

## 2019-08-28 NOTE — NURSING
0815-current  RN received phone call from outside hospital,  Regarding pt transfer.  Pt is assigned to bed S608 at [facility], number to call report [phone].  Rn updated pt and wife, gave address information of this campus as wife needs to find hotel accommodations.    Rn contacted CM, pt still needs transportation arranged through our transfer center.  Pt's wife provided names/numbers of individuals assisting in facilitating transportation (wife wants private med jet company).  Per pt's insurance company rep [place], this is not the insurance company's plan, regular transport through our transfer center is advised. Letty will contact pt's wife and update her on this,

## 2019-08-28 NOTE — PLAN OF CARE
8:30AM  Call received from patient's nurse Renee ([id number])  inquiring about the details of pt's transfer to [facility] as  Mrs. Hamilton needed to make hotel reservations.  Dr. SHONDA Lin was contacted who informed this CM that he had received a message from Dr.Brooks Pradhan who stated that he had been in contact with [facility] and Mr. Hamilton had been accepted by Dr. Argueta. Call was then placed to [facility] ([id number]) to inquire if Dr. Pradhan had initiated patient's transfer to [facility]. This CM was informed that no transfer had been initiated. Transfer process was then started. Patient's nurse was informed of above conversation.    11:15AM  Call received from patient's nurse Renee who informed this CM that Mrs. Hamilton would like to use [employer] to transfer her  to [facility]. This CM then called [facility] to give them the information for [employer]. Informed by [facility] that they would not be able to use this company and patient's spouse would have to make arrangements herself. Call was then received from Letty ([facility]) informing this CM that she had spoken to Mrs. Hamilton and told her that [facility] will make the necessary arrangements for her 's transfer to [facility] and [employer] [facility] would not be needed. CM then called [facility] back to update them on the above conversation. [facility] stated that they would arrange transportation and notify CM when complete.    11:40AM  CM called patient's nurse Renee to update her on above conversation. Call was then placed to patient's spouse updating her on the status of her 's transportation. Mrs. Hamilton expressed understanding. Dr. Lin and [facility] notified of above conversation. Will continue to follow.    Erendira Vega, RN  Ext [phone]

## 2019-08-28 NOTE — PLAN OF CARE
Problem: Adult Inpatient Plan of Care  Goal: Plan of Care Review  Outcome: Ongoing (interventions implemented as appropriate)  POC reviewed w/patient at bedside. Questions and concerns addressed. CM = [facility]. [facility]@4L/high flow. Anticipates transfer to [facility] today. Safety maintained. Bed in low and locked position. Call light within reach. Side rails up x2. Frequent rounds.

## 2019-08-28 NOTE — PLAN OF CARE
[facility]    FACILITY TRANSFER ORDERS      Patient Name: Manpreet Hamilton  YOB: 1950    PCP: Radames Pradhan MD   PCP Address[address] [facility] [address] [facility] [place] [address]  PCP Phone Number: [phone]  PCP Fax: [phone]    Encounter Date: 08/28/2019    Admit to: [facility] for advanced lung disease/transplant evaluation.    Vital Signs:  Routine    Diagnoses:   Active Hospital Problems    Diagnosis  POA    *Acute on chronic respiratory failure with hypoxemia [J96.21]  Yes     Priority: 1 - High    Pulmonary fibrosis [J84.10]  Yes     Priority: 2     Debility [R53.81]  Yes    Acute blood loss anemia [D62]  Yes      Resolved Hospital Problems   No resolved problems to display.       Allergies:  Review of patient's allergies indicates:   Allergen Reactions    Penicillins     Erythromycin Rash       Diet: regular diet    Activities: Activity as tolerated    Nursing: per facility protocol      Labs: CBC and CMP Daily    CONSULTS:    Physical Therapy to evaluate and treat. , Occupational Therapy to evaluate and treat. and  to evaluate for community resources/long-range planning.    MISCELLANEOUS CARE:  Routine Skin for Bedridden Patients: Apply moisture barrier cream to all skin folds and wet areas in perineal area daily and after baths and all bowel movements.    WOUND CARE ORDERS  None    Medications: Review discharge medications with patient and family and provide education.      Current Discharge Medication List      START taking these medications    Details   guaiFENesin (MUCINEX) 600 mg 12 hr tablet Take 1 tablet (600 mg total) by mouth 2 (two) times daily.      predniSONE (DELTASONE) 20 MG tablet Take 2 tablets (40 mg total) by mouth once daily. for 10 days  Qty: 20 tablet, Refills: 0         CONTINUE these medications which have NOT CHANGED    Details   atorvastatin (LIPITOR) 20 MG tablet TAKE ONE TABLET BY MOUTH EVERY EVENING  Qty: 90 tablet, Refills: 4      desonide  (DESOWEN) 0.05 % lotion 1 application 2 (two) times daily.  Refills: 3      ferrous sulfate (FEOSOL) 325 mg (65 mg iron) Tab tablet Take 1 tablet (325 mg total) by mouth every 12 (twelve) hours.  Qty: 60 tablet, Refills: 4    Associated Diagnoses: PUD (peptic ulcer disease); Pulmonary fibrosis      furosemide (LASIX) 40 MG tablet Take 1 tablet (40 mg total) by mouth once daily.  Qty: 30 tablet, Refills: 11      hydroCHLOROthiazide (HYDRODIURIL) 25 MG tablet Take 1 tablet (25 mg total) by mouth once daily.  Qty: 30 tablet, Refills: 11      lidocaine (LIDODERM) 5 % Place 2 patches onto the skin once daily. Remove & Discard patch within 12 hours or as directed by MD  Refills: 0      OFEV 100 mg Cap TAKE 1 CAPSULE BY MOUTH TWICE DAILY WITH FOOD.  Qty: 60 capsule, Refills: 6      pantoprazole (PROTONIX) 40 MG tablet Take 1 tablet (40 mg total) by mouth every 12 (twelve) hours. Take one pill every morning 45 minutes before breakfast in the morning.  Qty: 60 tablet, Refills: 5    Associated Diagnoses: PUD (peptic ulcer disease); Pulmonary fibrosis      ramelteon (ROZEREM) 8 mg tablet Take 1 tablet (8 mg total) by mouth nightly as needed for Insomnia.      sucralfate (CARAFATE) 100 mg/mL suspension Take 10 mLs (1 g total) by mouth 4 (four) times daily with meals and nightly.  Qty: 2400 mL, Refills: 0      tamsulosin (FLOMAX) 0.4 mg Cap Take 1 capsule (0.4 mg total) by mouth once daily.  Qty: 30 capsule, Refills: 11      vitamin D3-vitamin K2 1000-90 unit-mcg TbDL 1 tablet once daily.         STOP taking these medications       diphenhydrAMINE (BENADRYL) 25 mg capsule Comments:   Reason for Stopping:         doxycycline (VIBRAMYCIN) 100 MG Cap Comments:   Reason for Stopping:         loperamide (IMODIUM) 2 mg capsule Comments:   Reason for Stopping:         traZODone (DESYREL) 50 MG tablet Comments:   Reason for Stopping:                _________________________________  Domingo Morrow MD  08/28/2019

## 2019-08-28 NOTE — SUBJECTIVE & OBJECTIVE
Interval History: Patient desats very easily with minimal exertion. Stable on 4-5 L of high flow. Pulmonary following. Dr. Pradhan, outpatient pulmonologist working on facilitating transfer to [facility].     Review of Systems   Constitutional: Positive for activity change. Negative for chills and fever.   HENT: Negative for congestion, facial swelling, hearing loss and trouble swallowing.    Eyes: Negative for photophobia and visual disturbance.   Respiratory: Positive for cough and shortness of breath (improved). Negative for chest tightness and wheezing.    Cardiovascular: Negative for chest pain, palpitations and leg swelling.   Gastrointestinal: Negative for abdominal pain, blood in stool, constipation, diarrhea, nausea and vomiting.   Endocrine: Negative.    Genitourinary: Negative.    Musculoskeletal: Negative for back pain, joint swelling and myalgias.   Skin: Negative.    Allergic/Immunologic: Negative.    Neurological: Negative for dizziness, facial asymmetry, speech difficulty, weakness and numbness.   Hematological: Negative.    Psychiatric/Behavioral: Negative for agitation, confusion and dysphoric mood. The patient is not nervous/anxious.      Objective:     Vital Signs (Most Recent):  Temp: 97.7 °F (36.5 °C) (08/27/19 1930)  Pulse: (!) 117 (08/27/19 1930)  Resp: (!) 24 (08/27/19 1930)  BP: 114/74 (08/27/19 1930)  SpO2: 98 % (08/27/19 1930) Vital Signs (24h Range):  Temp:  [97.7 °F (36.5 °C)-98.4 °F (36.9 °C)] 97.7 °F (36.5 °C)  Pulse:  [] 117  Resp:  [18-24] 24  SpO2:  [75 %-99 %] 98 %  BP: (101-122)/(57-80) 114/74     Weight: 74.5 kg (164 lb 3.9 oz)  Body mass index is 24.25 kg/m².    Intake/Output Summary (Last 24 hours) at 8/27/2019 2211  Last data filed at 8/27/2019 0500  Gross per 24 hour   Intake 200 ml   Output 250 ml   Net -50 ml      Physical Exam   Constitutional: He is oriented to person, place, and time. Vital signs are normal. He appears well-developed and well-nourished.  Non-toxic  appearance. He does not appear ill. No distress. Nasal cannula in place.   HENT:   Head: Normocephalic and atraumatic.   Eyes: Pupils are equal, round, and reactive to light. Conjunctivae and EOM are normal. No scleral icterus.   Neck: Normal range of motion and full passive range of motion without pain. Neck supple. No JVD present. Carotid bruit is not present. No thyromegaly present.   Cardiovascular: Regular rhythm, S1 normal, S2 normal, normal heart sounds and normal pulses. Tachycardia present. Exam reveals no gallop, no S3, no S4 and no friction rub.   No murmur heard.  Pulmonary/Chest:   Fine basilar crackles  Mild tachypenia   Abdominal: Soft. Normal appearance and bowel sounds are normal. He exhibits no shifting dullness, no distension, no abdominal bruit and no ascites. There is no hepatosplenomegaly. There is no tenderness. There is no rigidity and no guarding.   Musculoskeletal: Normal range of motion. He exhibits no edema or tenderness.   Neurological: He is alert and oriented to person, place, and time. He has normal strength. He is not disoriented. No cranial nerve deficit or sensory deficit.   Skin: Skin is warm, dry and intact.   Psychiatric: He has a normal mood and affect. His behavior is normal. Judgment and thought content normal. His mood appears not anxious. His speech is not slurred. He is not actively hallucinating. Cognition and memory are normal. He does not exhibit a depressed mood. He is attentive.       Significant Labs: All pertinent labs within the past 24 hours have been reviewed.    Significant Imaging: I have reviewed all pertinent imaging results/findings within the past 24 hours.

## 2019-08-28 NOTE — ASSESSMENT & PLAN NOTE
Lab Results   Component Value Date    HGB 12.2 (L) 08/27/2019     H/H stable  No signs of active bleed  Cont home Ferrous sulfate with bowel regimen

## 2019-08-28 NOTE — ASSESSMENT & PLAN NOTE
Lab Results   Component Value Date    HGB 12.2 (L) 08/28/2019     H/H stable  No signs of active bleed  Cont home Ferrous sulfate with bowel regimen

## 2019-08-28 NOTE — DISCHARGE SUMMARY
[facility] Hospital Medicine  Discharge Summary      Patient Name: Manpreet Hamilton  MRN: [id number]  Admission Date: 8/21/2019  Hospital Length of Stay: 7 days  Discharge Date and Time:  08/28/2019 12:02 PM  Attending Physician: Domingo Lin MD   Discharging Provider: Domingo Morrow MD  Primary Care Provider: Radames Pradhan MD  [facility] Medicine Team: [facility] MED  Domingo Morrow MD    HPI:   68-year-old man with HTN, HLD, UIP (diagnosed 1 year ago, followed closely by Dr. Pradhan, on ofev) who is currently admitted to [facility]. He has had a complicated recent history beginning with an admission in July 2019 for GIB requiring massive transfusion. He has had increasing oxygen rqeuirements and was discharged on 8/9 after an admission for acute on chronic hypoxic respiratory failure, unclear if this was related to infection, non-cardiogenic pulmonary edema, or an exacerbation of his IPF. He improved with levaquin, lasix, and prednisone and was discharged to inpatient rehab for continued PT/OT and oxygen weaning (down to 4L upon discharge).     He did well with rehab, but starting Saturday began to have increasing oxygen requirements, eventually being transferred to the ED for increased work of breathing on 8/19, at which time a CTA was performed that ruled out PE. He improved while in the ED and was transferred back to rehab for further care. His shortness of breath has returned over the early week, culminating in an episode of respiratory distress this morning requiring VM. He has now improved, but remains short of breath on his home nasal canula requirements. Dr. Pradhan has been visiting him daily at rehab and has requested direct admission to hospital medicine after direct admission to lung transplant service was declined. Originally the patient was going to go directly to the ED given the respiratory distress and acute nature, but strongly requested direct floor admission, and given the  subsequent improvement Dr. Pradhan feels that he is safe to wait at rehab for a bed.    * No surgery found *      Hospital Course:   No notes on file     Consults:   Consults (From admission, onward)        Status Ordering Provider     Inpatient consult to Physical Medicine Rehab  Once     Provider:  (Not yet assigned)    Completed ANDERSON PHELPS     Inpatient consult to Pulmonology  Once     Provider:  (Not yet assigned)    Completed ANDERSON PHELPS     IP consult to case management  Once     Provider:  (Not yet assigned)    Acknowledged ANDERSON PHELPS          * Acute on chronic respiratory failure with hypoxemia  Likely due to exacerbation of his IPF.  Unlikely Acute respiratory infection or pulmonary edema  Blood and Sputum cultures NGTD  Cont antibiotic therapy with Levofloxacin empirically   Cont oral prednisone   Cont oral Lasix. Patient does not appear grossly volume overloaded on exam.   Check pulse oximetry to maintain O2 Sat ? 91%.   Maintain oxygenation with nasal canula.   Incentive spirometry q 1-2 hours while awake.   chest xray: Diffuse patchy interstitial and alveolar consolidation similar.  Cont to monitor I/O's and daily weights.  Fluid restriction (2 liters/24 hours)  Low Na diet  Pulmonary following and patient's outpatient pulmonologist, Dr. Pradhan.   Patient to be transferred to [facility] today under the care of Dr. Argueta.  Serology for collagen vascular disease ordered     Pulmonary fibrosis  Resume home Nintedanib  Further treatment as above.       Debility  Cont with PT/OT for gait training and strengthening and restoration of ADL's   Encourage mobility, OOB in chair, and early ambulation as appropriate  Fall precautions   Monitor for bowel and bladder dysfunction  Monitor for and prevent skin breakdown and pressure ulcers    Acute blood loss anemia  Lab Results   Component Value Date    HGB 12.2 (L) 08/28/2019     H/H stable  No signs of active bleed  Cont home Ferrous sulfate with bowel regimen          Final Active Diagnoses:    Diagnosis Date Noted POA    PRINCIPAL PROBLEM:  Acute on chronic respiratory failure with hypoxemia [J96.21] 07/31/2019 Yes    Pulmonary fibrosis [J84.10]  Yes    Debility [R53.81] 08/04/2019 Yes    Acute blood loss anemia [D62] 07/10/2019 Yes      Problems Resolved During this Admission:       Discharged Condition: fair    Disposition: Another Health Care Inst*    Follow Up:    Patient Instructions:      Notify your health care provider if you experience any of the following:  temperature >100.4     Notify your health care provider if you experience any of the following:  difficulty breathing or increased cough     Activity as tolerated       Significant Diagnostic Studies: Labs:   BMP:   Recent Labs   Lab 08/27/19  0508 08/28/19  0403   GLU 87 89    138   K 4.4 2.9*   CL 97 97   CO2 30* 28   BUN 21 22   CREATININE 0.8 0.8   CALCIUM 10.2 9.3   MG 2.1 2.0    and CBC   Recent Labs   Lab 08/27/19  0508 08/28/19  0403   WBC 8.26 8.75   HGB 12.2* 12.2*   HCT 40.7 39.9*   * 389*       Pending Diagnostic Studies:     Procedure Component Value Units Date/Time    LILIAN [[id number]] Collected:  08/27/19 1642    Order Status:  Sent Lab Status:  In process Updated:  08/27/19 1712    Specimen:  Blood     Anti-neutrophilic cytoplasmic antibody [[id number]] Collected:  08/27/19 1642    Order Status:  Sent Lab Status:  In process Updated:  08/27/19 1712    Specimen:  Blood     HIV 1/2 Ag/Ab (4th Gen) [[id number]] Collected:  08/26/19 0738    Order Status:  Sent Lab Status:  In process Updated:  08/26/19 0801    Specimen:  Blood     Myeloperoxidase Antibody (MPO) [[id number]] Collected:  08/27/19 1642    Order Status:  Sent Lab Status:  In process Updated:  08/27/19 1712    Specimen:  Blood     Proteinase 3 Autoantibodies [[id number]] Collected:  08/27/19 1642    Order Status:  Sent Lab Status:  In process Updated:  08/27/19 1712    Specimen:  Blood          Medications:  Reconciled Home  Medications:      Medication List      START taking these medications    guaiFENesin 600 mg 12 hr tablet  Commonly known as:  MUCINEX  Take 1 tablet (600 mg total) by mouth 2 (two) times daily.     predniSONE 20 MG tablet  Commonly known as:  DELTASONE  Take 2 tablets (40 mg total) by mouth once daily. for 10 days  Start taking on:  8/29/2019        CONTINUE taking these medications    atorvastatin 20 MG tablet  Commonly known as:  LIPITOR  TAKE ONE TABLET BY MOUTH EVERY EVENING     desonide 0.05 % lotion  Commonly known as:  DESOWEN  1 application 2 (two) times daily.     ferrous sulfate 325 mg (65 mg iron) Tab tablet  Commonly known as:  FEOSOL  Take 1 tablet (325 mg total) by mouth every 12 (twelve) hours.     furosemide 40 MG tablet  Commonly known as:  LASIX  Take 1 tablet (40 mg total) by mouth once daily.     hydroCHLOROthiazide 25 MG tablet  Commonly known as:  HYDRODIURIL  Take 1 tablet (25 mg total) by mouth once daily.     lidocaine 5 %  Commonly known as:  LIDODERM  Place 2 patches onto the skin once daily. Remove & Discard patch within 12 hours or as directed by MD     OFEV 100 mg Cap  Generic drug:  nintedanib  TAKE 1 CAPSULE BY MOUTH TWICE DAILY WITH FOOD.     pantoprazole 40 MG tablet  Commonly known as:  PROTONIX  Take 1 tablet (40 mg total) by mouth every 12 (twelve) hours. Take one pill every morning 45 minutes before breakfast in the morning.     ramelteon 8 mg tablet  Commonly known as:  ROZEREM  Take 1 tablet (8 mg total) by mouth nightly as needed for Insomnia.     sucralfate 100 mg/mL suspension  Commonly known as:  CARAFATE  Take 10 mLs (1 g total) by mouth 4 (four) times daily with meals and nightly.     tamsulosin 0.4 mg Cap  Commonly known as:  FLOMAX  Take 1 capsule (0.4 mg total) by mouth once daily.     vitamin D3-vitamin K2 1000-90 unit-mcg Tbdl  1 tablet once daily.        STOP taking these medications    diphenhydrAMINE 25 mg capsule  Commonly known as:  BENADRYL     doxycycline 100  MG Cap  Commonly known as:  VIBRAMYCIN     loperamide 2 mg capsule  Commonly known as:  IMODIUM     traZODone 50 MG tablet  Commonly known as:  DESYREL            Indwelling Lines/Drains at time of discharge:   Lines/Drains/Airways          None          Time spent on the discharge of patient: 30 minutes  Patient was seen and examined on the date of discharge and determined to be suitable for discharge.         Domingo Morrow MD  Department of Hospital Medicine  [facility]

## 2019-08-28 NOTE — ASSESSMENT & PLAN NOTE
Likely due to exacerbation of his IPF.  Unlikely Acute respiratory infection or pulmonary edema  Blood and Sputum cultures NGTD  Cont antibiotic therapy with Levofloxacin empirically   Cont oral prednisone   Cont oral Lasix. Patient does not appear grossly volume overloaded on exam.   Check pulse oximetry to maintain O2 Sat ? 91%.   Maintain oxygenation with nasal canula.   Incentive spirometry q 1-2 hours while awake.   chest xray: Diffuse patchy interstitial and alveolar consolidation similar.  Cont to monitor I/O's and daily weights.  Fluid restriction (2 liters/24 hours)  Low Na diet  Pulmonary following and patient's outpatient pulmonologist, Dr. Pradhan.   Patient to be transferred to [facility] today under the care of Dr. Argueta.  Serology for collagen vascular disease ordered

## 2019-08-28 NOTE — PROGRESS NOTES
[facility] Hospital Medicine  Progress Note    Patient Name: Manpreet Hamilton  MRN: [id number]  Patient Class: IP- Inpatient   Admission Date: 8/21/2019  Length of Stay: 6 days  Attending Physician: Veda Lin MD  Primary Care Provider: Radames Pradhan MD    Hospital Medicine Team: [facility] [place] [facility]OSP MED  Veda Lin MD    Subjective:     Principal Problem:Acute on chronic respiratory failure with hypoxemia        HPI:  68-year-old man with HTN, HLD, UIP (diagnosed 1 year ago, followed closely by Dr. Pradhan, on ofev) who is currently admitted to [facility]. He has had a complicated recent history beginning with an admission in July 2019 for GIB requiring massive transfusion. He has had increasing oxygen rqeuirements and was discharged on 8/9 after an admission for acute on chronic hypoxic respiratory failure, unclear if this was related to infection, non-cardiogenic pulmonary edema, or an exacerbation of his IPF. He improved with levaquin, lasix, and prednisone and was discharged to inpatient rehab for continued PT/OT and oxygen weaning (down to 4L upon discharge).     He did well with rehab, but starting Saturday began to have increasing oxygen requirements, eventually being transferred to the ED for increased work of breathing on 8/19, at which time a CTA was performed that ruled out PE. He improved while in the ED and was transferred back to rehab for further care. His shortness of breath has returned over the early week, culminating in an episode of respiratory distress this morning requiring VM. He has now improved, but remains short of breath on his home nasal canula requirements. Dr. Pradhan has been visiting him daily at rehab and has requested direct admission to hospital medicine after direct admission to lung transplant service was declined. Originally the patient was going to go directly to the ED given the respiratory distress and acute nature, but strongly requested direct floor  admission, and given the subsequent improvement Dr. Pradhan feels that he is safe to wait at rehab for a bed.    Overview/Hospital Course:  No notes on file    Interval History: Patient desats very easily with minimal exertion. Stable on 4-5 L of high flow. Pulmonary following. Dr. Pradhan, outpatient pulmonologist working on facilitating transfer to [facility].     Review of Systems   Constitutional: Positive for activity change. Negative for chills and fever.   HENT: Negative for congestion, facial swelling, hearing loss and trouble swallowing.    Eyes: Negative for photophobia and visual disturbance.   Respiratory: Positive for cough and shortness of breath (improved). Negative for chest tightness and wheezing.    Cardiovascular: Negative for chest pain, palpitations and leg swelling.   Gastrointestinal: Negative for abdominal pain, blood in stool, constipation, diarrhea, nausea and vomiting.   Endocrine: Negative.    Genitourinary: Negative.    Musculoskeletal: Negative for back pain, joint swelling and myalgias.   Skin: Negative.    Allergic/Immunologic: Negative.    Neurological: Negative for dizziness, facial asymmetry, speech difficulty, weakness and numbness.   Hematological: Negative.    Psychiatric/Behavioral: Negative for agitation, confusion and dysphoric mood. The patient is not nervous/anxious.      Objective:     Vital Signs (Most Recent):  Temp: 97.7 °F (36.5 °C) (08/27/19 1930)  Pulse: (!) 117 (08/27/19 1930)  Resp: (!) 24 (08/27/19 1930)  BP: 114/74 (08/27/19 1930)  SpO2: 98 % (08/27/19 1930) Vital Signs (24h Range):  Temp:  [97.7 °F (36.5 °C)-98.4 °F (36.9 °C)] 97.7 °F (36.5 °C)  Pulse:  [] 117  Resp:  [18-24] 24  SpO2:  [75 %-99 %] 98 %  BP: (101-122)/(57-80) 114/74     Weight: 74.5 kg (164 lb 3.9 oz)  Body mass index is 24.25 kg/m².    Intake/Output Summary (Last 24 hours) at 8/27/2019 2211  Last data filed at 8/27/2019 0500  Gross per 24 hour   Intake 200 ml   Output 250 ml   Net -50 ml       Physical Exam   Constitutional: He is oriented to person, place, and time. Vital signs are normal. He appears well-developed and well-nourished.  Non-toxic appearance. He does not appear ill. No distress. Nasal cannula in place.   HENT:   Head: Normocephalic and atraumatic.   Eyes: Pupils are equal, round, and reactive to light. Conjunctivae and EOM are normal. No scleral icterus.   Neck: Normal range of motion and full passive range of motion without pain. Neck supple. No JVD present. Carotid bruit is not present. No thyromegaly present.   Cardiovascular: Regular rhythm, S1 normal, S2 normal, normal heart sounds and normal pulses. Tachycardia present. Exam reveals no gallop, no S3, no S4 and no friction rub.   No murmur heard.  Pulmonary/Chest:   Fine basilar crackles  Mild tachypenia   Abdominal: Soft. Normal appearance and bowel sounds are normal. He exhibits no shifting dullness, no distension, no abdominal bruit and no ascites. There is no hepatosplenomegaly. There is no tenderness. There is no rigidity and no guarding.   Musculoskeletal: Normal range of motion. He exhibits no edema or tenderness.   Neurological: He is alert and oriented to person, place, and time. He has normal strength. He is not disoriented. No cranial nerve deficit or sensory deficit.   Skin: Skin is warm, dry and intact.   Psychiatric: He has a normal mood and affect. His behavior is normal. Judgment and thought content normal. His mood appears not anxious. His speech is not slurred. He is not actively hallucinating. Cognition and memory are normal. He does not exhibit a depressed mood. He is attentive.       Significant Labs: All pertinent labs within the past 24 hours have been reviewed.    Significant Imaging: I have reviewed all pertinent imaging results/findings within the past 24 hours.           Assessment/Plan:      * Acute on chronic respiratory failure with hypoxemia  Likely due to exacerbation of his IPF.  Unlikely Acute  respiratory infection or pulmonary edema  Blood and Sputum cultures NGTD  Cont antibiotic therapy with Levofloxacin empirically   Cont oral prednisone   Cont oral Lasix. Patient does not appear grossly volume overloaded on exam.   Check pulse oximetry to maintain O2 Sat ? 91%.   Maintain oxygenation with nasal canula.   Incentive spirometry q 1-2 hours while awake.   chest xray: Diffuse patchy interstitial and alveolar consolidation similar.  Cont to monitor I/O's and daily weights.  Fluid restriction (2 liters/24 hours)  Low Na diet  Pulmonary following and patient's outpatient pulmonologist, Dr. Pradhan. Patient will most likely be transferred to [facility] possibly on 8/27 under the care of Dr. Argueta.  Serology for collagen vascular disease ordered     Debility  Cont with PT/OT for gait training and strengthening and restoration of ADL's   Encourage mobility, OOB in chair, and early ambulation as appropriate  Fall precautions   Monitor for bowel and bladder dysfunction  Monitor for and prevent skin breakdown and pressure ulcers    Acute blood loss anemia  Lab Results   Component Value Date    HGB 12.2 (L) 08/27/2019     H/H stable  No signs of active bleed  Cont home Ferrous sulfate with bowel regimen       Pulmonary fibrosis  Resume home Nintedanib  Further treatment as above.         VTE Risk Mitigation (From admission, onward)        Ordered     heparin (porcine) injection 5,000 Units  Every 8 hours      08/21/19 1600                Veda Lin MD  Department of Hospital Medicine   [facility]

## 2019-08-28 NOTE — PT/OT/SLP PROGRESS
Physical Therapy Treatment    Patient Name:  Manpreet Hamilton   MRN:  [id number]  Admitting Diagnosis: Acute on chronic respiratory failure with hypoxemia  Recent Surgery: * No surgery found *      Recommendations:     Discharge Recommendations:  home health PT   Discharge Equipment Recommendations: commode, wheelchair, manual   Barriers to discharge: Inaccessible home    Plan:     During this hospitalization, patient to be seen 4 x/week to address the above listed problems via gait training, therapeutic activities, therapeutic exercises, neuromuscular re-education  · Plan of Care Expires:  09/24/19   Plan of Care Reviewed with: patient    This Plan of care has been discussed with the patient who was involved in its development and understands and is in agreement with the identified goals and treatment plan    Subjective     Communicated with RN (Renee) prior to session.     Patient comments: Pt with c/o SOB  Pain/Comfort:  · Pain Rating 1: 0/10  · Pain Rating Post-Intervention 1: 0/10    Objective:     Patient found with: telemetry, pulse ox (continuous), oxygen(8 LPM via NC)    Patient found UIC in reclined position upon PT entry to room, agreeable to treatment.  Wife present in the room.    General Precautions: Standard, fall, respiratory   Orthopedic Precautions:N/A   Braces: N/A       BED MOBILITY        NP 2* pt found/left seated UIC     TRANSFERS  (vc's for hand placement, sequencing of task and safety)   Patient completed Sit <> Stand Transfer from BS chair with CGA for safety with rolling walker x2 trial(s)   Patient completed Stand <> Sit Transfer to BS chair with CGA for safety with rolling walker      GAIT: within room   Patient ambulated: 2-3 short steps forward, 2-3 short steps backward   Patient required: CGA for balance and safety   Patient used:  Rolling Walker   Gait Pattern observed: reciprocal gait   Gait Deviation(s): increased feliz, decreased step length, decreased stride length, decreased  toe-to-floor clearance, decreased weight-shifting ability and instability    Comments: vc's and tc's for safety      THERAPEUTIC ACTIVITIES/EXERCISES  Pt performs B LE AROM ther ex's while seated UI x10-12 reps with vc's    Pt's SpO2 and HR fluctuated during session: 92% < > 79% and 's bpm,  RN aware    EDUCATION  Patient provided with daily orientation and goals of this PT session. They were educated to call for assistance and to transfer with hospital staff only.  Also, pt was educated on the effects of prolonged immobility and the importance of performing OOB activity and exercises to promote healing and reduce recovery time    Whiteboard updated with correct mobility information. RN/PCT notified.  Pt safe to transfer OOB/BTB with RN/PCT: Use RW/no AD with min A of 1 person.    Patient left up in chair, with  all lines intact, call button in reach, RN notified and family present    AM-PAC 6 CLICK MOBILITY  Turning over in bed (including adjusting bedclothes, sheets and blankets)?: 4  Sitting down on and standing up from a chair with arms (e.g., wheelchair, bedside commode, etc.): 3  Moving from lying on back to sitting on the side of the bed?: 3  Moving to and from a bed to a chair (including a wheelchair)?: 3  Need to walk in hospital room?: 3  Climbing 3-5 steps with a railing?: 2  Basic Mobility Total Score: 18     Assessment:     Manpreet Hamilton is a 68 y.o. male admitted with a medical diagnosis of Acute on chronic respiratory failure with hypoxemia.  He presents with the following impairments/functional limitations:  weakness, impaired endurance, impaired self care skills, impaired functional mobilty, gait instability, impaired balance, decreased upper extremity function, decreased lower extremity function, decreased safety awareness, impaired cardiopulmonary response to activity. requiring assistance and verbal cues for transfers, standing and gait to prevent falls due to weakness, instability,  limited endurance and SOB.       Rehab Prognosis:  Fair; patient would benefit from acute skilled PT services to address these deficits and reach maximum level of function.      GOALS:   Multidisciplinary Problems     Physical Therapy Goals        Problem: Physical Therapy Goal    Goal Priority Disciplines Outcome Goal Variances Interventions   Physical Therapy Goal     PT, PT/OT      Description:  PT goals until 9/4/19    1. Pt sit to supine with mod independent-not met  2. Pt sit to stand with or without RW as needed for safety with supervision-not met  3. Pt to perform gait 30ft with or without RW as needed for safety with supervision.-not met  4. Pt to up/down 3 steps with single rail with CGA.-not met  5. Pt to perform B LE exs in sitting or supine x 15 reps to strengthen B LE to improve functional mobility.-not met                       Time Tracking:     PT Received On: 08/28/19  PT Start Time: 1029     PT Stop Time: 1104  PT Total Time (min): 35 min     Billable Minutes: Therapeutic Activity 35    Treatment Type: Treatment  PT/PTA: PTA     PTA Visit Number: 1       Re Rodrigues PTA.  Pager [phone]    8/28/2019    .

## 2019-08-28 NOTE — NURSING
"Requested to take all scheduled meds at this time. Stated "the times scheduled are too late for me"  "

## 2019-08-28 NOTE — PLAN OF CARE
Problem: Physical Therapy Goal  Goal: Physical Therapy Goal  PT goals until 9/4/19    1. Pt sit to supine with mod independent-not met  2. Pt sit to stand with or without RW as needed for safety with supervision-not met  3. Pt to perform gait 30ft with or without RW as needed for safety with supervision.-not met  4. Pt to up/down 3 steps with single rail with CGA.-not met  5. Pt to perform B LE exs in sitting or supine x 15 reps to strengthen B LE to improve functional mobility.-not met       Discharge Recommendations: HH PT    Pt safe to transfer OOB/BTB with RN/PCT: Use RW/no AD with min A of 1 person.    Goals remain appropriate.     Re Rodrigues, PTA.   [phone]   8/28/2019

## 2019-08-29 ENCOUNTER — TELEPHONE (OUTPATIENT)
Dept: PULMONOLOGY | Facility: CLINIC | Age: 69
End: 2019-08-29

## 2019-08-29 LAB
ANCA AB TITR SER IF: NORMAL TITER
MYELOPEROXIDASE AB SER-ACNC: 4 UNITS
P-ANCA TITR SER IF: NORMAL TITER
PROTEINASE3 IGG SER-ACNC: 0.2 U

## 2019-08-29 NOTE — TELEPHONE ENCOUNTER
----- Message from Pita Rivas MA sent at 8/29/2019  2:44 PM CDT -----  Contact: [phone] [facility]   [facility] is calling to see if Dr. Pradhan received a fax on the Pt  In regards to some orders.

## 2019-08-29 NOTE — TELEPHONE ENCOUNTER
Spoke with [facility], they have been informed that we have received a fax on the above patient Mr. Hamilton. Advised them that we will work on having Dr. Pradhan to sign and date these forms so that we could fax them back over.

## 2019-11-18 ENCOUNTER — TELEPHONE (OUTPATIENT)
Dept: ENDOSCOPY | Facility: HOSPITAL | Age: 69
End: 2019-11-18

## 2020-01-03 ENCOUNTER — TELEPHONE (OUTPATIENT)
Dept: ENDOSCOPY | Facility: HOSPITAL | Age: 70
End: 2020-01-03

## 2020-01-15 ENCOUNTER — TELEPHONE (OUTPATIENT)
Dept: ENDOSCOPY | Facility: HOSPITAL | Age: 70
End: 2020-01-15

## 2024-02-13 NOTE — PROGRESS NOTES
Detail Level: Detailed [facility] Hospital Medicine  Progress Note    Patient Name: Manpreet Hamilton  MRN: [id number]  Patient Class: IP- Inpatient   Admission Date: 8/21/2019  Length of Stay: 5 days  Attending Physician: Veda Lin MD  Primary Care Provider: Radames Pradhan MD    Hospital Medicine [place] [facility]  Veda Lin MD    Subjective:     Principal Problem:Acute on chronic respiratory failure with hypoxemia        HPI:  68-year-old man with HTN, HLD, UIP (diagnosed 1 year ago, followed closely by Dr. Pradhan, on ofev) who is currently admitted to [facility]. He has had a complicated recent history beginning with an admission in July 2019 for GIB requiring massive transfusion. He has had increasing oxygen rqeuirements and was discharged on 8/9 after an admission for acute on chronic hypoxic respiratory failure, unclear if this was related to infection, non-cardiogenic pulmonary edema, or an exacerbation of his IPF. He improved with levaquin, lasix, and prednisone and was discharged to inpatient rehab for continued PT/OT and oxygen weaning (down to 4L upon discharge).     He did well with rehab, but starting Saturday began to have increasing oxygen requirements, eventually being transferred to the ED for increased work of breathing on 8/19, at which time a CTA was performed that ruled out PE. He improved while in the ED and was transferred back to rehab for further care. His shortness of breath has returned over the early week, culminating in an episode of respiratory distress this morning requiring VM. He has now improved, but remains short of breath on his home nasal canula requirements. Dr. Pradhan has been visiting him daily at rehab and has requested direct admission to hospital medicine after direct admission to lung transplant service was declined. Originally the patient was going to go directly to the ED given the respiratory distress and acute nature, but strongly requested direct floor  admission, and given the subsequent improvement Dr. Pradhan feels that he is safe to wait at rehab for a bed.    Overview/Hospital Course:  No notes on file    Interval History: No acute issues overnight. Stable on 4-5L high flow. Awaiting pulmonary recs regarding PLEX.     Review of Systems   Constitutional: Positive for activity change. Negative for chills and fever.   HENT: Negative for congestion, facial swelling, hearing loss and trouble swallowing.    Eyes: Negative for photophobia and visual disturbance.   Respiratory: Positive for cough and shortness of breath (improved). Negative for chest tightness and wheezing.    Cardiovascular: Negative for chest pain, palpitations and leg swelling.   Gastrointestinal: Negative for abdominal pain, blood in stool, constipation, diarrhea, nausea and vomiting.   Endocrine: Negative.    Genitourinary: Negative.    Musculoskeletal: Negative for back pain, joint swelling and myalgias.   Skin: Negative.    Allergic/Immunologic: Negative.    Neurological: Negative for dizziness, facial asymmetry, speech difficulty, weakness and numbness.   Hematological: Negative.    Psychiatric/Behavioral: Negative for agitation, confusion and dysphoric mood. The patient is not nervous/anxious.      Objective:     Vital Signs (Most Recent):  Temp: 97.7 °F (36.5 °C) (08/26/19 0749)  Pulse: (!) 117 (08/26/19 1230)  Resp: 18 (08/26/19 1230)  BP: 117/72 (08/26/19 0749)  SpO2: 98 % (08/26/19 1230) Vital Signs (24h Range):  Temp:  [97.3 °F (36.3 °C)-98.6 °F (37 °C)] 97.7 °F (36.5 °C)  Pulse:  [103-130] 117  Resp:  [18-24] 18  SpO2:  [84 %-99 %] 98 %  BP: (115-137)/(72-78) 117/72     Weight: 73.6 kg (162 lb 4.1 oz)  Body mass index is 23.96 kg/m².    Intake/Output Summary (Last 24 hours) at 8/26/2019 1257  Last data filed at 8/26/2019 0543  Gross per 24 hour   Intake 350 ml   Output 1000 ml   Net -650 ml      Physical Exam   Constitutional: He is oriented to person, place, and time. Vital signs are  normal. He appears well-developed and well-nourished.  Non-toxic appearance. He does not appear ill. No distress. Nasal cannula in place.   HENT:   Head: Normocephalic and atraumatic.   Eyes: Pupils are equal, round, and reactive to light. Conjunctivae and EOM are normal. No scleral icterus.   Neck: Normal range of motion and full passive range of motion without pain. Neck supple. No JVD present. Carotid bruit is not present. No thyromegaly present.   Cardiovascular: Regular rhythm, S1 normal, S2 normal, normal heart sounds and normal pulses. Tachycardia present. Exam reveals no gallop, no S3, no S4 and no friction rub.   No murmur heard.  Pulmonary/Chest:   Fine basilar crackles  Mild tachypenia   Abdominal: Soft. Normal appearance and bowel sounds are normal. He exhibits no shifting dullness, no distension, no abdominal bruit and no ascites. There is no hepatosplenomegaly. There is no tenderness. There is no rigidity and no guarding.   Musculoskeletal: Normal range of motion. He exhibits no edema or tenderness.   Neurological: He is alert and oriented to person, place, and time. He has normal strength. He is not disoriented. No cranial nerve deficit or sensory deficit.   Skin: Skin is warm, dry and intact.   Psychiatric: He has a normal mood and affect. His behavior is normal. Judgment and thought content normal. His mood appears not anxious. His speech is not slurred. He is not actively hallucinating. Cognition and memory are normal. He does not exhibit a depressed mood. He is attentive.       Significant Labs: All pertinent labs within the past 24 hours have been reviewed.    Significant Imaging: I have reviewed all pertinent imaging results/findings within the past 24 hours.           Assessment/Plan:      * Acute on chronic respiratory failure with hypoxemia  Likely due to exacerbation of his IPF.  Unlikely Acute respiratory infection or pulmonary edema  Blood and Sputum cultures NGTD  Cont antibiotic therapy  with Levofloxacin empirically   Cont oral prednisone   Cont oral Lasix. Patient does not appear grossly volume overloaded on exam.   Check pulse oximetry to maintain O2 Sat ? 91%.   Maintain oxygenation with nasal canula.   Incentive spirometry q 1-2 hours while awake.   chest xray: Diffuse patchy interstitial and alveolar consolidation similar.  Cont to monitor I/O's and daily weights.  Fluid restriction (2 liters/24 hours)  Low Na diet  Pulmonary following and may need PLEX; will await final pulm recs today     Debility  Cont with PT/OT for gait training and strengthening and restoration of ADL's   Encourage mobility, OOB in chair, and early ambulation as appropriate  Fall precautions   Monitor for bowel and bladder dysfunction  Monitor for and prevent skin breakdown and pressure ulcers    Acute blood loss anemia  Lab Results   Component Value Date    HGB 12.4 (L) 08/26/2019     H/H stable  No signs of active bleed  Cont home Ferrous sulfate with bowel regimen       Pulmonary fibrosis  Resume home Nintedanib  Further treatment as above.         VTE Risk Mitigation (From admission, onward)        Ordered     heparin (porcine) injection 5,000 Units  Every 8 hours      08/21/19 1600                Veda Lin MD  Department of Hospital Medicine   [facility]

## 2024-02-26 NOTE — PROGRESS NOTES
[facility]  Critical Care Medicine  Progress Note    Patient Name: Manpreet Hamilton  MRN: [id number]  Admission Date: 7/10/2019  Hospital Length of Stay: 6 days  Code Status: Full Code  Attending Provider: Tony De Santiago MD  Primary Care Provider: Radames Pradhan MD   Principal Problem: GIB (gastrointestinal bleeding)    Subjective:     HPI:  The patient is a 68M with a PMH of HTN, HLD, UIP (dx 1 year ago, followed by Dr. Pradhan, on ofev) presenting as transfer from [facility] for GI bleed. The patient was on a cruise with his family in [place] when he suddenly began having bright red bowel movements that progressed to melena. This was on Saturday the 8th. On board the ship his BP was 88/52, pulse 119. Labs with hemoglobin of 9.7 (baseline 15). He was transferred to [facility] where he became progressively more hypotensive (to systolic of 70's) and tachycardic to 120's. His Hg dropped to 7. He was admitted to [facility] for 1 night prior to transfer to [facility]. While at [facility], he received 8 units total of PRBC, 3 units FFP. He was placed on PPI drip and had an endoscopy which showed a small superficial gastric ulcer and duodenal bulb ulcers with no active bleeding.     The patient takes a baby aspirin daily and drinks approximately 4 alcoholic beverages nightly. Has never before had GI bleed.    Hospital/ICU Course:  Patient arrived to ICU and was urgently taken to endoscopy. Stomach full of blood, impairing visualization of source. Due to high risk of aspiration and necessity of repeat EGD, patient was intubated for airway protection.     Required transfusion of additional 5 U PRBC, 1 plt, and 1 FFP. Erythromycin given to clear patient's stomach of blood. Developed erythematous rash, and benadryl given. No wheezing appreciated on exam at that time.    Repeat EGD performed overnight (7/10) which showed numerous ulcers in the stomach and duodenum with no active bleeding. Patient brought back to ICU, and  Patient calling re: recent urgent care visit and was hoping to schedule fuv with Loren - first available appt is not until 4/1     Please advise and/or call patient     Patient aware that Loren is out this week    placed on PPI gtt per GI recs.    Patient was successfully extubated on 7/11; however, hemoglobin began trending downward again later in the day. Active bleeding overnight requiring 3 units PRBCs. GI repeated EGD the morning of 7/12 again with numerous clean-based gastric and duodenal ulcers. Possible active bleeding seen in the cardia of the stomach along the lesser curvature. Clip placed. Patient taken to IR for angio, left gastric embolized. Left intubated due to concern for aspiration again due to large amount of blood in stomach.    Overnight, patient's hemoglobin did trend down slightly, but stayed overall stable (8->~7.5). 3 melanotic stools, which is expected given volume of blood in stomach. Patient also spiked fever of 102.1. Cultures sent, CXR repeated. No focal consolidation, but some edema superimposed on chronic lung changes seen. Started on vanc, cefepime and flagyl given recent procedures and risk of aspiration prior to initial intubation. Lower suspicion for transfusion reaction as it had been an number of hours since most recent transfusion. Also given dose of lasix due to peripheral edema on exam and pulmonary edema on CXR.     On the evening of 7/13, patient did require 1 unit PRBC for hg of 6.7, which increased to 8.7 following transfusion. May represent oozing from site of embolization or AVMs seen on EGD. Again trending down slowly today. No melanotic bowel movements. Extubated to nasal cannula on 7/14. Melanotic bowel movements continue but hemoglobin remains stable, cough continued productive of clear sputum. 07/15 Hgb stable at 8.8, plan for step down     Interval History/Significant Events: NAEO, H/H stable, no melanotic stools.     Review of Systems   Constitutional: Negative for chills, fatigue and fever.   HENT: Negative for rhinorrhea and sinus pain.    Eyes: Negative.    Respiratory: Negative for cough and shortness of breath.    Cardiovascular: Negative for chest pain, palpitations  and leg swelling.   Gastrointestinal: Negative.  Negative for diarrhea, nausea and vomiting.   Endocrine: Negative.    Genitourinary: Negative for decreased urine volume, difficulty urinating and hematuria.   Musculoskeletal: Negative.    Skin: Negative.    Allergic/Immunologic: Negative.    Neurological: Negative for headaches.     Objective:     Vital Signs (Most Recent):  Temp: 98.2 °F (36.8 °C) (07/16/19 0315)  Pulse: 89 (07/16/19 0600)  Resp: (!) 27 (07/16/19 0600)  BP: 113/65 (07/16/19 0600)  SpO2: (!) 90 % (07/16/19 0600) Vital Signs (24h Range):  Temp:  [97.6 °F (36.4 °C)-98.6 °F (37 °C)] 98.2 °F (36.8 °C)  Pulse:  [82-98] 89  Resp:  [19-47] 27  SpO2:  [90 %-98 %] 90 %  BP: (101-162)/(55-74) 113/65   Weight: 92.8 kg (204 lb 9.4 oz)  Body mass index is 31.11 kg/m².      Intake/Output Summary (Last 24 hours) at 7/16/2019 0641  Last data filed at 7/16/2019 0600  Gross per 24 hour   Intake 675 ml   Output 7300 ml   Net -6625 ml       Physical Exam   Constitutional: He appears well-developed and well-nourished. No distress. Nasal cannula in place.   On 1L via NC   HENT:   Head: Normocephalic and atraumatic.   Eyes: EOM are normal. Right eye exhibits no discharge. Left eye exhibits no discharge.   Neck: No tracheal deviation present.   Cardiovascular: Normal rate, regular rhythm, S1 normal and S2 normal. Exam reveals no gallop and no friction rub.   Murmur heard.   Systolic murmur is present with a grade of 2/6.  Pulses:       Radial pulses are 2+ on the right side, and 2+ on the left side.        Dorsalis pedis pulses are 2+ on the right side, and 2+ on the left side.   Pulmonary/Chest: Effort normal. No stridor. He has no wheezes. He has rales in the right lower field and the left lower field.   Abdominal: Soft. He exhibits no distension. There is no tenderness. There is no guarding.   Musculoskeletal: He exhibits no edema or deformity.   Neurological: He is alert.   Skin: Skin is warm and dry. There is pallor.    Nursing note and vitals reviewed.      Vents:  Vent Mode: Spont (07/14/19 1704)  Ventilator Initiated: Yes (07/12/19 0806)  Set Rate: 0 bmp (07/14/19 1704)  Vt Set: 560 mL (07/14/19 1704)  Pressure Support: 5 cmH20 (07/14/19 1704)  PEEP/CPAP: 5 cmH20 (07/14/19 1704)  Oxygen Concentration (%): 36 (07/15/19 0347)  Peak Airway Pressure: 11 cmH2O (07/14/19 1704)  Plateau Pressure: 21 cmH20 (07/14/19 1704)  Total Ve: 7.31 mL (07/14/19 1704)  F/VT Ratio<105 (RSBI): (!) 68.97 (07/14/19 1704)  Lines/Drains/Airways     Central Venous Catheter Line                 Percutaneous Central Line Insertion/Assessment - triple lumen  07/10/19 1511 right internal jugular 5 days          Drain            Male External Urinary Catheter 07/16/19 0515 Medium less than 1 day              Significant Labs:    CBC/Anemia Profile:  Recent Labs   Lab 07/15/19  0840 07/15/19  1620 07/16/19  0300   WBC 7.48 6.77 8.95   HGB 8.0* 8.5* 8.8*   HCT 24.0* 24.9* 25.6*    217 256   MCV 88 87 87   RDW 14.1 13.9 14.0        Chemistries:  Recent Labs   Lab 07/15/19  0426 07/16/19  0300   * 137   K 3.7 3.1*    97   CO2 25 28   BUN 13 10   CREATININE 0.7 0.6   CALCIUM 7.8* 8.3*   ALBUMIN 1.7* 1.8*   PROT 4.2* 4.6*   BILITOT 0.4 0.4   ALKPHOS 132 150*   ALT 18 20   AST 35 29   MG 2.0 1.8   PHOS 2.2* 2.1*           Significant Imaging:  I have reviewed all pertinent imaging results/findings within the past 24 hours.      ABG  Recent Labs   Lab 07/14/19  1814   PH 7.328*   PO2 35*   PCO2 46.1*   HCO3 24.2   BE -2     Assessment/Plan:     Pulmonary  Acute hypoxemic respiratory failure  - previously intubated for airway protection  - remains on 4L NC, normally on RA  - Lasix 40mg IV once and monitor response, rales BL on examination  - follow UOP and re-dose as appropriate    Pulmonary fibrosis  - Dx 1 year ago, managed by Dr. Pradhan  - Per Dr. Pradhan:  hold ofev, goal SpO2 95%     Oncology  Acute blood loss anemia  - Hemoglobin 7 on arrival    - Has received 19 units PRBC, 5 units FFP, 2 plt between [facility]  - CBC q12h  - See GIB     GI  * GIB (gastrointestinal bleeding)  - Initial EGD obscured due to large amount of blood in stomach, repeat with numerous ulcers in stomach and duodenum with no active bleeding (7/10)  - CTA following scope (7/10) with no active bleeding visualized. Some enhancement noted at site of Nissen; however, read at that time felt mucosal enhancement more likely than active bleed  - Patient began actively bleeding again overnight 7/11, transfused 4 additional units, repeat scope again with large amount of blood in stomach, possible active bleed see in cardia along lesser curvature. Source unable to be visualized, clip placed to meeta location for IR. Left gastric embolized.   - CBC q8h  -H/H 8.8/25.6, stable, no melanotic stools overnight     Other  Shock  Hemorrhagic vs septic  - Tranfusing for Hg above 7, as no active signs of hemorrhage at this time  - Developed fevers 7/12, treating broadly with vanc, cefepime and flagyl  - off of pressors, normotensive        Critical secondary to Patient has a condition that poses threat to life and bodily function: GI bleed     Critical care was time spent personally by me on the following activities: development of treatment plan with patient or surrogate and bedside caregivers, discussions with consultants, evaluation of patient's response to treatment, examination of patient, ordering and performing treatments and interventions, ordering and review of laboratory studies, ordering and review of radiographic studies, pulse oximetry, re-evaluation of patient's condition. This critical care time did not overlap with that of any other provider or involve time for any procedures.     Irwin Stone MD  Critical Care Medicine  [facility]

## (undated) DEVICE — DRESSING MEPILEX BORDER 4 X 4

## (undated) DEVICE — BUR BONE CUT MICRO TPS 3X3.8MM

## (undated) DEVICE — DRAPE STERI-DRAPE 1000 17X11IN

## (undated) DEVICE — DRESSING TELFA STRL 4X3 LF

## (undated) DEVICE — MARKER SKIN STND TIP BLUE BARR

## (undated) DEVICE — SEE MEDLINE ITEM 157150

## (undated) DEVICE — SEE MEDLINE ITEM 152622

## (undated) DEVICE — DRAPE STERI INSTRUMENT 1018

## (undated) DEVICE — BLADE SURG CARBON STEEL #10

## (undated) DEVICE — SEE MEDLINE ITEM 156905

## (undated) DEVICE — DRAPE INCISE IOBAN 2 23X17IN

## (undated) DEVICE — TRAY FOLEY 16FR INFECTION CONT

## (undated) DEVICE — DRESSING TRANS 4X4 TEGADERM

## (undated) DEVICE — BLADE MILL BONE MEDIUM

## (undated) DEVICE — DRAPE C-ARMOR EQUIPMENT COVER

## (undated) DEVICE — CATH SUCTION 10FR

## (undated) DEVICE — DIFFUSER

## (undated) DEVICE — DRESSING AQUACEL FOAM 5 X 5

## (undated) DEVICE — CARTRIDGE OIL

## (undated) DEVICE — BLADE ELECTRO EDGE INSULATED

## (undated) DEVICE — CORD BIPOLAR 12 FOOT

## (undated) DEVICE — PACK SET UP CONVERTORS

## (undated) DEVICE — TUBE FRAZIER 5MM 2FT SOFT TIP

## (undated) DEVICE — ELECTRODE BLADE INSULATED 1 IN

## (undated) DEVICE — SEE MEDLINE ITEM 157117

## (undated) DEVICE — DRAPE C ARM 42 X 120 10/BX

## (undated) DEVICE — ADHESIVE MASTISOL VIAL 48/BX

## (undated) DEVICE — ELECTRODE REM PLYHSV RETURN 9

## (undated) DEVICE — DRESSING SURGICAL 1/2X1/2

## (undated) DEVICE — SUT VICRYL PLUS 3-0 SH 18IN

## (undated) DEVICE — DRAPE ABDOMINAL TIBURON 14X11

## (undated) DEVICE — ADHESIVE DERMABOND ADVANCED

## (undated) DEVICE — KIT SURGIFLO HEMOSTATIC MATRIX

## (undated) DEVICE — NDL SPINAL 18GX3.5 SPINOCAN

## (undated) DEVICE — SEE MEDLINE ITEM 157131

## (undated) DEVICE — DRAPE OPMI STERILE

## (undated) DEVICE — NDL N SERIES MICRO-DISSECTION